# Patient Record
Sex: MALE | Race: BLACK OR AFRICAN AMERICAN | NOT HISPANIC OR LATINO | Employment: OTHER | URBAN - METROPOLITAN AREA
[De-identification: names, ages, dates, MRNs, and addresses within clinical notes are randomized per-mention and may not be internally consistent; named-entity substitution may affect disease eponyms.]

---

## 2019-09-21 ENCOUNTER — HOSPITAL ENCOUNTER (OUTPATIENT)
Dept: MEDSURG UNIT | Facility: HOSPITAL | Age: 49
End: 2019-09-23
Attending: INTERNAL MEDICINE | Admitting: INTERNAL MEDICINE

## 2019-09-21 LAB
ABS NEUT (OLG): 6.58 X10(3)/MCL (ref 2.1–9.2)
ALBUMIN SERPL-MCNC: 3.4 GM/DL (ref 3.4–5)
ALBUMIN/GLOB SERPL: 1.1 {RATIO}
ALP SERPL-CCNC: 51 UNIT/L (ref 50–136)
ALT SERPL-CCNC: 20 UNIT/L (ref 12–78)
AMPHET UR QL SCN: NEGATIVE
AST SERPL-CCNC: 12 UNIT/L (ref 15–37)
BARBITURATE SCN PRESENT UR: NEGATIVE
BASOPHILS # BLD AUTO: 0 X10(3)/MCL (ref 0–0.2)
BASOPHILS NFR BLD AUTO: 0 %
BENZODIAZ UR QL SCN: NEGATIVE
BILIRUB SERPL-MCNC: 1 MG/DL (ref 0.2–1)
BILIRUBIN DIRECT+TOT PNL SERPL-MCNC: 0.1 MG/DL (ref 0–0.2)
BILIRUBIN DIRECT+TOT PNL SERPL-MCNC: 0.9 MG/DL (ref 0–0.8)
BNP BLD-MCNC: 17 PG/ML (ref 0–100)
BUN SERPL-MCNC: 35 MG/DL (ref 7–18)
CALCIUM SERPL-MCNC: 8.7 MG/DL (ref 8.5–10.1)
CANNABINOIDS UR QL SCN: NEGATIVE
CHLORIDE SERPL-SCNC: 101 MMOL/L (ref 98–107)
CO2 SERPL-SCNC: 27 MMOL/L (ref 21–32)
COCAINE UR QL SCN: POSITIVE
CREAT SERPL-MCNC: 2.49 MG/DL (ref 0.7–1.3)
EOSINOPHIL # BLD AUTO: 0 X10(3)/MCL (ref 0–0.9)
EOSINOPHIL NFR BLD AUTO: 0 %
ERYTHROCYTE [DISTWIDTH] IN BLOOD BY AUTOMATED COUNT: 15.5 % (ref 11.5–17)
GLOBULIN SER-MCNC: 3.1 GM/DL (ref 2.4–3.5)
GLUCOSE SERPL-MCNC: 94 MG/DL (ref 74–106)
HCO3 UR-SCNC: 24.7 MMOL/L (ref 22–26)
HCT VFR BLD AUTO: 44.8 % (ref 42–52)
HGB BLD-MCNC: 14.2 GM/DL (ref 14–18)
LYMPHOCYTES # BLD AUTO: 3.1 X10(3)/MCL (ref 0.6–4.6)
LYMPHOCYTES NFR BLD AUTO: 28 %
MCH RBC QN AUTO: 29.5 PG (ref 27–31)
MCHC RBC AUTO-ENTMCNC: 31.7 GM/DL (ref 33–36)
MCV RBC AUTO: 92.9 FL (ref 80–94)
MONOCYTES # BLD AUTO: 1.2 X10(3)/MCL (ref 0.1–1.3)
MONOCYTES NFR BLD AUTO: 10 %
NEUTROPHILS # BLD AUTO: 6.58 X10(3)/MCL (ref 2.1–9.2)
NEUTROPHILS NFR BLD AUTO: 60 %
O2 HGB ARTERIAL: 91.3 % (ref 94–97)
OPIATES UR QL SCN: POSITIVE
PCO2 BLDA: 49 MMHG (ref 35–45)
PCP UR QL: NEGATIVE
PH SMN: 7.31 [PH] (ref 7.35–7.45)
PH UR STRIP.AUTO: 6 [PH] (ref 5–7.5)
PLATELET # BLD AUTO: 315 X10(3)/MCL (ref 130–400)
PMV BLD AUTO: 9.4 FL (ref 9.4–12.4)
PO2 BLDA: 73 MMHG (ref 80–100)
POC ALLENS TEST: ABNORMAL
POC BE: -2.1 (ref -2–3)
POC CAO2: 19.1 ML/DL (ref 17.6–24.3)
POC CO HGB: 1.8 %
POC CO2: 26.2 MMOL/L (ref 22–27)
POC IONIZED CALCIUM: 1.15 MMOL/L (ref 1.12–1.23)
POC MET HGB: 1.5 % (ref 0.4–1.5)
POC SAMPLESOURCE: ABNORMAL
POC SATURATED O2: 92.9 % (ref 96–97)
POC SITE: ABNORMAL
POC THB: 14.9 GM/DL (ref 13.5–18)
POC TREATMENT: ABNORMAL
POC TROPONIN: 0.01 NG/ML (ref 0–0.08)
POTASSIUM BLD-SCNC: 3.9 MMOL/L (ref 3.6–5)
POTASSIUM SERPL-SCNC: 3.5 MMOL/L (ref 3.5–5.1)
PROT SERPL-MCNC: 6.5 GM/DL (ref 6.4–8.2)
RBC # BLD AUTO: 4.82 X10(6)/MCL (ref 4.7–6.1)
SETTING 1 ABG: ABNORMAL
SETTING 2 ABG: ABNORMAL
SETTING 3 ABG: ABNORMAL
SETTING 4 ABG: ABNORMAL
SODIUM BLD-SCNC: 136 MMOL/L (ref 137–145)
SODIUM SERPL-SCNC: 138 MMOL/L (ref 136–145)
SP GR FLD REFRACTOMETRY: 1.01 (ref 1–1.03)
WBC # SPEC AUTO: 11 X10(3)/MCL (ref 4.5–11.5)

## 2019-09-22 LAB
ABS NEUT (OLG): 11.15 X10(3)/MCL (ref 2.1–9.2)
BASOPHILS # BLD AUTO: 0 X10(3)/MCL (ref 0–0.2)
BASOPHILS NFR BLD AUTO: 0 %
BUN SERPL-MCNC: 29 MG/DL (ref 7–18)
CALCIUM SERPL-MCNC: 8.4 MG/DL (ref 8.5–10.1)
CHLORIDE SERPL-SCNC: 102 MMOL/L (ref 98–107)
CO2 SERPL-SCNC: 31 MMOL/L (ref 21–32)
CREAT SERPL-MCNC: 1.22 MG/DL (ref 0.7–1.3)
CREAT/UREA NIT SERPL: 23.8
EOSINOPHIL # BLD AUTO: 0 X10(3)/MCL (ref 0–0.9)
EOSINOPHIL NFR BLD AUTO: 0 %
ERYTHROCYTE [DISTWIDTH] IN BLOOD BY AUTOMATED COUNT: 15.2 % (ref 11.5–17)
GLUCOSE SERPL-MCNC: 102 MG/DL (ref 74–106)
HCT VFR BLD AUTO: 40.7 % (ref 42–52)
HGB BLD-MCNC: 12.7 GM/DL (ref 14–18)
LYMPHOCYTES # BLD AUTO: 1.2 X10(3)/MCL (ref 0.6–4.6)
LYMPHOCYTES NFR BLD AUTO: 9 %
MCH RBC QN AUTO: 29.3 PG (ref 27–31)
MCHC RBC AUTO-ENTMCNC: 31.2 GM/DL (ref 33–36)
MCV RBC AUTO: 93.8 FL (ref 80–94)
MONOCYTES # BLD AUTO: 1 X10(3)/MCL (ref 0.1–1.3)
MONOCYTES NFR BLD AUTO: 7 %
NEUTROPHILS # BLD AUTO: 11.15 X10(3)/MCL (ref 2.1–9.2)
NEUTROPHILS NFR BLD AUTO: 82 %
PLATELET # BLD AUTO: 258 X10(3)/MCL (ref 130–400)
PMV BLD AUTO: 9.4 FL (ref 9.4–12.4)
POTASSIUM SERPL-SCNC: 4 MMOL/L (ref 3.5–5.1)
RBC # BLD AUTO: 4.34 X10(6)/MCL (ref 4.7–6.1)
SODIUM SERPL-SCNC: 140 MMOL/L (ref 136–145)
WBC # SPEC AUTO: 13.5 X10(3)/MCL (ref 4.5–11.5)

## 2019-09-23 ENCOUNTER — HOSPITAL ENCOUNTER (OUTPATIENT)
Dept: INTENSIVE CARE | Facility: HOSPITAL | Age: 49
End: 2019-09-25
Attending: INTERNAL MEDICINE | Admitting: INTERNAL MEDICINE

## 2019-09-23 LAB
ABS NEUT (OLG): 6.7 X10(3)/MCL (ref 2.1–9.2)
ALBUMIN SERPL-MCNC: 3.5 GM/DL (ref 3.4–5)
ALBUMIN/GLOB SERPL: 0.9 RATIO (ref 1.1–2)
ALP SERPL-CCNC: 64 UNIT/L (ref 45–117)
ALT SERPL-CCNC: 30 UNIT/L (ref 12–78)
AST SERPL-CCNC: 21 UNIT/L (ref 15–37)
BASOPHILS # BLD AUTO: 0 X10(3)/MCL (ref 0–0.2)
BASOPHILS NFR BLD AUTO: 0 %
BILIRUB SERPL-MCNC: 0.3 MG/DL (ref 0.2–1)
BILIRUBIN DIRECT+TOT PNL SERPL-MCNC: 0.1 MG/DL (ref 0–0.2)
BILIRUBIN DIRECT+TOT PNL SERPL-MCNC: 0.2 MG/DL
BUN SERPL-MCNC: 24 MG/DL (ref 7–18)
BUN SERPL-MCNC: 24 MG/DL (ref 7–18)
CALCIUM SERPL-MCNC: 8.5 MG/DL (ref 8.5–10.1)
CALCIUM SERPL-MCNC: 8.8 MG/DL (ref 8.5–10.1)
CHLORIDE SERPL-SCNC: 103 MMOL/L (ref 98–107)
CHLORIDE SERPL-SCNC: 103 MMOL/L (ref 98–107)
CK MB SERPL-MCNC: <1 NG/ML (ref 1–3.6)
CK SERPL-CCNC: 68 UNIT/L (ref 39–308)
CO2 SERPL-SCNC: 31 MMOL/L (ref 21–32)
CO2 SERPL-SCNC: 32 MMOL/L (ref 21–32)
CREAT SERPL-MCNC: 0.99 MG/DL (ref 0.7–1.3)
CREAT SERPL-MCNC: 1.1 MG/DL (ref 0.6–1.3)
CREAT/UREA NIT SERPL: 24.2
ERYTHROCYTE [DISTWIDTH] IN BLOOD BY AUTOMATED COUNT: 15.4 % (ref 11.5–17)
ERYTHROCYTE [DISTWIDTH] IN BLOOD BY AUTOMATED COUNT: 15.6 % (ref 11.5–14.5)
GLOBULIN SER-MCNC: 4 GM/ML (ref 2.3–3.5)
GLUCOSE SERPL-MCNC: 113 MG/DL (ref 74–106)
GLUCOSE SERPL-MCNC: 81 MG/DL (ref 74–106)
HCO3 UR-SCNC: 32 MMOL/L (ref 22–26)
HCT VFR BLD AUTO: 39.4 % (ref 42–52)
HCT VFR BLD AUTO: 46 % (ref 40–51)
HGB BLD-MCNC: 12.4 GM/DL (ref 14–18)
HGB BLD-MCNC: 14.6 GM/DL (ref 13.5–17.5)
IMM GRANULOCYTES # BLD AUTO: 0.1 10*3/UL
IMM GRANULOCYTES NFR BLD AUTO: 1 %
LYMPHOCYTES # BLD AUTO: 1.2 X10(3)/MCL (ref 0.6–4.6)
LYMPHOCYTES NFR BLD AUTO: 14 %
MCH RBC QN AUTO: 29.6 PG (ref 27–31)
MCH RBC QN AUTO: 29.9 PG (ref 26–34)
MCHC RBC AUTO-ENTMCNC: 31.5 GM/DL (ref 33–36)
MCHC RBC AUTO-ENTMCNC: 31.7 GM/DL (ref 31–37)
MCV RBC AUTO: 94 FL (ref 80–94)
MCV RBC AUTO: 94.1 FL (ref 80–100)
MONOCYTES # BLD AUTO: 0.6 X10(3)/MCL (ref 0.1–1.3)
MONOCYTES NFR BLD AUTO: 6 %
NEUTROPHILS # BLD AUTO: 6.7 X10(3)/MCL (ref 2.1–9.2)
NEUTROPHILS NFR BLD AUTO: 78 %
O2 HGB ARTERIAL: 92.6 % (ref 94–100)
PCO2 BLDA: 58 MMHG (ref 35–45)
PH SMN: 7.35 [PH] (ref 7.35–7.45)
PLATELET # BLD AUTO: 235 X10(3)/MCL (ref 130–400)
PLATELET # BLD AUTO: 336 X10(3)/MCL (ref 130–400)
PMV BLD AUTO: 9.3 FL (ref 7.4–10.4)
PMV BLD AUTO: 9.4 FL (ref 9.4–12.4)
PO2 BLDA: 68 MMHG (ref 80–100)
POC ALLENS TEST: POSITIVE
POC BE: 4.6 (ref -2–2)
POC CO HGB: 1.4 %
POC CO2: 33.8 MMOL/L (ref 22–26)
POC MET HGB: 0.6 % (ref 0.4–1.5)
POC SAMPLESOURCE: ABNORMAL
POC SATURATED O2: 94.5 %
POC SITE: ABNORMAL
POC THB: 14.7 GM/DL (ref 12–16)
POC TREATMENT: ABNORMAL
POTASSIUM SERPL-SCNC: 3.9 MMOL/L (ref 3.5–5.1)
POTASSIUM SERPL-SCNC: 4.5 MMOL/L (ref 3.5–5.1)
PROT SERPL-MCNC: 7.5 GM/DL (ref 6.4–8.2)
RBC # BLD AUTO: 4.19 X10(6)/MCL (ref 4.7–6.1)
RBC # BLD AUTO: 4.89 X10(6)/MCL (ref 4.5–5.9)
SODIUM SERPL-SCNC: 139 MMOL/L (ref 136–145)
SODIUM SERPL-SCNC: 140 MMOL/L (ref 136–145)
TROPONIN I SERPL-MCNC: <0.015 NG/ML (ref 0–0.05)
WBC # SPEC AUTO: 8.6 X10(3)/MCL (ref 4.5–11)
WBC # SPEC AUTO: 9.3 X10(3)/MCL (ref 4.5–11.5)

## 2019-09-24 LAB
ABS NEUT (OLG): 9.12 X10(3)/MCL (ref 2.1–9.2)
ALBUMIN SERPL-MCNC: 2.9 GM/DL (ref 3.4–5)
ALBUMIN/GLOB SERPL: 0.8 RATIO (ref 1.1–2)
ALP SERPL-CCNC: 51 UNIT/L (ref 45–117)
ALT SERPL-CCNC: 19 UNIT/L (ref 12–78)
AST SERPL-CCNC: 7 UNIT/L (ref 15–37)
BASOPHILS # BLD AUTO: 0 X10(3)/MCL (ref 0–0.2)
BASOPHILS NFR BLD AUTO: 0 %
BILIRUB SERPL-MCNC: 0.2 MG/DL (ref 0.2–1)
BILIRUBIN DIRECT+TOT PNL SERPL-MCNC: <0.1 MG/DL (ref 0–0.2)
BILIRUBIN DIRECT+TOT PNL SERPL-MCNC: ABNORMAL MG/DL
BUN SERPL-MCNC: 23 MG/DL (ref 7–18)
CALCIUM SERPL-MCNC: 8.7 MG/DL (ref 8.5–10.1)
CHLORIDE SERPL-SCNC: 105 MMOL/L (ref 98–107)
CO2 SERPL-SCNC: 29 MMOL/L (ref 21–32)
CREAT SERPL-MCNC: 1 MG/DL (ref 0.6–1.3)
ERYTHROCYTE [DISTWIDTH] IN BLOOD BY AUTOMATED COUNT: 15.4 % (ref 11.5–14.5)
GLOBULIN SER-MCNC: 3.7 GM/ML (ref 2.3–3.5)
GLUCOSE SERPL-MCNC: 132 MG/DL (ref 74–106)
HCT VFR BLD AUTO: 40.7 % (ref 40–51)
HGB BLD-MCNC: 13.1 GM/DL (ref 13.5–17.5)
IMM GRANULOCYTES # BLD AUTO: 0.09 10*3/UL
IMM GRANULOCYTES NFR BLD AUTO: 1 %
LYMPHOCYTES # BLD AUTO: 0.6 X10(3)/MCL (ref 0.6–4.6)
LYMPHOCYTES NFR BLD AUTO: 6 %
MCH RBC QN AUTO: 29.8 PG (ref 26–34)
MCHC RBC AUTO-ENTMCNC: 32.2 GM/DL (ref 31–37)
MCV RBC AUTO: 92.5 FL (ref 80–100)
MONOCYTES # BLD AUTO: 0.4 X10(3)/MCL (ref 0.1–1.3)
MONOCYTES NFR BLD AUTO: 3 %
NEUTROPHILS # BLD AUTO: 9.12 X10(3)/MCL (ref 2.1–9.2)
NEUTROPHILS NFR BLD AUTO: 89 %
PLATELET # BLD AUTO: 297 X10(3)/MCL (ref 130–400)
PMV BLD AUTO: 9.7 FL (ref 7.4–10.4)
POTASSIUM SERPL-SCNC: 4.8 MMOL/L (ref 3.5–5.1)
PROT SERPL-MCNC: 6.6 GM/DL (ref 6.4–8.2)
RBC # BLD AUTO: 4.4 X10(6)/MCL (ref 4.5–5.9)
SODIUM SERPL-SCNC: 139 MMOL/L (ref 136–145)
T4 FREE SERPL-MCNC: 0.78 NG/DL (ref 0.76–1.46)
TSH SERPL-ACNC: 0.14 MIU/L (ref 0.36–3.74)
WBC # SPEC AUTO: 10.2 X10(3)/MCL (ref 4.5–11)

## 2019-09-25 LAB
ABS NEUT (OLG): 8.87 X10(3)/MCL (ref 2.1–9.2)
ALBUMIN SERPL-MCNC: 2.6 GM/DL (ref 3.4–5)
ALBUMIN/GLOB SERPL: 0.6 RATIO (ref 1.1–2)
ALP SERPL-CCNC: 55 UNIT/L (ref 45–117)
ALT SERPL-CCNC: 24 UNIT/L (ref 12–78)
AST SERPL-CCNC: 36 UNIT/L (ref 15–37)
BASOPHILS # BLD AUTO: 0 X10(3)/MCL (ref 0–0.2)
BASOPHILS NFR BLD AUTO: 0 %
BILIRUB SERPL-MCNC: 0.8 MG/DL (ref 0.2–1)
BUN SERPL-MCNC: 23 MG/DL (ref 7–18)
BUN SERPL-MCNC: 24 MG/DL (ref 7–18)
CALCIUM SERPL-MCNC: 8.6 MG/DL (ref 8.5–10.1)
CALCIUM SERPL-MCNC: 8.8 MG/DL (ref 8.5–10.1)
CHLORIDE SERPL-SCNC: 104 MMOL/L (ref 98–107)
CHLORIDE SERPL-SCNC: 105 MMOL/L (ref 98–107)
CO2 SERPL-SCNC: 28 MMOL/L (ref 21–32)
CO2 SERPL-SCNC: 32 MMOL/L (ref 21–32)
CREAT SERPL-MCNC: 1 MG/DL (ref 0.6–1.3)
CREAT SERPL-MCNC: 1.2 MG/DL (ref 0.6–1.3)
CREAT/UREA NIT SERPL: 19
EOSINOPHIL # BLD AUTO: 0 X10(3)/MCL (ref 0–0.9)
EOSINOPHIL NFR BLD AUTO: 0 %
ERYTHROCYTE [DISTWIDTH] IN BLOOD BY AUTOMATED COUNT: 15.4 % (ref 11.5–14.5)
GLOBULIN SER-MCNC: 4 GM/ML (ref 2.3–3.5)
GLUCOSE SERPL-MCNC: 92 MG/DL (ref 74–106)
GLUCOSE SERPL-MCNC: 98 MG/DL (ref 74–106)
HCT VFR BLD AUTO: 43.1 % (ref 40–51)
HGB BLD-MCNC: 13.7 GM/DL (ref 13.5–17.5)
IMM GRANULOCYTES # BLD AUTO: 0.16 10*3/UL
IMM GRANULOCYTES NFR BLD AUTO: 1 %
LYMPHOCYTES # BLD AUTO: 2 X10(3)/MCL (ref 0.6–4.6)
LYMPHOCYTES NFR BLD AUTO: 17 %
MCH RBC QN AUTO: 30.1 PG (ref 26–34)
MCHC RBC AUTO-ENTMCNC: 31.8 GM/DL (ref 31–37)
MCV RBC AUTO: 94.7 FL (ref 80–100)
MONOCYTES # BLD AUTO: 1.1 X10(3)/MCL (ref 0.1–1.3)
MONOCYTES NFR BLD AUTO: 9 %
NEUTROPHILS # BLD AUTO: 8.87 X10(3)/MCL (ref 2.1–9.2)
NEUTROPHILS NFR BLD AUTO: 72 %
PLATELET # BLD AUTO: 270 X10(3)/MCL (ref 130–400)
PMV BLD AUTO: 10.2 FL (ref 7.4–10.4)
POTASSIUM SERPL-SCNC: 3.7 MMOL/L (ref 3.5–5.1)
POTASSIUM SERPL-SCNC: 5.5 MMOL/L (ref 3.5–5.1)
PROT SERPL-MCNC: 6.6 GM/DL (ref 6.4–8.2)
RBC # BLD AUTO: 4.55 X10(6)/MCL (ref 4.5–5.9)
SODIUM SERPL-SCNC: 138 MMOL/L (ref 136–145)
SODIUM SERPL-SCNC: 140 MMOL/L (ref 136–145)
WBC # SPEC AUTO: 12.2 X10(3)/MCL (ref 4.5–11)

## 2022-01-01 ENCOUNTER — PATIENT OUTREACH (OUTPATIENT)
Dept: ADMINISTRATIVE | Facility: CLINIC | Age: 52
End: 2022-01-01
Payer: MEDICAID

## 2022-01-01 ENCOUNTER — DOCUMENTATION ONLY (OUTPATIENT)
Dept: PHARMACY | Facility: HOSPITAL | Age: 52
End: 2022-01-01
Payer: MEDICAID

## 2022-01-01 ENCOUNTER — HOSPITAL ENCOUNTER (OUTPATIENT)
Facility: HOSPITAL | Age: 52
Discharge: HOME | End: 2022-11-02
Attending: EMERGENCY MEDICINE | Admitting: INTERNAL MEDICINE
Payer: MEDICAID

## 2022-01-01 ENCOUNTER — HOSPITAL ENCOUNTER (INPATIENT)
Facility: HOSPITAL | Age: 52
LOS: 2 days | Discharge: HOME | DRG: 305 | End: 2022-08-20
Attending: EMERGENCY MEDICINE | Admitting: STUDENT IN AN ORGANIZED HEALTH CARE EDUCATION/TRAINING PROGRAM
Payer: MEDICAID

## 2022-01-01 ENCOUNTER — HISTORICAL (OUTPATIENT)
Dept: ADMINISTRATIVE | Facility: HOSPITAL | Age: 52
End: 2022-01-01
Payer: MEDICAID

## 2022-01-01 ENCOUNTER — HOSPITAL ENCOUNTER (INPATIENT)
Facility: HOSPITAL | Age: 52
LOS: 3 days | Discharge: HOME | DRG: 291 | End: 2022-10-23
Attending: EMERGENCY MEDICINE | Admitting: INTERNAL MEDICINE
Payer: MEDICAID

## 2022-01-01 ENCOUNTER — HOSPITAL ENCOUNTER (INPATIENT)
Facility: HOSPITAL | Age: 52
LOS: 5 days | Discharge: HOME | DRG: 308 | End: 2022-05-25
Attending: STUDENT IN AN ORGANIZED HEALTH CARE EDUCATION/TRAINING PROGRAM | Admitting: STUDENT IN AN ORGANIZED HEALTH CARE EDUCATION/TRAINING PROGRAM
Payer: MEDICAID

## 2022-01-01 ENCOUNTER — HOSPITAL ENCOUNTER (INPATIENT)
Facility: HOSPITAL | Age: 52
LOS: 3 days | Discharge: HOME | DRG: 291 | End: 2022-06-10
Attending: STUDENT IN AN ORGANIZED HEALTH CARE EDUCATION/TRAINING PROGRAM | Admitting: HOSPITALIST
Payer: MEDICAID

## 2022-01-01 ENCOUNTER — HOSPITAL ENCOUNTER (INPATIENT)
Facility: HOSPITAL | Age: 52
LOS: 2 days | DRG: 208 | End: 2022-12-31
Attending: FAMILY MEDICINE | Admitting: INTERNAL MEDICINE
Payer: MEDICAID

## 2022-01-01 ENCOUNTER — HOSPITAL ENCOUNTER (INPATIENT)
Facility: HOSPITAL | Age: 52
LOS: 3 days | Discharge: HOME | DRG: 190 | End: 2022-07-27
Attending: EMERGENCY MEDICINE | Admitting: INTERNAL MEDICINE
Payer: MEDICAID

## 2022-01-01 ENCOUNTER — HOSPITAL ENCOUNTER (INPATIENT)
Facility: HOSPITAL | Age: 52
LOS: 3 days | Discharge: HOME | DRG: 308 | End: 2022-09-25
Attending: EMERGENCY MEDICINE | Admitting: INTERNAL MEDICINE
Payer: MEDICAID

## 2022-01-01 VITALS
WEIGHT: 315 LBS | RESPIRATION RATE: 18 BRPM | DIASTOLIC BLOOD PRESSURE: 81 MMHG | TEMPERATURE: 98 F | HEART RATE: 120 BPM | OXYGEN SATURATION: 95 % | BODY MASS INDEX: 47.63 KG/M2 | SYSTOLIC BLOOD PRESSURE: 140 MMHG

## 2022-01-01 VITALS
TEMPERATURE: 98 F | RESPIRATION RATE: 22 BRPM | HEART RATE: 96 BPM | WEIGHT: 315 LBS | BODY MASS INDEX: 42.66 KG/M2 | SYSTOLIC BLOOD PRESSURE: 150 MMHG | OXYGEN SATURATION: 96 % | HEIGHT: 72 IN | DIASTOLIC BLOOD PRESSURE: 87 MMHG

## 2022-01-01 VITALS
DIASTOLIC BLOOD PRESSURE: 72 MMHG | WEIGHT: 315 LBS | BODY MASS INDEX: 42.66 KG/M2 | OXYGEN SATURATION: 93 % | TEMPERATURE: 98 F | RESPIRATION RATE: 18 BRPM | HEIGHT: 72 IN | HEART RATE: 108 BPM | SYSTOLIC BLOOD PRESSURE: 110 MMHG

## 2022-01-01 VITALS
HEART RATE: 98 BPM | HEIGHT: 72 IN | BODY MASS INDEX: 42.66 KG/M2 | WEIGHT: 315 LBS | DIASTOLIC BLOOD PRESSURE: 81 MMHG | TEMPERATURE: 97 F | OXYGEN SATURATION: 99 % | SYSTOLIC BLOOD PRESSURE: 110 MMHG | RESPIRATION RATE: 18 BRPM

## 2022-01-01 VITALS
DIASTOLIC BLOOD PRESSURE: 68 MMHG | WEIGHT: 315 LBS | SYSTOLIC BLOOD PRESSURE: 116 MMHG | BODY MASS INDEX: 41.75 KG/M2 | HEIGHT: 73 IN

## 2022-01-01 VITALS
BODY MASS INDEX: 42.66 KG/M2 | WEIGHT: 315 LBS | SYSTOLIC BLOOD PRESSURE: 136 MMHG | OXYGEN SATURATION: 99 % | TEMPERATURE: 98 F | HEIGHT: 72 IN | HEART RATE: 72 BPM | DIASTOLIC BLOOD PRESSURE: 83 MMHG | RESPIRATION RATE: 18 BRPM

## 2022-01-01 VITALS
RESPIRATION RATE: 18 BRPM | HEIGHT: 72 IN | DIASTOLIC BLOOD PRESSURE: 78 MMHG | SYSTOLIC BLOOD PRESSURE: 114 MMHG | TEMPERATURE: 98 F | HEART RATE: 94 BPM | WEIGHT: 315 LBS | OXYGEN SATURATION: 94 % | BODY MASS INDEX: 42.66 KG/M2

## 2022-01-01 VITALS
WEIGHT: 315 LBS | HEIGHT: 72 IN | DIASTOLIC BLOOD PRESSURE: 47 MMHG | OXYGEN SATURATION: 95 % | BODY MASS INDEX: 42.66 KG/M2 | SYSTOLIC BLOOD PRESSURE: 73 MMHG | RESPIRATION RATE: 26 BRPM | TEMPERATURE: 96 F

## 2022-01-01 VITALS
RESPIRATION RATE: 16 BRPM | TEMPERATURE: 98 F | HEART RATE: 82 BPM | OXYGEN SATURATION: 96 % | HEIGHT: 73 IN | BODY MASS INDEX: 41.75 KG/M2 | SYSTOLIC BLOOD PRESSURE: 132 MMHG | WEIGHT: 315 LBS | DIASTOLIC BLOOD PRESSURE: 95 MMHG

## 2022-01-01 DIAGNOSIS — R06.02 SOB (SHORTNESS OF BREATH): ICD-10-CM

## 2022-01-01 DIAGNOSIS — I50.9 ACUTE ON CHRONIC CONGESTIVE HEART FAILURE, UNSPECIFIED HEART FAILURE TYPE: ICD-10-CM

## 2022-01-01 DIAGNOSIS — I48.92 ATRIAL FLUTTER WITH RAPID VENTRICULAR RESPONSE: ICD-10-CM

## 2022-01-01 DIAGNOSIS — J44.1 COPD EXACERBATION: Primary | ICD-10-CM

## 2022-01-01 DIAGNOSIS — I50.9 CHF (CONGESTIVE HEART FAILURE): ICD-10-CM

## 2022-01-01 DIAGNOSIS — R06.02 SOB (SHORTNESS OF BREATH): Primary | ICD-10-CM

## 2022-01-01 DIAGNOSIS — R07.9 CHEST PAIN: ICD-10-CM

## 2022-01-01 DIAGNOSIS — I50.9 CHF EXACERBATION: ICD-10-CM

## 2022-01-01 DIAGNOSIS — N17.9 AKI (ACUTE KIDNEY INJURY): ICD-10-CM

## 2022-01-01 DIAGNOSIS — I49.9 ARRHYTHMIA: ICD-10-CM

## 2022-01-01 DIAGNOSIS — I48.91 ATRIAL FIBRILLATION WITH RVR: ICD-10-CM

## 2022-01-01 DIAGNOSIS — J44.9 CHRONIC OBSTRUCTIVE PULMONARY DISEASE, UNSPECIFIED COPD TYPE: Primary | ICD-10-CM

## 2022-01-01 DIAGNOSIS — R06.02 SHORTNESS OF BREATH: ICD-10-CM

## 2022-01-01 DIAGNOSIS — I50.43 ACUTE ON CHRONIC COMBINED SYSTOLIC AND DIASTOLIC CONGESTIVE HEART FAILURE: ICD-10-CM

## 2022-01-01 DIAGNOSIS — I46.9 CARDIAC ARREST: Primary | ICD-10-CM

## 2022-01-01 DIAGNOSIS — I48.91 AFIB: ICD-10-CM

## 2022-01-01 DIAGNOSIS — I50.43 CHF (CONGESTIVE HEART FAILURE), NYHA CLASS I, ACUTE ON CHRONIC, COMBINED: Primary | ICD-10-CM

## 2022-01-01 DIAGNOSIS — J44.1 COPD EXACERBATION: ICD-10-CM

## 2022-01-01 DIAGNOSIS — G47.33 OSA (OBSTRUCTIVE SLEEP APNEA): Primary | ICD-10-CM

## 2022-01-01 DIAGNOSIS — I48.91 ATRIAL FIBRILLATION STATUS POST CARDIOVERSION: ICD-10-CM

## 2022-01-01 DIAGNOSIS — F14.10 COCAINE ABUSE: ICD-10-CM

## 2022-01-01 DIAGNOSIS — J96.01 ACUTE RESPIRATORY FAILURE WITH HYPOXIA: ICD-10-CM

## 2022-01-01 DIAGNOSIS — J96.01 ACUTE RESPIRATORY FAILURE WITH HYPOXIA AND HYPERCAPNIA: Primary | ICD-10-CM

## 2022-01-01 DIAGNOSIS — J96.22 ACUTE ON CHRONIC RESPIRATORY FAILURE WITH HYPOXIA AND HYPERCAPNIA: ICD-10-CM

## 2022-01-01 DIAGNOSIS — R06.03 ACUTE RESPIRATORY DISTRESS: Primary | ICD-10-CM

## 2022-01-01 DIAGNOSIS — I50.9 CONGESTIVE HEART FAILURE, UNSPECIFIED HF CHRONICITY, UNSPECIFIED HEART FAILURE TYPE: ICD-10-CM

## 2022-01-01 DIAGNOSIS — J96.02 ACUTE RESPIRATORY FAILURE WITH HYPOXIA AND HYPERCARBIA: ICD-10-CM

## 2022-01-01 DIAGNOSIS — J96.01 ACUTE RESPIRATORY FAILURE WITH HYPOXIA AND HYPERCARBIA: ICD-10-CM

## 2022-01-01 DIAGNOSIS — G93.1: ICD-10-CM

## 2022-01-01 DIAGNOSIS — I50.43 CHF (CONGESTIVE HEART FAILURE), NYHA CLASS I, ACUTE ON CHRONIC, COMBINED: ICD-10-CM

## 2022-01-01 DIAGNOSIS — J44.1 ACUTE EXACERBATION OF CHRONIC OBSTRUCTIVE PULMONARY DISEASE (COPD): ICD-10-CM

## 2022-01-01 DIAGNOSIS — I48.92 ATRIAL FLUTTER: ICD-10-CM

## 2022-01-01 DIAGNOSIS — R09.02 HYPOXIA: ICD-10-CM

## 2022-01-01 DIAGNOSIS — R73.9 HYPERGLYCEMIA: ICD-10-CM

## 2022-01-01 DIAGNOSIS — I50.30 (HFPEF) HEART FAILURE WITH PRESERVED EJECTION FRACTION: ICD-10-CM

## 2022-01-01 DIAGNOSIS — I48.91 A-FIB: ICD-10-CM

## 2022-01-01 DIAGNOSIS — E11.9 TYPE 2 DIABETES MELLITUS WITHOUT COMPLICATION, WITHOUT LONG-TERM CURRENT USE OF INSULIN: ICD-10-CM

## 2022-01-01 DIAGNOSIS — R07.9 CHEST PAIN, UNSPECIFIED TYPE: ICD-10-CM

## 2022-01-01 DIAGNOSIS — I48.91 ATRIAL FIBRILLATION AND FLUTTER: ICD-10-CM

## 2022-01-01 DIAGNOSIS — J96.21 ACUTE ON CHRONIC RESPIRATORY FAILURE WITH HYPOXIA AND HYPERCAPNIA: ICD-10-CM

## 2022-01-01 DIAGNOSIS — I48.92 ATRIAL FIBRILLATION AND FLUTTER: ICD-10-CM

## 2022-01-01 DIAGNOSIS — R06.00 DYSPNEA: ICD-10-CM

## 2022-01-01 DIAGNOSIS — J96.02 ACUTE RESPIRATORY FAILURE WITH HYPOXIA AND HYPERCAPNIA: Primary | ICD-10-CM

## 2022-01-01 DIAGNOSIS — I16.0 HYPERTENSIVE URGENCY: Primary | ICD-10-CM

## 2022-01-01 DIAGNOSIS — I48.92 ATRIAL FLUTTER, UNSPECIFIED TYPE: ICD-10-CM

## 2022-01-01 DIAGNOSIS — I10 PRIMARY HYPERTENSION: ICD-10-CM

## 2022-01-01 DIAGNOSIS — I48.91 ATRIAL FIBRILLATION: ICD-10-CM

## 2022-01-01 DIAGNOSIS — J96.22 ACUTE ON CHRONIC RESPIRATORY FAILURE WITH HYPERCAPNIA: ICD-10-CM

## 2022-01-01 DIAGNOSIS — I16.1 HYPERTENSIVE EMERGENCY: ICD-10-CM

## 2022-01-01 LAB
ALBUMIN SERPL-MCNC: 3 G/DL (ref 3.5–5)
ALBUMIN SERPL-MCNC: 3.2 GM/DL (ref 3.5–5)
ALBUMIN SERPL-MCNC: 3.3 GM/DL (ref 3.5–5)
ALBUMIN SERPL-MCNC: 3.4 GM/DL (ref 3.5–5)
ALBUMIN SERPL-MCNC: 3.5 GM/DL (ref 3.5–5)
ALBUMIN SERPL-MCNC: 3.5 GM/DL (ref 3.5–5)
ALBUMIN SERPL-MCNC: 3.6 GM/DL (ref 3.5–5)
ALBUMIN SERPL-MCNC: 3.7 G/DL (ref 3.5–5)
ALBUMIN SERPL-MCNC: 3.7 GM/DL (ref 3.5–5)
ALBUMIN SERPL-MCNC: 3.7 GM/DL (ref 3.5–5)
ALBUMIN SERPL-MCNC: 3.8 GM/DL (ref 3.5–5)
ALBUMIN SERPL-MCNC: 3.9 GM/DL (ref 3.5–5)
ALBUMIN/GLOB SERPL: 0.9 RATIO (ref 1.1–2)
ALBUMIN/GLOB SERPL: 1 RATIO (ref 1.1–2)
ALBUMIN/GLOB SERPL: 1.1 RATIO (ref 1.1–2)
ALBUMIN/GLOB SERPL: 1.2 RATIO (ref 1.1–2)
ALP SERPL-CCNC: 112 UNIT/L (ref 40–150)
ALP SERPL-CCNC: 50 UNIT/L (ref 40–150)
ALP SERPL-CCNC: 52 UNIT/L (ref 40–150)
ALP SERPL-CCNC: 54 UNIT/L (ref 40–150)
ALP SERPL-CCNC: 55 UNIT/L (ref 40–150)
ALP SERPL-CCNC: 55 UNIT/L (ref 40–150)
ALP SERPL-CCNC: 60 UNIT/L (ref 40–150)
ALP SERPL-CCNC: 62 UNIT/L (ref 40–150)
ALP SERPL-CCNC: 62 UNIT/L (ref 40–150)
ALP SERPL-CCNC: 64 UNIT/L (ref 40–150)
ALP SERPL-CCNC: 64 UNIT/L (ref 40–150)
ALP SERPL-CCNC: 65 UNIT/L (ref 40–150)
ALP SERPL-CCNC: 66 UNIT/L (ref 40–150)
ALP SERPL-CCNC: 67 UNIT/L (ref 40–150)
ALP SERPL-CCNC: 67 UNIT/L (ref 40–150)
ALP SERPL-CCNC: 68 UNIT/L (ref 40–150)
ALP SERPL-CCNC: 70 UNIT/L (ref 40–150)
ALP SERPL-CCNC: 71 UNIT/L (ref 40–150)
ALP SERPL-CCNC: 88 UNIT/L (ref 40–150)
ALT SERPL-CCNC: 10 UNIT/L (ref 0–55)
ALT SERPL-CCNC: 11 UNIT/L (ref 0–55)
ALT SERPL-CCNC: 11 UNIT/L (ref 0–55)
ALT SERPL-CCNC: 13 UNIT/L (ref 0–55)
ALT SERPL-CCNC: 17 UNIT/L (ref 0–55)
ALT SERPL-CCNC: 5 UNIT/L (ref 0–55)
ALT SERPL-CCNC: 6 UNIT/L (ref 0–55)
ALT SERPL-CCNC: 7 UNIT/L (ref 0–55)
ALT SERPL-CCNC: 8 UNIT/L (ref 0–55)
ALT SERPL-CCNC: 9 UNIT/L (ref 0–55)
AMPHET UR QL SCN: NEGATIVE
ANION GAP SERPL CALC-SCNC: 10 MEQ/L
ANION GAP SERPL CALC-SCNC: 11 MEQ/L
ANION GAP SERPL CALC-SCNC: 12 MEQ/L
ANION GAP SERPL CALC-SCNC: 12 MMOL/L (ref 8–16)
ANION GAP SERPL CALC-SCNC: 16 MEQ/L
ANION GAP SERPL CALC-SCNC: 20 MEQ/L
ANION GAP SERPL CALC-SCNC: 21 MEQ/L
ANION GAP SERPL CALC-SCNC: 21 MEQ/L
ANION GAP SERPL CALC-SCNC: 23 MEQ/L
ANION GAP SERPL CALC-SCNC: 8 MEQ/L
ANION GAP SERPL CALC-SCNC: 9 MEQ/L
APTT PPP: 20.6 SECONDS (ref 23.2–33.7)
APTT PPP: 28.3 SECONDS (ref 23.2–33.7)
APTT PPP: 33 SECONDS
APTT PPP: 33.9 SECONDS
APTT PPP: 35.6 SECONDS
APTT PPP: >200 SECONDS
AST SERPL-CCNC: 10 UNIT/L (ref 5–34)
AST SERPL-CCNC: 11 UNIT/L (ref 5–34)
AST SERPL-CCNC: 12 UNIT/L (ref 5–34)
AST SERPL-CCNC: 13 UNIT/L (ref 5–34)
AST SERPL-CCNC: 13 UNIT/L (ref 5–34)
AST SERPL-CCNC: 14 UNIT/L (ref 5–34)
AST SERPL-CCNC: 16 UNIT/L (ref 5–34)
AST SERPL-CCNC: 17 UNIT/L (ref 5–34)
AST SERPL-CCNC: 33 UNIT/L (ref 5–34)
AST SERPL-CCNC: 7 UNIT/L (ref 5–34)
AST SERPL-CCNC: 8 UNIT/L (ref 5–34)
AST SERPL-CCNC: 9 UNIT/L (ref 5–34)
AST SERPL-CCNC: 97 UNIT/L (ref 5–34)
AV INDEX (PROSTH): 0.56
AV INDEX (PROSTH): 0.9
AV MEAN GRADIENT: 3 MMHG
AV MEAN GRADIENT: 3 MMHG
AV PEAK GRADIENT: 5 MMHG
AV PEAK GRADIENT: 6 MMHG
AV VALVE AREA: 1.95 CM2
AV VALVE AREA: 3.11 CM2
AV VELOCITY RATIO: 0.66
AV VELOCITY RATIO: 0.87
B-OH-BUTYR SERPL-MCNC: 0.2 MMOL/L
BARBITURATE SCN PRESENT UR: NEGATIVE
BASE EXCESS ARTERIAL: -0.3 MMOL/L (ref -2–2)
BASE EXCESS ARTERIAL: NORMAL
BASOPHILS # BLD AUTO: 0.01 X10(3)/MCL (ref 0–0.2)
BASOPHILS # BLD AUTO: 0.02 X10(3)/MCL (ref 0–0.2)
BASOPHILS # BLD AUTO: 0.03 X10(3)/MCL (ref 0–0.2)
BASOPHILS # BLD AUTO: 0.04 X10(3)/MCL (ref 0–0.2)
BASOPHILS # BLD AUTO: 0.05 X10(3)/MCL (ref 0–0.2)
BASOPHILS # BLD AUTO: 0.06 X10(3)/MCL (ref 0–0.2)
BASOPHILS NFR BLD AUTO: 0.1 %
BASOPHILS NFR BLD AUTO: 0.2 %
BASOPHILS NFR BLD AUTO: 0.3 %
BASOPHILS NFR BLD AUTO: 0.3 %
BASOPHILS NFR BLD AUTO: 0.4 %
BASOPHILS NFR BLD AUTO: 0.5 %
BASOPHILS NFR BLD AUTO: 0.5 %
BASOPHILS NFR BLD AUTO: 0.6 %
BASOPHILS NFR BLD AUTO: 0.6 %
BASOPHILS NFR BLD AUTO: 0.7 %
BASOPHILS NFR BLD AUTO: 0.7 %
BASOPHILS NFR BLD AUTO: 0.8 %
BENZODIAZ UR QL SCN: NEGATIVE
BILIRUBIN DIRECT+TOT PNL SERPL-MCNC: 0.2 MG/DL
BILIRUBIN DIRECT+TOT PNL SERPL-MCNC: 0.3 MG/DL
BILIRUBIN DIRECT+TOT PNL SERPL-MCNC: 0.4 MG/DL
BILIRUBIN DIRECT+TOT PNL SERPL-MCNC: 0.5 MG/DL
BILIRUBIN DIRECT+TOT PNL SERPL-MCNC: 0.6 MG/DL
BILIRUBIN DIRECT+TOT PNL SERPL-MCNC: 0.7 MG/DL
BILIRUBIN DIRECT+TOT PNL SERPL-MCNC: 0.8 MG/DL
BNP BLD-MCNC: 168.2 PG/ML
BNP BLD-MCNC: 192.3 PG/ML
BNP BLD-MCNC: 28 PG/ML
BNP BLD-MCNC: 324.8 PG/ML
BNP BLD-MCNC: 34.1 PG/ML
BNP BLD-MCNC: 410.1 PG/ML
BNP BLD-MCNC: 510.3 PG/ML
BNP BLD-MCNC: 87.6 PG/ML
BSA FOR ECHO PROCEDURE: 2.82 M2
BSA FOR ECHO PROCEDURE: 2.89 M2
BSA FOR ECHO PROCEDURE: 2.93 M2
BUN SERPL-MCNC: 12 MG/DL (ref 8.4–25.7)
BUN SERPL-MCNC: 12.7 MG/DL (ref 8.4–25.7)
BUN SERPL-MCNC: 12.8 MG/DL (ref 8.4–25.7)
BUN SERPL-MCNC: 12.8 MG/DL (ref 8.4–25.7)
BUN SERPL-MCNC: 13.3 MG/DL (ref 8.4–25.7)
BUN SERPL-MCNC: 14 MG/DL (ref 8.4–25.7)
BUN SERPL-MCNC: 14.3 MG/DL (ref 8.4–25.7)
BUN SERPL-MCNC: 14.3 MG/DL (ref 8.4–25.7)
BUN SERPL-MCNC: 14.5 MG/DL (ref 8.4–25.7)
BUN SERPL-MCNC: 14.5 MG/DL (ref 8.4–25.7)
BUN SERPL-MCNC: 14.6 MG/DL (ref 8.4–25.7)
BUN SERPL-MCNC: 14.7 MG/DL (ref 8.4–25.7)
BUN SERPL-MCNC: 14.9 MG/DL (ref 8.4–25.7)
BUN SERPL-MCNC: 15.6 MG/DL (ref 8.4–25.7)
BUN SERPL-MCNC: 15.6 MG/DL (ref 8.4–25.7)
BUN SERPL-MCNC: 17 MG/DL (ref 6–30)
BUN SERPL-MCNC: 17.5 MG/DL (ref 8.4–25.7)
BUN SERPL-MCNC: 18 MG/DL (ref 8.4–25.7)
BUN SERPL-MCNC: 19.1 MG/DL (ref 8.4–25.7)
BUN SERPL-MCNC: 19.2 MG/DL (ref 8.4–25.7)
BUN SERPL-MCNC: 19.2 MG/DL (ref 8.4–25.7)
BUN SERPL-MCNC: 19.4 MG/DL (ref 8.4–25.7)
BUN SERPL-MCNC: 19.7 MG/DL (ref 8.4–25.7)
BUN SERPL-MCNC: 19.9 MG/DL (ref 8.4–25.7)
BUN SERPL-MCNC: 20.3 MG/DL (ref 8.4–25.7)
BUN SERPL-MCNC: 20.6 MG/DL (ref 8.4–25.7)
BUN SERPL-MCNC: 21.4 MG/DL (ref 8.4–25.7)
BUN SERPL-MCNC: 21.8 MG/DL (ref 8.4–25.7)
BUN SERPL-MCNC: 22.3 MG/DL (ref 8.4–25.7)
BUN SERPL-MCNC: 23.1 MG/DL (ref 8.4–25.7)
BUN SERPL-MCNC: 27 MG/DL (ref 8.4–25.7)
BUN SERPL-MCNC: 28.6 MG/DL (ref 8.4–25.7)
BUN SERPL-MCNC: 30.3 MG/DL (ref 8.4–25.7)
BUN SERPL-MCNC: 9.2 MG/DL (ref 8.4–25.7)
CALCIUM SERPL-MCNC: 10 MG/DL (ref 8.4–10.2)
CALCIUM SERPL-MCNC: 6.6 MG/DL (ref 8.4–10.2)
CALCIUM SERPL-MCNC: 7.2 MG/DL (ref 8.4–10.2)
CALCIUM SERPL-MCNC: 7.8 MG/DL (ref 8.4–10.2)
CALCIUM SERPL-MCNC: 8.2 MG/DL (ref 8.4–10.2)
CALCIUM SERPL-MCNC: 8.5 MG/DL (ref 8.4–10.2)
CALCIUM SERPL-MCNC: 8.5 MG/DL (ref 8.4–10.2)
CALCIUM SERPL-MCNC: 8.7 MG/DL (ref 8.4–10.2)
CALCIUM SERPL-MCNC: 9.1 MG/DL (ref 8.4–10.2)
CALCIUM SERPL-MCNC: 9.2 MG/DL (ref 8.4–10.2)
CALCIUM SERPL-MCNC: 9.2 MG/DL (ref 8.4–10.2)
CALCIUM SERPL-MCNC: 9.3 MG/DL (ref 8.4–10.2)
CALCIUM SERPL-MCNC: 9.4 MG/DL (ref 8.4–10.2)
CALCIUM SERPL-MCNC: 9.5 MG/DL (ref 8.4–10.2)
CALCIUM SERPL-MCNC: 9.7 MG/DL (ref 8.4–10.2)
CALCIUM SERPL-MCNC: 9.8 MG/DL (ref 8.4–10.2)
CALCIUM SERPL-MCNC: 9.8 MG/DL (ref 8.4–10.2)
CALCIUM SERPL-MCNC: 9.9 MG/DL (ref 8.4–10.2)
CANNABINOIDS UR QL SCN: NEGATIVE
CHLORIDE SERPL-SCNC: 100 MMOL/L (ref 98–107)
CHLORIDE SERPL-SCNC: 101 MMOL/L (ref 98–107)
CHLORIDE SERPL-SCNC: 102 MMOL/L (ref 98–107)
CHLORIDE SERPL-SCNC: 103 MMOL/L (ref 95–110)
CHLORIDE SERPL-SCNC: 103 MMOL/L (ref 98–107)
CHLORIDE SERPL-SCNC: 104 MMOL/L (ref 98–107)
CHLORIDE SERPL-SCNC: 105 MMOL/L (ref 98–107)
CHLORIDE SERPL-SCNC: 106 MMOL/L (ref 98–107)
CHLORIDE SERPL-SCNC: 107 MMOL/L (ref 98–107)
CHLORIDE SERPL-SCNC: 92 MMOL/L (ref 98–107)
CHLORIDE SERPL-SCNC: 96 MMOL/L (ref 98–107)
CHLORIDE SERPL-SCNC: 96 MMOL/L (ref 98–107)
CHLORIDE SERPL-SCNC: 97 MMOL/L (ref 98–107)
CHLORIDE SERPL-SCNC: 97 MMOL/L (ref 98–107)
CHLORIDE SERPL-SCNC: 98 MMOL/L (ref 98–107)
CHLORIDE SERPL-SCNC: 99 MMOL/L (ref 98–107)
CHOLEST SERPL-MCNC: 196 MG/DL
CHOLEST/HDLC SERPL: 4 {RATIO} (ref 0–5)
CK MB SERPL-MCNC: 2.4 NG/ML
CK SERPL-CCNC: 200 U/L (ref 30–200)
CK SERPL-CCNC: 406 U/L (ref 30–200)
CO2 SERPL-SCNC: 11 MMOL/L (ref 22–29)
CO2 SERPL-SCNC: 13 MMOL/L (ref 22–29)
CO2 SERPL-SCNC: 13 MMOL/L (ref 22–29)
CO2 SERPL-SCNC: 14 MMOL/L (ref 22–29)
CO2 SERPL-SCNC: 19 MMOL/L (ref 22–29)
CO2 SERPL-SCNC: 19 MMOL/L (ref 22–29)
CO2 SERPL-SCNC: 25 MMOL/L (ref 22–29)
CO2 SERPL-SCNC: 26 MMOL/L (ref 22–29)
CO2 SERPL-SCNC: 27 MMOL/L (ref 22–29)
CO2 SERPL-SCNC: 28 MMOL/L (ref 22–29)
CO2 SERPL-SCNC: 29 MMOL/L (ref 22–29)
CO2 SERPL-SCNC: 30 MMOL/L (ref 22–29)
CO2 SERPL-SCNC: 31 MMOL/L (ref 22–29)
CO2 SERPL-SCNC: 32 MMOL/L (ref 22–29)
CO2 SERPL-SCNC: 33 MMOL/L (ref 22–29)
COCAINE UR QL SCN: POSITIVE
CORRECTED TEMPERATURE (PCO2): 53 MMHG (ref 20–50)
CORRECTED TEMPERATURE (PCO2): 54 MMHG (ref 20–50)
CORRECTED TEMPERATURE (PCO2): 58 MMHG (ref 20–50)
CORRECTED TEMPERATURE (PCO2): 60 MMHG (ref 19–50)
CORRECTED TEMPERATURE (PCO2): 62 MMHG (ref 19–50)
CORRECTED TEMPERATURE (PCO2): 62 MMHG (ref 20–50)
CORRECTED TEMPERATURE (PCO2): 64 MMHG (ref 19–50)
CORRECTED TEMPERATURE (PCO2): 67 MMHG (ref 19–50)
CORRECTED TEMPERATURE (PCO2): 70 MMHG (ref 20–50)
CORRECTED TEMPERATURE (PCO2): 77 MMHG (ref 20–50)
CORRECTED TEMPERATURE (PCO2): 79 MMHG (ref 20–50)
CORRECTED TEMPERATURE (PCO2): 92 MMHG (ref 20–50)
CORRECTED TEMPERATURE (PH): 7.01 (ref 7.3–7.6)
CORRECTED TEMPERATURE (PH): 7.08 (ref 7.3–7.6)
CORRECTED TEMPERATURE (PH): 7.11 (ref 7.3–7.6)
CORRECTED TEMPERATURE (PH): 7.12 (ref 7.3–7.6)
CORRECTED TEMPERATURE (PH): 7.17 (ref 7.3–7.6)
CORRECTED TEMPERATURE (PH): 7.25 (ref 7.29–7.61)
CORRECTED TEMPERATURE (PH): 7.25 (ref 7.3–7.6)
CORRECTED TEMPERATURE (PH): 7.26 (ref 7.29–7.61)
CORRECTED TEMPERATURE (PH): 7.27 (ref 7.29–7.61)
CORRECTED TEMPERATURE (PH): 7.28 (ref 7.29–7.61)
CORRECTED TEMPERATURE (PH): 7.37 (ref 7.35–7.45)
CORRECTED TEMPERATURE (PO2): 148 MMHG (ref 75–100)
CORRECTED TEMPERATURE (PO2): 282 MMHG (ref 75–100)
CORRECTED TEMPERATURE (PO2): 296 MMHG (ref 75–100)
CORRECTED TEMPERATURE (PO2): 388 MMHG (ref 75–100)
CORRECTED TEMPERATURE (PO2): 63 MMHG (ref 75–100)
CORRECTED TEMPERATURE (PO2): 64 MMHG (ref 75–100)
CORRECTED TEMPERATURE (PO2): 68 MMHG (ref 75–100)
CORRECTED TEMPERATURE (PO2): 72 MMHG (ref 80–100)
CORRECTED TEMPERATURE (PO2): 84 MMHG (ref 80–100)
CORRECTED TEMPERATURE (PO2): 85 MMHG (ref 80–100)
CORRECTED TEMPERATURE (PO2): 87 MMHG (ref 80–100)
CORRECTED TEMPERATURE (PO2): 91 MMHG (ref 75–100)
CREAT SERPL-MCNC: 0.83 MG/DL (ref 0.73–1.18)
CREAT SERPL-MCNC: 0.9 MG/DL (ref 0.5–1.4)
CREAT SERPL-MCNC: 0.91 MG/DL (ref 0.73–1.18)
CREAT SERPL-MCNC: 0.95 MG/DL (ref 0.73–1.18)
CREAT SERPL-MCNC: 0.95 MG/DL (ref 0.73–1.18)
CREAT SERPL-MCNC: 0.98 MG/DL (ref 0.73–1.18)
CREAT SERPL-MCNC: 0.99 MG/DL (ref 0.73–1.18)
CREAT SERPL-MCNC: 1.01 MG/DL (ref 0.73–1.18)
CREAT SERPL-MCNC: 1.01 MG/DL (ref 0.73–1.18)
CREAT SERPL-MCNC: 1.02 MG/DL (ref 0.73–1.18)
CREAT SERPL-MCNC: 1.02 MG/DL (ref 0.73–1.18)
CREAT SERPL-MCNC: 1.04 MG/DL (ref 0.73–1.18)
CREAT SERPL-MCNC: 1.05 MG/DL (ref 0.73–1.18)
CREAT SERPL-MCNC: 1.05 MG/DL (ref 0.73–1.18)
CREAT SERPL-MCNC: 1.06 MG/DL (ref 0.73–1.18)
CREAT SERPL-MCNC: 1.08 MG/DL (ref 0.73–1.18)
CREAT SERPL-MCNC: 1.08 MG/DL (ref 0.73–1.18)
CREAT SERPL-MCNC: 1.1 MG/DL (ref 0.73–1.18)
CREAT SERPL-MCNC: 1.16 MG/DL (ref 0.73–1.18)
CREAT SERPL-MCNC: 1.17 MG/DL (ref 0.73–1.18)
CREAT SERPL-MCNC: 1.18 MG/DL (ref 0.73–1.18)
CREAT SERPL-MCNC: 1.18 MG/DL (ref 0.73–1.18)
CREAT SERPL-MCNC: 1.23 MG/DL (ref 0.73–1.18)
CREAT SERPL-MCNC: 1.26 MG/DL (ref 0.73–1.18)
CREAT SERPL-MCNC: 1.29 MG/DL (ref 0.73–1.18)
CREAT SERPL-MCNC: 1.29 MG/DL (ref 0.73–1.18)
CREAT SERPL-MCNC: 1.34 MG/DL (ref 0.73–1.18)
CREAT SERPL-MCNC: 1.39 MG/DL (ref 0.73–1.18)
CREAT SERPL-MCNC: 1.87 MG/DL (ref 0.73–1.18)
CREAT SERPL-MCNC: 2.72 MG/DL (ref 0.73–1.18)
CREAT SERPL-MCNC: 3.57 MG/DL (ref 0.73–1.18)
CREAT SERPL-MCNC: 4.35 MG/DL (ref 0.73–1.18)
CREAT SERPL-MCNC: 4.59 MG/DL (ref 0.73–1.18)
CREAT SERPL-MCNC: 4.84 MG/DL (ref 0.73–1.18)
CREAT/UREA NIT SERPL: 11
CREAT/UREA NIT SERPL: 14
CREAT/UREA NIT SERPL: 18
CREAT/UREA NIT SERPL: 20
CREAT/UREA NIT SERPL: 21
CREAT/UREA NIT SERPL: 28
CREAT/UREA NIT SERPL: 4
CREAT/UREA NIT SERPL: 5
CREAT/UREA NIT SERPL: 5
CREAT/UREA NIT SERPL: 6
CV ECHO LV RWT: 0.42 CM
CV ECHO LV RWT: 0.49 CM
DIGOXIN SERPL-MCNC: <0.19 NG/ML (ref 0.8–2)
DOP CALC AO PEAK VEL: 1.1 M/S
DOP CALC AO PEAK VEL: 1.23 M/S
DOP CALC AO VTI: 17.7 CM
DOP CALC AO VTI: 19 CM
DOP CALC LVOT AREA: 3.5 CM2
DOP CALC LVOT AREA: 3.5 CM2
DOP CALC LVOT DIAMETER: 2.1 CM
DOP CALC LVOT DIAMETER: 2.1 CM
DOP CALC LVOT PEAK VEL: 0.73 M/S
DOP CALC LVOT PEAK VEL: 1.07 M/S
DOP CALC LVOT STROKE VOLUME: 37.04 CM3
DOP CALC LVOT STROKE VOLUME: 55.04 CM3
DOP CALC MV VTI: 18.1 CM
DOP CALC MV VTI: 25.8 CM
DOP CALC MV VTI: 6.7 CM
DOP CALCLVOT PEAK VEL VTI: 10.7 CM
DOP CALCLVOT PEAK VEL VTI: 15.9 CM
E WAVE DECELERATION TIME: 106.2 MSEC
E WAVE DECELERATION TIME: 248 MSEC
E/A RATIO: 1.05
E/A RATIO: 1.13
E/E' RATIO: 8.92 M/S
ECHO LV POSTERIOR WALL: 1.01 CM (ref 0.6–1.1)
ECHO LV POSTERIOR WALL: 1.23 CM (ref 0.6–1.1)
EJECTION FRACTION: 43 %
EJECTION FRACTION: 45 %
EJECTION FRACTION: 55 %
EOSINOPHIL # BLD AUTO: 0 X10(3)/MCL (ref 0–0.9)
EOSINOPHIL # BLD AUTO: 0.01 X10(3)/MCL (ref 0–0.9)
EOSINOPHIL # BLD AUTO: 0.01 X10(3)/MCL (ref 0–0.9)
EOSINOPHIL # BLD AUTO: 0.02 X10(3)/MCL (ref 0–0.9)
EOSINOPHIL # BLD AUTO: 0.04 X10(3)/MCL (ref 0–0.9)
EOSINOPHIL # BLD AUTO: 0.08 X10(3)/MCL (ref 0–0.9)
EOSINOPHIL # BLD AUTO: 0.1 X10(3)/MCL (ref 0–0.9)
EOSINOPHIL # BLD AUTO: 0.12 X10(3)/MCL (ref 0–0.9)
EOSINOPHIL # BLD AUTO: 0.13 X10(3)/MCL (ref 0–0.9)
EOSINOPHIL # BLD AUTO: 0.14 X10(3)/MCL (ref 0–0.9)
EOSINOPHIL # BLD AUTO: 0.15 X10(3)/MCL (ref 0–0.9)
EOSINOPHIL # BLD AUTO: 0.17 X10(3)/MCL (ref 0–0.9)
EOSINOPHIL # BLD AUTO: 0.2 X10(3)/MCL (ref 0–0.9)
EOSINOPHIL # BLD AUTO: 0.21 X10(3)/MCL (ref 0–0.9)
EOSINOPHIL # BLD AUTO: 0.26 X10(3)/MCL (ref 0–0.9)
EOSINOPHIL # BLD AUTO: 0.32 X10(3)/MCL (ref 0–0.9)
EOSINOPHIL # BLD AUTO: 0.43 X10(3)/MCL (ref 0–0.9)
EOSINOPHIL NFR BLD AUTO: 0 %
EOSINOPHIL NFR BLD AUTO: 0.1 %
EOSINOPHIL NFR BLD AUTO: 0.1 %
EOSINOPHIL NFR BLD AUTO: 0.2 %
EOSINOPHIL NFR BLD AUTO: 0.4 %
EOSINOPHIL NFR BLD AUTO: 0.9 %
EOSINOPHIL NFR BLD AUTO: 1.2 %
EOSINOPHIL NFR BLD AUTO: 1.7 %
EOSINOPHIL NFR BLD AUTO: 1.7 %
EOSINOPHIL NFR BLD AUTO: 1.9 %
EOSINOPHIL NFR BLD AUTO: 1.9 %
EOSINOPHIL NFR BLD AUTO: 2.4 %
EOSINOPHIL NFR BLD AUTO: 3.6 %
EOSINOPHIL NFR BLD AUTO: 3.6 %
EOSINOPHIL NFR BLD AUTO: 3.8 %
EOSINOPHIL NFR BLD AUTO: 5 %
EOSINOPHIL NFR BLD AUTO: 7.7 %
ERYTHROCYTE [DISTWIDTH] IN BLOOD BY AUTOMATED COUNT: 14.2 % (ref 11.6–14.4)
ERYTHROCYTE [DISTWIDTH] IN BLOOD BY AUTOMATED COUNT: 14.3 % (ref 11.5–17)
ERYTHROCYTE [DISTWIDTH] IN BLOOD BY AUTOMATED COUNT: 14.4 % (ref 11.5–17)
ERYTHROCYTE [DISTWIDTH] IN BLOOD BY AUTOMATED COUNT: 14.4 % (ref 11.6–14.4)
ERYTHROCYTE [DISTWIDTH] IN BLOOD BY AUTOMATED COUNT: 14.9 % (ref 11.5–17)
ERYTHROCYTE [DISTWIDTH] IN BLOOD BY AUTOMATED COUNT: 15 % (ref 11.5–17)
ERYTHROCYTE [DISTWIDTH] IN BLOOD BY AUTOMATED COUNT: 15 % (ref 11.5–17)
ERYTHROCYTE [DISTWIDTH] IN BLOOD BY AUTOMATED COUNT: 15.1 % (ref 11.5–17)
ERYTHROCYTE [DISTWIDTH] IN BLOOD BY AUTOMATED COUNT: 15.3 % (ref 11.5–17)
ERYTHROCYTE [DISTWIDTH] IN BLOOD BY AUTOMATED COUNT: 15.3 % (ref 11.5–17)
ERYTHROCYTE [DISTWIDTH] IN BLOOD BY AUTOMATED COUNT: 15.4 % (ref 11.5–17)
ERYTHROCYTE [DISTWIDTH] IN BLOOD BY AUTOMATED COUNT: 15.5 % (ref 11.5–17)
ERYTHROCYTE [DISTWIDTH] IN BLOOD BY AUTOMATED COUNT: 15.6 % (ref 11.5–17)
ERYTHROCYTE [DISTWIDTH] IN BLOOD BY AUTOMATED COUNT: 15.6 % (ref 11.5–17)
ERYTHROCYTE [DISTWIDTH] IN BLOOD BY AUTOMATED COUNT: 15.7 % (ref 11.5–17)
ERYTHROCYTE [DISTWIDTH] IN BLOOD BY AUTOMATED COUNT: 15.7 % (ref 11.5–17)
ERYTHROCYTE [DISTWIDTH] IN BLOOD BY AUTOMATED COUNT: 15.9 % (ref 11.5–17)
ERYTHROCYTE [DISTWIDTH] IN BLOOD BY AUTOMATED COUNT: 16.1 % (ref 11.5–17)
ERYTHROCYTE [DISTWIDTH] IN BLOOD BY AUTOMATED COUNT: 16.1 % (ref 11.5–17)
ERYTHROCYTE [DISTWIDTH] IN BLOOD BY AUTOMATED COUNT: 16.3 % (ref 11.5–17)
ERYTHROCYTE [DISTWIDTH] IN BLOOD BY AUTOMATED COUNT: 16.4 % (ref 11.5–17)
EST. AVERAGE GLUCOSE BLD GHB EST-MCNC: 134.1 MG/DL
ETHANOL SERPL-MCNC: 160 MG/DL
FENTANYL UR QL SCN: NEGATIVE
FLUAV AG UPPER RESP QL IA.RAPID: NOT DETECTED
FLUBV AG UPPER RESP QL IA.RAPID: NOT DETECTED
FRACTIONAL SHORTENING: 16 % (ref 28–44)
FRACTIONAL SHORTENING: 9 % (ref 28–44)
GFR SERPLBLD CREATININE-BSD FMLA CKD-EPI: 14 MLS/MIN/1.73/M2
GFR SERPLBLD CREATININE-BSD FMLA CKD-EPI: 15 MLS/MIN/1.73/M2
GFR SERPLBLD CREATININE-BSD FMLA CKD-EPI: 16 MLS/MIN/1.73/M2
GFR SERPLBLD CREATININE-BSD FMLA CKD-EPI: 20 MLS/MIN/1.73/M2
GFR SERPLBLD CREATININE-BSD FMLA CKD-EPI: 28 MLS/MIN/1.73/M2
GFR SERPLBLD CREATININE-BSD FMLA CKD-EPI: 43 MLS/MIN/1.73/M2
GFR SERPLBLD CREATININE-BSD FMLA CKD-EPI: >60 MLS/MIN/1.73/M2
GLOBULIN SER-MCNC: 2.6 GM/DL (ref 2.4–3.5)
GLOBULIN SER-MCNC: 2.8 GM/DL (ref 2.4–3.5)
GLOBULIN SER-MCNC: 2.9 GM/DL (ref 2.4–3.5)
GLOBULIN SER-MCNC: 2.9 GM/DL (ref 2.4–3.5)
GLOBULIN SER-MCNC: 3 GM/DL (ref 2.4–3.5)
GLOBULIN SER-MCNC: 3.2 GM/DL (ref 2.4–3.5)
GLOBULIN SER-MCNC: 3.2 GM/DL (ref 2.4–3.5)
GLOBULIN SER-MCNC: 3.3 GM/DL (ref 2.4–3.5)
GLOBULIN SER-MCNC: 3.4 GM/DL (ref 2.4–3.5)
GLOBULIN SER-MCNC: 3.4 GM/DL (ref 2.4–3.5)
GLOBULIN SER-MCNC: 3.5 GM/DL (ref 2.4–3.5)
GLOBULIN SER-MCNC: 3.7 GM/DL (ref 2.4–3.5)
GLOBULIN SER-MCNC: 3.7 GM/DL (ref 2.4–3.5)
GLOBULIN SER-MCNC: 3.9 GM/DL (ref 2.4–3.5)
GLOBULIN SER-MCNC: 4 GM/DL (ref 2.4–3.5)
GLUCOSE SERPL-MCNC: 101 MG/DL (ref 74–100)
GLUCOSE SERPL-MCNC: 102 MG/DL (ref 74–100)
GLUCOSE SERPL-MCNC: 106 MG/DL (ref 74–100)
GLUCOSE SERPL-MCNC: 107 MG/DL (ref 70–110)
GLUCOSE SERPL-MCNC: 107 MG/DL (ref 70–110)
GLUCOSE SERPL-MCNC: 107 MG/DL (ref 74–100)
GLUCOSE SERPL-MCNC: 107 MG/DL (ref 74–100)
GLUCOSE SERPL-MCNC: 108 MG/DL (ref 70–110)
GLUCOSE SERPL-MCNC: 109 MG/DL (ref 74–100)
GLUCOSE SERPL-MCNC: 113 MG/DL (ref 74–100)
GLUCOSE SERPL-MCNC: 118 MG/DL (ref 74–100)
GLUCOSE SERPL-MCNC: 122 MG/DL (ref 70–110)
GLUCOSE SERPL-MCNC: 127 MG/DL (ref 74–100)
GLUCOSE SERPL-MCNC: 133 MG/DL (ref 74–100)
GLUCOSE SERPL-MCNC: 137 MG/DL (ref 74–100)
GLUCOSE SERPL-MCNC: 143 MG/DL (ref 74–100)
GLUCOSE SERPL-MCNC: 151 MG/DL (ref 74–100)
GLUCOSE SERPL-MCNC: 151 MG/DL (ref 74–100)
GLUCOSE SERPL-MCNC: 153 MG/DL (ref 70–110)
GLUCOSE SERPL-MCNC: 164 MG/DL (ref 74–100)
GLUCOSE SERPL-MCNC: 167 MG/DL (ref 70–110)
GLUCOSE SERPL-MCNC: 193 MG/DL (ref 70–110)
GLUCOSE SERPL-MCNC: 193 MG/DL (ref 74–100)
GLUCOSE SERPL-MCNC: 198 MG/DL (ref 74–100)
GLUCOSE SERPL-MCNC: 208 MG/DL (ref 74–100)
GLUCOSE SERPL-MCNC: 284 MG/DL (ref 74–100)
GLUCOSE SERPL-MCNC: 298 MG/DL (ref 74–100)
GLUCOSE SERPL-MCNC: 356 MG/DL (ref 74–100)
GLUCOSE SERPL-MCNC: 377 MG/DL (ref 74–100)
GLUCOSE SERPL-MCNC: 396 MG/DL (ref 74–100)
GLUCOSE SERPL-MCNC: 408 MG/DL (ref 74–100)
GLUCOSE SERPL-MCNC: 530 MG/DL (ref 74–100)
GLUCOSE SERPL-MCNC: 82 MG/DL (ref 74–100)
GLUCOSE SERPL-MCNC: 90 MG/DL (ref 74–100)
GLUCOSE SERPL-MCNC: 92 MG/DL (ref 74–100)
GLUCOSE SERPL-MCNC: 93 MG/DL (ref 74–100)
GLUCOSE SERPL-MCNC: 94 MG/DL (ref 74–100)
GLUCOSE SERPL-MCNC: 96 MG/DL (ref 74–100)
GLUCOSE SERPL-MCNC: 97 MG/DL (ref 74–100)
GLUCOSE SERPL-MCNC: 97 MG/DL (ref 74–100)
HBA1C MFR BLD: 6.3 %
HCO3 ARTERIAL: 28.2 MMOL/L (ref 18–23)
HCO3 ARTERIAL: NORMAL
HCO3 UR-SCNC: 14.6 MMOL/L
HCO3 UR-SCNC: 17.2 MMOL/L (ref 22–26)
HCO3 UR-SCNC: 18.4 MMOL/L (ref 22–26)
HCO3 UR-SCNC: 27.8 MMOL/L
HCO3 UR-SCNC: 28.1 MMOL/L
HCO3 UR-SCNC: 28.2 MMOL/L
HCO3 UR-SCNC: 28.8 MMOL/L (ref 22–26)
HCO3 UR-SCNC: 29.9 MMOL/L (ref 22–26)
HCO3 UR-SCNC: 30.6 MMOL/L (ref 22–26)
HCO3 UR-SCNC: 30.7 MMOL/L (ref 22–26)
HCO3 UR-SCNC: 30.8 MMOL/L
HCT VFR BLD AUTO: 40 % (ref 42–52)
HCT VFR BLD AUTO: 42.9 % (ref 42–52)
HCT VFR BLD AUTO: 43.3 % (ref 42–52)
HCT VFR BLD AUTO: 43.3 % (ref 42–52)
HCT VFR BLD AUTO: 44 % (ref 42–52)
HCT VFR BLD AUTO: 44.1 % (ref 42–52)
HCT VFR BLD AUTO: 44.7 % (ref 42–52)
HCT VFR BLD AUTO: 44.8 % (ref 42–52)
HCT VFR BLD AUTO: 45.5 % (ref 42–52)
HCT VFR BLD AUTO: 45.7 % (ref 42–52)
HCT VFR BLD AUTO: 45.9 % (ref 42–52)
HCT VFR BLD AUTO: 46.7 % (ref 42–52)
HCT VFR BLD AUTO: 46.9 % (ref 42–52)
HCT VFR BLD AUTO: 47.5 % (ref 42–52)
HCT VFR BLD AUTO: 48.4 % (ref 42–52)
HCT VFR BLD AUTO: 49 % (ref 42–52)
HCT VFR BLD AUTO: 49.4 % (ref 42–52)
HCT VFR BLD AUTO: 49.5 % (ref 42–52)
HCT VFR BLD AUTO: 49.7 % (ref 42–52)
HCT VFR BLD AUTO: 49.9 % (ref 42–52)
HCT VFR BLD AUTO: 50.3 % (ref 42–52)
HCT VFR BLD AUTO: 51 % (ref 42–52)
HCT VFR BLD AUTO: 53.9 % (ref 42–52)
HCT VFR BLD CALC: 50 %PCV (ref 36–54)
HDLC SERPL-MCNC: 51 MG/DL (ref 35–60)
HGB BLD-MCNC: 13 GM/DL (ref 14–18)
HGB BLD-MCNC: 13.3 GM/DL (ref 14–18)
HGB BLD-MCNC: 13.6 GM/DL (ref 14–18)
HGB BLD-MCNC: 13.9 GM/DL (ref 14–18)
HGB BLD-MCNC: 14 GM/DL (ref 14–18)
HGB BLD-MCNC: 14.2 GM/DL (ref 14–18)
HGB BLD-MCNC: 14.3 GM/DL (ref 14–18)
HGB BLD-MCNC: 14.3 GM/DL (ref 14–18)
HGB BLD-MCNC: 14.4 GM/DL (ref 14–18)
HGB BLD-MCNC: 14.4 GM/DL (ref 14–18)
HGB BLD-MCNC: 14.7 GM/DL (ref 14–18)
HGB BLD-MCNC: 14.8 G/DL (ref 12–16)
HGB BLD-MCNC: 14.8 G/DL (ref 12–18)
HGB BLD-MCNC: 14.8 GM/DL (ref 14–18)
HGB BLD-MCNC: 14.9 GM/DL (ref 14–18)
HGB BLD-MCNC: 15 GM/DL (ref 14–18)
HGB BLD-MCNC: 15.1 GM/DL (ref 14–18)
HGB BLD-MCNC: 15.2 G/DL (ref 12–16)
HGB BLD-MCNC: 15.2 G/DL (ref 12–18)
HGB BLD-MCNC: 15.2 GM/DL (ref 14–18)
HGB BLD-MCNC: 15.3 G/DL (ref 12–18)
HGB BLD-MCNC: 15.4 GM/DL (ref 14–18)
HGB BLD-MCNC: 15.4 GM/DL (ref 14–18)
HGB BLD-MCNC: 15.6 G/DL (ref 12–18)
HGB BLD-MCNC: 15.9 G/DL (ref 12–16)
HGB BLD-MCNC: 15.9 GM/DL (ref 14–18)
HGB BLD-MCNC: 16.1 GM/DL (ref 14–18)
HGB BLD-MCNC: 16.2 G/DL (ref 12–18)
HGB BLD-MCNC: 16.3 G/DL (ref 12–18)
HGB BLD-MCNC: 16.7 G/DL (ref 12–18)
HGB BLD-MCNC: 16.8 G/DL (ref 12–18)
HGB BLD-MCNC: 16.9 G/DL (ref 12–16)
HGB BLD-MCNC: 17 G/DL
HGB BLD-MCNC: 17 GM/DL (ref 14–18)
HGB BLD-MCNC: ABNORMAL G/DL
HGB BLD-MCNC: NORMAL G/DL
HR MV ECHO: 126 BPM
IMM GRANULOCYTES # BLD AUTO: 0.02 X10(3)/MCL (ref 0–0.02)
IMM GRANULOCYTES # BLD AUTO: 0.02 X10(3)/MCL (ref 0–0.04)
IMM GRANULOCYTES # BLD AUTO: 0.02 X10(3)/MCL (ref 0–0.04)
IMM GRANULOCYTES # BLD AUTO: 0.03 X10(3)/MCL (ref 0–0.02)
IMM GRANULOCYTES # BLD AUTO: 0.03 X10(3)/MCL (ref 0–0.02)
IMM GRANULOCYTES # BLD AUTO: 0.03 X10(3)/MCL (ref 0–0.04)
IMM GRANULOCYTES # BLD AUTO: 0.04 X10(3)/MCL (ref 0–0.04)
IMM GRANULOCYTES # BLD AUTO: 0.05 X10(3)/MCL (ref 0–0.02)
IMM GRANULOCYTES # BLD AUTO: 0.05 X10(3)/MCL (ref 0–0.02)
IMM GRANULOCYTES # BLD AUTO: 0.06 X10(3)/MCL (ref 0–0.02)
IMM GRANULOCYTES # BLD AUTO: 0.06 X10(3)/MCL (ref 0–0.04)
IMM GRANULOCYTES # BLD AUTO: 0.07 X10(3)/MCL (ref 0–0.04)
IMM GRANULOCYTES # BLD AUTO: 0.08 X10(3)/MCL (ref 0–0.04)
IMM GRANULOCYTES # BLD AUTO: 0.14 X10(3)/MCL (ref 0–0.02)
IMM GRANULOCYTES # BLD AUTO: 0.28 X10(3)/MCL (ref 0–0.04)
IMM GRANULOCYTES # BLD AUTO: 0.56 X10(3)/MCL (ref 0–0.04)
IMM GRANULOCYTES NFR BLD AUTO: 0.3 %
IMM GRANULOCYTES NFR BLD AUTO: 0.3 %
IMM GRANULOCYTES NFR BLD AUTO: 0.3 % (ref 0–0.43)
IMM GRANULOCYTES NFR BLD AUTO: 0.4 %
IMM GRANULOCYTES NFR BLD AUTO: 0.4 % (ref 0–0.43)
IMM GRANULOCYTES NFR BLD AUTO: 0.5 %
IMM GRANULOCYTES NFR BLD AUTO: 0.5 % (ref 0–0.43)
IMM GRANULOCYTES NFR BLD AUTO: 0.6 %
IMM GRANULOCYTES NFR BLD AUTO: 0.6 % (ref 0–0.43)
IMM GRANULOCYTES NFR BLD AUTO: 0.7 % (ref 0–0.43)
IMM GRANULOCYTES NFR BLD AUTO: 0.7 % (ref 0–0.43)
IMM GRANULOCYTES NFR BLD AUTO: 0.8 %
IMM GRANULOCYTES NFR BLD AUTO: 1.7 %
IMM GRANULOCYTES NFR BLD AUTO: 1.8 % (ref 0–0.43)
IMM GRANULOCYTES NFR BLD AUTO: 6.7 %
INR BLD: 1.02 (ref 0–1.3)
INR BLD: 1.08 (ref 0–1.3)
INR BLD: 1.17 (ref 0–1.3)
INTERVENTRICULAR SEPTUM: 1.2 CM (ref 0.6–1.1)
INTERVENTRICULAR SEPTUM: 1.39 CM (ref 0.6–1.1)
LACTATE SERPL-SCNC: 1.8 MMOL/L (ref 0.5–2.2)
LACTATE SERPL-SCNC: 10.4 MMOL/L (ref 0.5–2.2)
LACTATE SERPL-SCNC: 5.4 MMOL/L (ref 0.5–2.2)
LACTATE SERPL-SCNC: 8.7 MMOL/L (ref 0.5–2.2)
LDLC SERPL CALC-MCNC: 118 MG/DL (ref 50–140)
LEFT ATRIUM SIZE: 3.1 CM
LEFT ATRIUM SIZE: 3.6 CM
LEFT ATRIUM SIZE: 3.7 CM
LEFT ATRIUM VOLUME INDEX MOD: 19.4 ML/M2
LEFT ATRIUM VOLUME INDEX MOD: 20 ML/M2
LEFT ATRIUM VOLUME MOD: 53.7 CM3
LEFT ATRIUM VOLUME MOD: 53.9 CM3
LEFT INTERNAL DIMENSION IN SYSTOLE: 4.05 CM (ref 2.1–4)
LEFT INTERNAL DIMENSION IN SYSTOLE: 4.55 CM (ref 2.1–4)
LEFT VENTRICLE DIASTOLIC VOLUME INDEX: 19.93 ML/M2
LEFT VENTRICLE DIASTOLIC VOLUME INDEX: 39.57 ML/M2
LEFT VENTRICLE DIASTOLIC VOLUME INDEX: 63.57 ML/M2
LEFT VENTRICLE DIASTOLIC VOLUME: 110 ML
LEFT VENTRICLE DIASTOLIC VOLUME: 171 ML
LEFT VENTRICLE DIASTOLIC VOLUME: 54.61 ML
LEFT VENTRICLE MASS INDEX: 80 G/M2
LEFT VENTRICLE MASS INDEX: 88 G/M2
LEFT VENTRICLE SYSTOLIC VOLUME INDEX: 25.9 ML/M2
LEFT VENTRICLE SYSTOLIC VOLUME INDEX: 35.3 ML/M2
LEFT VENTRICLE SYSTOLIC VOLUME INDEX: 9 ML/M2
LEFT VENTRICLE SYSTOLIC VOLUME: 24.61 ML
LEFT VENTRICLE SYSTOLIC VOLUME: 72.1 ML
LEFT VENTRICLE SYSTOLIC VOLUME: 94.9 ML
LEFT VENTRICULAR INTERNAL DIMENSION IN DIASTOLE: 4.85 CM (ref 3.5–6)
LEFT VENTRICULAR INTERNAL DIMENSION IN DIASTOLE: 5 CM (ref 3.5–6)
LEFT VENTRICULAR MASS: 222.74 G
LEFT VENTRICULAR MASS: 237.86 G
LV LATERAL E/E' RATIO: 9.67 M/S
LV SEPTAL E/E' RATIO: 8.29 M/S
LVOT MG: 1 MMHG
LVOT MG: 2 MMHG
LVOT MV: 0.47 CM/S
LVOT MV: 0.64 CM/S
LYMPHOCYTES # BLD AUTO: 0.75 X10(3)/MCL (ref 0.6–4.6)
LYMPHOCYTES # BLD AUTO: 0.75 X10(3)/MCL (ref 0.6–4.6)
LYMPHOCYTES # BLD AUTO: 0.79 X10(3)/MCL (ref 0.6–4.6)
LYMPHOCYTES # BLD AUTO: 0.84 X10(3)/MCL (ref 0.6–4.6)
LYMPHOCYTES # BLD AUTO: 0.86 X10(3)/MCL (ref 0.6–4.6)
LYMPHOCYTES # BLD AUTO: 0.89 X10(3)/MCL (ref 0.6–4.6)
LYMPHOCYTES # BLD AUTO: 0.93 X10(3)/MCL (ref 0.6–4.6)
LYMPHOCYTES # BLD AUTO: 0.99 X10(3)/MCL (ref 0.6–4.6)
LYMPHOCYTES # BLD AUTO: 1.43 X10(3)/MCL (ref 0.6–4.6)
LYMPHOCYTES # BLD AUTO: 1.65 X10(3)/MCL (ref 0.6–4.6)
LYMPHOCYTES # BLD AUTO: 1.67 X10(3)/MCL (ref 0.6–4.6)
LYMPHOCYTES # BLD AUTO: 1.75 X10(3)/MCL (ref 0.6–4.6)
LYMPHOCYTES # BLD AUTO: 1.83 X10(3)/MCL (ref 0.6–4.6)
LYMPHOCYTES # BLD AUTO: 1.85 X10(3)/MCL (ref 0.6–4.6)
LYMPHOCYTES # BLD AUTO: 1.87 X10(3)/MCL (ref 0.6–4.6)
LYMPHOCYTES # BLD AUTO: 1.99 X10(3)/MCL (ref 0.6–4.6)
LYMPHOCYTES # BLD AUTO: 2.04 X10(3)/MCL (ref 0.6–4.6)
LYMPHOCYTES # BLD AUTO: 2.1 X10(3)/MCL (ref 0.6–4.6)
LYMPHOCYTES # BLD AUTO: 2.16 X10(3)/MCL (ref 0.6–4.6)
LYMPHOCYTES # BLD AUTO: 2.26 X10(3)/MCL (ref 0.6–4.6)
LYMPHOCYTES # BLD AUTO: 2.27 X10(3)/MCL (ref 0.6–4.6)
LYMPHOCYTES # BLD AUTO: 2.32 X10(3)/MCL (ref 0.6–4.6)
LYMPHOCYTES # BLD AUTO: 3.7 X10(3)/MCL (ref 0.6–4.6)
LYMPHOCYTES NFR BLD AUTO: 12.9 %
LYMPHOCYTES NFR BLD AUTO: 13.3 %
LYMPHOCYTES NFR BLD AUTO: 15 %
LYMPHOCYTES NFR BLD AUTO: 20 %
LYMPHOCYTES NFR BLD AUTO: 22.5 %
LYMPHOCYTES NFR BLD AUTO: 22.5 %
LYMPHOCYTES NFR BLD AUTO: 22.6 %
LYMPHOCYTES NFR BLD AUTO: 22.8 %
LYMPHOCYTES NFR BLD AUTO: 24.7 %
LYMPHOCYTES NFR BLD AUTO: 25 %
LYMPHOCYTES NFR BLD AUTO: 25.5 %
LYMPHOCYTES NFR BLD AUTO: 29.9 %
LYMPHOCYTES NFR BLD AUTO: 30 %
LYMPHOCYTES NFR BLD AUTO: 33.1 %
LYMPHOCYTES NFR BLD AUTO: 33.6 %
LYMPHOCYTES NFR BLD AUTO: 4.3 %
LYMPHOCYTES NFR BLD AUTO: 41 %
LYMPHOCYTES NFR BLD AUTO: 44 %
LYMPHOCYTES NFR BLD AUTO: 6 %
LYMPHOCYTES NFR BLD AUTO: 7 %
LYMPHOCYTES NFR BLD AUTO: 7 %
LYMPHOCYTES NFR BLD AUTO: 7.7 %
LYMPHOCYTES NFR BLD AUTO: 9.3 %
MAGNESIUM SERPL-MCNC: 1.1 MG/DL (ref 1.6–2.6)
MAGNESIUM SERPL-MCNC: 1.1 MG/DL (ref 1.6–2.6)
MAGNESIUM SERPL-MCNC: 1.4 MG/DL (ref 1.6–2.6)
MAGNESIUM SERPL-MCNC: 1.6 MG/DL (ref 1.6–2.6)
MAGNESIUM SERPL-MCNC: 1.7 MG/DL (ref 1.6–2.6)
MAGNESIUM SERPL-MCNC: 1.8 MG/DL (ref 1.6–2.6)
MAGNESIUM SERPL-MCNC: 1.9 MG/DL (ref 1.6–2.6)
MAGNESIUM SERPL-MCNC: 1.9 MG/DL (ref 1.6–2.6)
MAGNESIUM SERPL-MCNC: 2 MG/DL (ref 1.6–2.6)
MAGNESIUM SERPL-MCNC: 2 MG/DL (ref 1.6–2.6)
MAGNESIUM SERPL-MCNC: 2.1 MG/DL (ref 1.6–2.6)
MAGNESIUM SERPL-MCNC: 2.2 MG/DL (ref 1.6–2.6)
MAGNESIUM SERPL-MCNC: 2.2 MG/DL (ref 1.6–2.6)
MCH RBC QN AUTO: 29.4 PG (ref 27–31)
MCH RBC QN AUTO: 29.5 PG (ref 27–31)
MCH RBC QN AUTO: 29.6 PG (ref 27–31)
MCH RBC QN AUTO: 29.7 PG (ref 27–31)
MCH RBC QN AUTO: 29.8 PG (ref 27–31)
MCH RBC QN AUTO: 29.8 PG (ref 27–31)
MCH RBC QN AUTO: 29.9 PG (ref 27–31)
MCH RBC QN AUTO: 30 PG (ref 27–31)
MCH RBC QN AUTO: 30 PG (ref 27–31)
MCH RBC QN AUTO: 30.1 PG (ref 27–31)
MCH RBC QN AUTO: 30.4 PG (ref 27–31)
MCH RBC QN AUTO: 30.6 PG (ref 27–31)
MCH RBC QN AUTO: 30.6 PG (ref 27–31)
MCH RBC QN AUTO: 30.8 PG (ref 27–31)
MCH RBC QN AUTO: 30.8 PG (ref 27–31)
MCH RBC QN AUTO: 31 PG
MCH RBC QN AUTO: 31.1 PG (ref 27–31)
MCH RBC QN AUTO: 31.2 PG (ref 27–31)
MCH RBC QN AUTO: 31.3 PG (ref 27–31)
MCH RBC QN AUTO: 31.3 PG (ref 27–31)
MCH RBC QN AUTO: 31.6 PG
MCHC RBC AUTO-ENTMCNC: 30.2 MG/DL (ref 33–36)
MCHC RBC AUTO-ENTMCNC: 30.3 MG/DL (ref 33–36)
MCHC RBC AUTO-ENTMCNC: 30.5 MG/DL (ref 33–36)
MCHC RBC AUTO-ENTMCNC: 30.6 MG/DL (ref 33–36)
MCHC RBC AUTO-ENTMCNC: 30.8 MG/DL (ref 33–36)
MCHC RBC AUTO-ENTMCNC: 30.9 MG/DL (ref 33–36)
MCHC RBC AUTO-ENTMCNC: 31.1 MG/DL (ref 33–36)
MCHC RBC AUTO-ENTMCNC: 31.2 MG/DL (ref 33–36)
MCHC RBC AUTO-ENTMCNC: 31.3 MG/DL (ref 33–36)
MCHC RBC AUTO-ENTMCNC: 31.4 MG/DL (ref 33–36)
MCHC RBC AUTO-ENTMCNC: 31.5 MG/DL (ref 33–36)
MCHC RBC AUTO-ENTMCNC: 31.7 MG/DL (ref 33–36)
MCHC RBC AUTO-ENTMCNC: 31.7 MG/DL (ref 33–36)
MCHC RBC AUTO-ENTMCNC: 31.8 MG/DL (ref 33–36)
MCHC RBC AUTO-ENTMCNC: 31.8 MG/DL (ref 33–36)
MCHC RBC AUTO-ENTMCNC: 32.3 MG/DL (ref 33–36)
MCHC RBC AUTO-ENTMCNC: 32.4 MG/DL (ref 33–36)
MCHC RBC AUTO-ENTMCNC: 32.5 MG/DL (ref 33–36)
MCV RBC AUTO: 100.7 FL (ref 80–94)
MCV RBC AUTO: 102.3 FL (ref 80–94)
MCV RBC AUTO: 92 FL (ref 80–94)
MCV RBC AUTO: 93.8 FL (ref 80–94)
MCV RBC AUTO: 94.9 FL (ref 80–94)
MCV RBC AUTO: 95.2 FL (ref 80–94)
MCV RBC AUTO: 96.2 FL (ref 80–94)
MCV RBC AUTO: 96.3 FL (ref 80–94)
MCV RBC AUTO: 96.4 FL (ref 80–94)
MCV RBC AUTO: 96.5 FL (ref 80–94)
MCV RBC AUTO: 96.7 FL (ref 80–94)
MCV RBC AUTO: 96.9 FL (ref 80–94)
MCV RBC AUTO: 97 FL (ref 80–94)
MCV RBC AUTO: 97 FL (ref 80–94)
MCV RBC AUTO: 97.2 FL (ref 80–94)
MCV RBC AUTO: 97.2 FL (ref 80–94)
MCV RBC AUTO: 97.3 FL (ref 80–94)
MCV RBC AUTO: 97.7 FL (ref 80–94)
MCV RBC AUTO: 98 FL (ref 80–94)
MCV RBC AUTO: 98.4 FL (ref 80–94)
MCV RBC AUTO: 99.1 FL (ref 80–94)
MCV RBC AUTO: 99.3 FL (ref 80–94)
MCV RBC AUTO: 99.3 FL (ref 80–94)
MDMA UR QL SCN: NEGATIVE
MONOCYTES # BLD AUTO: 0.11 X10(3)/MCL (ref 0.1–1.3)
MONOCYTES # BLD AUTO: 0.13 X10(3)/MCL (ref 0.1–1.3)
MONOCYTES # BLD AUTO: 0.17 X10(3)/MCL (ref 0.1–1.3)
MONOCYTES # BLD AUTO: 0.25 X10(3)/MCL (ref 0.1–1.3)
MONOCYTES # BLD AUTO: 0.34 X10(3)/MCL (ref 0.1–1.3)
MONOCYTES # BLD AUTO: 0.37 X10(3)/MCL (ref 0.1–1.3)
MONOCYTES # BLD AUTO: 0.39 X10(3)/MCL (ref 0.1–1.3)
MONOCYTES # BLD AUTO: 0.49 X10(3)/MCL (ref 0.1–1.3)
MONOCYTES # BLD AUTO: 0.49 X10(3)/MCL (ref 0.1–1.3)
MONOCYTES # BLD AUTO: 0.51 X10(3)/MCL (ref 0.1–1.3)
MONOCYTES # BLD AUTO: 0.54 X10(3)/MCL (ref 0.1–1.3)
MONOCYTES # BLD AUTO: 0.54 X10(3)/MCL (ref 0.1–1.3)
MONOCYTES # BLD AUTO: 0.59 X10(3)/MCL (ref 0.1–1.3)
MONOCYTES # BLD AUTO: 0.61 X10(3)/MCL (ref 0.1–1.3)
MONOCYTES # BLD AUTO: 0.62 X10(3)/MCL (ref 0.1–1.3)
MONOCYTES # BLD AUTO: 0.62 X10(3)/MCL (ref 0.1–1.3)
MONOCYTES # BLD AUTO: 0.65 X10(3)/MCL (ref 0.1–1.3)
MONOCYTES # BLD AUTO: 0.65 X10(3)/MCL (ref 0.1–1.3)
MONOCYTES # BLD AUTO: 0.66 X10(3)/MCL (ref 0.1–1.3)
MONOCYTES # BLD AUTO: 0.71 X10(3)/MCL (ref 0.1–1.3)
MONOCYTES # BLD AUTO: 0.72 X10(3)/MCL (ref 0.1–1.3)
MONOCYTES # BLD AUTO: 0.76 X10(3)/MCL (ref 0.1–1.3)
MONOCYTES # BLD AUTO: 0.84 X10(3)/MCL (ref 0.1–1.3)
MONOCYTES NFR BLD AUTO: 1.4 %
MONOCYTES NFR BLD AUTO: 1.6 %
MONOCYTES NFR BLD AUTO: 1.7 %
MONOCYTES NFR BLD AUTO: 10.1 %
MONOCYTES NFR BLD AUTO: 2.3 %
MONOCYTES NFR BLD AUTO: 4 %
MONOCYTES NFR BLD AUTO: 4.4 %
MONOCYTES NFR BLD AUTO: 5.4 %
MONOCYTES NFR BLD AUTO: 5.9 %
MONOCYTES NFR BLD AUTO: 6.1 %
MONOCYTES NFR BLD AUTO: 6.1 %
MONOCYTES NFR BLD AUTO: 6.3 %
MONOCYTES NFR BLD AUTO: 7.2 %
MONOCYTES NFR BLD AUTO: 7.3 %
MONOCYTES NFR BLD AUTO: 8.7 %
MONOCYTES NFR BLD AUTO: 8.8 %
MONOCYTES NFR BLD AUTO: 8.8 %
MONOCYTES NFR BLD AUTO: 9.2 %
MONOCYTES NFR BLD AUTO: 9.3 %
MONOCYTES NFR BLD AUTO: 9.3 %
MONOCYTES NFR BLD AUTO: 9.7 %
MV MEAN GRADIENT: 1 MMHG
MV MEAN GRADIENT: 2 MMHG
MV MEAN GRADIENT: 2 MMHG
MV PEAK A VEL: 0.44 M/S
MV PEAK A VEL: 0.64 M/S
MV PEAK E VEL: 0.46 M/S
MV PEAK E VEL: 0.72 M/S
MV PEAK E VEL: 1.16 M/S
MV PEAK GRADIENT: 1 MMHG
MV PEAK GRADIENT: 4 MMHG
MV PEAK GRADIENT: 5 MMHG
MV STENOSIS PRESSURE HALF TIME: 30.8 MS
MV STENOSIS PRESSURE HALF TIME: 45 MS
MV VALVE AREA BY CONTINUITY EQUATION: 2.05 CM2
MV VALVE AREA BY CONTINUITY EQUATION: 2.13 CM2
MV VALVE AREA P 1/2 METHOD: 4.89 CM2
MV VALVE AREA P 1/2 METHOD: 7.14 CM2
NEUTROPHILS # BLD AUTO: 11 X10(3)/MCL (ref 2.1–9.2)
NEUTROPHILS # BLD AUTO: 11.1 X10(3)/MCL (ref 2.1–9.2)
NEUTROPHILS # BLD AUTO: 11.3 X10(3)/MCL (ref 2.1–9.2)
NEUTROPHILS # BLD AUTO: 11.7 X10(3)/MCL (ref 2.1–9.2)
NEUTROPHILS # BLD AUTO: 13.03 X10(3)/MCL (ref 2.1–9.2)
NEUTROPHILS # BLD AUTO: 16.1 X10(3)/MCL (ref 2.1–9.2)
NEUTROPHILS # BLD AUTO: 2.7 X10(3)/MCL (ref 2.1–9.2)
NEUTROPHILS # BLD AUTO: 2.7 X10(3)/MCL (ref 2.1–9.2)
NEUTROPHILS # BLD AUTO: 3.57 X10(3)/MCL (ref 2.1–9.2)
NEUTROPHILS # BLD AUTO: 3.6 X10(3)/MCL (ref 2.1–9.2)
NEUTROPHILS # BLD AUTO: 3.8 X10(3)/MCL (ref 2.1–9.2)
NEUTROPHILS # BLD AUTO: 3.8 X10(3)/MCL (ref 2.1–9.2)
NEUTROPHILS # BLD AUTO: 3.9 X10(3)/MCL (ref 2.1–9.2)
NEUTROPHILS # BLD AUTO: 4 X10(3)/MCL (ref 2.1–9.2)
NEUTROPHILS # BLD AUTO: 4.2 X10(3)/MCL (ref 2.1–9.2)
NEUTROPHILS # BLD AUTO: 5 X10(3)/MCL (ref 2.1–9.2)
NEUTROPHILS # BLD AUTO: 5.2 X10(3)/MCL (ref 2.1–9.2)
NEUTROPHILS # BLD AUTO: 5.5 X10(3)/MCL (ref 2.1–9.2)
NEUTROPHILS # BLD AUTO: 5.5 X10(3)/MCL (ref 2.1–9.2)
NEUTROPHILS # BLD AUTO: 6.1 X10(3)/MCL (ref 2.1–9.2)
NEUTROPHILS # BLD AUTO: 6.4 X10(3)/MCL (ref 2.1–9.2)
NEUTROPHILS # BLD AUTO: 6.9 X10(3)/MCL (ref 2.1–9.2)
NEUTROPHILS # BLD AUTO: 7.1 X10(3)/MCL (ref 2.1–9.2)
NEUTROPHILS NFR BLD AUTO: 42.5 %
NEUTROPHILS NFR BLD AUTO: 48.1 %
NEUTROPHILS NFR BLD AUTO: 49 %
NEUTROPHILS NFR BLD AUTO: 55.5 %
NEUTROPHILS NFR BLD AUTO: 55.7 %
NEUTROPHILS NFR BLD AUTO: 57.6 %
NEUTROPHILS NFR BLD AUTO: 58.3 %
NEUTROPHILS NFR BLD AUTO: 61.6 %
NEUTROPHILS NFR BLD AUTO: 63 %
NEUTROPHILS NFR BLD AUTO: 67 %
NEUTROPHILS NFR BLD AUTO: 68.1 %
NEUTROPHILS NFR BLD AUTO: 69.1 %
NEUTROPHILS NFR BLD AUTO: 69.3 %
NEUTROPHILS NFR BLD AUTO: 69.7 %
NEUTROPHILS NFR BLD AUTO: 80.3 %
NEUTROPHILS NFR BLD AUTO: 80.6 %
NEUTROPHILS NFR BLD AUTO: 84.7 %
NEUTROPHILS NFR BLD AUTO: 85.7 %
NEUTROPHILS NFR BLD AUTO: 87.9 %
NEUTROPHILS NFR BLD AUTO: 88.3 %
NEUTROPHILS NFR BLD AUTO: 88.3 %
NEUTROPHILS NFR BLD AUTO: 91 %
NEUTROPHILS NFR BLD AUTO: 92.8 %
NRBC BLD AUTO-RTO: 0 %
NRBC BLD AUTO-RTO: 0.2 %
NRBC BLD AUTO-RTO: 0.2 %
NRBC BLD AUTO-RTO: 0.3 %
NRBC BLD AUTO-RTO: 0.4 %
NRBC BLD AUTO-RTO: 0.4 %
NRBC BLD AUTO-RTO: 0.5 %
NRBC BLD AUTO-RTO: 0.6 %
NRBC BLD AUTO-RTO: 0.6 %
NRBC BLD AUTO-RTO: 0.6 % (ref 0–1)
NRBC BLD AUTO-RTO: 0.7 %
NRBC BLD AUTO-RTO: 0.8 %
NRBC BLD AUTO-RTO: 0.8 % (ref 0–1)
NSE SERPL-MCNC: NORMAL UG/L
OPIATES UR QL SCN: NEGATIVE
OPIATES UR QL SCN: POSITIVE
PCO2 BLDA: 53 MMHG (ref 20–50)
PCO2 BLDA: 54 MMHG (ref 20–50)
PCO2 BLDA: 58 MMHG (ref 20–50)
PCO2 BLDA: 60 MMHG (ref 19–50)
PCO2 BLDA: 62 MMHG (ref 19–50)
PCO2 BLDA: 62 MMHG (ref 20–50)
PCO2 BLDA: 64 MMHG (ref 19–50)
PCO2 BLDA: 67 MMHG (ref 19–50)
PCO2 BLDA: 70 MMHG (ref 20–50)
PCO2 BLDA: 77 MMHG (ref 20–50)
PCO2 BLDA: 79 MMHG (ref 20–50)
PCO2 BLDA: 92 MMHG (ref 20–50)
PCO2 BLDA: ABNORMAL MM[HG]
PCO2 BLDA: ABNORMAL MM[HG]
PCO2 BLDA: NORMAL MM[HG]
PCO2 BLDA: NORMAL MM[HG]
PCP UR QL: NEGATIVE
PH SMN: 7.01 [PH] (ref 7.3–7.6)
PH SMN: 7.08 [PH] (ref 7.3–7.6)
PH SMN: 7.11 [PH] (ref 7.3–7.6)
PH SMN: 7.12 [PH] (ref 7.3–7.6)
PH SMN: 7.17 [PH] (ref 7.3–7.6)
PH SMN: 7.25 [PH] (ref 7.29–7.61)
PH SMN: 7.25 [PH] (ref 7.3–7.6)
PH SMN: 7.26 [PH] (ref 7.29–7.61)
PH SMN: 7.27 [PH] (ref 7.29–7.61)
PH SMN: 7.28 [PH] (ref 7.29–7.61)
PH SMN: 7.37 [PH] (ref 7.35–7.45)
PH SMN: <6.92 [PH]
PH SMN: ABNORMAL [PH]
PH SMN: NORMAL [PH]
PH UR: 5.5 [PH] (ref 3–11)
PH UR: 5.5 [PH] (ref 3–11)
PH UR: 6 [PH] (ref 3–11)
PH UR: 7.5 [PH] (ref 3–11)
PHOSPHATE SERPL-MCNC: 10.3 MG/DL (ref 2.3–4.7)
PHOSPHATE SERPL-MCNC: 2.6 MG/DL (ref 2.3–4.7)
PHOSPHATE SERPL-MCNC: 3.4 MG/DL (ref 2.3–4.7)
PHOSPHATE SERPL-MCNC: 3.7 MG/DL (ref 2.3–4.7)
PHOSPHATE SERPL-MCNC: 4.1 MG/DL (ref 2.3–4.7)
PHOSPHATE SERPL-MCNC: 4.2 MG/DL (ref 2.3–4.7)
PHOSPHATE SERPL-MCNC: 8.9 MG/DL (ref 2.3–4.7)
PHOSPHATE SERPL-MCNC: 9.2 MG/DL (ref 2.3–4.7)
PISA TR MAX VEL: 2.24 M/S
PISA TR MAX VEL: 2.6 M/S
PISA TR MAX VEL: 2.7 M/S
PLATELET # BLD AUTO: 244 X10(3)/MCL (ref 140–371)
PLATELET # BLD AUTO: 253 X10(3)/MCL (ref 130–400)
PLATELET # BLD AUTO: 267 X10(3)/MCL (ref 140–371)
PLATELET # BLD AUTO: 274 X10(3)/MCL (ref 130–400)
PLATELET # BLD AUTO: 275 X10(3)/MCL (ref 130–400)
PLATELET # BLD AUTO: 277 X10(3)/MCL (ref 130–400)
PLATELET # BLD AUTO: 277 X10(3)/MCL (ref 130–400)
PLATELET # BLD AUTO: 283 X10(3)/MCL (ref 130–400)
PLATELET # BLD AUTO: 285 X10(3)/MCL (ref 130–400)
PLATELET # BLD AUTO: 286 X10(3)/MCL (ref 130–400)
PLATELET # BLD AUTO: 286 X10(3)/MCL (ref 130–400)
PLATELET # BLD AUTO: 305 X10(3)/MCL (ref 130–400)
PLATELET # BLD AUTO: 307 X10(3)/MCL (ref 130–400)
PLATELET # BLD AUTO: 312 X10(3)/MCL (ref 130–400)
PLATELET # BLD AUTO: 314 X10(3)/MCL (ref 130–400)
PLATELET # BLD AUTO: 315 X10(3)/MCL (ref 130–400)
PLATELET # BLD AUTO: 324 X10(3)/MCL (ref 130–400)
PLATELET # BLD AUTO: 325 X10(3)/MCL (ref 130–400)
PLATELET # BLD AUTO: 328 X10(3)/MCL (ref 130–400)
PLATELET # BLD AUTO: 334 X10(3)/MCL (ref 130–400)
PLATELET # BLD AUTO: 336 X10(3)/MCL (ref 130–400)
PLATELET # BLD AUTO: 351 X10(3)/MCL (ref 130–400)
PLATELET # BLD AUTO: 360 X10(3)/MCL (ref 130–400)
PMV BLD AUTO: 10 FL (ref 7.4–10.4)
PMV BLD AUTO: 10 FL (ref 7.4–10.4)
PMV BLD AUTO: 10.1 FL (ref 7.4–10.4)
PMV BLD AUTO: 10.1 FL (ref 9.4–12.4)
PMV BLD AUTO: 10.2 FL (ref 7.4–10.4)
PMV BLD AUTO: 10.2 FL (ref 9.4–12.4)
PMV BLD AUTO: 10.6 FL (ref 9.4–12.4)
PMV BLD AUTO: 9.3 FL (ref 7.4–10.4)
PMV BLD AUTO: 9.4 FL (ref 9.4–12.4)
PMV BLD AUTO: 9.5 FL (ref 7.4–10.4)
PMV BLD AUTO: 9.6 FL (ref 7.4–10.4)
PMV BLD AUTO: 9.6 FL (ref 7.4–10.4)
PMV BLD AUTO: 9.6 FL (ref 9.4–12.4)
PMV BLD AUTO: 9.7 FL (ref 7.4–10.4)
PMV BLD AUTO: 9.7 FL (ref 7.4–10.4)
PMV BLD AUTO: 9.7 FL (ref 9.4–12.4)
PMV BLD AUTO: 9.8 FL (ref 7.4–10.4)
PMV BLD AUTO: 9.8 FL (ref 7.4–10.4)
PMV BLD AUTO: 9.8 FL (ref 9.4–12.4)
PMV BLD AUTO: 9.8 FL (ref 9.4–12.4)
PMV BLD AUTO: 9.9 FL (ref 7.4–10.4)
PMV BLD AUTO: 9.9 FL (ref 7.4–10.4)
PMV BLD AUTO: 9.9 FL (ref 9.4–12.4)
PO2 BLDA: 148 MMHG (ref 75–100)
PO2 BLDA: 282 MMHG (ref 75–100)
PO2 BLDA: 296 MMHG (ref 75–100)
PO2 BLDA: 388 MMHG (ref 75–100)
PO2 BLDA: 63 MMHG (ref 75–100)
PO2 BLDA: 64 MMHG (ref 75–100)
PO2 BLDA: 68 MMHG (ref 75–100)
PO2 BLDA: 72 MMHG (ref 80–100)
PO2 BLDA: 84 MMHG (ref 80–100)
PO2 BLDA: 85 MMHG (ref 80–100)
PO2 BLDA: 87 MMHG (ref 80–100)
PO2 BLDA: 91 MMHG (ref 75–100)
PO2 BLDA: ABNORMAL MM[HG]
PO2 BLDA: NORMAL MM[HG]
POC BASE DEFICIT: -0.3 MMOL/L (ref -2–2)
POC BASE DEFICIT: -0.9 MMOL/L (ref -2–2)
POC BASE DEFICIT: -1 MMOL/L (ref -2–2)
POC BASE DEFICIT: -12.7 MMOL/L (ref -2–2)
POC BASE DEFICIT: -12.7 MMOL/L (ref -2–2)
POC BASE DEFICIT: -17.3 MMOL/L (ref -2–2)
POC BASE DEFICIT: -2.1 MMOL/L (ref -2–2)
POC BASE DEFICIT: -2.5 MMOL/L (ref -2–2)
POC BASE DEFICIT: 1 MMOL/L (ref -2–2)
POC BASE DEFICIT: 1.8 MMOL/L (ref -2–2)
POC BASE DEFICIT: 3.9 MMOL/L (ref -2–2)
POC COHB: 1.5 % (ref 0.5–1.5)
POC COHB: 1.5 % (ref 0.5–1.5)
POC COHB: 1.7 % (ref 0.5–1.5)
POC COHB: 1.9 % (ref 0.5–1.5)
POC COHB: 2.2 %
POC COHB: 2.7 %
POC COHB: 2.7 %
POC COHB: 2.8 %
POC COHB: 2.8 % (ref 0.5–1.5)
POC COHB: 2.8 % (ref 0.5–1.5)
POC COHB: 2.9 % (ref 0.5–1.5)
POC COHB: 4.1 % (ref 0.5–1.5)
POC COHB: ABNORMAL
POC COHB: NORMAL
POC IONIZED CALCIUM: 1.19 MMOL/L (ref 1.12–1.23)
POC IONIZED CALCIUM: 1.23 MMOL/L (ref 1.12–1.23)
POC IONIZED CALCIUM: 1.25 MMOL/L (ref 1.12–1.23)
POC IONIZED CALCIUM: 1.26 MMOL/L (ref 1.06–1.42)
POC IONIZED CALCIUM: 1.27 MMOL/L (ref 1.12–1.23)
POC IONIZED CALCIUM: ABNORMAL
POC IONIZED CALCIUM: NORMAL
POC METHB: 0.7 % (ref 0–2)
POC METHB: 0.8 % (ref 0–1.5)
POC METHB: 0.9 % (ref 0–2)
POC METHB: 1 % (ref 0.4–1.5)
POC METHB: 1 % (ref 0–1.5)
POC METHB: 1 % (ref 0–1.5)
POC METHB: 1.1 % (ref 0.4–1.5)
POC METHB: 1.1 % (ref 0.4–1.5)
POC METHB: 1.1 % (ref 0–1.5)
POC METHB: 1.1 % (ref 0–1.5)
POC METHB: 1.3 % (ref 0.4–1.5)
POC METHB: 2.2 % (ref 0–1.5)
POC METHB: ABNORMAL
POC METHB: NORMAL
POC O2HB: 88.4 % (ref 94–100)
POC O2HB: 89.1 % (ref 94–100)
POC O2HB: 90.9 % (ref 94–97)
POC O2HB: 91.2 % (ref 94–100)
POC O2HB: 93 % (ref 94–97)
POC O2HB: 93.5 % (ref 94–100)
POC O2HB: 94 % (ref 94–97)
POC O2HB: 94.3 % (ref 94–97)
POC O2HB: 95.6 % (ref 94–100)
POC O2HB: 97.7 % (ref 94–100)
POC O2HB: 97.8 % (ref 94–100)
POC O2HB: 98.2 % (ref 94–100)
POC O2HB: ABNORMAL
POC O2HB: NORMAL
POC PERFORMED BY: ABNORMAL
POC SATURATED O2: 84.7 % (ref 90–100)
POC SATURATED O2: 89.9 % (ref 90–100)
POC SATURATED O2: 91.3 %
POC SATURATED O2: 91.4 % (ref 90–100)
POC SATURATED O2: 93.7 % (ref 90–100)
POC SATURATED O2: 94.7 %
POC SATURATED O2: 95 %
POC SATURATED O2: 95.1 %
POC SATURATED O2: 99.8 % (ref 90–100)
POC SATURATED O2: 99.8 % (ref 90–100)
POC SATURATED O2: 99.9 % (ref 90–100)
POC TCO2 (MEASURED): 31 MMOL/L (ref 23–29)
POC TEMPERATURE: 37 C
POC TEMPERATURE: 37 °C
POCT GLUCOSE: 100 MG/DL (ref 70–110)
POCT GLUCOSE: 100 MG/DL (ref 70–110)
POCT GLUCOSE: 101 MG/DL (ref 70–110)
POCT GLUCOSE: 102 MG/DL (ref 70–110)
POCT GLUCOSE: 103 MG/DL (ref 70–110)
POCT GLUCOSE: 107 MG/DL (ref 70–110)
POCT GLUCOSE: 107 MG/DL (ref 70–110)
POCT GLUCOSE: 108 MG/DL (ref 70–110)
POCT GLUCOSE: 109 MG/DL (ref 70–110)
POCT GLUCOSE: 109 MG/DL (ref 70–110)
POCT GLUCOSE: 110 MG/DL (ref 70–110)
POCT GLUCOSE: 110 MG/DL (ref 70–110)
POCT GLUCOSE: 114 MG/DL (ref 70–110)
POCT GLUCOSE: 115 MG/DL (ref 70–110)
POCT GLUCOSE: 115 MG/DL (ref 70–110)
POCT GLUCOSE: 122 MG/DL (ref 70–110)
POCT GLUCOSE: 122 MG/DL (ref 70–110)
POCT GLUCOSE: 124 MG/DL (ref 70–110)
POCT GLUCOSE: 129 MG/DL (ref 70–110)
POCT GLUCOSE: 129 MG/DL (ref 70–110)
POCT GLUCOSE: 130 MG/DL (ref 70–110)
POCT GLUCOSE: 134 MG/DL (ref 70–110)
POCT GLUCOSE: 134 MG/DL (ref 70–110)
POCT GLUCOSE: 135 MG/DL (ref 70–110)
POCT GLUCOSE: 136 MG/DL (ref 70–110)
POCT GLUCOSE: 138 MG/DL (ref 70–110)
POCT GLUCOSE: 148 MG/DL (ref 70–110)
POCT GLUCOSE: 151 MG/DL (ref 70–110)
POCT GLUCOSE: 157 MG/DL (ref 70–110)
POCT GLUCOSE: 158 MG/DL (ref 70–110)
POCT GLUCOSE: 167 MG/DL (ref 70–110)
POCT GLUCOSE: 167 MG/DL (ref 70–110)
POCT GLUCOSE: 174 MG/DL (ref 70–110)
POCT GLUCOSE: 175 MG/DL (ref 70–110)
POCT GLUCOSE: 186 MG/DL (ref 70–110)
POCT GLUCOSE: 188 MG/DL (ref 70–110)
POCT GLUCOSE: 189 MG/DL (ref 70–110)
POCT GLUCOSE: 192 MG/DL (ref 70–110)
POCT GLUCOSE: 193 MG/DL (ref 70–110)
POCT GLUCOSE: 193 MG/DL (ref 70–110)
POCT GLUCOSE: 252 MG/DL (ref 70–110)
POCT GLUCOSE: 311 MG/DL (ref 70–110)
POCT GLUCOSE: 335 MG/DL (ref 70–110)
POCT GLUCOSE: 86 MG/DL (ref 70–110)
POCT GLUCOSE: 89 MG/DL (ref 70–110)
POCT GLUCOSE: 92 MG/DL (ref 70–110)
POCT GLUCOSE: 93 MG/DL (ref 70–110)
POCT GLUCOSE: 94 MG/DL (ref 70–110)
POCT GLUCOSE: 94 MG/DL (ref 70–110)
POCT GLUCOSE: 96 MG/DL (ref 70–110)
POCT GLUCOSE: 96 MG/DL (ref 70–110)
POCT GLUCOSE: 98 MG/DL (ref 70–110)
POCT GLUCOSE: >500 MG/DL (ref 70–110)
POCT GLUCOSE: >500 MG/DL (ref 70–110)
POTASSIUM BLD-SCNC: 4 MMOL/L (ref 3.5–5)
POTASSIUM BLD-SCNC: 4.6 MMOL/L (ref 3.5–5)
POTASSIUM BLD-SCNC: 4.7 MMOL/L (ref 3.5–5)
POTASSIUM BLD-SCNC: 4.9 MMOL/L (ref 3.5–5)
POTASSIUM BLD-SCNC: 5.3 MMOL/L (ref 3.5–5.1)
POTASSIUM BLD-SCNC: ABNORMAL MMOL/L
POTASSIUM BLD-SCNC: NORMAL MMOL/L
POTASSIUM SERPL-SCNC: 3.3 MMOL/L (ref 3.5–5.1)
POTASSIUM SERPL-SCNC: 3.7 MMOL/L (ref 3.5–5.1)
POTASSIUM SERPL-SCNC: 3.9 MMOL/L (ref 3.5–5.1)
POTASSIUM SERPL-SCNC: 4 MMOL/L (ref 3.5–5.1)
POTASSIUM SERPL-SCNC: 4.2 MMOL/L (ref 3.5–5.1)
POTASSIUM SERPL-SCNC: 4.3 MMOL/L (ref 3.5–5.1)
POTASSIUM SERPL-SCNC: 4.4 MMOL/L (ref 3.5–5.1)
POTASSIUM SERPL-SCNC: 4.5 MMOL/L (ref 3.5–5.1)
POTASSIUM SERPL-SCNC: 4.6 MMOL/L (ref 3.5–5.1)
POTASSIUM SERPL-SCNC: 4.7 MMOL/L (ref 3.5–5.1)
POTASSIUM SERPL-SCNC: 4.7 MMOL/L (ref 3.5–5.1)
POTASSIUM SERPL-SCNC: 4.8 MMOL/L (ref 3.5–5.1)
POTASSIUM SERPL-SCNC: 4.9 MMOL/L (ref 3.5–5.1)
POTASSIUM SERPL-SCNC: 5 MMOL/L (ref 3.5–5.1)
POTASSIUM SERPL-SCNC: 5.1 MMOL/L (ref 3.5–5.1)
POTASSIUM SERPL-SCNC: 5.1 MMOL/L (ref 3.5–5.1)
POTASSIUM SERPL-SCNC: 5.2 MMOL/L (ref 3.5–5.1)
POTASSIUM SERPL-SCNC: 5.7 MMOL/L (ref 3.5–5.1)
POTASSIUM SERPL-SCNC: 5.9 MMOL/L (ref 3.5–5.1)
POTASSIUM SERPL-SCNC: 6 MMOL/L (ref 3.5–5.1)
POTASSIUM SERPL-SCNC: 6.1 MMOL/L (ref 3.5–5.1)
POTASSIUM SERPL-SCNC: 6.3 MMOL/L (ref 3.5–5.1)
PROT SERPL-MCNC: 5.8 GM/DL (ref 6.4–8.3)
PROT SERPL-MCNC: 6.1 GM/DL (ref 6.4–8.3)
PROT SERPL-MCNC: 6.2 GM/DL (ref 6.4–8.3)
PROT SERPL-MCNC: 6.3 GM/DL (ref 6.4–8.3)
PROT SERPL-MCNC: 6.3 GM/DL (ref 6.4–8.3)
PROT SERPL-MCNC: 6.6 GM/DL (ref 6.4–8.3)
PROT SERPL-MCNC: 6.7 GM/DL (ref 6.4–8.3)
PROT SERPL-MCNC: 6.8 GM/DL (ref 6.4–8.3)
PROT SERPL-MCNC: 7 GM/DL (ref 6.4–8.3)
PROT SERPL-MCNC: 7.1 GM/DL (ref 6.4–8.3)
PROT SERPL-MCNC: 7.3 GM/DL (ref 6.4–8.3)
PROT SERPL-MCNC: 7.6 GM/DL (ref 6.4–8.3)
PROT SERPL-MCNC: 7.7 GM/DL (ref 6.4–8.3)
PROTHROMBIN TIME: 13.3 SECONDS (ref 12.5–14.5)
PROTHROMBIN TIME: 13.9 SECONDS (ref 12.5–14.5)
PROTHROMBIN TIME: 14.6 SECONDS (ref 12.5–14.5)
PV PEAK VELOCITY: 1.17 CM/S
RA PRESSURE: 15 MMHG
RA PRESSURE: 15 MMHG
RA WIDTH: 5 CM
RBC # BLD AUTO: 4.15 X10(6)/MCL (ref 4.7–6.1)
RBC # BLD AUTO: 4.36 X10(6)/MCL (ref 4.7–6.1)
RBC # BLD AUTO: 4.38 X10(6)/MCL (ref 4.7–6.1)
RBC # BLD AUTO: 4.45 X10(6)/MCL (ref 4.7–6.1)
RBC # BLD AUTO: 4.51 X10(6)/MCL (ref 4.7–6.1)
RBC # BLD AUTO: 4.52 X10(6)/MCL (ref 4.7–6.1)
RBC # BLD AUTO: 4.57 X10(6)/MCL (ref 4.7–6.1)
RBC # BLD AUTO: 4.6 X10(6)/MCL (ref 4.7–6.1)
RBC # BLD AUTO: 4.61 X10(6)/MCL (ref 4.7–6.1)
RBC # BLD AUTO: 4.69 X10(6)/MCL (ref 4.7–6.1)
RBC # BLD AUTO: 4.78 X10(6)/MCL (ref 4.7–6.1)
RBC # BLD AUTO: 4.82 X10(6)/MCL (ref 4.7–6.1)
RBC # BLD AUTO: 4.88 X10(6)/MCL (ref 4.7–6.1)
RBC # BLD AUTO: 4.9 X10(6)/MCL (ref 4.7–6.1)
RBC # BLD AUTO: 4.94 X10(6)/MCL (ref 4.7–6.1)
RBC # BLD AUTO: 5 X10(6)/MCL (ref 4.7–6.1)
RBC # BLD AUTO: 5.05 X10(6)/MCL (ref 4.7–6.1)
RBC # BLD AUTO: 5.08 X10(6)/MCL (ref 4.7–6.1)
RBC # BLD AUTO: 5.12 X10(6)/MCL (ref 4.7–6.1)
RBC # BLD AUTO: 5.21 X10(6)/MCL (ref 4.7–6.1)
RBC # BLD AUTO: 5.3 X10(6)/MCL (ref 4.7–6.1)
RBC # BLD AUTO: 5.4 X10(6)/MCL (ref 4.7–6.1)
RBC # BLD AUTO: 5.48 X10(6)/MCL (ref 4.7–6.1)
RIGHT VENTRICULAR END-DIASTOLIC DIMENSION: 3.88 CM
RIGHT VENTRICULAR END-DIASTOLIC DIMENSION: 3.97 CM
SAMPLE: ABNORMAL
SARS-COV-2 RDRP RESP QL NAA+PROBE: NEGATIVE
SARS-COV-2 RNA RESP QL NAA+PROBE: NOT DETECTED
SATURATED O2 ARTERIAL, I-STAT: ABNORMAL
SATURATED O2 ARTERIAL, I-STAT: NORMAL
SODIUM BLD-SCNC: 134 MMOL/L (ref 137–145)
SODIUM BLD-SCNC: 135 MMOL/L (ref 137–145)
SODIUM BLD-SCNC: 135 MMOL/L (ref 137–145)
SODIUM BLD-SCNC: 136 MMOL/L (ref 137–145)
SODIUM BLD-SCNC: 139 MMOL/L (ref 136–145)
SODIUM BLD-SCNC: ABNORMAL MMOL/L
SODIUM BLD-SCNC: NORMAL MMOL/L
SODIUM SERPL-SCNC: 123 MMOL/L (ref 136–145)
SODIUM SERPL-SCNC: 130 MMOL/L (ref 136–145)
SODIUM SERPL-SCNC: 132 MMOL/L (ref 136–145)
SODIUM SERPL-SCNC: 137 MMOL/L (ref 136–145)
SODIUM SERPL-SCNC: 138 MMOL/L (ref 136–145)
SODIUM SERPL-SCNC: 139 MMOL/L (ref 136–145)
SODIUM SERPL-SCNC: 140 MMOL/L (ref 136–145)
SODIUM SERPL-SCNC: 141 MMOL/L (ref 136–145)
SODIUM SERPL-SCNC: 142 MMOL/L (ref 136–145)
SODIUM SERPL-SCNC: 143 MMOL/L (ref 136–145)
SPECIFIC GRAVITY, URINE AUTO (.000) (OHS): 1.01 (ref 1–1.03)
SPECIFIC GRAVITY, URINE AUTO (.000) (OHS): 1.02 (ref 1–1.03)
SPECIMEN SOURCE: ABNORMAL
T4 FREE SERPL-MCNC: 0.76 NG/DL (ref 0.7–1.48)
TDI LATERAL: 0.12 M/S
TDI SEPTAL: 0.14 M/S
TDI: 0.13 M/S
TR MAX PG: 20 MMHG
TR MAX PG: 27 MMHG
TR MAX PG: 29 MMHG
TRICUSPID ANNULAR PLANE SYSTOLIC EXCURSION: 1.31 CM
TRICUSPID ANNULAR PLANE SYSTOLIC EXCURSION: 1.75 CM
TRICUSPID ANNULAR PLANE SYSTOLIC EXCURSION: 2.03 CM
TRIGL SERPL-MCNC: 135 MG/DL (ref 34–140)
TROPONIN I SERPL-MCNC: 0.01 NG/ML (ref 0–0.04)
TROPONIN I SERPL-MCNC: 0.07 NG/ML (ref 0–0.04)
TROPONIN I SERPL-MCNC: 1.56 NG/ML (ref 0–0.04)
TROPONIN I SERPL-MCNC: 4.66 NG/ML (ref 0–0.04)
TROPONIN I SERPL-MCNC: 4.67 NG/ML (ref 0–0.04)
TROPONIN I SERPL-MCNC: 5 NG/ML (ref 0–0.04)
TROPONIN I SERPL-MCNC: <0.01 NG/ML (ref 0–0.04)
TSH SERPL-ACNC: 0.83 UIU/ML (ref 0.35–4.94)
TSH SERPL-ACNC: 1.45 UIU/ML (ref 0.35–4.94)
TSH SERPL-ACNC: 2.07 UIU/ML (ref 0.35–4.94)
TV REST PULMONARY ARTERY PRESSURE: 42 MMHG
TV REST PULMONARY ARTERY PRESSURE: 44 MMHG
VLDLC SERPL CALC-MCNC: 27 MG/DL
WBC # SPEC AUTO: 10 X10(3)/MCL (ref 4.5–11.5)
WBC # SPEC AUTO: 12.2 X10(3)/MCL (ref 4.5–11.5)
WBC # SPEC AUTO: 12.8 X10(3)/MCL (ref 4.5–11.5)
WBC # SPEC AUTO: 12.8 X10(3)/MCL (ref 4.5–11.5)
WBC # SPEC AUTO: 13.2 X10(3)/MCL (ref 4.5–11.5)
WBC # SPEC AUTO: 16.2 X10(3)/MCL (ref 4.5–11.5)
WBC # SPEC AUTO: 17.3 X10(3)/MCL (ref 4.5–11.5)
WBC # SPEC AUTO: 5.5 X10(3)/MCL (ref 4.5–11.5)
WBC # SPEC AUTO: 5.6 X10(3)/MCL (ref 4.5–11.5)
WBC # SPEC AUTO: 5.8 X10(3)/MCL (ref 4.5–11.5)
WBC # SPEC AUTO: 6.2 X10(3)/MCL (ref 4.5–11.5)
WBC # SPEC AUTO: 6.5 X10(3)/MCL (ref 4.5–11.5)
WBC # SPEC AUTO: 6.5 X10(3)/MCL (ref 4.5–11.5)
WBC # SPEC AUTO: 6.7 X10(3)/MCL (ref 4.5–11.5)
WBC # SPEC AUTO: 6.8 X10(3)/MCL (ref 4.5–11.5)
WBC # SPEC AUTO: 7 X10(3)/MCL (ref 4.5–11.5)
WBC # SPEC AUTO: 7 X10(3)/MCL (ref 4.5–11.5)
WBC # SPEC AUTO: 7.8 X10(3)/MCL (ref 4.5–11.5)
WBC # SPEC AUTO: 8 X10(3)/MCL (ref 4.5–11.5)
WBC # SPEC AUTO: 8.1 X10(3)/MCL (ref 4.5–11.5)
WBC # SPEC AUTO: 8.4 X10(3)/MCL (ref 4.5–11.5)
WBC # SPEC AUTO: 8.9 X10(3)/MCL (ref 4.5–11.5)
WBC # SPEC AUTO: 9.2 X10(3)/MCL (ref 4.5–11.5)

## 2022-01-01 PROCEDURE — 99285 EMERGENCY DEPT VISIT HI MDM: CPT | Mod: 25

## 2022-01-01 PROCEDURE — 36600 WITHDRAWAL OF ARTERIAL BLOOD: CPT

## 2022-01-01 PROCEDURE — 99900031 HC PATIENT EDUCATION (STAT)

## 2022-01-01 PROCEDURE — 94761 N-INVAS EAR/PLS OXIMETRY MLT: CPT

## 2022-01-01 PROCEDURE — 93005 ELECTROCARDIOGRAM TRACING: CPT

## 2022-01-01 PROCEDURE — 99291 CRITICAL CARE FIRST HOUR: CPT | Mod: 25

## 2022-01-01 PROCEDURE — 25000003 PHARM REV CODE 250: Performed by: STUDENT IN AN ORGANIZED HEALTH CARE EDUCATION/TRAINING PROGRAM

## 2022-01-01 PROCEDURE — 94640 AIRWAY INHALATION TREATMENT: CPT

## 2022-01-01 PROCEDURE — 84484 ASSAY OF TROPONIN QUANT: CPT | Performed by: EMERGENCY MEDICINE

## 2022-01-01 PROCEDURE — 25000003 PHARM REV CODE 250: Performed by: PHYSICIAN ASSISTANT

## 2022-01-01 PROCEDURE — 63600175 PHARM REV CODE 636 W HCPCS: Performed by: EMERGENCY MEDICINE

## 2022-01-01 PROCEDURE — 94002 VENT MGMT INPAT INIT DAY: CPT

## 2022-01-01 PROCEDURE — 85025 COMPLETE CBC W/AUTO DIFF WBC: CPT | Performed by: NURSE PRACTITIONER

## 2022-01-01 PROCEDURE — 25000242 PHARM REV CODE 250 ALT 637 W/ HCPCS: Performed by: EMERGENCY MEDICINE

## 2022-01-01 PROCEDURE — 80053 COMPREHEN METABOLIC PANEL: CPT | Performed by: EMERGENCY MEDICINE

## 2022-01-01 PROCEDURE — 63600175 PHARM REV CODE 636 W HCPCS: Performed by: STUDENT IN AN ORGANIZED HEALTH CARE EDUCATION/TRAINING PROGRAM

## 2022-01-01 PROCEDURE — 85025 COMPLETE CBC W/AUTO DIFF WBC: CPT | Performed by: INTERNAL MEDICINE

## 2022-01-01 PROCEDURE — 93010 EKG 12-LEAD: ICD-10-PCS | Mod: ,,, | Performed by: INTERNAL MEDICINE

## 2022-01-01 PROCEDURE — 36415 COLL VENOUS BLD VENIPUNCTURE: CPT | Performed by: NURSE PRACTITIONER

## 2022-01-01 PROCEDURE — 25000242 PHARM REV CODE 250 ALT 637 W/ HCPCS: Performed by: INTERNAL MEDICINE

## 2022-01-01 PROCEDURE — 27000190 HC CPAP FULL FACE MASK W/VALVE

## 2022-01-01 PROCEDURE — 96375 TX/PRO/DX INJ NEW DRUG ADDON: CPT

## 2022-01-01 PROCEDURE — 83735 ASSAY OF MAGNESIUM: CPT | Performed by: INTERNAL MEDICINE

## 2022-01-01 PROCEDURE — 85610 PROTHROMBIN TIME: CPT | Performed by: STUDENT IN AN ORGANIZED HEALTH CARE EDUCATION/TRAINING PROGRAM

## 2022-01-01 PROCEDURE — 27000221 HC OXYGEN, UP TO 24 HOURS

## 2022-01-01 PROCEDURE — 11000001 HC ACUTE MED/SURG PRIVATE ROOM

## 2022-01-01 PROCEDURE — 25000003 PHARM REV CODE 250: Performed by: NURSE PRACTITIONER

## 2022-01-01 PROCEDURE — 25000003 PHARM REV CODE 250: Performed by: FAMILY MEDICINE

## 2022-01-01 PROCEDURE — 37799 UNLISTED PX VASCULAR SURGERY: CPT

## 2022-01-01 PROCEDURE — 25000242 PHARM REV CODE 250 ALT 637 W/ HCPCS: Performed by: STUDENT IN AN ORGANIZED HEALTH CARE EDUCATION/TRAINING PROGRAM

## 2022-01-01 PROCEDURE — 83735 ASSAY OF MAGNESIUM: CPT | Performed by: STUDENT IN AN ORGANIZED HEALTH CARE EDUCATION/TRAINING PROGRAM

## 2022-01-01 PROCEDURE — 0241U COVID/FLU A&B PCR: CPT | Performed by: EMERGENCY MEDICINE

## 2022-01-01 PROCEDURE — 83605 ASSAY OF LACTIC ACID: CPT | Performed by: FAMILY MEDICINE

## 2022-01-01 PROCEDURE — 63600175 PHARM REV CODE 636 W HCPCS: Performed by: INTERNAL MEDICINE

## 2022-01-01 PROCEDURE — 99900035 HC TECH TIME PER 15 MIN (STAT)

## 2022-01-01 PROCEDURE — 80162 ASSAY OF DIGOXIN TOTAL: CPT | Performed by: EMERGENCY MEDICINE

## 2022-01-01 PROCEDURE — 83735 ASSAY OF MAGNESIUM: CPT | Performed by: FAMILY MEDICINE

## 2022-01-01 PROCEDURE — 80307 DRUG TEST PRSMV CHEM ANLYZR: CPT | Performed by: STUDENT IN AN ORGANIZED HEALTH CARE EDUCATION/TRAINING PROGRAM

## 2022-01-01 PROCEDURE — 25000003 PHARM REV CODE 250: Performed by: EMERGENCY MEDICINE

## 2022-01-01 PROCEDURE — 84439 ASSAY OF FREE THYROXINE: CPT | Performed by: STUDENT IN AN ORGANIZED HEALTH CARE EDUCATION/TRAINING PROGRAM

## 2022-01-01 PROCEDURE — 36415 COLL VENOUS BLD VENIPUNCTURE: CPT | Performed by: EMERGENCY MEDICINE

## 2022-01-01 PROCEDURE — 80048 BASIC METABOLIC PNL TOTAL CA: CPT | Performed by: INTERNAL MEDICINE

## 2022-01-01 PROCEDURE — 85025 COMPLETE CBC W/AUTO DIFF WBC: CPT | Performed by: EMERGENCY MEDICINE

## 2022-01-01 PROCEDURE — 80053 COMPREHEN METABOLIC PANEL: CPT | Performed by: STUDENT IN AN ORGANIZED HEALTH CARE EDUCATION/TRAINING PROGRAM

## 2022-01-01 PROCEDURE — 63600175 PHARM REV CODE 636 W HCPCS: Performed by: PHYSICIAN ASSISTANT

## 2022-01-01 PROCEDURE — 80048 BASIC METABOLIC PNL TOTAL CA: CPT | Performed by: NURSE PRACTITIONER

## 2022-01-01 PROCEDURE — 83036 HEMOGLOBIN GLYCOSYLATED A1C: CPT | Performed by: STUDENT IN AN ORGANIZED HEALTH CARE EDUCATION/TRAINING PROGRAM

## 2022-01-01 PROCEDURE — 21400001 HC TELEMETRY ROOM

## 2022-01-01 PROCEDURE — 94660 CPAP INITIATION&MGMT: CPT

## 2022-01-01 PROCEDURE — 36415 COLL VENOUS BLD VENIPUNCTURE: CPT | Performed by: PHYSICIAN ASSISTANT

## 2022-01-01 PROCEDURE — 80307 DRUG TEST PRSMV CHEM ANLYZR: CPT | Performed by: INTERNAL MEDICINE

## 2022-01-01 PROCEDURE — 85025 COMPLETE CBC W/AUTO DIFF WBC: CPT | Performed by: STUDENT IN AN ORGANIZED HEALTH CARE EDUCATION/TRAINING PROGRAM

## 2022-01-01 PROCEDURE — 63600175 PHARM REV CODE 636 W HCPCS: Performed by: NURSE PRACTITIONER

## 2022-01-01 PROCEDURE — 93010 ELECTROCARDIOGRAM REPORT: CPT | Mod: ,,, | Performed by: INTERNAL MEDICINE

## 2022-01-01 PROCEDURE — 82803 BLOOD GASES ANY COMBINATION: CPT

## 2022-01-01 PROCEDURE — 80048 BASIC METABOLIC PNL TOTAL CA: CPT | Performed by: STUDENT IN AN ORGANIZED HEALTH CARE EDUCATION/TRAINING PROGRAM

## 2022-01-01 PROCEDURE — 36415 COLL VENOUS BLD VENIPUNCTURE: CPT | Performed by: STUDENT IN AN ORGANIZED HEALTH CARE EDUCATION/TRAINING PROGRAM

## 2022-01-01 PROCEDURE — 87636 SARSCOV2 & INF A&B AMP PRB: CPT | Performed by: PHYSICIAN ASSISTANT

## 2022-01-01 PROCEDURE — 94799 UNLISTED PULMONARY SVC/PX: CPT

## 2022-01-01 PROCEDURE — 80053 COMPREHEN METABOLIC PANEL: CPT | Performed by: INTERNAL MEDICINE

## 2022-01-01 PROCEDURE — 25000003 PHARM REV CODE 250: Performed by: INTERNAL MEDICINE

## 2022-01-01 PROCEDURE — G0378 HOSPITAL OBSERVATION PER HR: HCPCS

## 2022-01-01 PROCEDURE — 84484 ASSAY OF TROPONIN QUANT: CPT | Performed by: STUDENT IN AN ORGANIZED HEALTH CARE EDUCATION/TRAINING PROGRAM

## 2022-01-01 PROCEDURE — 85730 THROMBOPLASTIN TIME PARTIAL: CPT | Performed by: STUDENT IN AN ORGANIZED HEALTH CARE EDUCATION/TRAINING PROGRAM

## 2022-01-01 PROCEDURE — 25000003 PHARM REV CODE 250

## 2022-01-01 PROCEDURE — 80053 COMPREHEN METABOLIC PANEL: CPT | Performed by: NURSE PRACTITIONER

## 2022-01-01 PROCEDURE — 84100 ASSAY OF PHOSPHORUS: CPT | Performed by: INTERNAL MEDICINE

## 2022-01-01 PROCEDURE — 82553 CREATINE MB FRACTION: CPT | Performed by: FAMILY MEDICINE

## 2022-01-01 PROCEDURE — 84100 ASSAY OF PHOSPHORUS: CPT | Performed by: STUDENT IN AN ORGANIZED HEALTH CARE EDUCATION/TRAINING PROGRAM

## 2022-01-01 PROCEDURE — 83880 ASSAY OF NATRIURETIC PEPTIDE: CPT | Performed by: STUDENT IN AN ORGANIZED HEALTH CARE EDUCATION/TRAINING PROGRAM

## 2022-01-01 PROCEDURE — 99900026 HC AIRWAY MAINTENANCE (STAT)

## 2022-01-01 PROCEDURE — 36415 COLL VENOUS BLD VENIPUNCTURE: CPT | Performed by: INTERNAL MEDICINE

## 2022-01-01 PROCEDURE — 96366 THER/PROPH/DIAG IV INF ADDON: CPT

## 2022-01-01 PROCEDURE — 83520 IMMUNOASSAY QUANT NOS NONAB: CPT | Performed by: STUDENT IN AN ORGANIZED HEALTH CARE EDUCATION/TRAINING PROGRAM

## 2022-01-01 PROCEDURE — 83735 ASSAY OF MAGNESIUM: CPT | Performed by: NURSE PRACTITIONER

## 2022-01-01 PROCEDURE — 99292 CRITICAL CARE ADDL 30 MIN: CPT

## 2022-01-01 PROCEDURE — 63600175 PHARM REV CODE 636 W HCPCS

## 2022-01-01 PROCEDURE — 96374 THER/PROPH/DIAG INJ IV PUSH: CPT

## 2022-01-01 PROCEDURE — 95822 EEG COMA OR SLEEP ONLY: CPT | Mod: 26,,, | Performed by: PSYCHIATRY & NEUROLOGY

## 2022-01-01 PROCEDURE — 51702 INSERT TEMP BLADDER CATH: CPT

## 2022-01-01 PROCEDURE — 85025 COMPLETE CBC W/AUTO DIFF WBC: CPT | Performed by: PHYSICIAN ASSISTANT

## 2022-01-01 PROCEDURE — 94003 VENT MGMT INPAT SUBQ DAY: CPT

## 2022-01-01 PROCEDURE — 31500 INSERT EMERGENCY AIRWAY: CPT

## 2022-01-01 PROCEDURE — 96360 HYDRATION IV INFUSION INIT: CPT | Mod: 59

## 2022-01-01 PROCEDURE — 84484 ASSAY OF TROPONIN QUANT: CPT | Performed by: INTERNAL MEDICINE

## 2022-01-01 PROCEDURE — 82947 ASSAY GLUCOSE BLOOD QUANT: CPT | Performed by: STUDENT IN AN ORGANIZED HEALTH CARE EDUCATION/TRAINING PROGRAM

## 2022-01-01 PROCEDURE — 95816 EEG AWAKE AND DROWSY: CPT

## 2022-01-01 PROCEDURE — 96368 THER/DIAG CONCURRENT INF: CPT

## 2022-01-01 PROCEDURE — 0240U COVID/FLU A&B PCR: CPT | Performed by: FAMILY MEDICINE

## 2022-01-01 PROCEDURE — 84075 ASSAY ALKALINE PHOSPHATASE: CPT | Performed by: NURSE PRACTITIONER

## 2022-01-01 PROCEDURE — 20000000 HC ICU ROOM

## 2022-01-01 PROCEDURE — 94640 AIRWAY INHALATION TREATMENT: CPT | Mod: XB

## 2022-01-01 PROCEDURE — 83880 ASSAY OF NATRIURETIC PEPTIDE: CPT | Performed by: EMERGENCY MEDICINE

## 2022-01-01 PROCEDURE — 96365 THER/PROPH/DIAG IV INF INIT: CPT

## 2022-01-01 PROCEDURE — 87635 SARS-COV-2 COVID-19 AMP PRB: CPT | Performed by: EMERGENCY MEDICINE

## 2022-01-01 PROCEDURE — 84443 ASSAY THYROID STIM HORMONE: CPT | Performed by: FAMILY MEDICINE

## 2022-01-01 PROCEDURE — 85730 THROMBOPLASTIN TIME PARTIAL: CPT | Performed by: FAMILY MEDICINE

## 2022-01-01 PROCEDURE — 25000242 PHARM REV CODE 250 ALT 637 W/ HCPCS

## 2022-01-01 PROCEDURE — 97530 THERAPEUTIC ACTIVITIES: CPT

## 2022-01-01 PROCEDURE — 84484 ASSAY OF TROPONIN QUANT: CPT | Performed by: PHYSICIAN ASSISTANT

## 2022-01-01 PROCEDURE — 83605 ASSAY OF LACTIC ACID: CPT | Performed by: INTERNAL MEDICINE

## 2022-01-01 PROCEDURE — 84484 ASSAY OF TROPONIN QUANT: CPT | Performed by: FAMILY MEDICINE

## 2022-01-01 PROCEDURE — 83735 ASSAY OF MAGNESIUM: CPT | Performed by: EMERGENCY MEDICINE

## 2022-01-01 PROCEDURE — 83880 ASSAY OF NATRIURETIC PEPTIDE: CPT | Performed by: FAMILY MEDICINE

## 2022-01-01 PROCEDURE — 82550 ASSAY OF CK (CPK): CPT | Performed by: STUDENT IN AN ORGANIZED HEALTH CARE EDUCATION/TRAINING PROGRAM

## 2022-01-01 PROCEDURE — 25000242 PHARM REV CODE 250 ALT 637 W/ HCPCS: Performed by: FAMILY MEDICINE

## 2022-01-01 PROCEDURE — 27200966 HC CLOSED SUCTION SYSTEM

## 2022-01-01 PROCEDURE — 63600175 PHARM REV CODE 636 W HCPCS: Performed by: FAMILY MEDICINE

## 2022-01-01 PROCEDURE — 95822 PR EEG,COMA/SLEEP RECORD ONLY: ICD-10-PCS | Mod: 26,,, | Performed by: PSYCHIATRY & NEUROLOGY

## 2022-01-01 PROCEDURE — 87040 BLOOD CULTURE FOR BACTERIA: CPT | Performed by: FAMILY MEDICINE

## 2022-01-01 PROCEDURE — 87181 SC STD AGAR DILUTION PER AGT: CPT | Performed by: INTERNAL MEDICINE

## 2022-01-01 PROCEDURE — 27100080 HC AIRWAY ADAPTER-END TIDAL CO2

## 2022-01-01 PROCEDURE — 27100171 HC OXYGEN HIGH FLOW UP TO 24 HOURS

## 2022-01-01 PROCEDURE — 25500020 PHARM REV CODE 255: Performed by: INTERNAL MEDICINE

## 2022-01-01 PROCEDURE — 82550 ASSAY OF CK (CPK): CPT | Performed by: FAMILY MEDICINE

## 2022-01-01 PROCEDURE — 36556 INSERT NON-TUNNEL CV CATH: CPT

## 2022-01-01 PROCEDURE — 80053 COMPREHEN METABOLIC PANEL: CPT | Performed by: PHYSICIAN ASSISTANT

## 2022-01-01 PROCEDURE — 87636 SARSCOV2 & INF A&B AMP PRB: CPT | Performed by: STUDENT IN AN ORGANIZED HEALTH CARE EDUCATION/TRAINING PROGRAM

## 2022-01-01 PROCEDURE — 80061 LIPID PANEL: CPT | Performed by: STUDENT IN AN ORGANIZED HEALTH CARE EDUCATION/TRAINING PROGRAM

## 2022-01-01 PROCEDURE — 87077 CULTURE AEROBIC IDENTIFY: CPT | Performed by: STUDENT IN AN ORGANIZED HEALTH CARE EDUCATION/TRAINING PROGRAM

## 2022-01-01 PROCEDURE — 80162 ASSAY OF DIGOXIN TOTAL: CPT | Performed by: INTERNAL MEDICINE

## 2022-01-01 PROCEDURE — 80053 COMPREHEN METABOLIC PANEL: CPT | Performed by: FAMILY MEDICINE

## 2022-01-01 PROCEDURE — 84443 ASSAY THYROID STIM HORMONE: CPT | Performed by: STUDENT IN AN ORGANIZED HEALTH CARE EDUCATION/TRAINING PROGRAM

## 2022-01-01 PROCEDURE — 84443 ASSAY THYROID STIM HORMONE: CPT | Performed by: PHYSICIAN ASSISTANT

## 2022-01-01 PROCEDURE — 25000242 PHARM REV CODE 250 ALT 637 W/ HCPCS: Performed by: NURSE PRACTITIONER

## 2022-01-01 PROCEDURE — 85025 COMPLETE CBC W/AUTO DIFF WBC: CPT | Performed by: FAMILY MEDICINE

## 2022-01-01 PROCEDURE — 85730 THROMBOPLASTIN TIME PARTIAL: CPT | Performed by: EMERGENCY MEDICINE

## 2022-01-01 PROCEDURE — 94760 N-INVAS EAR/PLS OXIMETRY 1: CPT

## 2022-01-01 PROCEDURE — 85610 PROTHROMBIN TIME: CPT | Performed by: FAMILY MEDICINE

## 2022-01-01 PROCEDURE — 85610 PROTHROMBIN TIME: CPT | Performed by: EMERGENCY MEDICINE

## 2022-01-01 PROCEDURE — C9113 INJ PANTOPRAZOLE SODIUM, VIA: HCPCS | Performed by: STUDENT IN AN ORGANIZED HEALTH CARE EDUCATION/TRAINING PROGRAM

## 2022-01-01 PROCEDURE — 93010 EKG 12-LEAD: ICD-10-PCS | Mod: 76,,, | Performed by: INTERNAL MEDICINE

## 2022-01-01 PROCEDURE — 80307 DRUG TEST PRSMV CHEM ANLYZR: CPT | Performed by: EMERGENCY MEDICINE

## 2022-01-01 PROCEDURE — 82962 GLUCOSE BLOOD TEST: CPT

## 2022-01-01 PROCEDURE — 80307 DRUG TEST PRSMV CHEM ANLYZR: CPT | Performed by: PHYSICIAN ASSISTANT

## 2022-01-01 PROCEDURE — 82010 KETONE BODYS QUAN: CPT | Performed by: INTERNAL MEDICINE

## 2022-01-01 PROCEDURE — 80162 ASSAY OF DIGOXIN TOTAL: CPT | Performed by: PHYSICIAN ASSISTANT

## 2022-01-01 PROCEDURE — 99284 EMERGENCY DEPT VISIT MOD MDM: CPT | Mod: 25

## 2022-01-01 PROCEDURE — 87636 SARSCOV2 & INF A&B AMP PRB: CPT | Performed by: EMERGENCY MEDICINE

## 2022-01-01 PROCEDURE — 82077 ASSAY SPEC XCP UR&BREATH IA: CPT | Performed by: EMERGENCY MEDICINE

## 2022-01-01 RX ORDER — GLUCAGON 1 MG
1 KIT INJECTION
Status: DISCONTINUED | OUTPATIENT
Start: 2022-01-01 | End: 2022-01-01 | Stop reason: HOSPADM

## 2022-01-01 RX ORDER — HYDRALAZINE HYDROCHLORIDE 20 MG/ML
20 INJECTION INTRAMUSCULAR; INTRAVENOUS
Status: DISCONTINUED | OUTPATIENT
Start: 2022-01-01 | End: 2022-01-01 | Stop reason: HOSPADM

## 2022-01-01 RX ORDER — MAGNESIUM SULFATE HEPTAHYDRATE 40 MG/ML
2 INJECTION, SOLUTION INTRAVENOUS
Status: COMPLETED | OUTPATIENT
Start: 2022-01-01 | End: 2022-01-01

## 2022-01-01 RX ORDER — ATORVASTATIN CALCIUM 80 MG/1
80 TABLET, FILM COATED ORAL DAILY
Status: ON HOLD | COMMUNITY
End: 2022-01-01 | Stop reason: SDUPTHER

## 2022-01-01 RX ORDER — FUROSEMIDE 10 MG/ML
40 INJECTION INTRAMUSCULAR; INTRAVENOUS
Status: COMPLETED | OUTPATIENT
Start: 2022-01-01 | End: 2022-01-01

## 2022-01-01 RX ORDER — GLUCAGON 1 MG
1 KIT INJECTION
Status: DISCONTINUED | OUTPATIENT
Start: 2022-01-01 | End: 2022-01-01

## 2022-01-01 RX ORDER — DIGOXIN 250 MCG
0.25 TABLET ORAL DAILY
Status: DISCONTINUED | OUTPATIENT
Start: 2022-01-01 | End: 2022-01-01 | Stop reason: HOSPADM

## 2022-01-01 RX ORDER — LEVOFLOXACIN 5 MG/ML
750 INJECTION, SOLUTION INTRAVENOUS
Status: DISCONTINUED | OUTPATIENT
Start: 2022-01-01 | End: 2022-01-01 | Stop reason: HOSPADM

## 2022-01-01 RX ORDER — MIDAZOLAM HYDROCHLORIDE 1 MG/ML
INJECTION INTRAMUSCULAR; INTRAVENOUS
Status: DISPENSED
Start: 2022-01-01 | End: 2022-01-01

## 2022-01-01 RX ORDER — IBUPROFEN 200 MG
24 TABLET ORAL
Status: DISCONTINUED | OUTPATIENT
Start: 2022-01-01 | End: 2022-01-01

## 2022-01-01 RX ORDER — SODIUM CHLORIDE 0.9 % (FLUSH) 0.9 %
10 SYRINGE (ML) INJECTION
Status: DISCONTINUED | OUTPATIENT
Start: 2022-01-01 | End: 2022-01-01 | Stop reason: HOSPADM

## 2022-01-01 RX ORDER — IBUPROFEN 800 MG/1
800 TABLET ORAL EVERY 8 HOURS
Status: ON HOLD | COMMUNITY
Start: 2022-01-01 | End: 2022-01-01 | Stop reason: HOSPADM

## 2022-01-01 RX ORDER — DIGOXIN 0.25 MG/ML
500 INJECTION INTRAMUSCULAR; INTRAVENOUS ONCE
Status: COMPLETED | OUTPATIENT
Start: 2022-01-01 | End: 2022-01-01

## 2022-01-01 RX ORDER — AMLODIPINE BESYLATE 5 MG/1
5 TABLET ORAL DAILY
Status: DISCONTINUED | OUTPATIENT
Start: 2022-01-01 | End: 2022-01-01 | Stop reason: HOSPADM

## 2022-01-01 RX ORDER — PANTOPRAZOLE SODIUM 40 MG/1
40 TABLET, DELAYED RELEASE ORAL DAILY
Status: DISCONTINUED | OUTPATIENT
Start: 2022-01-01 | End: 2022-01-01 | Stop reason: HOSPADM

## 2022-01-01 RX ORDER — ONDANSETRON 2 MG/ML
4 INJECTION INTRAMUSCULAR; INTRAVENOUS
Status: COMPLETED | OUTPATIENT
Start: 2022-01-01 | End: 2022-01-01

## 2022-01-01 RX ORDER — DILTIAZEM HYDROCHLORIDE 180 MG/1
360 CAPSULE, COATED, EXTENDED RELEASE ORAL DAILY
Status: DISCONTINUED | OUTPATIENT
Start: 2022-01-01 | End: 2022-01-01 | Stop reason: HOSPADM

## 2022-01-01 RX ORDER — ONDANSETRON 2 MG/ML
4 INJECTION INTRAMUSCULAR; INTRAVENOUS EVERY 8 HOURS PRN
Status: DISCONTINUED | OUTPATIENT
Start: 2022-01-01 | End: 2022-01-01 | Stop reason: HOSPADM

## 2022-01-01 RX ORDER — DILTIAZEM HYDROCHLORIDE 240 MG/1
240 CAPSULE, COATED, EXTENDED RELEASE ORAL DAILY
Status: DISCONTINUED | OUTPATIENT
Start: 2022-01-01 | End: 2022-01-01 | Stop reason: HOSPADM

## 2022-01-01 RX ORDER — PANTOPRAZOLE SODIUM 40 MG/1
40 TABLET, DELAYED RELEASE ORAL DAILY
Status: ON HOLD | COMMUNITY
End: 2022-01-01 | Stop reason: SDUPTHER

## 2022-01-01 RX ORDER — FUROSEMIDE 40 MG/1
40 TABLET ORAL DAILY
Qty: 30 TABLET | Refills: 0 | Status: ON HOLD | OUTPATIENT
Start: 2022-01-01 | End: 2022-01-01 | Stop reason: SDUPTHER

## 2022-01-01 RX ORDER — METHYLPREDNISOLONE SOD SUCC 125 MG
125 VIAL (EA) INJECTION
Status: COMPLETED | OUTPATIENT
Start: 2022-01-01 | End: 2022-01-01

## 2022-01-01 RX ORDER — DILTIAZEM HYDROCHLORIDE 240 MG/1
480 CAPSULE, COATED, EXTENDED RELEASE ORAL DAILY
Qty: 60 CAPSULE | Refills: 11 | Status: SHIPPED | OUTPATIENT
Start: 2022-01-01 | End: 2023-10-24

## 2022-01-01 RX ORDER — BENZONATATE 100 MG/1
100 CAPSULE ORAL 3 TIMES DAILY PRN
Status: DISCONTINUED | OUTPATIENT
Start: 2022-01-01 | End: 2022-01-01 | Stop reason: HOSPADM

## 2022-01-01 RX ORDER — FENTANYL CITRATE 50 UG/ML
50 INJECTION, SOLUTION INTRAMUSCULAR; INTRAVENOUS
Status: DISCONTINUED | OUTPATIENT
Start: 2022-01-01 | End: 2022-01-01 | Stop reason: HOSPADM

## 2022-01-01 RX ORDER — ENOXAPARIN SODIUM 100 MG/ML
40 INJECTION SUBCUTANEOUS EVERY 24 HOURS
Status: DISCONTINUED | OUTPATIENT
Start: 2022-01-01 | End: 2022-01-01

## 2022-01-01 RX ORDER — TADALAFIL 5 MG/1
10 TABLET ORAL DAILY PRN
COMMUNITY
End: 2022-01-01

## 2022-01-01 RX ORDER — ACETAMINOPHEN 325 MG/1
650 TABLET ORAL EVERY 4 HOURS PRN
Status: DISCONTINUED | OUTPATIENT
Start: 2022-01-01 | End: 2022-01-01 | Stop reason: HOSPADM

## 2022-01-01 RX ORDER — BUTALBITAL, ACETAMINOPHEN AND CAFFEINE 50; 325; 40 MG/1; MG/1; MG/1
1 TABLET ORAL EVERY 4 HOURS PRN
Status: DISCONTINUED | OUTPATIENT
Start: 2022-01-01 | End: 2022-01-01 | Stop reason: HOSPADM

## 2022-01-01 RX ORDER — LABETALOL HYDROCHLORIDE 5 MG/ML
20 INJECTION, SOLUTION INTRAVENOUS
Status: DISCONTINUED | OUTPATIENT
Start: 2022-01-01 | End: 2022-01-01

## 2022-01-01 RX ORDER — CARVEDILOL 3.12 MG/1
3.12 TABLET ORAL 2 TIMES DAILY
Status: DISCONTINUED | OUTPATIENT
Start: 2022-01-01 | End: 2022-01-01

## 2022-01-01 RX ORDER — LANOLIN ALCOHOL/MO/W.PET/CERES
400 CREAM (GRAM) TOPICAL 2 TIMES DAILY
Status: DISCONTINUED | OUTPATIENT
Start: 2022-01-01 | End: 2022-01-01 | Stop reason: HOSPADM

## 2022-01-01 RX ORDER — GABAPENTIN 300 MG/1
300 CAPSULE ORAL 3 TIMES DAILY
Status: DISCONTINUED | OUTPATIENT
Start: 2022-01-01 | End: 2022-01-01 | Stop reason: HOSPADM

## 2022-01-01 RX ORDER — LISINOPRIL 20 MG/1
20 TABLET ORAL DAILY
Status: DISCONTINUED | OUTPATIENT
Start: 2022-01-01 | End: 2022-01-01 | Stop reason: HOSPADM

## 2022-01-01 RX ORDER — IBUPROFEN 200 MG
24 TABLET ORAL
Status: DISCONTINUED | OUTPATIENT
Start: 2022-01-01 | End: 2022-01-01 | Stop reason: HOSPADM

## 2022-01-01 RX ORDER — DIGOXIN 0.25 MG/ML
250 INJECTION INTRAMUSCULAR; INTRAVENOUS ONCE
Status: COMPLETED | OUTPATIENT
Start: 2022-01-01 | End: 2022-01-01

## 2022-01-01 RX ORDER — DILTIAZEM HYDROCHLORIDE 5 MG/ML
10 INJECTION INTRAVENOUS
Status: COMPLETED | OUTPATIENT
Start: 2022-01-01 | End: 2022-01-01

## 2022-01-01 RX ORDER — LEVALBUTEROL INHALATION SOLUTION 0.63 MG/3ML
0.63 SOLUTION RESPIRATORY (INHALATION) EVERY 4 HOURS
Qty: 540 ML | Refills: 0 | Status: SHIPPED | OUTPATIENT
Start: 2022-01-01 | End: 2022-01-01

## 2022-01-01 RX ORDER — LISINOPRIL 10 MG/1
10 TABLET ORAL DAILY
Status: DISCONTINUED | OUTPATIENT
Start: 2022-01-01 | End: 2022-01-01

## 2022-01-01 RX ORDER — GUAIFENESIN 100 MG/5ML
200 SOLUTION ORAL EVERY 4 HOURS PRN
Status: DISCONTINUED | OUTPATIENT
Start: 2022-01-01 | End: 2022-01-01 | Stop reason: HOSPADM

## 2022-01-01 RX ORDER — INSULIN ASPART 100 [IU]/ML
1-10 INJECTION, SOLUTION INTRAVENOUS; SUBCUTANEOUS
Status: DISCONTINUED | OUTPATIENT
Start: 2022-01-01 | End: 2022-01-01 | Stop reason: HOSPADM

## 2022-01-01 RX ORDER — ALBUTEROL SULFATE 0.63 MG/3ML
0.63 SOLUTION RESPIRATORY (INHALATION) EVERY 6 HOURS PRN
Qty: 75 ML | Refills: 0 | Status: ON HOLD | OUTPATIENT
Start: 2022-01-01 | End: 2022-01-01 | Stop reason: HOSPADM

## 2022-01-01 RX ORDER — CALCIUM GLUCONATE 20 MG/ML
1 INJECTION, SOLUTION INTRAVENOUS
Status: COMPLETED | OUTPATIENT
Start: 2022-01-01 | End: 2022-01-01

## 2022-01-01 RX ORDER — OXYCODONE AND ACETAMINOPHEN 10; 325 MG/1; MG/1
1 TABLET ORAL EVERY 4 HOURS PRN
Status: DISCONTINUED | OUTPATIENT
Start: 2022-01-01 | End: 2022-01-01 | Stop reason: HOSPADM

## 2022-01-01 RX ORDER — IBUPROFEN 200 MG
16 TABLET ORAL
Status: DISCONTINUED | OUTPATIENT
Start: 2022-01-01 | End: 2022-01-01 | Stop reason: HOSPADM

## 2022-01-01 RX ORDER — HYDRALAZINE HYDROCHLORIDE 20 MG/ML
10 INJECTION INTRAMUSCULAR; INTRAVENOUS
Status: DISCONTINUED | OUTPATIENT
Start: 2022-01-01 | End: 2022-01-01 | Stop reason: HOSPADM

## 2022-01-01 RX ORDER — PANTOPRAZOLE SODIUM 40 MG/10ML
40 INJECTION, POWDER, LYOPHILIZED, FOR SOLUTION INTRAVENOUS EVERY 12 HOURS
Status: DISCONTINUED | OUTPATIENT
Start: 2022-01-01 | End: 2022-01-01 | Stop reason: HOSPADM

## 2022-01-01 RX ORDER — INSULIN ASPART 100 [IU]/ML
1-10 INJECTION, SOLUTION INTRAVENOUS; SUBCUTANEOUS EVERY 6 HOURS PRN
Status: DISCONTINUED | OUTPATIENT
Start: 2022-01-01 | End: 2022-01-01 | Stop reason: HOSPADM

## 2022-01-01 RX ORDER — SODIUM CHLORIDE 0.9 % (FLUSH) 0.9 %
10 SYRINGE (ML) INJECTION EVERY 12 HOURS PRN
Status: DISCONTINUED | OUTPATIENT
Start: 2022-01-01 | End: 2022-01-01 | Stop reason: HOSPADM

## 2022-01-01 RX ORDER — DIGOXIN 250 MCG
0.25 TABLET ORAL DAILY
Qty: 30 TABLET | Refills: 0 | Status: ON HOLD | OUTPATIENT
Start: 2022-01-01 | End: 2022-01-01 | Stop reason: SDUPTHER

## 2022-01-01 RX ORDER — TIOTROPIUM BROMIDE 18 UG/1
18 CAPSULE ORAL; RESPIRATORY (INHALATION) DAILY
Qty: 30 CAPSULE | Refills: 0 | Status: ON HOLD | OUTPATIENT
Start: 2022-01-01 | End: 2022-01-01 | Stop reason: SDUPTHER

## 2022-01-01 RX ORDER — MIDAZOLAM HYDROCHLORIDE 1 MG/ML
4 INJECTION INTRAMUSCULAR; INTRAVENOUS
Status: COMPLETED | OUTPATIENT
Start: 2022-01-01 | End: 2022-01-01

## 2022-01-01 RX ORDER — IPRATROPIUM BROMIDE AND ALBUTEROL SULFATE 2.5; .5 MG/3ML; MG/3ML
3 SOLUTION RESPIRATORY (INHALATION) EVERY 6 HOURS PRN
Status: DISCONTINUED | OUTPATIENT
Start: 2022-01-01 | End: 2022-01-01

## 2022-01-01 RX ORDER — LEVALBUTEROL INHALATION SOLUTION 0.63 MG/3ML
0.63 SOLUTION RESPIRATORY (INHALATION) EVERY 4 HOURS
Status: DISCONTINUED | OUTPATIENT
Start: 2022-01-01 | End: 2022-01-01 | Stop reason: HOSPADM

## 2022-01-01 RX ORDER — BUDESONIDE AND FORMOTEROL FUMARATE DIHYDRATE 160; 4.5 UG/1; UG/1
2 AEROSOL RESPIRATORY (INHALATION) EVERY 12 HOURS
Qty: 6 G | Refills: 0 | Status: SHIPPED | OUTPATIENT
Start: 2022-01-01

## 2022-01-01 RX ORDER — INSULIN ASPART 100 [IU]/ML
0-5 INJECTION, SOLUTION INTRAVENOUS; SUBCUTANEOUS
Status: DISCONTINUED | OUTPATIENT
Start: 2022-01-01 | End: 2022-01-01

## 2022-01-01 RX ORDER — ATORVASTATIN CALCIUM 40 MG/1
80 TABLET, FILM COATED ORAL DAILY
Status: DISCONTINUED | OUTPATIENT
Start: 2022-01-01 | End: 2022-01-01 | Stop reason: HOSPADM

## 2022-01-01 RX ORDER — OXYCODONE AND ACETAMINOPHEN 5; 325 MG/1; MG/1
1 TABLET ORAL EVERY 6 HOURS PRN
Status: DISCONTINUED | OUTPATIENT
Start: 2022-01-01 | End: 2022-01-01 | Stop reason: HOSPADM

## 2022-01-01 RX ORDER — LANOLIN ALCOHOL/MO/W.PET/CERES
400 CREAM (GRAM) TOPICAL ONCE
Status: DISCONTINUED | OUTPATIENT
Start: 2022-01-01 | End: 2022-01-01

## 2022-01-01 RX ORDER — MAGNESIUM SULFATE HEPTAHYDRATE 40 MG/ML
2 INJECTION, SOLUTION INTRAVENOUS EVERY 4 HOURS
Status: DISCONTINUED | OUTPATIENT
Start: 2022-01-01 | End: 2022-01-01 | Stop reason: DRUGHIGH

## 2022-01-01 RX ORDER — BUDESONIDE AND FORMOTEROL FUMARATE DIHYDRATE 160; 4.5 UG/1; UG/1
2 AEROSOL RESPIRATORY (INHALATION) EVERY 12 HOURS
Status: ON HOLD | COMMUNITY
End: 2022-01-01 | Stop reason: SDUPTHER

## 2022-01-01 RX ORDER — CARVEDILOL 3.12 MG/1
6.25 TABLET ORAL 2 TIMES DAILY
Status: DISCONTINUED | OUTPATIENT
Start: 2022-01-01 | End: 2022-01-01 | Stop reason: HOSPADM

## 2022-01-01 RX ORDER — INDOMETHACIN 25 MG/1
CAPSULE ORAL
Status: COMPLETED
Start: 2022-01-01 | End: 2022-01-01

## 2022-01-01 RX ORDER — EPINEPHRINE 1 MG/ML
0.4 INJECTION, SOLUTION, CONCENTRATE INTRAVENOUS
Status: COMPLETED | OUTPATIENT
Start: 2022-01-01 | End: 2022-01-01

## 2022-01-01 RX ORDER — TIOTROPIUM BROMIDE 18 UG/1
18 CAPSULE ORAL; RESPIRATORY (INHALATION) DAILY
Qty: 30 CAPSULE | Refills: 0 | Status: SHIPPED | OUTPATIENT
Start: 2022-01-01

## 2022-01-01 RX ORDER — CARVEDILOL 6.25 MG/1
6.25 TABLET ORAL 2 TIMES DAILY
Qty: 60 TABLET | Refills: 11 | Status: SHIPPED | OUTPATIENT
Start: 2022-01-01 | End: 2022-01-01 | Stop reason: SDUPTHER

## 2022-01-01 RX ORDER — PROCHLORPERAZINE EDISYLATE 5 MG/ML
10 INJECTION INTRAMUSCULAR; INTRAVENOUS ONCE
Status: COMPLETED | OUTPATIENT
Start: 2022-01-01 | End: 2022-01-01

## 2022-01-01 RX ORDER — ACETAMINOPHEN 325 MG/1
650 TABLET ORAL EVERY 6 HOURS PRN
Status: DISCONTINUED | OUTPATIENT
Start: 2022-01-01 | End: 2022-01-01 | Stop reason: HOSPADM

## 2022-01-01 RX ORDER — DILTIAZEM HYDROCHLORIDE 120 MG/1
120 CAPSULE, COATED, EXTENDED RELEASE ORAL ONCE
Status: COMPLETED | OUTPATIENT
Start: 2022-01-01 | End: 2022-01-01

## 2022-01-01 RX ORDER — CARVEDILOL 6.25 MG/1
6.25 TABLET ORAL 2 TIMES DAILY
Qty: 60 TABLET | Refills: 11 | Status: SHIPPED | OUTPATIENT
Start: 2022-01-01 | End: 2023-10-23

## 2022-01-01 RX ORDER — DILTIAZEM HYDROCHLORIDE 180 MG/1
360 CAPSULE, COATED, EXTENDED RELEASE ORAL DAILY
Status: DISCONTINUED | OUTPATIENT
Start: 2022-01-01 | End: 2022-01-01

## 2022-01-01 RX ORDER — LISINOPRIL 10 MG/1
20 TABLET ORAL
Status: COMPLETED | OUTPATIENT
Start: 2022-01-01 | End: 2022-01-01

## 2022-01-01 RX ORDER — IPRATROPIUM BROMIDE 0.5 MG/2.5ML
0.5 SOLUTION RESPIRATORY (INHALATION) EVERY 4 HOURS PRN
Status: DISCONTINUED | OUTPATIENT
Start: 2022-01-01 | End: 2022-01-01 | Stop reason: HOSPADM

## 2022-01-01 RX ORDER — HYDRALAZINE HYDROCHLORIDE 20 MG/ML
10 INJECTION INTRAMUSCULAR; INTRAVENOUS EVERY 6 HOURS PRN
Status: DISCONTINUED | OUTPATIENT
Start: 2022-01-01 | End: 2022-01-01 | Stop reason: HOSPADM

## 2022-01-01 RX ORDER — FUROSEMIDE 10 MG/ML
40 INJECTION INTRAMUSCULAR; INTRAVENOUS
Status: DISCONTINUED | OUTPATIENT
Start: 2022-01-01 | End: 2022-01-01 | Stop reason: HOSPADM

## 2022-01-01 RX ORDER — ACETAMINOPHEN 325 MG/1
650 TABLET ORAL EVERY 8 HOURS PRN
Status: DISCONTINUED | OUTPATIENT
Start: 2022-01-01 | End: 2022-01-01 | Stop reason: HOSPADM

## 2022-01-01 RX ORDER — ENOXAPARIN SODIUM 100 MG/ML
40 INJECTION SUBCUTANEOUS EVERY 12 HOURS
Status: DISCONTINUED | OUTPATIENT
Start: 2022-01-01 | End: 2022-01-01

## 2022-01-01 RX ORDER — MAGNESIUM SULFATE HEPTAHYDRATE 40 MG/ML
2 INJECTION, SOLUTION INTRAVENOUS ONCE
Status: DISCONTINUED | OUTPATIENT
Start: 2022-01-01 | End: 2022-01-01

## 2022-01-01 RX ORDER — AMIODARONE HYDROCHLORIDE 200 MG/1
400 TABLET ORAL 2 TIMES DAILY
Status: DISCONTINUED | OUTPATIENT
Start: 2022-01-01 | End: 2022-01-01 | Stop reason: HOSPADM

## 2022-01-01 RX ORDER — FUROSEMIDE 40 MG/1
40 TABLET ORAL 2 TIMES DAILY
Qty: 60 TABLET | Refills: 0 | Status: SHIPPED | OUTPATIENT
Start: 2022-01-01 | End: 2022-01-01

## 2022-01-01 RX ORDER — IPRATROPIUM BROMIDE AND ALBUTEROL SULFATE 2.5; .5 MG/3ML; MG/3ML
SOLUTION RESPIRATORY (INHALATION)
Status: COMPLETED
Start: 2022-01-01 | End: 2022-01-01

## 2022-01-01 RX ORDER — LISINOPRIL 20 MG/1
20 TABLET ORAL DAILY
Qty: 90 TABLET | Refills: 0 | Status: SHIPPED | OUTPATIENT
Start: 2022-01-01 | End: 2022-01-01 | Stop reason: SDUPTHER

## 2022-01-01 RX ORDER — METOPROLOL SUCCINATE 50 MG/1
100 TABLET, EXTENDED RELEASE ORAL 2 TIMES DAILY
Status: DISCONTINUED | OUTPATIENT
Start: 2022-01-01 | End: 2022-01-01

## 2022-01-01 RX ORDER — SPIRONOLACTONE 25 MG/1
25 TABLET ORAL DAILY
Status: DISCONTINUED | OUTPATIENT
Start: 2022-01-01 | End: 2022-01-01

## 2022-01-01 RX ORDER — METOPROLOL TARTRATE 1 MG/ML
5 INJECTION, SOLUTION INTRAVENOUS
Status: DISCONTINUED | OUTPATIENT
Start: 2022-01-01 | End: 2022-01-01 | Stop reason: HOSPADM

## 2022-01-01 RX ORDER — AMLODIPINE BESYLATE 10 MG/1
10 TABLET ORAL DAILY
Status: ON HOLD | COMMUNITY
End: 2022-01-01 | Stop reason: SDUPTHER

## 2022-01-01 RX ORDER — LISINOPRIL 20 MG/1
40 TABLET ORAL DAILY
Qty: 30 TABLET | Refills: 0 | Status: ON HOLD | OUTPATIENT
Start: 2022-01-01 | End: 2022-01-01 | Stop reason: HOSPADM

## 2022-01-01 RX ORDER — CLONIDINE HYDROCHLORIDE 0.1 MG/1
0.1 TABLET ORAL EVERY 6 HOURS PRN
Status: DISCONTINUED | OUTPATIENT
Start: 2022-01-01 | End: 2022-01-01

## 2022-01-01 RX ORDER — FUROSEMIDE 40 MG/1
40 TABLET ORAL 2 TIMES DAILY
Qty: 60 TABLET | Refills: 0 | Status: ON HOLD | OUTPATIENT
Start: 2022-01-01 | End: 2022-01-01 | Stop reason: SDUPTHER

## 2022-01-01 RX ORDER — IPRATROPIUM BROMIDE AND ALBUTEROL SULFATE 2.5; .5 MG/3ML; MG/3ML
3 SOLUTION RESPIRATORY (INHALATION) EVERY 6 HOURS PRN
Status: DISCONTINUED | OUTPATIENT
Start: 2022-01-01 | End: 2022-01-01 | Stop reason: HOSPADM

## 2022-01-01 RX ORDER — METOPROLOL SUCCINATE 50 MG/1
100 TABLET, EXTENDED RELEASE ORAL 2 TIMES DAILY
Status: DISCONTINUED | OUTPATIENT
Start: 2022-01-01 | End: 2022-01-01 | Stop reason: HOSPADM

## 2022-01-01 RX ORDER — METHYLPREDNISOLONE SOD SUCC 125 MG
60 VIAL (EA) INJECTION EVERY 8 HOURS
Status: DISCONTINUED | OUTPATIENT
Start: 2022-01-01 | End: 2022-01-01

## 2022-01-01 RX ORDER — INDOMETHACIN 25 MG/1
50 CAPSULE ORAL ONCE
Status: COMPLETED | OUTPATIENT
Start: 2022-01-01 | End: 2022-01-01

## 2022-01-01 RX ORDER — ASPIRIN 325 MG
TABLET ORAL
Status: DISPENSED
Start: 2022-01-01 | End: 2022-01-01

## 2022-01-01 RX ORDER — FUROSEMIDE 10 MG/ML
80 INJECTION INTRAMUSCULAR; INTRAVENOUS 2 TIMES DAILY
Status: DISCONTINUED | OUTPATIENT
Start: 2022-01-01 | End: 2022-01-01 | Stop reason: HOSPADM

## 2022-01-01 RX ORDER — PROMETHAZINE HYDROCHLORIDE AND CODEINE PHOSPHATE 6.25; 1 MG/5ML; MG/5ML
5 SOLUTION ORAL EVERY 4 HOURS PRN
Qty: 118 ML | Refills: 0 | Status: ON HOLD | OUTPATIENT
Start: 2022-01-01 | End: 2022-01-01 | Stop reason: HOSPADM

## 2022-01-01 RX ORDER — METOPROLOL TARTRATE 50 MG/1
100 TABLET ORAL 3 TIMES DAILY
Status: DISCONTINUED | OUTPATIENT
Start: 2022-01-01 | End: 2022-01-01

## 2022-01-01 RX ORDER — SPIRONOLACTONE 25 MG/1
25 TABLET ORAL DAILY
Qty: 30 TABLET | Refills: 0 | Status: ON HOLD | OUTPATIENT
Start: 2022-01-01 | End: 2022-01-01 | Stop reason: HOSPADM

## 2022-01-01 RX ORDER — TRAMADOL HYDROCHLORIDE 50 MG/1
50 TABLET ORAL EVERY 6 HOURS PRN
Status: DISCONTINUED | OUTPATIENT
Start: 2022-01-01 | End: 2022-01-01

## 2022-01-01 RX ORDER — DILTIAZEM HYDROCHLORIDE 240 MG/1
480 CAPSULE, COATED, EXTENDED RELEASE ORAL DAILY
Status: DISCONTINUED | OUTPATIENT
Start: 2022-01-01 | End: 2022-01-01 | Stop reason: HOSPADM

## 2022-01-01 RX ORDER — FAMOTIDINE 10 MG/ML
20 INJECTION INTRAVENOUS EVERY 12 HOURS
Status: DISCONTINUED | OUTPATIENT
Start: 2022-01-01 | End: 2022-01-01

## 2022-01-01 RX ORDER — LISINOPRIL 20 MG/1
20 TABLET ORAL DAILY
Status: ON HOLD | COMMUNITY
End: 2022-01-01 | Stop reason: SDUPTHER

## 2022-01-01 RX ORDER — PANTOPRAZOLE SODIUM 40 MG/1
40 TABLET, DELAYED RELEASE ORAL DAILY
Qty: 30 TABLET | Refills: 0 | Status: SHIPPED | OUTPATIENT
Start: 2022-01-01 | End: 2022-01-01 | Stop reason: SDUPTHER

## 2022-01-01 RX ORDER — ACETAMINOPHEN 500 MG
1000 TABLET ORAL
Status: COMPLETED | OUTPATIENT
Start: 2022-01-01 | End: 2022-01-01

## 2022-01-01 RX ORDER — TIOTROPIUM BROMIDE 18 UG/1
18 CAPSULE ORAL; RESPIRATORY (INHALATION) DAILY
Status: ON HOLD | COMMUNITY
End: 2022-01-01 | Stop reason: SDUPTHER

## 2022-01-01 RX ORDER — AMLODIPINE BESYLATE 10 MG/1
5 TABLET ORAL DAILY
Qty: 30 TABLET | Refills: 0
Start: 2022-01-01 | End: 2022-01-01

## 2022-01-01 RX ORDER — FLUTICASONE FUROATE AND VILANTEROL 100; 25 UG/1; UG/1
1 POWDER RESPIRATORY (INHALATION) DAILY
Status: DISCONTINUED | OUTPATIENT
Start: 2022-01-01 | End: 2022-01-01 | Stop reason: HOSPADM

## 2022-01-01 RX ORDER — GABAPENTIN 300 MG/1
300 CAPSULE ORAL 3 TIMES DAILY
Status: ON HOLD | COMMUNITY
End: 2022-01-01 | Stop reason: SDUPTHER

## 2022-01-01 RX ORDER — LABETALOL HYDROCHLORIDE 5 MG/ML
10 INJECTION, SOLUTION INTRAVENOUS
Status: COMPLETED | OUTPATIENT
Start: 2022-01-01 | End: 2022-01-01

## 2022-01-01 RX ORDER — HEPARIN SODIUM,PORCINE/D5W 25000/250
0-40 INTRAVENOUS SOLUTION INTRAVENOUS CONTINUOUS
Status: DISCONTINUED | OUTPATIENT
Start: 2022-01-01 | End: 2022-01-01

## 2022-01-01 RX ORDER — IPRATROPIUM BROMIDE AND ALBUTEROL SULFATE 2.5; .5 MG/3ML; MG/3ML
3 SOLUTION RESPIRATORY (INHALATION) EVERY 4 HOURS PRN
Status: DISCONTINUED | OUTPATIENT
Start: 2022-01-01 | End: 2022-01-01 | Stop reason: HOSPADM

## 2022-01-01 RX ORDER — MIDAZOLAM HYDROCHLORIDE 1 MG/ML
INJECTION INTRAMUSCULAR; INTRAVENOUS
Status: COMPLETED
Start: 2022-01-01 | End: 2022-01-01

## 2022-01-01 RX ORDER — CARVEDILOL 12.5 MG/1
25 TABLET ORAL 2 TIMES DAILY
Status: DISCONTINUED | OUTPATIENT
Start: 2022-01-01 | End: 2022-01-01

## 2022-01-01 RX ORDER — ALBUTEROL SULFATE 0.83 MG/ML
2.5 SOLUTION RESPIRATORY (INHALATION)
Status: COMPLETED | OUTPATIENT
Start: 2022-01-01 | End: 2022-01-01

## 2022-01-01 RX ORDER — IPRATROPIUM BROMIDE AND ALBUTEROL SULFATE 2.5; .5 MG/3ML; MG/3ML
3 SOLUTION RESPIRATORY (INHALATION)
Status: COMPLETED | OUTPATIENT
Start: 2022-01-01 | End: 2022-01-01

## 2022-01-01 RX ORDER — PANTOPRAZOLE SODIUM 40 MG/1
40 TABLET, DELAYED RELEASE ORAL DAILY
Qty: 30 TABLET | Refills: 0 | Status: SHIPPED | OUTPATIENT
Start: 2022-01-01 | End: 2022-01-01

## 2022-01-01 RX ORDER — HYDRALAZINE HYDROCHLORIDE 20 MG/ML
20 INJECTION INTRAMUSCULAR; INTRAVENOUS
Status: DISCONTINUED | OUTPATIENT
Start: 2022-01-01 | End: 2022-01-01

## 2022-01-01 RX ORDER — DIGOXIN 250 MCG
0.25 TABLET ORAL DAILY
Qty: 30 TABLET | Refills: 0 | Status: ON HOLD | OUTPATIENT
Start: 2022-01-01 | End: 2022-01-01 | Stop reason: HOSPADM

## 2022-01-01 RX ORDER — METHYLPREDNISOLONE 4 MG/1
TABLET ORAL
Qty: 21 EACH | Refills: 0 | Status: SHIPPED | OUTPATIENT
Start: 2022-01-01 | End: 2022-01-01

## 2022-01-01 RX ORDER — METOPROLOL SUCCINATE 50 MG/1
150 TABLET, EXTENDED RELEASE ORAL 2 TIMES DAILY
Status: DISCONTINUED | OUTPATIENT
Start: 2022-01-01 | End: 2022-01-01

## 2022-01-01 RX ORDER — NAPROXEN 500 MG/1
500 TABLET ORAL 2 TIMES DAILY
Status: ON HOLD | COMMUNITY
End: 2022-01-01

## 2022-01-01 RX ORDER — ATORVASTATIN CALCIUM 80 MG/1
80 TABLET, FILM COATED ORAL DAILY
Qty: 30 TABLET | Refills: 0 | Status: SHIPPED | OUTPATIENT
Start: 2022-01-01 | End: 2022-01-01 | Stop reason: SDUPTHER

## 2022-01-01 RX ORDER — HYDROCODONE BITARTRATE AND ACETAMINOPHEN 5; 325 MG/1; MG/1
1 TABLET ORAL EVERY 6 HOURS PRN
Status: DISCONTINUED | OUTPATIENT
Start: 2022-01-01 | End: 2022-01-01 | Stop reason: HOSPADM

## 2022-01-01 RX ORDER — OXYCODONE AND ACETAMINOPHEN 10; 325 MG/1; MG/1
1 TABLET ORAL EVERY 4 HOURS PRN
Status: ON HOLD | COMMUNITY
End: 2022-01-01

## 2022-01-01 RX ORDER — TRAMADOL HYDROCHLORIDE 50 MG/1
50 TABLET ORAL
Status: COMPLETED | OUTPATIENT
Start: 2022-01-01 | End: 2022-01-01

## 2022-01-01 RX ORDER — HYDRALAZINE HYDROCHLORIDE 20 MG/ML
5 INJECTION INTRAMUSCULAR; INTRAVENOUS EVERY 6 HOURS PRN
Status: DISCONTINUED | OUTPATIENT
Start: 2022-01-01 | End: 2022-01-01 | Stop reason: HOSPADM

## 2022-01-01 RX ORDER — ALBUTEROL SULFATE 0.63 MG/3ML
0.63 SOLUTION RESPIRATORY (INHALATION) EVERY 6 HOURS PRN
Status: ON HOLD | COMMUNITY
End: 2022-01-01 | Stop reason: SDUPTHER

## 2022-01-01 RX ORDER — AMIODARONE HYDROCHLORIDE 200 MG/1
200 TABLET ORAL 2 TIMES DAILY
Status: DISCONTINUED | OUTPATIENT
Start: 2022-01-01 | End: 2022-01-01 | Stop reason: HOSPADM

## 2022-01-01 RX ORDER — LISINOPRIL 20 MG/1
20 TABLET ORAL DAILY
Qty: 90 TABLET | Refills: 0 | Status: ON HOLD | OUTPATIENT
Start: 2022-01-01 | End: 2022-01-01 | Stop reason: SDUPTHER

## 2022-01-01 RX ORDER — SODIUM BICARBONATE 1 MEQ/ML
50 SYRINGE (ML) INTRAVENOUS ONCE
Status: COMPLETED | OUTPATIENT
Start: 2022-01-01 | End: 2022-01-01

## 2022-01-01 RX ORDER — TALC
6 POWDER (GRAM) TOPICAL NIGHTLY PRN
Status: DISCONTINUED | OUTPATIENT
Start: 2022-01-01 | End: 2022-01-01 | Stop reason: HOSPADM

## 2022-01-01 RX ORDER — CARVEDILOL 3.12 MG/1
6.25 TABLET ORAL ONCE
Status: COMPLETED | OUTPATIENT
Start: 2022-01-01 | End: 2022-01-01

## 2022-01-01 RX ORDER — GABAPENTIN 300 MG/1
300 CAPSULE ORAL 3 TIMES DAILY
Status: DISCONTINUED | OUTPATIENT
Start: 2022-01-01 | End: 2022-01-01

## 2022-01-01 RX ORDER — METOPROLOL SUCCINATE 100 MG/1
100 TABLET, EXTENDED RELEASE ORAL 2 TIMES DAILY
Qty: 60 TABLET | Refills: 0 | Status: ON HOLD | OUTPATIENT
Start: 2022-01-01 | End: 2022-01-01 | Stop reason: SDUPTHER

## 2022-01-01 RX ORDER — LEVALBUTEROL INHALATION SOLUTION 1.25 MG/3ML
1.25 SOLUTION RESPIRATORY (INHALATION) EVERY 6 HOURS PRN
Qty: 100 ML | Refills: 0 | Status: ON HOLD | OUTPATIENT
Start: 2022-01-01 | End: 2022-01-01 | Stop reason: HOSPADM

## 2022-01-01 RX ORDER — EPINEPHRINE 0.1 MG/ML
INJECTION INTRAVENOUS
Status: COMPLETED
Start: 2022-01-01 | End: 2022-01-01

## 2022-01-01 RX ORDER — PREDNISONE 10 MG/1
TABLET ORAL
Qty: 30 TABLET | Refills: 0 | Status: SHIPPED | OUTPATIENT
Start: 2022-01-01 | End: 2022-01-01

## 2022-01-01 RX ORDER — DILTIAZEM HYDROCHLORIDE 360 MG/1
360 CAPSULE, EXTENDED RELEASE ORAL DAILY
Qty: 30 CAPSULE | Refills: 0 | Status: ON HOLD | OUTPATIENT
Start: 2022-01-01 | End: 2022-01-01 | Stop reason: HOSPADM

## 2022-01-01 RX ORDER — SPIRONOLACTONE 25 MG/1
25 TABLET ORAL DAILY
Status: DISCONTINUED | OUTPATIENT
Start: 2022-01-01 | End: 2022-01-01 | Stop reason: HOSPADM

## 2022-01-01 RX ORDER — NALOXONE HCL 0.4 MG/ML
0.4 VIAL (ML) INJECTION
Status: COMPLETED | OUTPATIENT
Start: 2022-01-01 | End: 2022-01-01

## 2022-01-01 RX ORDER — NAPROXEN SODIUM 220 MG/1
81 TABLET, FILM COATED ORAL DAILY
Status: DISCONTINUED | OUTPATIENT
Start: 2022-01-01 | End: 2022-01-01 | Stop reason: HOSPADM

## 2022-01-01 RX ORDER — AMIODARONE HYDROCHLORIDE 200 MG/1
200 TABLET ORAL DAILY
Status: DISCONTINUED | OUTPATIENT
Start: 2022-01-01 | End: 2022-01-01

## 2022-01-01 RX ORDER — LISINOPRIL 10 MG/1
10 TABLET ORAL DAILY
Qty: 90 TABLET | Refills: 3 | Status: ON HOLD | OUTPATIENT
Start: 2022-01-01 | End: 2022-01-01 | Stop reason: HOSPADM

## 2022-01-01 RX ORDER — METHYLPREDNISOLONE SOD SUCC 125 MG
125 VIAL (EA) INJECTION ONCE
Status: COMPLETED | OUTPATIENT
Start: 2022-01-01 | End: 2022-01-01

## 2022-01-01 RX ORDER — BUTALBITAL, ACETAMINOPHEN AND CAFFEINE 50; 325; 40 MG/1; MG/1; MG/1
1 TABLET ORAL EVERY 6 HOURS PRN
Status: DISCONTINUED | OUTPATIENT
Start: 2022-01-01 | End: 2022-01-01 | Stop reason: HOSPADM

## 2022-01-01 RX ORDER — METOPROLOL TARTRATE 1 MG/ML
5 INJECTION, SOLUTION INTRAVENOUS
Status: COMPLETED | OUTPATIENT
Start: 2022-01-01 | End: 2022-01-01

## 2022-01-01 RX ORDER — IPRATROPIUM BROMIDE AND ALBUTEROL SULFATE 2.5; .5 MG/3ML; MG/3ML
3 SOLUTION RESPIRATORY (INHALATION) EVERY 4 HOURS
Status: DISCONTINUED | OUTPATIENT
Start: 2022-01-01 | End: 2022-01-01

## 2022-01-01 RX ORDER — ASPIRIN 325 MG
325 TABLET, DELAYED RELEASE (ENTERIC COATED) ORAL
Status: COMPLETED | OUTPATIENT
Start: 2022-01-01 | End: 2022-01-01

## 2022-01-01 RX ORDER — DILTIAZEM HYDROCHLORIDE 240 MG/1
240 CAPSULE, COATED, EXTENDED RELEASE ORAL
Status: COMPLETED | OUTPATIENT
Start: 2022-01-01 | End: 2022-01-01

## 2022-01-01 RX ORDER — SODIUM CHLORIDE 9 MG/ML
INJECTION, SOLUTION INTRAVENOUS CONTINUOUS
Status: DISCONTINUED | OUTPATIENT
Start: 2022-01-01 | End: 2022-01-01

## 2022-01-01 RX ORDER — MAGNESIUM SULFATE HEPTAHYDRATE 40 MG/ML
2 INJECTION, SOLUTION INTRAVENOUS ONCE
Status: COMPLETED | OUTPATIENT
Start: 2022-01-01 | End: 2022-01-01

## 2022-01-01 RX ORDER — METFORMIN HYDROCHLORIDE 500 MG/1
500 TABLET, EXTENDED RELEASE ORAL DAILY
Qty: 30 TABLET | Refills: 0 | Status: SHIPPED | OUTPATIENT
Start: 2022-01-01 | End: 2022-01-01

## 2022-01-01 RX ORDER — BUTALBITAL, ACETAMINOPHEN AND CAFFEINE 50; 325; 40 MG/1; MG/1; MG/1
1 TABLET ORAL EVERY 4 HOURS PRN
Qty: 30 TABLET | Refills: 0 | Status: SHIPPED | OUTPATIENT
Start: 2022-01-01 | End: 2022-01-01

## 2022-01-01 RX ORDER — ENALAPRILAT 1.25 MG/ML
2.5 INJECTION INTRAVENOUS
Status: DISCONTINUED | OUTPATIENT
Start: 2022-01-01 | End: 2022-01-01 | Stop reason: HOSPADM

## 2022-01-01 RX ORDER — IBUPROFEN 800 MG/1
800 TABLET ORAL EVERY 6 HOURS PRN
Status: ON HOLD | COMMUNITY
End: 2022-01-01 | Stop reason: HOSPADM

## 2022-01-01 RX ORDER — FUROSEMIDE 40 MG/1
40 TABLET ORAL DAILY
Status: DISCONTINUED | OUTPATIENT
Start: 2022-01-01 | End: 2022-01-01 | Stop reason: HOSPADM

## 2022-01-01 RX ORDER — PANTOPRAZOLE SODIUM 40 MG/10ML
40 INJECTION, POWDER, LYOPHILIZED, FOR SOLUTION INTRAVENOUS ONCE
Status: COMPLETED | OUTPATIENT
Start: 2022-01-01 | End: 2022-01-01

## 2022-01-01 RX ORDER — BENZONATATE 100 MG/1
200 CAPSULE ORAL 3 TIMES DAILY PRN
Status: DISCONTINUED | OUTPATIENT
Start: 2022-01-01 | End: 2022-01-01 | Stop reason: HOSPADM

## 2022-01-01 RX ORDER — ONDANSETRON 2 MG/ML
4 INJECTION INTRAMUSCULAR; INTRAVENOUS EVERY 6 HOURS PRN
Status: DISCONTINUED | OUTPATIENT
Start: 2022-01-01 | End: 2022-01-01 | Stop reason: HOSPADM

## 2022-01-01 RX ORDER — LISINOPRIL 10 MG/1
20 TABLET ORAL DAILY
Status: DISCONTINUED | OUTPATIENT
Start: 2022-01-01 | End: 2022-01-01 | Stop reason: HOSPADM

## 2022-01-01 RX ORDER — LISINOPRIL 10 MG/1
40 TABLET ORAL DAILY
Status: DISCONTINUED | OUTPATIENT
Start: 2022-01-01 | End: 2022-01-01

## 2022-01-01 RX ORDER — METOPROLOL SUCCINATE 100 MG/1
100 TABLET, EXTENDED RELEASE ORAL 2 TIMES DAILY
Qty: 60 TABLET | Refills: 0 | Status: ON HOLD | OUTPATIENT
Start: 2022-01-01 | End: 2022-01-01 | Stop reason: HOSPADM

## 2022-01-01 RX ORDER — DIGOXIN 250 MCG
0.25 TABLET ORAL DAILY
Status: DISCONTINUED | OUTPATIENT
Start: 2022-01-01 | End: 2022-01-01

## 2022-01-01 RX ORDER — ALBUTEROL SULFATE 0.63 MG/3ML
0.63 SOLUTION RESPIRATORY (INHALATION) EVERY 6 HOURS PRN
Status: DISCONTINUED | OUTPATIENT
Start: 2022-01-01 | End: 2022-01-01

## 2022-01-01 RX ORDER — DILTIAZEM HYDROCHLORIDE 240 MG/1
240 CAPSULE, COATED, EXTENDED RELEASE ORAL DAILY
Qty: 30 CAPSULE | Refills: 11 | Status: ON HOLD | OUTPATIENT
Start: 2022-01-01 | End: 2022-01-01 | Stop reason: HOSPADM

## 2022-01-01 RX ORDER — PROMETHAZINE HYDROCHLORIDE AND DEXTROMETHORPHAN HYDROBROMIDE 6.25; 15 MG/5ML; MG/5ML
5 SYRUP ORAL EVERY 8 HOURS PRN
Qty: 150 ML | Refills: 0 | Status: SHIPPED | OUTPATIENT
Start: 2022-01-01 | End: 2022-01-01

## 2022-01-01 RX ORDER — BUDESONIDE AND FORMOTEROL FUMARATE DIHYDRATE 160; 4.5 UG/1; UG/1
2 AEROSOL RESPIRATORY (INHALATION) EVERY 12 HOURS
Qty: 6 G | Refills: 0 | Status: ON HOLD | OUTPATIENT
Start: 2022-01-01 | End: 2022-01-01 | Stop reason: SDUPTHER

## 2022-01-01 RX ORDER — MAGNESIUM SULFATE HEPTAHYDRATE 40 MG/ML
2 INJECTION, SOLUTION INTRAVENOUS
Status: ACTIVE | OUTPATIENT
Start: 2022-01-01 | End: 2022-01-01

## 2022-01-01 RX ORDER — METOPROLOL TARTRATE 25 MG/1
25 TABLET, FILM COATED ORAL ONCE
Status: COMPLETED | OUTPATIENT
Start: 2022-01-01 | End: 2022-01-01

## 2022-01-01 RX ORDER — ASPIRIN 325 MG
325 TABLET ORAL
Status: COMPLETED | OUTPATIENT
Start: 2022-01-01 | End: 2022-01-01

## 2022-01-01 RX ORDER — ENOXAPARIN SODIUM 150 MG/ML
1 INJECTION SUBCUTANEOUS
Status: DISCONTINUED | OUTPATIENT
Start: 2022-01-01 | End: 2022-01-01

## 2022-01-01 RX ORDER — CARVEDILOL 3.12 MG/1
3.12 TABLET ORAL 2 TIMES DAILY
Qty: 60 TABLET | Refills: 0 | Status: ON HOLD | OUTPATIENT
Start: 2022-01-01 | End: 2022-01-01 | Stop reason: HOSPADM

## 2022-01-01 RX ORDER — BUTALBITAL, ACETAMINOPHEN AND CAFFEINE 50; 325; 40 MG/1; MG/1; MG/1
1 TABLET ORAL EVERY 4 HOURS PRN
Status: ON HOLD | COMMUNITY
End: 2022-01-01 | Stop reason: SDUPTHER

## 2022-01-01 RX ORDER — DEXMEDETOMIDINE HYDROCHLORIDE 4 UG/ML
INJECTION, SOLUTION INTRAVENOUS
Status: COMPLETED
Start: 2022-01-01 | End: 2022-01-01

## 2022-01-01 RX ORDER — LEVALBUTEROL INHALATION SOLUTION 0.63 MG/3ML
0.63 SOLUTION RESPIRATORY (INHALATION) EVERY 4 HOURS
Status: DISCONTINUED | OUTPATIENT
Start: 2022-01-01 | End: 2022-01-01

## 2022-01-01 RX ORDER — KETOROLAC TROMETHAMINE 30 MG/ML
30 INJECTION, SOLUTION INTRAMUSCULAR; INTRAVENOUS ONCE
Status: COMPLETED | OUTPATIENT
Start: 2022-01-01 | End: 2022-01-01

## 2022-01-01 RX ORDER — IPRATROPIUM BROMIDE AND ALBUTEROL SULFATE 2.5; .5 MG/3ML; MG/3ML
9 SOLUTION RESPIRATORY (INHALATION) ONCE
Status: DISCONTINUED | OUTPATIENT
Start: 2022-01-01 | End: 2022-01-01

## 2022-01-01 RX ORDER — PREDNISONE 20 MG/1
20 TABLET ORAL DAILY
Status: ON HOLD | COMMUNITY
End: 2022-01-01 | Stop reason: HOSPADM

## 2022-01-01 RX ORDER — CLONIDINE HYDROCHLORIDE 0.2 MG/1
0.2 TABLET ORAL 3 TIMES DAILY PRN
Status: DISCONTINUED | OUTPATIENT
Start: 2022-01-01 | End: 2022-01-01 | Stop reason: HOSPADM

## 2022-01-01 RX ORDER — FLUTICASONE FUROATE AND VILANTEROL 100; 25 UG/1; UG/1
1 POWDER RESPIRATORY (INHALATION) DAILY
Status: DISCONTINUED | OUTPATIENT
Start: 2022-01-01 | End: 2022-01-01

## 2022-01-01 RX ORDER — DIGOXIN 0.25 MG/ML
250 INJECTION INTRAMUSCULAR; INTRAVENOUS EVERY 6 HOURS
Status: COMPLETED | OUTPATIENT
Start: 2022-01-01 | End: 2022-01-01

## 2022-01-01 RX ORDER — POTASSIUM CHLORIDE 20 MEQ/1
40 TABLET, EXTENDED RELEASE ORAL ONCE
Status: COMPLETED | OUTPATIENT
Start: 2022-01-01 | End: 2022-01-01

## 2022-01-01 RX ORDER — AMLODIPINE BESYLATE 5 MG/1
5 TABLET ORAL DAILY
Status: DISCONTINUED | OUTPATIENT
Start: 2022-01-01 | End: 2022-01-01

## 2022-01-01 RX ORDER — LEVETIRACETAM 500 MG/5ML
1000 INJECTION, SOLUTION, CONCENTRATE INTRAVENOUS
Status: COMPLETED | OUTPATIENT
Start: 2022-01-01 | End: 2022-01-01

## 2022-01-01 RX ORDER — METHYLPREDNISOLONE SOD SUCC 125 MG
VIAL (EA) INJECTION
Status: COMPLETED
Start: 2022-01-01 | End: 2022-01-01

## 2022-01-01 RX ORDER — FUROSEMIDE 10 MG/ML
60 INJECTION INTRAMUSCULAR; INTRAVENOUS
Status: COMPLETED | OUTPATIENT
Start: 2022-01-01 | End: 2022-01-01

## 2022-01-01 RX ORDER — CARVEDILOL 3.12 MG/1
3.12 TABLET ORAL 2 TIMES DAILY
Status: DISCONTINUED | OUTPATIENT
Start: 2022-01-01 | End: 2022-01-01 | Stop reason: HOSPADM

## 2022-01-01 RX ORDER — PANTOPRAZOLE SODIUM 40 MG/10ML
40 INJECTION, POWDER, LYOPHILIZED, FOR SOLUTION INTRAVENOUS EVERY 24 HOURS
Status: DISCONTINUED | OUTPATIENT
Start: 2022-01-01 | End: 2022-01-01

## 2022-01-01 RX ORDER — LISINOPRIL 20 MG/1
40 TABLET ORAL DAILY
Status: DISCONTINUED | OUTPATIENT
Start: 2022-01-01 | End: 2022-01-01

## 2022-01-01 RX ORDER — FUROSEMIDE 40 MG/1
40 TABLET ORAL DAILY
Qty: 30 TABLET | Refills: 0 | Status: ON HOLD | OUTPATIENT
Start: 2022-01-01 | End: 2022-01-01

## 2022-01-01 RX ORDER — NALOXONE HCL 0.4 MG/ML
VIAL (ML) INJECTION
Status: COMPLETED
Start: 2022-01-01 | End: 2022-01-01

## 2022-01-01 RX ORDER — FUROSEMIDE 10 MG/ML
40 INJECTION INTRAMUSCULAR; INTRAVENOUS DAILY
Status: DISCONTINUED | OUTPATIENT
Start: 2022-01-01 | End: 2022-01-01 | Stop reason: HOSPADM

## 2022-01-01 RX ORDER — FUROSEMIDE 10 MG/ML
40 INJECTION INTRAMUSCULAR; INTRAVENOUS ONCE
Status: COMPLETED | OUTPATIENT
Start: 2022-01-01 | End: 2022-01-01

## 2022-01-01 RX ORDER — METOPROLOL TARTRATE 50 MG/1
50 TABLET ORAL 2 TIMES DAILY
Status: DISCONTINUED | OUTPATIENT
Start: 2022-01-01 | End: 2022-01-01

## 2022-01-01 RX ORDER — ACETAMINOPHEN 325 MG/1
650 TABLET ORAL EVERY 8 HOURS PRN
Status: DISCONTINUED | OUTPATIENT
Start: 2022-01-01 | End: 2022-01-01

## 2022-01-01 RX ORDER — DILTIAZEM HYDROCHLORIDE 5 MG/ML
INJECTION INTRAVENOUS
Status: DISPENSED
Start: 2022-01-01 | End: 2022-01-01

## 2022-01-01 RX ORDER — GABAPENTIN 300 MG/1
300 CAPSULE ORAL 3 TIMES DAILY
Qty: 90 CAPSULE | Refills: 0 | Status: SHIPPED | OUTPATIENT
Start: 2022-01-01 | End: 2022-01-01 | Stop reason: SDUPTHER

## 2022-01-01 RX ORDER — FUROSEMIDE 10 MG/ML
40 INJECTION INTRAMUSCULAR; INTRAVENOUS 2 TIMES DAILY
Status: DISCONTINUED | OUTPATIENT
Start: 2022-01-01 | End: 2022-01-01

## 2022-01-01 RX ORDER — NALOXONE HCL 0.4 MG/ML
0.02 VIAL (ML) INJECTION
Status: DISCONTINUED | OUTPATIENT
Start: 2022-01-01 | End: 2022-01-01 | Stop reason: HOSPADM

## 2022-01-01 RX ORDER — POTASSIUM CHLORIDE 20 MEQ/1
20 TABLET, EXTENDED RELEASE ORAL ONCE
Status: COMPLETED | OUTPATIENT
Start: 2022-01-01 | End: 2022-01-01

## 2022-01-01 RX ORDER — CLONIDINE HYDROCHLORIDE 0.2 MG/1
0.2 TABLET ORAL EVERY 6 HOURS PRN
Status: DISCONTINUED | OUTPATIENT
Start: 2022-01-01 | End: 2022-01-01 | Stop reason: HOSPADM

## 2022-01-01 RX ORDER — DEXMEDETOMIDINE HYDROCHLORIDE 4 UG/ML
0-1.4 INJECTION, SOLUTION INTRAVENOUS CONTINUOUS
Status: DISCONTINUED | OUTPATIENT
Start: 2022-01-01 | End: 2022-01-01 | Stop reason: HOSPADM

## 2022-01-01 RX ORDER — IPRATROPIUM BROMIDE AND ALBUTEROL SULFATE 2.5; .5 MG/3ML; MG/3ML
3 SOLUTION RESPIRATORY (INHALATION)
Status: DISCONTINUED | OUTPATIENT
Start: 2022-01-01 | End: 2022-01-01

## 2022-01-01 RX ORDER — METOPROLOL TARTRATE 1 MG/ML
5 INJECTION, SOLUTION INTRAVENOUS ONCE
Status: COMPLETED | OUTPATIENT
Start: 2022-01-01 | End: 2022-01-01

## 2022-01-01 RX ORDER — ATORVASTATIN CALCIUM 80 MG/1
80 TABLET, FILM COATED ORAL DAILY
Qty: 30 TABLET | Refills: 0 | Status: SHIPPED | OUTPATIENT
Start: 2022-01-01 | End: 2022-01-01

## 2022-01-01 RX ORDER — TRAMADOL HYDROCHLORIDE 50 MG/1
50 TABLET ORAL 2 TIMES DAILY
Status: ON HOLD | COMMUNITY
End: 2022-01-01 | Stop reason: HOSPADM

## 2022-01-01 RX ORDER — LISINOPRIL 10 MG/1
10 TABLET ORAL DAILY
Status: DISCONTINUED | OUTPATIENT
Start: 2022-01-01 | End: 2022-01-01 | Stop reason: HOSPADM

## 2022-01-01 RX ORDER — FUROSEMIDE 40 MG/1
40 TABLET ORAL 2 TIMES DAILY
Qty: 60 TABLET | Refills: 0 | Status: SHIPPED | OUTPATIENT
Start: 2022-01-01 | End: 2022-01-01 | Stop reason: SDUPTHER

## 2022-01-01 RX ORDER — IPRATROPIUM BROMIDE AND ALBUTEROL SULFATE 2.5; .5 MG/3ML; MG/3ML
9 SOLUTION RESPIRATORY (INHALATION)
Status: COMPLETED | OUTPATIENT
Start: 2022-01-01 | End: 2022-01-01

## 2022-01-01 RX ORDER — LISINOPRIL 20 MG/1
20 TABLET ORAL DAILY
Qty: 90 TABLET | Refills: 0 | Status: SHIPPED | OUTPATIENT
Start: 2022-01-01 | End: 2022-01-01

## 2022-01-01 RX ORDER — MORPHINE SULFATE 4 MG/ML
4 INJECTION, SOLUTION INTRAMUSCULAR; INTRAVENOUS
Status: COMPLETED | OUTPATIENT
Start: 2022-01-01 | End: 2022-01-01

## 2022-01-01 RX ORDER — ALBUTEROL SULFATE 0.63 MG/3ML
0.63 SOLUTION RESPIRATORY (INHALATION) EVERY 6 HOURS PRN
Qty: 75 ML | Refills: 0 | Status: ON HOLD | OUTPATIENT
Start: 2022-01-01 | End: 2022-01-01 | Stop reason: SDUPTHER

## 2022-01-01 RX ORDER — SIMETHICONE 80 MG
1 TABLET,CHEWABLE ORAL 4 TIMES DAILY PRN
Status: DISCONTINUED | OUTPATIENT
Start: 2022-01-01 | End: 2022-01-01 | Stop reason: HOSPADM

## 2022-01-01 RX ORDER — METFORMIN HYDROCHLORIDE 500 MG/1
500 TABLET, EXTENDED RELEASE ORAL DAILY
Status: ON HOLD | COMMUNITY
End: 2022-01-01 | Stop reason: SDUPTHER

## 2022-01-01 RX ORDER — MIDAZOLAM HYDROCHLORIDE 5 MG/ML
4 INJECTION INTRAMUSCULAR; INTRAVENOUS
Status: DISCONTINUED | OUTPATIENT
Start: 2022-01-01 | End: 2022-01-01

## 2022-01-01 RX ORDER — LEVALBUTEROL 1.25 MG/.5ML
1.25 SOLUTION, CONCENTRATE RESPIRATORY (INHALATION) EVERY 4 HOURS PRN
Status: DISCONTINUED | OUTPATIENT
Start: 2022-01-01 | End: 2022-01-01 | Stop reason: HOSPADM

## 2022-01-01 RX ORDER — LEVETIRACETAM 5 MG/ML
500 INJECTION INTRAVASCULAR EVERY 12 HOURS
Status: DISCONTINUED | OUTPATIENT
Start: 2022-01-01 | End: 2022-01-01 | Stop reason: HOSPADM

## 2022-01-01 RX ORDER — LEVALBUTEROL INHALATION SOLUTION 1.25 MG/3ML
1.25 SOLUTION RESPIRATORY (INHALATION) EVERY 6 HOURS PRN
Status: DISCONTINUED | OUTPATIENT
Start: 2022-01-01 | End: 2022-01-01 | Stop reason: HOSPADM

## 2022-01-01 RX ORDER — AMIODARONE HYDROCHLORIDE 200 MG/1
TABLET ORAL
Qty: 40 TABLET | Refills: 0 | Status: ON HOLD | OUTPATIENT
Start: 2022-01-01 | End: 2022-01-01 | Stop reason: HOSPADM

## 2022-01-01 RX ORDER — MUPIROCIN 20 MG/G
OINTMENT TOPICAL 2 TIMES DAILY
Status: DISCONTINUED | OUTPATIENT
Start: 2022-01-01 | End: 2022-01-01 | Stop reason: HOSPADM

## 2022-01-01 RX ORDER — FUROSEMIDE 40 MG/1
40 TABLET ORAL 2 TIMES DAILY
Status: DISCONTINUED | OUTPATIENT
Start: 2022-01-01 | End: 2022-01-01 | Stop reason: HOSPADM

## 2022-01-01 RX ORDER — OXYCODONE AND ACETAMINOPHEN 5; 325 MG/1; MG/1
1 TABLET ORAL EVERY 12 HOURS PRN
Status: DISCONTINUED | OUTPATIENT
Start: 2022-01-01 | End: 2022-01-01

## 2022-01-01 RX ORDER — METFORMIN HYDROCHLORIDE 500 MG/1
500 TABLET ORAL 2 TIMES DAILY WITH MEALS
Status: DISCONTINUED | OUTPATIENT
Start: 2022-01-01 | End: 2022-01-01

## 2022-01-01 RX ORDER — METOPROLOL TARTRATE 25 MG/1
25 TABLET, FILM COATED ORAL 2 TIMES DAILY
Status: DISCONTINUED | OUTPATIENT
Start: 2022-01-01 | End: 2022-01-01

## 2022-01-01 RX ORDER — PROMETHAZINE HYDROCHLORIDE AND CODEINE PHOSPHATE 6.25; 1 MG/5ML; MG/5ML
5 SOLUTION ORAL EVERY 4 HOURS PRN
Status: ON HOLD | COMMUNITY
End: 2022-01-01 | Stop reason: SDUPTHER

## 2022-01-01 RX ORDER — AMIODARONE HYDROCHLORIDE 200 MG/1
200 TABLET ORAL DAILY
Status: DISCONTINUED | OUTPATIENT
Start: 2022-01-01 | End: 2022-01-01 | Stop reason: HOSPADM

## 2022-01-01 RX ORDER — OXYCODONE AND ACETAMINOPHEN 5; 325 MG/1; MG/1
1 TABLET ORAL EVERY 4 HOURS PRN
Status: DISCONTINUED | OUTPATIENT
Start: 2022-01-01 | End: 2022-01-01 | Stop reason: HOSPADM

## 2022-01-01 RX ORDER — METOPROLOL TARTRATE 50 MG/1
100 TABLET ORAL ONCE
Status: COMPLETED | OUTPATIENT
Start: 2022-01-01 | End: 2022-01-01

## 2022-01-01 RX ORDER — MIDAZOLAM HYDROCHLORIDE 1 MG/ML
2 INJECTION INTRAMUSCULAR; INTRAVENOUS
Status: COMPLETED | OUTPATIENT
Start: 2022-01-01 | End: 2022-01-01

## 2022-01-01 RX ORDER — ATORVASTATIN CALCIUM 80 MG/1
80 TABLET, FILM COATED ORAL DAILY
Qty: 30 TABLET | Refills: 0 | Status: ON HOLD | OUTPATIENT
Start: 2022-01-01 | End: 2022-01-01 | Stop reason: SDUPTHER

## 2022-01-01 RX ORDER — METHYLPREDNISOLONE SOD SUCC 125 MG
60 VIAL (EA) INJECTION
Status: DISCONTINUED | OUTPATIENT
Start: 2022-01-01 | End: 2022-01-01

## 2022-01-01 RX ORDER — INSULIN ASPART 100 [IU]/ML
0-5 INJECTION, SOLUTION INTRAVENOUS; SUBCUTANEOUS
Status: DISCONTINUED | OUTPATIENT
Start: 2022-01-01 | End: 2022-01-01 | Stop reason: HOSPADM

## 2022-01-01 RX ORDER — MIDAZOLAM HYDROCHLORIDE 1 MG/ML
4 INJECTION INTRAMUSCULAR; INTRAVENOUS
Status: DISCONTINUED | OUTPATIENT
Start: 2022-01-01 | End: 2022-01-01 | Stop reason: HOSPADM

## 2022-01-01 RX ORDER — IBUPROFEN 200 MG
16 TABLET ORAL
Status: DISCONTINUED | OUTPATIENT
Start: 2022-01-01 | End: 2022-01-01

## 2022-01-01 RX ORDER — GABAPENTIN 300 MG/1
300 CAPSULE ORAL 3 TIMES DAILY
Qty: 90 CAPSULE | Refills: 0 | Status: SHIPPED | OUTPATIENT
Start: 2022-01-01 | End: 2022-01-01

## 2022-01-01 RX ORDER — MAGNESIUM SULFATE HEPTAHYDRATE 40 MG/ML
INJECTION, SOLUTION INTRAVENOUS
Status: COMPLETED
Start: 2022-01-01 | End: 2022-01-01

## 2022-01-01 RX ORDER — METOPROLOL TARTRATE 50 MG/1
100 TABLET ORAL 2 TIMES DAILY
Status: DISCONTINUED | OUTPATIENT
Start: 2022-01-01 | End: 2022-01-01

## 2022-01-01 RX ORDER — METFORMIN HYDROCHLORIDE 500 MG/1
500 TABLET ORAL DAILY
Status: DISCONTINUED | OUTPATIENT
Start: 2022-01-01 | End: 2022-01-01 | Stop reason: HOSPADM

## 2022-01-01 RX ORDER — ENOXAPARIN SODIUM 150 MG/ML
150 INJECTION SUBCUTANEOUS
Status: DISCONTINUED | OUTPATIENT
Start: 2022-01-01 | End: 2022-01-01

## 2022-01-01 RX ORDER — DIGOXIN 250 MCG
0.25 TABLET ORAL
Status: COMPLETED | OUTPATIENT
Start: 2022-01-01 | End: 2022-01-01

## 2022-01-01 RX ORDER — OXYCODONE AND ACETAMINOPHEN 5; 325 MG/1; MG/1
1 TABLET ORAL EVERY 6 HOURS PRN
Qty: 15 TABLET | Refills: 0 | Status: SHIPPED | OUTPATIENT
Start: 2022-01-01 | End: 2022-01-01

## 2022-01-01 RX ORDER — TERBUTALINE SULFATE 1 MG/ML
0.25 INJECTION SUBCUTANEOUS ONCE
Status: DISCONTINUED | OUTPATIENT
Start: 2022-01-01 | End: 2022-01-01

## 2022-01-01 RX ORDER — FUROSEMIDE 40 MG/1
40 TABLET ORAL DAILY
Qty: 30 TABLET | Refills: 0 | Status: ON HOLD | OUTPATIENT
Start: 2022-01-01 | End: 2022-01-01 | Stop reason: HOSPADM

## 2022-01-01 RX ORDER — DILTIAZEM HYDROCHLORIDE 240 MG/1
240 CAPSULE, COATED, EXTENDED RELEASE ORAL DAILY
Status: DISCONTINUED | OUTPATIENT
Start: 2022-01-01 | End: 2022-01-01

## 2022-01-01 RX ORDER — ASPIRIN 325 MG
325 TABLET ORAL ONCE
Status: COMPLETED | OUTPATIENT
Start: 2022-01-01 | End: 2022-01-01

## 2022-01-01 RX ORDER — PREDNISONE 20 MG/1
40 TABLET ORAL DAILY
Status: DISCONTINUED | OUTPATIENT
Start: 2022-01-01 | End: 2022-01-01 | Stop reason: HOSPADM

## 2022-01-01 RX ADMIN — PANTOPRAZOLE SODIUM 40 MG: 40 TABLET, DELAYED RELEASE ORAL at 08:10

## 2022-01-01 RX ADMIN — IPRATROPIUM BROMIDE 0.5 MG: 0.5 SOLUTION RESPIRATORY (INHALATION) at 11:05

## 2022-01-01 RX ADMIN — PANTOPRAZOLE SODIUM 40 MG: 40 TABLET, DELAYED RELEASE ORAL at 09:07

## 2022-01-01 RX ADMIN — GABAPENTIN 300 MG: 300 CAPSULE ORAL at 02:10

## 2022-01-01 RX ADMIN — GABAPENTIN 300 MG: 300 CAPSULE ORAL at 08:10

## 2022-01-01 RX ADMIN — ACETAMINOPHEN 650 MG: 325 TABLET ORAL at 12:06

## 2022-01-01 RX ADMIN — IPRATROPIUM BROMIDE 0.5 MG: 0.5 SOLUTION RESPIRATORY (INHALATION) at 08:05

## 2022-01-01 RX ADMIN — METHYLPREDNISOLONE SODIUM SUCCINATE 125 MG: 125 INJECTION, POWDER, FOR SOLUTION INTRAMUSCULAR; INTRAVENOUS at 10:06

## 2022-01-01 RX ADMIN — METHYLPREDNISOLONE SODIUM SUCCINATE 60 MG: 40 INJECTION, POWDER, FOR SOLUTION INTRAMUSCULAR; INTRAVENOUS at 06:09

## 2022-01-01 RX ADMIN — ATORVASTATIN CALCIUM 80 MG: 40 TABLET, FILM COATED ORAL at 09:10

## 2022-01-01 RX ADMIN — CARVEDILOL 3.12 MG: 3.12 TABLET, FILM COATED ORAL at 08:10

## 2022-01-01 RX ADMIN — ATORVASTATIN CALCIUM 80 MG: 40 TABLET, FILM COATED ORAL at 08:06

## 2022-01-01 RX ADMIN — METHYLPREDNISOLONE SODIUM SUCCINATE 40 MG: 40 INJECTION, POWDER, FOR SOLUTION INTRAMUSCULAR; INTRAVENOUS at 09:09

## 2022-01-01 RX ADMIN — DIGOXIN 250 MCG: 0.25 INJECTION INTRAMUSCULAR; INTRAVENOUS at 09:06

## 2022-01-01 RX ADMIN — LISINOPRIL 40 MG: 10 TABLET ORAL at 02:06

## 2022-01-01 RX ADMIN — BENZONATATE 100 MG: 100 CAPSULE ORAL at 01:09

## 2022-01-01 RX ADMIN — DILTIAZEM HYDROCHLORIDE 240 MG: 240 CAPSULE, COATED, EXTENDED RELEASE ORAL at 02:11

## 2022-01-01 RX ADMIN — AMLODIPINE BESYLATE 5 MG: 5 TABLET ORAL at 09:06

## 2022-01-01 RX ADMIN — METHYLPREDNISOLONE SODIUM SUCCINATE 125 MG: 125 INJECTION, POWDER, FOR SOLUTION INTRAMUSCULAR; INTRAVENOUS at 10:07

## 2022-01-01 RX ADMIN — IPRATROPIUM BROMIDE AND ALBUTEROL SULFATE 3 ML: 2.5; .5 SOLUTION RESPIRATORY (INHALATION) at 11:06

## 2022-01-01 RX ADMIN — CARVEDILOL 6.25 MG: 3.12 TABLET, FILM COATED ORAL at 09:10

## 2022-01-01 RX ADMIN — DIGOXIN 0.25 MG: 250 TABLET ORAL at 08:06

## 2022-01-01 RX ADMIN — METOPROLOL TARTRATE 50 MG: 50 TABLET, FILM COATED ORAL at 09:05

## 2022-01-01 RX ADMIN — HYDROCORTISONE SODIUM SUCCINATE 50 MG: 100 INJECTION, POWDER, FOR SOLUTION INTRAMUSCULAR; INTRAVENOUS at 12:12

## 2022-01-01 RX ADMIN — EPINEPHRINE 1.1 MCG/KG/MIN: 1 INJECTION, SOLUTION, CONCENTRATE INTRAVENOUS at 09:12

## 2022-01-01 RX ADMIN — PHENYLEPHRINE HYDROCHLORIDE 4.5 MCG/KG/MIN: 10 INJECTION INTRAVENOUS at 12:12

## 2022-01-01 RX ADMIN — DILTIAZEM HYDROCHLORIDE 5 MG/HR: 5 INJECTION INTRAVENOUS at 01:08

## 2022-01-01 RX ADMIN — METFORMIN HYDROCHLORIDE 500 MG: 500 TABLET, FILM COATED ORAL at 04:05

## 2022-01-01 RX ADMIN — AMLODIPINE BESYLATE 5 MG: 5 TABLET ORAL at 08:05

## 2022-01-01 RX ADMIN — DILTIAZEM HYDROCHLORIDE 240 MG: 240 CAPSULE, COATED, EXTENDED RELEASE ORAL at 08:09

## 2022-01-01 RX ADMIN — SPIRONOLACTONE 25 MG: 25 TABLET ORAL at 08:05

## 2022-01-01 RX ADMIN — BENZONATATE 200 MG: 100 CAPSULE ORAL at 11:07

## 2022-01-01 RX ADMIN — ATORVASTATIN CALCIUM 80 MG: 40 TABLET, FILM COATED ORAL at 08:05

## 2022-01-01 RX ADMIN — RIVAROXABAN 20 MG: 10 TABLET, FILM COATED ORAL at 04:10

## 2022-01-01 RX ADMIN — SPIRONOLACTONE 25 MG: 25 TABLET, FILM COATED ORAL at 09:06

## 2022-01-01 RX ADMIN — DEXMEDETOMIDINE HYDROCHLORIDE 1.4 MCG/KG/HR: 400 INJECTION, SOLUTION INTRAVENOUS at 05:12

## 2022-01-01 RX ADMIN — Medication 400 MG: at 07:05

## 2022-01-01 RX ADMIN — MAGNESIUM SULFATE HEPTAHYDRATE 2 G: 40 INJECTION, SOLUTION INTRAVENOUS at 09:10

## 2022-01-01 RX ADMIN — ONDANSETRON 4 MG: 2 INJECTION INTRAMUSCULAR; INTRAVENOUS at 02:08

## 2022-01-01 RX ADMIN — INSULIN ASPART 2 UNITS: 100 INJECTION, SOLUTION INTRAVENOUS; SUBCUTANEOUS at 09:09

## 2022-01-01 RX ADMIN — MAGNESIUM SULFATE 2 G: 2 INJECTION INTRAVENOUS at 08:06

## 2022-01-01 RX ADMIN — LEVALBUTEROL HYDROCHLORIDE 0.63 MG: 0.63 SOLUTION RESPIRATORY (INHALATION) at 07:10

## 2022-01-01 RX ADMIN — PHENYLEPHRINE HYDROCHLORIDE 5 MCG/KG/MIN: 10 INJECTION INTRAVENOUS at 01:12

## 2022-01-01 RX ADMIN — FUROSEMIDE 80 MG: 10 INJECTION, SOLUTION INTRAMUSCULAR; INTRAVENOUS at 08:09

## 2022-01-01 RX ADMIN — EPINEPHRINE 1.1 MCG/KG/MIN: 1 INJECTION, SOLUTION, CONCENTRATE INTRAVENOUS at 10:12

## 2022-01-01 RX ADMIN — EPINEPHRINE 0.05 MCG/KG/MIN: 1 INJECTION, SOLUTION, CONCENTRATE INTRAVENOUS at 10:12

## 2022-01-01 RX ADMIN — OXYCODONE AND ACETAMINOPHEN 1 TABLET: 10; 325 TABLET ORAL at 05:07

## 2022-01-01 RX ADMIN — AMIODARONE HYDROCHLORIDE 200 MG: 200 TABLET ORAL at 09:06

## 2022-01-01 RX ADMIN — DIGOXIN 500 MCG: 0.25 INJECTION INTRAMUSCULAR; INTRAVENOUS at 12:10

## 2022-01-01 RX ADMIN — AMIODARONE HYDROCHLORIDE 400 MG: 200 TABLET ORAL at 04:05

## 2022-01-01 RX ADMIN — IPRATROPIUM BROMIDE AND ALBUTEROL SULFATE 9 ML: .5; 3 SOLUTION RESPIRATORY (INHALATION) at 10:12

## 2022-01-01 RX ADMIN — LEVALBUTEROL HYDROCHLORIDE 0.63 MG: 0.63 SOLUTION RESPIRATORY (INHALATION) at 07:09

## 2022-01-01 RX ADMIN — OXYCODONE AND ACETAMINOPHEN 1 TABLET: 10; 325 TABLET ORAL at 01:09

## 2022-01-01 RX ADMIN — FAMOTIDINE 20 MG: 10 INJECTION INTRAVENOUS at 08:06

## 2022-01-01 RX ADMIN — METOPROLOL TARTRATE 25 MG: 25 TABLET, FILM COATED ORAL at 06:05

## 2022-01-01 RX ADMIN — FLUTICASONE FUROATE AND VILANTEROL TRIFENATATE 1 PUFF: 100; 25 POWDER RESPIRATORY (INHALATION) at 07:06

## 2022-01-01 RX ADMIN — RIVAROXABAN 20 MG: 10 TABLET, FILM COATED ORAL at 04:06

## 2022-01-01 RX ADMIN — EPINEPHRINE 1 MCG/KG/MIN: 1 INJECTION INTRAMUSCULAR; INTRAVENOUS; SUBCUTANEOUS at 07:12

## 2022-01-01 RX ADMIN — METOPROLOL TARTRATE 50 MG: 50 TABLET, FILM COATED ORAL at 08:05

## 2022-01-01 RX ADMIN — LEVETIRACETAM 1000 MG: 100 INJECTION, SOLUTION, CONCENTRATE INTRAVENOUS at 01:12

## 2022-01-01 RX ADMIN — HYDROCODONE BITARTRATE AND ACETAMINOPHEN 1 TABLET: 5; 325 TABLET ORAL at 09:08

## 2022-01-01 RX ADMIN — DEXMEDETOMIDINE HYDROCHLORIDE 1.4 MCG/KG/HR: 400 INJECTION, SOLUTION INTRAVENOUS at 03:12

## 2022-01-01 RX ADMIN — OXYCODONE AND ACETAMINOPHEN 1 TABLET: 10; 325 TABLET ORAL at 04:09

## 2022-01-01 RX ADMIN — PROCHLORPERAZINE EDISYLATE 10 MG: 5 INJECTION INTRAMUSCULAR; INTRAVENOUS at 03:11

## 2022-01-01 RX ADMIN — METFORMIN HYDROCHLORIDE 500 MG: 500 TABLET, FILM COATED ORAL at 09:07

## 2022-01-01 RX ADMIN — PHENYLEPHRINE HYDROCHLORIDE 1 MCG/KG/MIN: 10 INJECTION INTRAVENOUS at 07:12

## 2022-01-01 RX ADMIN — AMIODARONE HYDROCHLORIDE 400 MG: 200 TABLET ORAL at 08:05

## 2022-01-01 RX ADMIN — EPINEPHRINE 1.8 MCG/KG/MIN: 1 INJECTION INTRAMUSCULAR; INTRAVENOUS; SUBCUTANEOUS at 12:12

## 2022-01-01 RX ADMIN — FUROSEMIDE 40 MG: 10 INJECTION, SOLUTION INTRAMUSCULAR; INTRAVENOUS at 04:07

## 2022-01-01 RX ADMIN — RIVAROXABAN 20 MG: 10 TABLET, FILM COATED ORAL at 09:11

## 2022-01-01 RX ADMIN — OXYCODONE AND ACETAMINOPHEN 1 TABLET: 10; 325 TABLET ORAL at 12:09

## 2022-01-01 RX ADMIN — DILTIAZEM HYDROCHLORIDE 120 MG: 120 CAPSULE, COATED, EXTENDED RELEASE ORAL at 11:10

## 2022-01-01 RX ADMIN — METOPROLOL TARTRATE 100 MG: 50 TABLET, FILM COATED ORAL at 03:05

## 2022-01-01 RX ADMIN — IPRATROPIUM BROMIDE AND ALBUTEROL SULFATE 3 ML: 2.5; .5 SOLUTION RESPIRATORY (INHALATION) at 06:06

## 2022-01-01 RX ADMIN — METHYLPREDNISOLONE SODIUM SUCCINATE 80 MG: 40 INJECTION, POWDER, FOR SOLUTION INTRAMUSCULAR; INTRAVENOUS at 05:07

## 2022-01-01 RX ADMIN — METOPROLOL TARTRATE 5 MG: 5 INJECTION, SOLUTION INTRAVENOUS at 08:05

## 2022-01-01 RX ADMIN — LEVALBUTEROL HYDROCHLORIDE 0.63 MG: 0.63 SOLUTION RESPIRATORY (INHALATION) at 12:09

## 2022-01-01 RX ADMIN — MORPHINE SULFATE 4 MG: 4 INJECTION INTRAVENOUS at 02:08

## 2022-01-01 RX ADMIN — CARVEDILOL 6.25 MG: 3.12 TABLET, FILM COATED ORAL at 08:10

## 2022-01-01 RX ADMIN — MAGNESIUM SULFATE 2 G: 2 INJECTION INTRAVENOUS at 10:06

## 2022-01-01 RX ADMIN — LEVALBUTEROL HYDROCHLORIDE 0.63 MG: 0.63 SOLUTION RESPIRATORY (INHALATION) at 10:11

## 2022-01-01 RX ADMIN — HYDROCORTISONE SODIUM SUCCINATE 100 MG: 100 INJECTION, POWDER, FOR SOLUTION INTRAMUSCULAR; INTRAVENOUS at 09:12

## 2022-01-01 RX ADMIN — FUROSEMIDE 40 MG: 10 INJECTION, SOLUTION INTRAVENOUS at 12:10

## 2022-01-01 RX ADMIN — SODIUM BICARBONATE 50 MEQ: 84 INJECTION, SOLUTION INTRAVENOUS at 07:12

## 2022-01-01 RX ADMIN — ENOXAPARIN SODIUM 40 MG: 40 INJECTION SUBCUTANEOUS at 08:06

## 2022-01-01 RX ADMIN — CARVEDILOL 3.12 MG: 3.12 TABLET, FILM COATED ORAL at 08:09

## 2022-01-01 RX ADMIN — DEXMEDETOMIDINE HYDROCHLORIDE 1.4 MCG/KG/HR: 400 INJECTION, SOLUTION INTRAVENOUS at 09:12

## 2022-01-01 RX ADMIN — LEVALBUTEROL HYDROCHLORIDE 0.63 MG: 0.63 SOLUTION RESPIRATORY (INHALATION) at 08:10

## 2022-01-01 RX ADMIN — FUROSEMIDE 40 MG: 40 TABLET ORAL at 08:11

## 2022-01-01 RX ADMIN — SODIUM BICARBONATE: 84 INJECTION, SOLUTION INTRAVENOUS at 02:12

## 2022-01-01 RX ADMIN — FLUTICASONE FUROATE AND VILANTEROL TRIFENATATE 1 PUFF: 100; 25 POWDER RESPIRATORY (INHALATION) at 08:05

## 2022-01-01 RX ADMIN — OXYCODONE AND ACETAMINOPHEN 1 TABLET: 10; 325 TABLET ORAL at 02:07

## 2022-01-01 RX ADMIN — AMIODARONE HYDROCHLORIDE 1 MG/MIN: 1.8 INJECTION, SOLUTION INTRAVENOUS at 01:05

## 2022-01-01 RX ADMIN — MIDAZOLAM HYDROCHLORIDE 2 MG: 1 INJECTION, SOLUTION INTRAMUSCULAR; INTRAVENOUS at 12:12

## 2022-01-01 RX ADMIN — INSULIN ASPART 4 UNITS: 100 INJECTION, SOLUTION INTRAVENOUS; SUBCUTANEOUS at 12:10

## 2022-01-01 RX ADMIN — ENOXAPARIN SODIUM 40 MG: 40 INJECTION SUBCUTANEOUS at 09:06

## 2022-01-01 RX ADMIN — METHYLPREDNISOLONE SODIUM SUCCINATE 80 MG: 40 INJECTION, POWDER, FOR SOLUTION INTRAMUSCULAR; INTRAVENOUS at 04:07

## 2022-01-01 RX ADMIN — TIOTROPIUM BROMIDE INHALATION SPRAY 2 PUFF: 3.12 SPRAY, METERED RESPIRATORY (INHALATION) at 08:10

## 2022-01-01 RX ADMIN — LEVALBUTEROL HYDROCHLORIDE 0.63 MG: 0.63 SOLUTION RESPIRATORY (INHALATION) at 04:11

## 2022-01-01 RX ADMIN — IPRATROPIUM BROMIDE AND ALBUTEROL SULFATE 3 ML: 2.5; .5 SOLUTION RESPIRATORY (INHALATION) at 04:06

## 2022-01-01 RX ADMIN — Medication 0.4 MG: at 11:06

## 2022-01-01 RX ADMIN — NOREPINEPHRINE BITARTRATE 2.6 MCG/KG/MIN: 1 INJECTION, SOLUTION, CONCENTRATE INTRAVENOUS at 03:12

## 2022-01-01 RX ADMIN — BENZONATATE 200 MG: 100 CAPSULE ORAL at 12:05

## 2022-01-01 RX ADMIN — TIOTROPIUM BROMIDE INHALATION SPRAY 2 PUFF: 3.12 SPRAY, METERED RESPIRATORY (INHALATION) at 09:10

## 2022-01-01 RX ADMIN — BENZONATATE 100 MG: 100 CAPSULE ORAL at 09:06

## 2022-01-01 RX ADMIN — ENOXAPARIN SODIUM 150 MG: 150 INJECTION SUBCUTANEOUS at 10:05

## 2022-01-01 RX ADMIN — METHYLPREDNISOLONE SODIUM SUCCINATE 80 MG: 40 INJECTION, POWDER, FOR SOLUTION INTRAMUSCULAR; INTRAVENOUS at 12:05

## 2022-01-01 RX ADMIN — PHENYLEPHRINE HYDROCHLORIDE 3 MCG/KG/MIN: 10 INJECTION INTRAVENOUS at 10:12

## 2022-01-01 RX ADMIN — HYDROCODONE BITARTRATE AND ACETAMINOPHEN 1 TABLET: 5; 325 TABLET ORAL at 10:08

## 2022-01-01 RX ADMIN — ATORVASTATIN CALCIUM 80 MG: 40 TABLET, FILM COATED ORAL at 08:10

## 2022-01-01 RX ADMIN — LEVALBUTEROL HYDROCHLORIDE 1.25 MG: 1.25 SOLUTION, CONCENTRATE RESPIRATORY (INHALATION) at 08:05

## 2022-01-01 RX ADMIN — LEVALBUTEROL HYDROCHLORIDE 0.63 MG: 0.63 SOLUTION RESPIRATORY (INHALATION) at 04:09

## 2022-01-01 RX ADMIN — ENOXAPARIN SODIUM 150 MG: 150 INJECTION SUBCUTANEOUS at 11:05

## 2022-01-01 RX ADMIN — PANTOPRAZOLE 40 MG: 40 TABLET, DELAYED RELEASE ORAL at 08:06

## 2022-01-01 RX ADMIN — AMLODIPINE BESYLATE 5 MG: 5 TABLET ORAL at 09:05

## 2022-01-01 RX ADMIN — METOPROLOL TARTRATE 5 MG: 5 INJECTION, SOLUTION INTRAVENOUS at 07:05

## 2022-01-01 RX ADMIN — LEVALBUTEROL HYDROCHLORIDE 0.63 MG: 0.63 SOLUTION RESPIRATORY (INHALATION) at 08:11

## 2022-01-01 RX ADMIN — ACETAMINOPHEN 650 MG: 325 TABLET ORAL at 08:05

## 2022-01-01 RX ADMIN — METOPROLOL SUCCINATE 100 MG: 50 TABLET, FILM COATED, EXTENDED RELEASE ORAL at 08:05

## 2022-01-01 RX ADMIN — MUPIROCIN: 20 OINTMENT TOPICAL at 09:12

## 2022-01-01 RX ADMIN — FUROSEMIDE 40 MG: 10 INJECTION INTRAMUSCULAR; INTRAVENOUS at 08:06

## 2022-01-01 RX ADMIN — GABAPENTIN 300 MG: 300 CAPSULE ORAL at 09:10

## 2022-01-01 RX ADMIN — IPRATROPIUM BROMIDE AND ALBUTEROL SULFATE 3 ML: .5; 2.5 SOLUTION RESPIRATORY (INHALATION) at 01:05

## 2022-01-01 RX ADMIN — CARVEDILOL 6.25 MG: 3.12 TABLET, FILM COATED ORAL at 02:11

## 2022-01-01 RX ADMIN — ENOXAPARIN SODIUM 150 MG: 150 INJECTION SUBCUTANEOUS at 12:05

## 2022-01-01 RX ADMIN — MIDAZOLAM HYDROCHLORIDE 4 MG: 1 INJECTION INTRAMUSCULAR; INTRAVENOUS at 11:12

## 2022-01-01 RX ADMIN — RIVAROXABAN 20 MG: 10 TABLET, FILM COATED ORAL at 05:07

## 2022-01-01 RX ADMIN — NOREPINEPHRINE BITARTRATE 2.6 MCG/KG/MIN: 1 INJECTION, SOLUTION, CONCENTRATE INTRAVENOUS at 02:12

## 2022-01-01 RX ADMIN — LABETALOL HYDROCHLORIDE 20 MG: 5 INJECTION INTRAVENOUS at 10:07

## 2022-01-01 RX ADMIN — AMIODARONE HYDROCHLORIDE 0.5 MG/MIN: 1.8 INJECTION, SOLUTION INTRAVENOUS at 06:05

## 2022-01-01 RX ADMIN — OXYCODONE HYDROCHLORIDE AND ACETAMINOPHEN 1 TABLET: 5; 325 TABLET ORAL at 08:10

## 2022-01-01 RX ADMIN — RIVAROXABAN 20 MG: 10 TABLET, FILM COATED ORAL at 10:06

## 2022-01-01 RX ADMIN — LISINOPRIL 20 MG: 10 TABLET ORAL at 02:10

## 2022-01-01 RX ADMIN — LISINOPRIL 20 MG: 20 TABLET ORAL at 08:10

## 2022-01-01 RX ADMIN — METHYLPREDNISOLONE SODIUM SUCCINATE 40 MG: 40 INJECTION, POWDER, FOR SOLUTION INTRAMUSCULAR; INTRAVENOUS at 08:05

## 2022-01-01 RX ADMIN — LEVALBUTEROL HYDROCHLORIDE 1.25 MG: 1.25 SOLUTION, CONCENTRATE RESPIRATORY (INHALATION) at 04:05

## 2022-01-01 RX ADMIN — OXYCODONE HYDROCHLORIDE AND ACETAMINOPHEN 1 TABLET: 5; 325 TABLET ORAL at 03:10

## 2022-01-01 RX ADMIN — METOPROLOL TARTRATE 100 MG: 50 TABLET, FILM COATED ORAL at 08:05

## 2022-01-01 RX ADMIN — ATORVASTATIN CALCIUM 80 MG: 40 TABLET, FILM COATED ORAL at 09:07

## 2022-01-01 RX ADMIN — PANTOPRAZOLE SODIUM 40 MG: 40 INJECTION, POWDER, FOR SOLUTION INTRAVENOUS at 04:12

## 2022-01-01 RX ADMIN — RIVAROXABAN 20 MG: 10 TABLET, FILM COATED ORAL at 03:08

## 2022-01-01 RX ADMIN — LISINOPRIL 10 MG: 10 TABLET ORAL at 08:07

## 2022-01-01 RX ADMIN — SODIUM CHLORIDE 1000 ML: 9 INJECTION, SOLUTION INTRAVENOUS at 10:12

## 2022-01-01 RX ADMIN — GABAPENTIN 300 MG: 300 CAPSULE ORAL at 04:05

## 2022-01-01 RX ADMIN — IPRATROPIUM BROMIDE 0.5 MG: 0.5 SOLUTION RESPIRATORY (INHALATION) at 01:05

## 2022-01-01 RX ADMIN — OXYCODONE AND ACETAMINOPHEN 1 TABLET: 10; 325 TABLET ORAL at 10:07

## 2022-01-01 RX ADMIN — METOPROLOL TARTRATE 100 MG: 50 TABLET, FILM COATED ORAL at 09:05

## 2022-01-01 RX ADMIN — FLUTICASONE FUROATE AND VILANTEROL TRIFENATATE 1 PUFF: 100; 25 POWDER RESPIRATORY (INHALATION) at 08:10

## 2022-01-01 RX ADMIN — EPINEPHRINE 1.5 MCG/KG/MIN: 1 INJECTION INTRAMUSCULAR; INTRAVENOUS; SUBCUTANEOUS at 09:12

## 2022-01-01 RX ADMIN — LEVALBUTEROL HYDROCHLORIDE 1.25 MG: 1.25 SOLUTION RESPIRATORY (INHALATION) at 01:08

## 2022-01-01 RX ADMIN — METOPROLOL SUCCINATE 100 MG: 50 TABLET, EXTENDED RELEASE ORAL at 08:06

## 2022-01-01 RX ADMIN — PHENYLEPHRINE HYDROCHLORIDE 4 MCG/KG/MIN: 10 INJECTION INTRAVENOUS at 11:12

## 2022-01-01 RX ADMIN — INSULIN HUMAN 0.1 UNITS/KG/HR: 1 INJECTION, SOLUTION INTRAVENOUS at 12:12

## 2022-01-01 RX ADMIN — FUROSEMIDE 40 MG: 10 INJECTION, SOLUTION INTRAMUSCULAR; INTRAVENOUS at 05:07

## 2022-01-01 RX ADMIN — PANTOPRAZOLE SODIUM 40 MG: 40 TABLET, DELAYED RELEASE ORAL at 12:10

## 2022-01-01 RX ADMIN — METOPROLOL TARTRATE 100 MG: 50 TABLET, FILM COATED ORAL at 11:07

## 2022-01-01 RX ADMIN — ACETAMINOPHEN 650 MG: 325 TABLET ORAL at 10:06

## 2022-01-01 RX ADMIN — SODIUM BICARBONATE: 84 INJECTION, SOLUTION INTRAVENOUS at 10:12

## 2022-01-01 RX ADMIN — METHYLPREDNISOLONE SODIUM SUCCINATE 125 MG: 125 INJECTION, POWDER, FOR SOLUTION INTRAMUSCULAR; INTRAVENOUS at 02:10

## 2022-01-01 RX ADMIN — OXYCODONE AND ACETAMINOPHEN 1 TABLET: 10; 325 TABLET ORAL at 05:09

## 2022-01-01 RX ADMIN — METHYLPREDNISOLONE SODIUM SUCCINATE 80 MG: 40 INJECTION, POWDER, FOR SOLUTION INTRAMUSCULAR; INTRAVENOUS at 09:10

## 2022-01-01 RX ADMIN — NOREPINEPHRINE BITARTRATE 3 MCG/KG/MIN: 1 INJECTION, SOLUTION, CONCENTRATE INTRAVENOUS at 10:12

## 2022-01-01 RX ADMIN — METHYLPREDNISOLONE SODIUM SUCCINATE 80 MG: 40 INJECTION, POWDER, FOR SOLUTION INTRAMUSCULAR; INTRAVENOUS at 06:07

## 2022-01-01 RX ADMIN — MAGNESIUM SULFATE HEPTAHYDRATE 2 G: 40 INJECTION, SOLUTION INTRAVENOUS at 11:10

## 2022-01-01 RX ADMIN — FUROSEMIDE 40 MG: 10 INJECTION, SOLUTION INTRAMUSCULAR; INTRAVENOUS at 11:09

## 2022-01-01 RX ADMIN — GABAPENTIN 300 MG: 300 CAPSULE ORAL at 08:05

## 2022-01-01 RX ADMIN — AMLODIPINE BESYLATE 5 MG: 5 TABLET ORAL at 06:05

## 2022-01-01 RX ADMIN — METOPROLOL TARTRATE 5 MG: 5 INJECTION, SOLUTION INTRAVENOUS at 05:05

## 2022-01-01 RX ADMIN — LEVALBUTEROL HYDROCHLORIDE 0.63 MG: 0.63 SOLUTION RESPIRATORY (INHALATION) at 03:10

## 2022-01-01 RX ADMIN — LEVALBUTEROL HYDROCHLORIDE 1.25 MG: 1.25 SOLUTION, CONCENTRATE RESPIRATORY (INHALATION) at 01:05

## 2022-01-01 RX ADMIN — INSULIN ASPART 2 UNITS: 100 INJECTION, SOLUTION INTRAVENOUS; SUBCUTANEOUS at 12:09

## 2022-01-01 RX ADMIN — DILTIAZEM HYDROCHLORIDE 240 MG: 240 CAPSULE, COATED, EXTENDED RELEASE ORAL at 09:08

## 2022-01-01 RX ADMIN — LEVALBUTEROL HYDROCHLORIDE 0.63 MG: 0.63 SOLUTION RESPIRATORY (INHALATION) at 04:10

## 2022-01-01 RX ADMIN — METHYLPREDNISOLONE SODIUM SUCCINATE 125 MG: 125 INJECTION, POWDER, FOR SOLUTION INTRAMUSCULAR; INTRAVENOUS at 10:12

## 2022-01-01 RX ADMIN — FUROSEMIDE 40 MG: 10 INJECTION, SOLUTION INTRAMUSCULAR; INTRAVENOUS at 02:10

## 2022-01-01 RX ADMIN — FUROSEMIDE 40 MG: 10 INJECTION, SOLUTION INTRAVENOUS at 11:05

## 2022-01-01 RX ADMIN — GUAIFENESIN 200 MG: 200 SOLUTION ORAL at 04:05

## 2022-01-01 RX ADMIN — DILTIAZEM HYDROCHLORIDE 15 MG/HR: 5 INJECTION INTRAVENOUS at 01:09

## 2022-01-01 RX ADMIN — IPRATROPIUM BROMIDE AND ALBUTEROL SULFATE 6 ML: 2.5; .5 SOLUTION RESPIRATORY (INHALATION) at 11:06

## 2022-01-01 RX ADMIN — TRAMADOL HYDROCHLORIDE 50 MG: 50 TABLET, COATED ORAL at 09:07

## 2022-01-01 RX ADMIN — LISINOPRIL 20 MG: 20 TABLET ORAL at 08:11

## 2022-01-01 RX ADMIN — GABAPENTIN 300 MG: 300 CAPSULE ORAL at 04:07

## 2022-01-01 RX ADMIN — NITROGLYCERIN 1 INCH: 20 OINTMENT TOPICAL at 01:11

## 2022-01-01 RX ADMIN — EPINEPHRINE 0.2 MCG/KG/MIN: 1 INJECTION INTRAMUSCULAR; INTRAVENOUS; SUBCUTANEOUS at 03:12

## 2022-01-01 RX ADMIN — LABETALOL HYDROCHLORIDE 10 MG: 5 INJECTION INTRAVENOUS at 08:05

## 2022-01-01 RX ADMIN — DILTIAZEM HYDROCHLORIDE 480 MG: 240 CAPSULE, COATED, EXTENDED RELEASE ORAL at 09:10

## 2022-01-01 RX ADMIN — ACETAMINOPHEN 650 MG: 325 TABLET ORAL at 04:05

## 2022-01-01 RX ADMIN — METOPROLOL TARTRATE 25 MG: 25 TABLET, FILM COATED ORAL at 09:05

## 2022-01-01 RX ADMIN — METHYLPREDNISOLONE SODIUM SUCCINATE 60 MG: 125 INJECTION, POWDER, FOR SOLUTION INTRAMUSCULAR; INTRAVENOUS at 05:12

## 2022-01-01 RX ADMIN — IPRATROPIUM BROMIDE 0.5 MG: 0.5 SOLUTION RESPIRATORY (INHALATION) at 12:05

## 2022-01-01 RX ADMIN — DEXTROSE MONOHYDRATE 250 ML: 100 INJECTION, SOLUTION INTRAVENOUS at 09:12

## 2022-01-01 RX ADMIN — OXYCODONE AND ACETAMINOPHEN 1 TABLET: 10; 325 TABLET ORAL at 11:07

## 2022-01-01 RX ADMIN — IPRATROPIUM BROMIDE AND ALBUTEROL SULFATE 3 ML: 2.5; .5 SOLUTION RESPIRATORY (INHALATION) at 09:06

## 2022-01-01 RX ADMIN — ATORVASTATIN CALCIUM 80 MG: 40 TABLET, FILM COATED ORAL at 02:06

## 2022-01-01 RX ADMIN — RIVAROXABAN 20 MG: 10 TABLET, FILM COATED ORAL at 05:09

## 2022-01-01 RX ADMIN — CARVEDILOL 3.12 MG: 3.12 TABLET, FILM COATED ORAL at 12:09

## 2022-01-01 RX ADMIN — CARVEDILOL 3.12 MG: 3.12 TABLET, FILM COATED ORAL at 09:09

## 2022-01-01 RX ADMIN — ASPIRIN 325 MG ORAL TABLET 325 MG: 325 PILL ORAL at 04:12

## 2022-01-01 RX ADMIN — SODIUM ZIRCONIUM CYCLOSILICATE 10 G: 10 POWDER, FOR SUSPENSION ORAL at 05:12

## 2022-01-01 RX ADMIN — DIGOXIN 0.25 MG: 250 TABLET ORAL at 09:08

## 2022-01-01 RX ADMIN — LISINOPRIL 10 MG: 10 TABLET ORAL at 09:07

## 2022-01-01 RX ADMIN — DEXTROSE MONOHYDRATE 250 ML: 100 INJECTION, SOLUTION INTRAVENOUS at 05:12

## 2022-01-01 RX ADMIN — GABAPENTIN 300 MG: 300 CAPSULE ORAL at 09:05

## 2022-01-01 RX ADMIN — HYDRALAZINE HYDROCHLORIDE 10 MG: 20 INJECTION INTRAMUSCULAR; INTRAVENOUS at 07:10

## 2022-01-01 RX ADMIN — SODIUM CHLORIDE: 9 INJECTION, SOLUTION INTRAVENOUS at 04:12

## 2022-01-01 RX ADMIN — PHENYLEPHRINE HYDROCHLORIDE 3.5 MCG/KG/MIN: 10 INJECTION INTRAVENOUS at 11:12

## 2022-01-01 RX ADMIN — DILTIAZEM HYDROCHLORIDE 240 MG: 240 CAPSULE, COATED, EXTENDED RELEASE ORAL at 06:09

## 2022-01-01 RX ADMIN — NOREPINEPHRINE BITARTRATE 3 MCG/KG/MIN: 1 INJECTION, SOLUTION, CONCENTRATE INTRAVENOUS at 09:12

## 2022-01-01 RX ADMIN — DEXMEDETOMIDINE HYDROCHLORIDE 1.4 MCG/KG/HR: 400 INJECTION, SOLUTION INTRAVENOUS at 07:12

## 2022-01-01 RX ADMIN — DEXTROSE MONOHYDRATE 250 ML: 100 INJECTION, SOLUTION INTRAVENOUS at 06:12

## 2022-01-01 RX ADMIN — BENZONATATE 200 MG: 100 CAPSULE ORAL at 03:05

## 2022-01-01 RX ADMIN — METHYLPREDNISOLONE SODIUM SUCCINATE 80 MG: 40 INJECTION, POWDER, FOR SOLUTION INTRAMUSCULAR; INTRAVENOUS at 11:07

## 2022-01-01 RX ADMIN — DILTIAZEM HYDROCHLORIDE 5 MG/HR: 5 INJECTION INTRAVENOUS at 05:09

## 2022-01-01 RX ADMIN — LEVALBUTEROL HYDROCHLORIDE 0.63 MG: 0.63 SOLUTION RESPIRATORY (INHALATION) at 12:10

## 2022-01-01 RX ADMIN — IPRATROPIUM BROMIDE AND ALBUTEROL SULFATE 3 ML: 2.5; .5 SOLUTION RESPIRATORY (INHALATION) at 11:07

## 2022-01-01 RX ADMIN — LEVALBUTEROL HYDROCHLORIDE 0.63 MG: 0.63 SOLUTION RESPIRATORY (INHALATION) at 11:10

## 2022-01-01 RX ADMIN — Medication 400 MG: at 08:05

## 2022-01-01 RX ADMIN — INSULIN ASPART 2 UNITS: 100 INJECTION, SOLUTION INTRAVENOUS; SUBCUTANEOUS at 05:05

## 2022-01-01 RX ADMIN — OXYCODONE AND ACETAMINOPHEN 1 TABLET: 10; 325 TABLET ORAL at 08:09

## 2022-01-01 RX ADMIN — SODIUM BICARBONATE: 84 INJECTION, SOLUTION INTRAVENOUS at 08:12

## 2022-01-01 RX ADMIN — FLUTICASONE FUROATE AND VILANTEROL TRIFENATATE 1 PUFF: 100; 25 POWDER RESPIRATORY (INHALATION) at 08:06

## 2022-01-01 RX ADMIN — DILTIAZEM HYDROCHLORIDE 360 MG: 180 CAPSULE, COATED, EXTENDED RELEASE ORAL at 08:10

## 2022-01-01 RX ADMIN — DILTIAZEM HYDROCHLORIDE 10 MG: 5 INJECTION INTRAVENOUS at 04:09

## 2022-01-01 RX ADMIN — NOREPINEPHRINE BITARTRATE 2.84 MCG/KG/MIN: 1 INJECTION, SOLUTION, CONCENTRATE INTRAVENOUS at 07:12

## 2022-01-01 RX ADMIN — DEXMEDETOMIDINE HYDROCHLORIDE 1.4 MCG/KG/HR: 400 INJECTION, SOLUTION INTRAVENOUS at 11:12

## 2022-01-01 RX ADMIN — DIGOXIN 0.25 MG: 250 TABLET ORAL at 08:09

## 2022-01-01 RX ADMIN — LABETALOL HYDROCHLORIDE 10 MG: 5 INJECTION, SOLUTION INTRAVENOUS at 03:05

## 2022-01-01 RX ADMIN — PREDNISONE 40 MG: 20 TABLET ORAL at 08:05

## 2022-01-01 RX ADMIN — OXYCODONE HYDROCHLORIDE AND ACETAMINOPHEN 1 TABLET: 5; 325 TABLET ORAL at 11:07

## 2022-01-01 RX ADMIN — GABAPENTIN 300 MG: 300 CAPSULE ORAL at 09:06

## 2022-01-01 RX ADMIN — ATORVASTATIN CALCIUM 80 MG: 40 TABLET, FILM COATED ORAL at 08:11

## 2022-01-01 RX ADMIN — EPINEPHRINE 0.44 MCG/KG/MIN: 1 INJECTION, SOLUTION, CONCENTRATE INTRAVENOUS at 05:12

## 2022-01-01 RX ADMIN — TRAMADOL HYDROCHLORIDE 50 MG: 50 TABLET, COATED ORAL at 07:09

## 2022-01-01 RX ADMIN — ACETAMINOPHEN 1000 MG: 500 TABLET ORAL at 01:11

## 2022-01-01 RX ADMIN — LEVOFLOXACIN 750 MG: 5 INJECTION, SOLUTION INTRAVENOUS at 12:07

## 2022-01-01 RX ADMIN — GABAPENTIN 300 MG: 300 CAPSULE ORAL at 09:07

## 2022-01-01 RX ADMIN — AMLODIPINE BESYLATE 5 MG: 5 TABLET ORAL at 08:06

## 2022-01-01 RX ADMIN — SPIRONOLACTONE 25 MG: 25 TABLET, FILM COATED ORAL at 08:06

## 2022-01-01 RX ADMIN — MIDAZOLAM 4 MG: 1 INJECTION INTRAMUSCULAR; INTRAVENOUS at 06:12

## 2022-01-01 RX ADMIN — DILTIAZEM HYDROCHLORIDE 240 MG: 240 CAPSULE, COATED, EXTENDED RELEASE ORAL at 08:08

## 2022-01-01 RX ADMIN — LEVALBUTEROL HYDROCHLORIDE 0.63 MG: 0.63 SOLUTION RESPIRATORY (INHALATION) at 03:09

## 2022-01-01 RX ADMIN — LISINOPRIL 20 MG: 10 TABLET ORAL at 08:05

## 2022-01-01 RX ADMIN — FLUTICASONE FUROATE AND VILANTEROL TRIFENATATE 1 PUFF: 100; 25 POWDER RESPIRATORY (INHALATION) at 09:07

## 2022-01-01 RX ADMIN — EPINEPHRINE 0.2 MG: 0.1 INJECTION INTRACARDIAC; INTRAVENOUS at 10:12

## 2022-01-01 RX ADMIN — PANTOPRAZOLE SODIUM 40 MG: 40 INJECTION, POWDER, FOR SOLUTION INTRAVENOUS at 08:12

## 2022-01-01 RX ADMIN — SODIUM BICARBONATE 50 MEQ: 84 INJECTION, SOLUTION INTRAVENOUS at 05:12

## 2022-01-01 RX ADMIN — IPRATROPIUM BROMIDE AND ALBUTEROL SULFATE 3 ML: 2.5; .5 SOLUTION RESPIRATORY (INHALATION) at 02:10

## 2022-01-01 RX ADMIN — HYDRALAZINE HYDROCHLORIDE 10 MG: 20 INJECTION INTRAMUSCULAR; INTRAVENOUS at 12:05

## 2022-01-01 RX ADMIN — IPRATROPIUM BROMIDE AND ALBUTEROL SULFATE 3 ML: 2.5; .5 SOLUTION RESPIRATORY (INHALATION) at 07:06

## 2022-01-01 RX ADMIN — HYDROCODONE BITARTRATE AND ACETAMINOPHEN 1 TABLET: 5; 325 TABLET ORAL at 04:08

## 2022-01-01 RX ADMIN — DIGOXIN 0.25 MG: 250 TABLET ORAL at 02:08

## 2022-01-01 RX ADMIN — LISINOPRIL 20 MG: 20 TABLET ORAL at 12:10

## 2022-01-01 RX ADMIN — NOREPINEPHRINE BITARTRATE 2.84 MCG/KG/MIN: 1 INJECTION, SOLUTION, CONCENTRATE INTRAVENOUS at 06:12

## 2022-01-01 RX ADMIN — METOPROLOL SUCCINATE 100 MG: 50 TABLET, EXTENDED RELEASE ORAL at 09:06

## 2022-01-01 RX ADMIN — TRAMADOL HYDROCHLORIDE 50 MG: 50 TABLET, COATED ORAL at 11:07

## 2022-01-01 RX ADMIN — IPRATROPIUM BROMIDE 0.5 MG: 0.5 SOLUTION RESPIRATORY (INHALATION) at 09:05

## 2022-01-01 RX ADMIN — LEVETIRACETAM INJECTION 500 MG: 5 INJECTION INTRAVENOUS at 09:12

## 2022-01-01 RX ADMIN — IPRATROPIUM BROMIDE 0.5 MG: 0.5 SOLUTION RESPIRATORY (INHALATION) at 04:05

## 2022-01-01 RX ADMIN — LISINOPRIL 20 MG: 20 TABLET ORAL at 09:08

## 2022-01-01 RX ADMIN — LEVALBUTEROL HYDROCHLORIDE 1.25 MG: 1.25 SOLUTION RESPIRATORY (INHALATION) at 10:08

## 2022-01-01 RX ADMIN — LEVALBUTEROL HYDROCHLORIDE 0.63 MG: 0.63 SOLUTION RESPIRATORY (INHALATION) at 01:09

## 2022-01-01 RX ADMIN — DIGOXIN 0.25 MG: 250 TABLET ORAL at 09:09

## 2022-01-01 RX ADMIN — DEXMEDETOMIDINE HYDROCHLORIDE 1.4 MCG/KG/HR: 400 INJECTION, SOLUTION INTRAVENOUS at 12:12

## 2022-01-01 RX ADMIN — METOPROLOL TARTRATE 25 MG: 25 TABLET, FILM COATED ORAL at 11:05

## 2022-01-01 RX ADMIN — INSULIN ASPART 10 UNITS: 100 INJECTION, SOLUTION INTRAVENOUS; SUBCUTANEOUS at 12:12

## 2022-01-01 RX ADMIN — POTASSIUM CHLORIDE 20 MEQ: 1500 TABLET, EXTENDED RELEASE ORAL at 10:10

## 2022-01-01 RX ADMIN — LEVETIRACETAM INJECTION 500 MG: 5 INJECTION INTRAVENOUS at 08:12

## 2022-01-01 RX ADMIN — BENZONATATE 200 MG: 100 CAPSULE ORAL at 04:05

## 2022-01-01 RX ADMIN — DILTIAZEM HYDROCHLORIDE 480 MG: 240 CAPSULE, COATED, EXTENDED RELEASE ORAL at 08:10

## 2022-01-01 RX ADMIN — MAGNESIUM SULFATE HEPTAHYDRATE 2 G: 40 INJECTION, SOLUTION INTRAVENOUS at 10:07

## 2022-01-01 RX ADMIN — MAGNESIUM SULFATE HEPTAHYDRATE 2 G: 40 INJECTION, SOLUTION INTRAVENOUS at 06:10

## 2022-01-01 RX ADMIN — NALOXONE HYDROCHLORIDE 0.4 MG: 0.4 INJECTION, SOLUTION INTRAMUSCULAR; INTRAVENOUS; SUBCUTANEOUS at 11:06

## 2022-01-01 RX ADMIN — METHYLPREDNISOLONE SODIUM SUCCINATE 60 MG: 40 INJECTION, POWDER, FOR SOLUTION INTRAMUSCULAR; INTRAVENOUS at 05:09

## 2022-01-01 RX ADMIN — NOREPINEPHRINE BITARTRATE 3 MCG/KG/MIN: 1 INJECTION, SOLUTION, CONCENTRATE INTRAVENOUS at 08:12

## 2022-01-01 RX ADMIN — HYDROCORTISONE SODIUM SUCCINATE 50 MG: 100 INJECTION, POWDER, FOR SOLUTION INTRAMUSCULAR; INTRAVENOUS at 04:12

## 2022-01-01 RX ADMIN — LISINOPRIL 20 MG: 20 TABLET ORAL at 09:10

## 2022-01-01 RX ADMIN — METOPROLOL TARTRATE 100 MG: 50 TABLET, FILM COATED ORAL at 02:05

## 2022-01-01 RX ADMIN — MIDAZOLAM 4 MG: 1 INJECTION INTRAMUSCULAR; INTRAVENOUS at 11:12

## 2022-01-01 RX ADMIN — VASOPRESSIN 0.04 UNITS/MIN: 20 INJECTION INTRAVENOUS at 07:12

## 2022-01-01 RX ADMIN — FUROSEMIDE 80 MG: 10 INJECTION, SOLUTION INTRAMUSCULAR; INTRAVENOUS at 09:09

## 2022-01-01 RX ADMIN — ATORVASTATIN CALCIUM 80 MG: 40 TABLET, FILM COATED ORAL at 12:10

## 2022-01-01 RX ADMIN — OXYCODONE AND ACETAMINOPHEN 1 TABLET: 10; 325 TABLET ORAL at 07:07

## 2022-01-01 RX ADMIN — LISINOPRIL 10 MG: 10 TABLET ORAL at 02:08

## 2022-01-01 RX ADMIN — EPINEPHRINE 0.17 MCG/KG/MIN: 1 INJECTION, SOLUTION, CONCENTRATE INTRAVENOUS at 12:12

## 2022-01-01 RX ADMIN — DIGOXIN 500 MCG: 0.25 INJECTION INTRAMUSCULAR; INTRAVENOUS at 07:06

## 2022-01-01 RX ADMIN — SODIUM BICARBONATE 50 MEQ: 84 INJECTION, SOLUTION INTRAVENOUS at 06:12

## 2022-01-01 RX ADMIN — KETOROLAC TROMETHAMINE 30 MG: 30 INJECTION, SOLUTION INTRAMUSCULAR at 09:08

## 2022-01-01 RX ADMIN — OXYCODONE HYDROCHLORIDE AND ACETAMINOPHEN 1 TABLET: 5; 325 TABLET ORAL at 01:10

## 2022-01-01 RX ADMIN — LEVALBUTEROL HYDROCHLORIDE 1.25 MG: 1.25 SOLUTION, CONCENTRATE RESPIRATORY (INHALATION) at 11:05

## 2022-01-01 RX ADMIN — METOPROLOL SUCCINATE 100 MG: 50 TABLET, FILM COATED, EXTENDED RELEASE ORAL at 09:06

## 2022-01-01 RX ADMIN — HYDROCODONE BITARTRATE AND ACETAMINOPHEN 1 TABLET: 5; 325 TABLET ORAL at 05:11

## 2022-01-01 RX ADMIN — UMECLIDINIUM 62.5 MCG: 62.5 AEROSOL, POWDER ORAL at 09:07

## 2022-01-01 RX ADMIN — LEVALBUTEROL HYDROCHLORIDE 0.63 MG: 0.63 SOLUTION RESPIRATORY (INHALATION) at 08:09

## 2022-01-01 RX ADMIN — OXYCODONE HYDROCHLORIDE AND ACETAMINOPHEN 1 TABLET: 5; 325 TABLET ORAL at 02:10

## 2022-01-01 RX ADMIN — OXYCODONE HYDROCHLORIDE AND ACETAMINOPHEN 1 TABLET: 5; 325 TABLET ORAL at 09:10

## 2022-01-01 RX ADMIN — NOREPINEPHRINE BITARTRATE 2.8 MCG/KG/MIN: 1 INJECTION, SOLUTION, CONCENTRATE INTRAVENOUS at 05:12

## 2022-01-01 RX ADMIN — CARVEDILOL 3.12 MG: 3.12 TABLET, FILM COATED ORAL at 12:10

## 2022-01-01 RX ADMIN — RIVAROXABAN 20 MG: 10 TABLET, FILM COATED ORAL at 04:07

## 2022-01-01 RX ADMIN — METFORMIN HYDROCHLORIDE 500 MG: 500 TABLET, FILM COATED ORAL at 08:07

## 2022-01-01 RX ADMIN — FUROSEMIDE 40 MG: 40 TABLET ORAL at 09:08

## 2022-01-01 RX ADMIN — DEXMEDETOMIDINE HYDROCHLORIDE 0.4 MCG/KG/HR: 400 INJECTION, SOLUTION INTRAVENOUS at 10:12

## 2022-01-01 RX ADMIN — IPRATROPIUM BROMIDE AND ALBUTEROL SULFATE 3 ML: .5; 3 SOLUTION RESPIRATORY (INHALATION) at 03:12

## 2022-01-01 RX ADMIN — LEVALBUTEROL HYDROCHLORIDE 0.63 MG: 0.63 SOLUTION RESPIRATORY (INHALATION) at 01:10

## 2022-01-01 RX ADMIN — FUROSEMIDE 40 MG: 10 INJECTION INTRAMUSCULAR; INTRAVENOUS at 03:11

## 2022-01-01 RX ADMIN — METHYLPREDNISOLONE SODIUM SUCCINATE 125 MG: 125 INJECTION, POWDER, FOR SOLUTION INTRAMUSCULAR; INTRAVENOUS at 04:09

## 2022-01-01 RX ADMIN — IPRATROPIUM BROMIDE AND ALBUTEROL SULFATE 3 ML: 2.5; .5 SOLUTION RESPIRATORY (INHALATION) at 09:07

## 2022-01-01 RX ADMIN — LEVALBUTEROL HYDROCHLORIDE 0.63 MG: 0.63 SOLUTION RESPIRATORY (INHALATION) at 05:10

## 2022-01-01 RX ADMIN — ALBUTEROL SULFATE 2.5 MG: 2.5 SOLUTION RESPIRATORY (INHALATION) at 03:10

## 2022-01-01 RX ADMIN — LISINOPRIL 20 MG: 20 TABLET ORAL at 08:09

## 2022-01-01 RX ADMIN — RIVAROXABAN 20 MG: 10 TABLET, FILM COATED ORAL at 04:09

## 2022-01-01 RX ADMIN — FUROSEMIDE 80 MG: 10 INJECTION, SOLUTION INTRAMUSCULAR; INTRAVENOUS at 10:09

## 2022-01-01 RX ADMIN — POTASSIUM CHLORIDE 40 MEQ: 1500 TABLET, EXTENDED RELEASE ORAL at 11:06

## 2022-01-01 RX ADMIN — IPRATROPIUM BROMIDE AND ALBUTEROL SULFATE 3 ML: 2.5; .5 SOLUTION RESPIRATORY (INHALATION) at 10:07

## 2022-01-01 RX ADMIN — GABAPENTIN 300 MG: 300 CAPSULE ORAL at 02:05

## 2022-01-01 RX ADMIN — PHENYLEPHRINE HYDROCHLORIDE 2 MCG/KG/MIN: 10 INJECTION INTRAVENOUS at 09:12

## 2022-01-01 RX ADMIN — UMECLIDINIUM 62.5 MCG: 62.5 AEROSOL, POWDER ORAL at 11:05

## 2022-01-01 RX ADMIN — LISINOPRIL 20 MG: 10 TABLET ORAL at 11:05

## 2022-01-01 RX ADMIN — MAGNESIUM SULFATE HEPTAHYDRATE 2 G: 40 INJECTION, SOLUTION INTRAVENOUS at 02:10

## 2022-01-01 RX ADMIN — METFORMIN HYDROCHLORIDE 500 MG: 500 TABLET, FILM COATED ORAL at 11:05

## 2022-01-01 RX ADMIN — METHYLPREDNISOLONE SODIUM SUCCINATE 60 MG: 40 INJECTION, POWDER, FOR SOLUTION INTRAMUSCULAR; INTRAVENOUS at 11:09

## 2022-01-01 RX ADMIN — HYDROCODONE BITARTRATE AND ACETAMINOPHEN 1 TABLET: 5; 325 TABLET ORAL at 05:08

## 2022-01-01 RX ADMIN — LEVOFLOXACIN 750 MG: 5 INJECTION, SOLUTION INTRAVENOUS at 01:07

## 2022-01-01 RX ADMIN — METOPROLOL TARTRATE 5 MG: 5 INJECTION, SOLUTION INTRAVENOUS at 01:05

## 2022-01-01 RX ADMIN — IPRATROPIUM BROMIDE AND ALBUTEROL SULFATE 3 ML: 2.5; .5 SOLUTION RESPIRATORY (INHALATION) at 04:09

## 2022-01-01 RX ADMIN — LISINOPRIL 40 MG: 10 TABLET ORAL at 08:06

## 2022-01-01 RX ADMIN — SODIUM BICARBONATE 50 MEQ: 84 INJECTION INTRAVENOUS at 05:12

## 2022-01-01 RX ADMIN — CALCIUM GLUCONATE 1 G: 20 INJECTION, SOLUTION INTRAVENOUS at 05:12

## 2022-01-01 RX ADMIN — METOPROLOL SUCCINATE 100 MG: 50 TABLET, FILM COATED, EXTENDED RELEASE ORAL at 08:06

## 2022-01-01 RX ADMIN — IPRATROPIUM BROMIDE AND ALBUTEROL SULFATE 3 ML: 2.5; .5 SOLUTION RESPIRATORY (INHALATION) at 10:06

## 2022-01-01 RX ADMIN — FLUTICASONE FUROATE AND VILANTEROL TRIFENATATE 1 PUFF: 100; 25 POWDER RESPIRATORY (INHALATION) at 12:10

## 2022-01-01 RX ADMIN — NOREPINEPHRINE BITARTRATE 1.5 MCG/KG/MIN: 1 INJECTION, SOLUTION, CONCENTRATE INTRAVENOUS at 05:12

## 2022-01-01 RX ADMIN — GABAPENTIN 300 MG: 300 CAPSULE ORAL at 03:05

## 2022-01-01 RX ADMIN — IPRATROPIUM BROMIDE AND ALBUTEROL SULFATE 3 ML: .5; 3 SOLUTION RESPIRATORY (INHALATION) at 01:11

## 2022-01-01 RX ADMIN — DILTIAZEM HYDROCHLORIDE 360 MG: 240 CAPSULE, COATED, EXTENDED RELEASE ORAL at 02:10

## 2022-01-01 RX ADMIN — DIGOXIN 0.25 MG: 250 TABLET ORAL at 08:08

## 2022-01-01 RX ADMIN — NOREPINEPHRINE BITARTRATE 1.76 MCG/KG/MIN: 1 INJECTION, SOLUTION, CONCENTRATE INTRAVENOUS at 08:12

## 2022-01-01 RX ADMIN — NOREPINEPHRINE BITARTRATE 2.3 MCG/KG/MIN: 1 INJECTION, SOLUTION, CONCENTRATE INTRAVENOUS at 09:12

## 2022-01-01 RX ADMIN — NOREPINEPHRINE BITARTRATE 2.6 MCG/KG/MIN: 1 INJECTION, SOLUTION, CONCENTRATE INTRAVENOUS at 12:12

## 2022-01-01 RX ADMIN — LISINOPRIL 10 MG: 10 TABLET ORAL at 08:08

## 2022-01-01 RX ADMIN — OXYCODONE AND ACETAMINOPHEN 1 TABLET: 10; 325 TABLET ORAL at 02:09

## 2022-01-01 RX ADMIN — PANTOPRAZOLE SODIUM 40 MG: 40 TABLET, DELAYED RELEASE ORAL at 09:10

## 2022-01-01 RX ADMIN — AMIODARONE HYDROCHLORIDE 150 MG: 1.5 INJECTION, SOLUTION INTRAVENOUS at 12:05

## 2022-01-01 RX ADMIN — INSULIN ASPART 2 UNITS: 100 INJECTION, SOLUTION INTRAVENOUS; SUBCUTANEOUS at 06:09

## 2022-01-01 RX ADMIN — NITROGLYCERIN 1 INCH: 20 OINTMENT TOPICAL at 01:08

## 2022-01-01 RX ADMIN — LISINOPRIL 20 MG: 20 TABLET ORAL at 09:09

## 2022-01-01 RX ADMIN — HEPARIN SODIUM 18 UNITS/KG/HR: 10000 INJECTION, SOLUTION INTRAVENOUS at 09:12

## 2022-01-01 RX ADMIN — DILTIAZEM HYDROCHLORIDE 360 MG: 180 CAPSULE, COATED, EXTENDED RELEASE ORAL at 08:09

## 2022-01-01 RX ADMIN — NOREPINEPHRINE BITARTRATE 0.1 MCG/KG/MIN: 1 INJECTION, SOLUTION, CONCENTRATE INTRAVENOUS at 03:12

## 2022-01-01 RX ADMIN — HUMAN INSULIN 10 UNITS: 100 INJECTION, SOLUTION SUBCUTANEOUS at 05:12

## 2022-01-01 RX ADMIN — NOREPINEPHRINE BITARTRATE 2.6 MCG/KG/MIN: 1 INJECTION, SOLUTION, CONCENTRATE INTRAVENOUS at 11:12

## 2022-01-01 RX ADMIN — INSULIN ASPART 10 UNITS: 100 INJECTION, SOLUTION INTRAVENOUS; SUBCUTANEOUS at 09:12

## 2022-01-01 RX ADMIN — AMIODARONE HYDROCHLORIDE 0.5 MG/MIN: 1.8 INJECTION, SOLUTION INTRAVENOUS at 07:05

## 2022-01-01 RX ADMIN — FLUTICASONE FUROATE AND VILANTEROL TRIFENATATE 1 PUFF: 100; 25 POWDER RESPIRATORY (INHALATION) at 09:10

## 2022-01-01 RX ADMIN — AMLODIPINE BESYLATE 5 MG: 5 TABLET ORAL at 02:06

## 2022-01-01 RX ADMIN — DILTIAZEM HYDROCHLORIDE 360 MG: 180 CAPSULE, COATED, EXTENDED RELEASE ORAL at 09:09

## 2022-01-01 RX ADMIN — FUROSEMIDE 60 MG: 10 INJECTION, SOLUTION INTRAMUSCULAR; INTRAVENOUS at 02:06

## 2022-01-01 RX ADMIN — PHENYLEPHRINE HYDROCHLORIDE 0.5 MCG/KG/MIN: 10 INJECTION INTRAVENOUS at 05:12

## 2022-01-01 RX ADMIN — HYDROCODONE BITARTRATE AND ACETAMINOPHEN 1 TABLET: 5; 325 TABLET ORAL at 12:08

## 2022-01-01 RX ADMIN — PHENYLEPHRINE HYDROCHLORIDE 0.5 MCG/KG/MIN: 10 INJECTION INTRAVENOUS at 02:12

## 2022-01-01 RX ADMIN — GUAIFENESIN 200 MG: 200 SOLUTION ORAL at 12:05

## 2022-01-01 RX ADMIN — RIVAROXABAN 20 MG: 10 TABLET, FILM COATED ORAL at 09:08

## 2022-01-01 RX ADMIN — FUROSEMIDE 40 MG: 10 INJECTION, SOLUTION INTRAVENOUS at 11:10

## 2022-01-01 RX ADMIN — EPINEPHRINE 1 MCG/KG/MIN: 1 INJECTION, SOLUTION, CONCENTRATE INTRAVENOUS at 12:12

## 2022-01-01 RX ADMIN — Medication 125 MG: at 10:07

## 2022-01-01 RX ADMIN — METHYLPREDNISOLONE SODIUM SUCCINATE 125 MG: 125 INJECTION, POWDER, FOR SOLUTION INTRAMUSCULAR; INTRAVENOUS at 01:05

## 2022-01-01 RX ADMIN — ATORVASTATIN CALCIUM 80 MG: 40 TABLET, FILM COATED ORAL at 08:07

## 2022-01-01 RX ADMIN — HUMAN ALBUMIN MICROSPHERES AND PERFLUTREN 0.66 MG: 10; .22 INJECTION, SOLUTION INTRAVENOUS at 09:12

## 2022-01-01 RX ADMIN — INSULIN ASPART 1 UNITS: 100 INJECTION, SOLUTION INTRAVENOUS; SUBCUTANEOUS at 08:09

## 2022-01-01 RX ADMIN — DIGOXIN 0.25 MG: 250 TABLET ORAL at 06:09

## 2022-01-01 RX ADMIN — METHYLPREDNISOLONE SODIUM SUCCINATE 80 MG: 40 INJECTION, POWDER, FOR SOLUTION INTRAMUSCULAR; INTRAVENOUS at 12:07

## 2022-01-01 RX ADMIN — CALCIUM GLUCONATE 1 G: 20 INJECTION, SOLUTION INTRAVENOUS at 06:12

## 2022-01-01 RX ADMIN — DIGOXIN 250 MCG: 0.25 INJECTION INTRAMUSCULAR; INTRAVENOUS at 08:10

## 2022-01-01 RX ADMIN — ASPIRIN 325 MG ORAL TABLET 325 MG: 325 PILL ORAL at 11:06

## 2022-01-01 RX ADMIN — DIGOXIN 250 MCG: 0.25 INJECTION INTRAMUSCULAR; INTRAVENOUS at 04:10

## 2022-01-01 RX ADMIN — ATORVASTATIN CALCIUM 80 MG: 40 TABLET, FILM COATED ORAL at 09:06

## 2022-01-01 RX ADMIN — FLUTICASONE FUROATE AND VILANTEROL TRIFENATATE 1 PUFF: 100; 25 POWDER RESPIRATORY (INHALATION) at 09:05

## 2022-01-01 RX ADMIN — DEXMEDETOMIDINE HYDROCHLORIDE 0.4 MCG/KG/HR: 4 INJECTION, SOLUTION INTRAVENOUS at 10:12

## 2022-01-01 RX ADMIN — EPINEPHRINE 0.4 MG: 1 INJECTION, SOLUTION, CONCENTRATE INTRAVENOUS at 10:12

## 2022-01-01 RX ADMIN — SODIUM ZIRCONIUM CYCLOSILICATE 10 G: 10 POWDER, FOR SUSPENSION ORAL at 08:12

## 2022-01-01 RX ADMIN — METHYLPREDNISOLONE SODIUM SUCCINATE 60 MG: 40 INJECTION, POWDER, FOR SOLUTION INTRAMUSCULAR; INTRAVENOUS at 09:09

## 2022-01-01 RX ADMIN — SPIRONOLACTONE 25 MG: 25 TABLET ORAL at 11:05

## 2022-01-01 RX ADMIN — FUROSEMIDE 40 MG: 10 INJECTION INTRAMUSCULAR; INTRAVENOUS at 05:06

## 2022-01-01 RX ADMIN — METHYLPREDNISOLONE SODIUM SUCCINATE 125 MG: 125 INJECTION, POWDER, FOR SOLUTION INTRAMUSCULAR; INTRAVENOUS at 06:05

## 2022-01-01 RX ADMIN — EPINEPHRINE 0.7 MCG/KG/MIN: 1 INJECTION, SOLUTION, CONCENTRATE INTRAVENOUS at 11:12

## 2022-01-01 RX ADMIN — HEPARIN SODIUM 18 UNITS/KG/HR: 10000 INJECTION, SOLUTION INTRAVENOUS at 04:12

## 2022-01-01 RX ADMIN — PANTOPRAZOLE SODIUM 40 MG: 40 TABLET, DELAYED RELEASE ORAL at 08:11

## 2022-01-01 RX ADMIN — LISINOPRIL 40 MG: 10 TABLET ORAL at 09:06

## 2022-01-01 RX ADMIN — EPINEPHRINE 0.75 MCG/KG/MIN: 1 INJECTION INTRAMUSCULAR; INTRAVENOUS; SUBCUTANEOUS at 05:12

## 2022-01-01 RX ADMIN — DIGOXIN 250 MCG: 0.25 INJECTION INTRAMUSCULAR; INTRAVENOUS at 02:06

## 2022-01-01 RX ADMIN — SPIRONOLACTONE 25 MG: 25 TABLET, FILM COATED ORAL at 02:06

## 2022-01-01 RX ADMIN — ATORVASTATIN CALCIUM 80 MG: 40 TABLET, FILM COATED ORAL at 11:05

## 2022-01-01 RX ADMIN — METFORMIN HYDROCHLORIDE 500 MG: 500 TABLET, FILM COATED ORAL at 08:05

## 2022-01-01 RX ADMIN — HUMAN INSULIN 10 UNITS: 100 INJECTION, SOLUTION SUBCUTANEOUS at 09:12

## 2022-01-01 RX ADMIN — LISINOPRIL 20 MG: 10 TABLET ORAL at 02:11

## 2022-01-01 RX ADMIN — HYDROCODONE BITARTRATE AND ACETAMINOPHEN 1 TABLET: 5; 325 TABLET ORAL at 11:11

## 2022-01-01 RX ADMIN — DILTIAZEM HYDROCHLORIDE 10 MG: 5 INJECTION INTRAVENOUS at 01:08

## 2022-01-01 RX ADMIN — INSULIN ASPART 10 UNITS: 100 INJECTION, SOLUTION INTRAVENOUS; SUBCUTANEOUS at 05:12

## 2022-01-01 RX ADMIN — NOREPINEPHRINE BITARTRATE 3 MCG/KG/MIN: 1 INJECTION, SOLUTION, CONCENTRATE INTRAVENOUS at 01:12

## 2022-01-01 RX ADMIN — FUROSEMIDE 80 MG: 10 INJECTION, SOLUTION INTRAMUSCULAR; INTRAVENOUS at 01:09

## 2022-01-01 RX ADMIN — EPINEPHRINE 1.8 MCG/KG/MIN: 1 INJECTION INTRAMUSCULAR; INTRAVENOUS; SUBCUTANEOUS at 11:12

## 2022-01-01 RX ADMIN — DILTIAZEM HYDROCHLORIDE 480 MG: 240 CAPSULE, COATED, EXTENDED RELEASE ORAL at 08:11

## 2022-01-01 RX ADMIN — METHYLPREDNISOLONE SODIUM SUCCINATE 80 MG: 40 INJECTION, POWDER, FOR SOLUTION INTRAMUSCULAR; INTRAVENOUS at 01:07

## 2022-01-01 RX ADMIN — ASPIRIN 325 MG: 325 TABLET, COATED ORAL at 01:08

## 2022-01-01 RX ADMIN — OXYCODONE AND ACETAMINOPHEN 1 TABLET: 10; 325 TABLET ORAL at 09:09

## 2022-01-01 RX ADMIN — FUROSEMIDE 40 MG: 10 INJECTION, SOLUTION INTRAMUSCULAR; INTRAVENOUS at 08:09

## 2022-01-01 RX ADMIN — SODIUM CHLORIDE 1000 ML: 9 INJECTION, SOLUTION INTRAVENOUS at 04:12

## 2022-01-01 RX ADMIN — HYDRALAZINE HYDROCHLORIDE 10 MG: 20 INJECTION INTRAMUSCULAR; INTRAVENOUS at 08:05

## 2022-01-01 RX ADMIN — EPINEPHRINE 0.8 MCG/KG/MIN: 1 INJECTION, SOLUTION, CONCENTRATE INTRAVENOUS at 11:12

## 2022-04-29 NOTE — ED PROVIDER NOTES
Patient:   Vladimir Bernal             MRN: [id number]            FIN: [id number]               Age:   49 years     Sex:  Male     :  1970   Associated Diagnoses:   COPD exacerbation; On home O2; Hypoxemia; Hypercapnia; Chest pain   Author:   Epi Peng MD      Basic Information   Time seen: Date & time 2019 16:58:00.   History source: Patient.   Arrival mode: Private vehicle.   History limitation: None.   Additional information: Chief Complaint from Nursing Triage Note : Chief Complaint   2019 16:51 CDT      Chief Complaint           c/o shortness of breath, discharge from [facility] today with CHF, oxygen saturation 82%, oxygen applied NC 92%  .   Provider/Visit info:   Time Seen:  Epi Peng MD / 2019 16:57  .   History of Present Illness   The patient presents with difficulty breathing.  The onset was chronic.  The course/duration of symptoms is worsening.  Degree at onset severe.  Degree at present severe.  The Exacerbating factors is exertion.  There are Relieving factorss including oxygen and beta-agonist.  Risk factors consist of congestive heart failure, chronic obstructive pulmonary disease and obesity.  Prior episodes: rare.  Therapy today: none.  Associated symptoms: chest pain and cough.  Additional history: recent discharge from [facility] for COPD exacerbation, obesity hypoventilation symdrome.  patient reports that he is on home o2, 2L, recently moved to [place], does not have oxygen here, states he will be here in [place] at least 7 months. .        Review of Systems   Constitutional symptoms:  No fever, no chills, no sweats.    Skin symptoms:  No rash,    Eye symptoms:  No recent vision problems,    ENMT symptoms:  No sore throat,    Respiratory symptoms:  Negative except as documented in HPI.   Cardiovascular symptoms:  Negative except as documented in HPI.   Gastrointestinal symptoms:  No abdominal pain, no nausea, no vomiting.    Genitourinary symptoms:  No  dysuria,    Musculoskeletal symptoms:  No back pain, no Muscle pain.    Neurologic symptoms:  No headache, no dizziness.              Additional review of systems information: All other systems reviewed and otherwise negative.      Health Status   Allergies:    Allergic Reactions (Selected)  No Known Medication Allergies,    Allergies (1) Active Reaction  No Known Medication Allergies None Documented  .   Medications:  (Selected)   Inpatient Medications  Ordered  Augmentin 875 mg-125 mg oral tablet: 875 mg, form: Tab, Oral, q12hr, first dose 09/21/19 10:00:00 CDT  Dextrose 10% in Water intravenous solution: 125 mL, IV, PRN blood glucose, start date 09/21/19 5:17:00 CDT  Dextrose 10% in Water intravenous solution: 125 mL, IV, PRN blood glucose, start date 09/21/19 5:17:00 CDT  Dextrose 10% in Water intravenous solution: 125 mL, IV, PRN blood glucose, start date 09/21/19 5:17:00 CDT  Dextrose 10% in Water intravenous solution: 250 mL, IV, PRN blood glucose, start date 09/21/19 5:17:00 CDT  DuoNeb: 3 mL, form: Soln-Inh, NEB, QID Resp, first dose 09/21/19 5:13:00 CDT  Lovenox 40 mg/0.4 mL subcutaneous solution: 40 mg, form: Injection, Subcutaneous, Daily, first dose 09/21/19 5:12:00 CDT, STAT  Norco 5 mg-325 mg oral tablet: 1 tab(s), form: Tab, Oral, q6hr PRN for pain, first dose 09/21/19 15:09:00 CDT, Waste Code CLARISA  Normal Saline (0.9% NS) IV 1,000 mL: 1,000 mL, 1,000 mL, IV, 100 mL/hr, start date 09/22/19 11:35:00 CDT, 2.48, m2  Pantoprazole 40 mg ORAL EC-Tablet: 40 mg, form: Tab-EC, Oral, Daily, first dose 09/22/19 6:00:00 CDT  Zestril 10 mg oral tablet: 40 mg, form: Tab, Oral, Daily, first dose 09/22/19 9:00:00 CDT  Zofran: 4 mg, form: Injection, IV Push, q4hr PRN for nausea, first dose 09/21/19 5:13:00 CDT, STAT  acetaminophen: 1,000 mg, form: Tab, Oral, q6hr PRN for pain, mild, first dose 09/21/19 5:13:00 CDT, STAT  acetaminophen: 650 mg, form: Liquid, Oral, q6hr PRN for fever, first dose 09/21/19 5:13:00 CDT,  STAT,  > 38.1 degrees Celsius (100.6 degrees Fahrenheit)  albuterol: 3 mL, 2.5 mg =, form: Soln-Inh, NEB, q4hr PRN for wheezing, first dose 09/21/19 5:13:00 CDT  glucagon: 1 mg, form: Injection, IM, q10min PRN for blood glucose, first dose 09/21/19 5:13:00 CDT, STAT, Conscious Patient with NO IV access available and BG < 45 mg/dl.  glucagon: 1 mg, form: Injection, IM, q10min PRN for blood glucose, first dose 09/21/19 5:13:00 CDT, STAT, Unconscious patient: Patient with NO IV access available and BG < 70 mg/dl.  hydrochlorothiazide 25 mg oral tablet: 25 mg, form: Tab, Oral, Daily, first dose 09/22/19 9:00:00 CDT  insulin lispro: 2-14 units, form: Soln, Subcutaneous, As Directed PRN for blood glucose, first dose 09/21/19 5:13:00 CDT  prednisONE 20 mg oral tablet: 40 mg, form: Tab, Oral, Daily, first dose 09/22/19 9:00:00 CDT  Prescriptions  Prescribed  Augmentin 875 mg-125 mg oral tablet: = 1 tab(s), Oral, q12hr, X 7 day(s), # 14 tab(s), 0 Refill(s)  lisinopril 10 mg oral tablet: 40 mg = 4 tab(s), Oral, Daily, # 120 tab(s), 0 Refill(s)  Documented Medications  Documented  Pantoprazole 40 mg ORAL EC-Tablet: 40 mg = 1 tab(s), Oral, Daily.      Past Medical/ Family/ Social History   Medical history:    No active or resolved past medical history items have been selected or recorded..   Surgical history:    denies..   Family history:    No family history items have been selected or recorded..   Social history:    Social & Psychosocial Habits    Alcohol  09/15/2019  Use: Current    Substance Use  09/23/2019  Use: Never    Tobacco  09/23/2019  Use: Never (less than 100 in l    Patient Wants Consult For Cessation Counseling N/A    Abuse/Neglect  09/15/2019  SHX Any signs of abuse or neglect No    09/23/2019  SHX Any signs of abuse or neglect No  .   Problem list:    Active Problems (1)  Morbid obesity   .      Physical Examination               Vital Signs      Vital Signs (last 24 hrs)_____  Last Charted___________  Temp  Oral     36.6 DegC  (SEP 23 16:51)  Heart Rate Peripheral   H 117bpm  (SEP 23 16:51)  Resp Rate         H 26br/min  (SEP 23 16:51)  SBP      H 148mmHg  (SEP 23 16:51)  DBP      H 93mmHg  (SEP 23 16:51)  SpO2      L 82%  (SEP 23 16:51)  Weight      157.25 kg  (SEP 23 16:51)  Height      180 cm  (SEP 23 16:51)  BMI      48.53  (SEP 23 16:51)  .   General:  Alert, no acute distress.    Skin:  Warm, dry.    Head:  Normocephalic.   Neck:  Supple, trachea midline.    Eye:  Pupils are equal, round and reactive to light, extraocular movements are intact.    Ears, nose, mouth and throat:  Oral mucosa moist.   Cardiovascular:  Regular rate and rhythm, No murmur, Normal peripheral perfusion, No edema.    Respiratory:  Lungs are clear to auscultation, respirations are non-labored, breath sounds are equal, Symmetrical chest wall expansion.    Gastrointestinal:  Soft, Nontender, Non distended, Normal bowel sounds, No organomegaly.    Back:  Nontender, Normal range of motion.    Musculoskeletal:  No swelling, no deformity.    Neurological:  Alert and oriented to person, place, time, and situation, normal motor observed, normal speech observed.    Psychiatric:  Cooperative.      Medical Decision Making   Documents reviewed:  Emergency department nurses' notes.   Electrocardiogram:  Time 9/23/2019 16:54:00, rate 111, No ST-T changes, no ectopy, EP Interp, The Rhythm is sinus tachycardia.  , QRS interval Right bundle branch block.    Results review:  Lab results : Lab View   9/23/2019 17:18 CDT      Sample ABG                arterial                             Treatment                 room air                             Site                      Radial Lt                             pH Art                    7.35                             pCO2 Art                  58.0 mmHg  CRIT                             pO2 Art                   68.0 mmHg  LOW                             HCO3 Art                  32.0 mmol/L  HI                              CO2 Totl Art              33.8 mmol/L  HI                             O2 Sat Art                94.5 %                             D base                    4.6  HI                             THB ABG                   14.7 gm/dL                             CO Hgb                    1.4 %  NA                             Met Hgb Art               0.6 %                             O2 Hgb                    92.6 %  LOW                             Allens                    Positive    9/23/2019 17:08 CDT      Sodium Lvl                139 mmol/L                             Potassium Lvl             4.5 mmol/L                             Chloride                  103 mmol/L                             CO2                       32 mmol/L                             Calcium Lvl               8.8 mg/dL                             Glucose Lvl               113 mg/dL  HI                             BUN                       24 mg/dL  HI                             Creatinine                1.10 mg/dL                             eGFR-AA                   92 mL/min                             eGFR-ORLY                  76 mL/min  LOW                             Bili Total                0.3 mg/dL                             Bili Direct               0.1 mg/dL                             Bili Indirect             0.2 mg/dL                             AST                       21 unit/L                             ALT                       30 unit/L                             Alk Phos                  64 unit/L                             Total Protein             7.5 gm/dL                             Albumin Lvl               3.5 gm/dL                             Globulin                  4.00 gm/mL  HI                             A/G Ratio                 0.9 ratio  LOW                             Total CK                  68 unit/L                             CK MB                     <1.0 ng/mL                              Troponin-I                <0.015 ng/mL                             WBC                       8.6 x10(3)/mcL                             RBC                       4.89 x10(6)/mcL                             Hgb                       14.6 gm/dL                             Hct                       46.0 %                             Platelet                  336 x10(3)/mcL                             MCV                       94.1 fL                             MCH                       29.9 pg                             MCHC                      31.7 gm/dL                             RDW                       15.6 %  HI                             MPV                       9.3 fL                             Abs Neut                  6.70 x10(3)/mcL                             Neutro Auto               78 %  NA                             Lymph Auto                14 %  NA                             Mono Auto                 6 %  NA                             Basophil Auto             0 %  NA                             Abs Neutro                6.70 x10(3)/mcL                             Abs Lymph                 1.2 x10(3)/mcL                             Abs Mono                  0.6 x10(3)/mcL                             Abs Baso                  0.0 x10(3)/mcL                             IG%                       1 %  NA                             IG#                       0.1000  NA    9/23/2019 5:52 CDT       Sodium Lvl                140 mmol/L                             Potassium Lvl             3.9 mmol/L                             Chloride                  103 mmol/L                             CO2                       31.0 mmol/L                             Calcium Lvl               8.5 mg/dL                             Glucose Lvl               81 mg/dL                             BUN                       24.0 mg/dL  HI                             Creatinine                0.99 mg/dL                              BUN/Creat Ratio           24.2  NA                             eGFR-AA                   >60 mL/min/1.73 m2  NA                             eGFR-ORLY                  >60 mL/min/1.73 m2  NA                             WBC                       9.3 x10(3)/mcL                             RBC                       4.19 x10(6)/mcL  LOW                             Hgb                       12.4 gm/dL  LOW                             Hct                       39.4 %  LOW                             Platelet                  235 x10(3)/mcL                             MCV                       94.0 fL                             MCH                       29.6 pg                             MCHC                      31.5 gm/dL  LOW                             RDW                       15.4 %                             MPV                       9.4 fL    9/22/2019 5:30 CDT       Sodium Lvl                140 mmol/L                             Potassium Lvl             4.0 mmol/L                             Chloride                  102 mmol/L                             CO2                       31.0 mmol/L                             Calcium Lvl               8.4 mg/dL  LOW                             Glucose Lvl               102 mg/dL                             BUN                       29.0 mg/dL  HI                             Creatinine                1.22 mg/dL                             BUN/Creat Ratio           23.8  NA                             eGFR-AA                   >60 mL/min/1.73 m2  NA                             eGFR-ORLY                  >60 mL/min/1.73 m2  NA                             WBC                       13.5 x10(3)/mcL  HI                             RBC                       4.34 x10(6)/mcL  LOW                             Hgb                       12.7 gm/dL  LOW                             Hct                       40.7 %  LOW                             Platelet                   258 x10(3)/mcL                             MCV                       93.8 fL                             MCH                       29.3 pg                             MCHC                      31.2 gm/dL  LOW                             RDW                       15.2 %                             MPV                       9.4 fL                             Abs Neut                  11.15 x10(3)/mcL  HI                             Neutro Auto               82 %  NA                             Lymph Auto                9 %  NA                             Mono Auto                 7 %  NA                             Eos Auto                  0 %  NA                             Abs Eos                   0.0 x10(3)/mcL                             Basophil Auto             0 %  NA                             Abs Neutro                11.15 x10(3)/mcL  HI                             Abs Lymph                 1.2 x10(3)/mcL                             Abs Mono                  1.0 x10(3)/mcL                             Abs Baso                  0.0 x10(3)/mcL  .   Radiology results:  Rad Results (ST)  < 12 hrs   Accession: [id number]  Order: XR Chest 2 Views  Report Dt/Tm: 09/23/2019 18:30  Report:   XR Chest 2 Views     REASON FOR STUDY: Shortness of Breath     TECHNIQUE: Frontal and lateral radiographs of the chest     COMPARISON: 9/21/2019     FINDINGS: Cardiac silhouette and mediastinal contours are within  normal limits. The lungs are well-inflated. No consolidation  identified. There is no pleural effusion or discernible pneumothorax.          IMPRESSION:      No acute cardiac pulmonary process.          .      Reexamination/ Reevaluation   Vital signs   results included from flowsheet : Vital Signs   9/23/2019 18:30 CDT      Peripheral Pulse Rate     91 bpm                             Heart Rate Monitored      91 bpm                             Respiratory Rate          12 br/min                              SpO2                      93 %  LOW                             Saturation Probe Site     Hand, left                             Oxygen Therapy            Nasal cannula                             Oxygen Flow Rate          3 L/min                             Systolic Blood Pressure   116 mmHg                             Diastolic Blood Pressure  74 mmHg                             Mean Arterial Pressure, Cuff              88 mmHg    9/23/2019 18:00 CDT      Peripheral Pulse Rate     80 bpm                             Heart Rate Monitored      81 bpm                             Respiratory Rate          18 br/min                             SpO2                      95 %                             Oxygen Therapy            Nasal cannula                             Oxygen Therapy            Room air                             Oxygen Flow Rate          3 L/min    9/23/2019 17:49 CDT      Peripheral Pulse Rate     86 bpm                             Heart Rate Monitored      86 bpm                             Respiratory Rate          20 br/min                             SpO2                      96 %                             Oxygen Therapy            Nasal cannula                             Oxygen Flow Rate          3 L/min                             Systolic Blood Pressure   137 mmHg                             Diastolic Blood Pressure  85 mmHg                             Mean Arterial Pressure, Cuff              102 mmHg    9/23/2019 17:34 CDT      Peripheral Pulse Rate     86 bpm                             Heart Rate Monitored      86 bpm                             Respiratory Rate          25 br/min  HI                             SpO2                      93 %  LOW    9/23/2019 17:16 CDT      Peripheral Pulse Rate     90 bpm                             Respiratory Rate          25 br/min  HI                             SpO2                      93 %  LOW                             Oxygen Therapy             Room air, All-Purpose nebulizer    9/23/2019 17:03 CDT      Peripheral Pulse Rate     100 bpm                             Heart Rate Monitored      100 bpm                             Respiratory Rate          25 br/min  HI                             SpO2                      95 %    9/23/2019 17:02 CDT      Peripheral Pulse Rate     100 bpm                             Respiratory Rate          27 br/min  HI                             SpO2                      93 %  LOW                             Oxygen Therapy            Nasal cannula                             Oxygen Flow Rate          3 L/min                             Systolic Blood Pressure   139 mmHg                             Diastolic Blood Pressure  105 mmHg  HI                             Mean Arterial Pressure, Cuff              116 mmHg    9/23/2019 17:00 CDT      Heart Rate Monitored      100 bpm                             Oxygen Therapy            Nasal cannula                             Oxygen Flow Rate          3 L/min    9/23/2019 16:58 CDT      SpO2                      92 %  LOW    9/23/2019 16:51 CDT      Temperature Oral          36.6 DegC                             Temperature Oral (calculated)             97.88 DegF                             Peripheral Pulse Rate     117 bpm  HI                             Respiratory Rate          26 br/min  HI                             SpO2                      82 %  LOW                             Oxygen Therapy            Room air                             Systolic Blood Pressure   148 mmHg  HI                             Diastolic Blood Pressure  93 mmHg  HI    9/23/2019 8:19 CDT       Oxygen Therapy            Room air    9/23/2019 8:12 CDT       Peripheral Pulse Rate     58 bpm  LOW    9/23/2019 8:03 CDT       Peripheral Pulse Rate     60 bpm                             Respiratory Rate          18 br/min                             SpO2                      98 %                              Oxygen Therapy            Room air    9/23/2019 8:00 CDT       Heart Rate Monitored      66 bpm    9/23/2019 7:56 CDT       Temperature Oral          36.5 DegC                             Temperature Oral (calculated)             97.70 DegF                             Peripheral Pulse Rate     57 bpm  LOW                             SpO2                      98 %                             Systolic Blood Pressure   149 mmHg  HI                             Diastolic Blood Pressure  95 mmHg  HI                             Mean Arterial Pressure, Cuff              113 mmHg    9/23/2019 4:55 CDT       Peripheral Pulse Rate     67 bpm                             Systolic Blood Pressure   157 mmHg  HI                             Diastolic Blood Pressure  93 mmHg  HI                             Mean Arterial Pressure, Cuff              114 mmHg    9/23/2019 4:53 CDT       Temperature Oral          36.4 DegC                             Temperature Oral (calculated)             97.52 DegF                             Peripheral Pulse Rate     64 bpm                             SpO2                      99 %                             Systolic Blood Pressure   162 mmHg  HI                             Diastolic Blood Pressure  101 mmHg  HI                             Mean Arterial Pressure, Cuff              122 mmHg    9/23/2019 4:00 CDT       Heart Rate Monitored      65 bpm    9/23/2019 0:08 CDT       Temperature Oral          36.5 DegC                             Temperature Oral (calculated)             97.70 DegF                             Peripheral Pulse Rate     67 bpm                             SpO2                      99 %                             Systolic Blood Pressure   159 mmHg  HI                             Diastolic Blood Pressure  92 mmHg  HI                             Mean Arterial Pressure, Cuff              114 mmHg    9/23/2019 0:00 CDT       Heart Rate Monitored      74 bpm    9/22/2019  21:10 CDT      Respiratory Rate          72 br/min  HI    9/22/2019 21:03 CDT      Respiratory Rate          69 br/min  HI                             SpO2                      99 %                             Oxygen Therapy            Nasal cannula                             Oxygen Flow Rate          2 L/min    9/22/2019 20:00 CDT      Heart Rate Monitored      85 bpm                             Oxygen Therapy            Nasal cannula                             Oxygen Flow Rate          2 L/min    9/22/2019 17:09 CDT      Heart Rate Monitored      66 bpm                             Respiratory Rate          20 br/min    9/22/2019 17:01 CDT      Respiratory Rate          20 br/min                             Oxygen Therapy            Nasal cannula                             Oxygen Flow Rate          2 L/min    9/22/2019 16:00 CDT      Heart Rate Monitored      65 bpm    9/22/2019 15:37 CDT      Temperature Oral          36.4 DegC                             Temperature Oral (calculated)             97.52 DegF                             Peripheral Pulse Rate     71 bpm                             Respiratory Rate          20 br/min                             SpO2                      96 %                             Systolic Blood Pressure   135 mmHg                             Diastolic Blood Pressure  76 mmHg                             Mean Arterial Pressure, Cuff              96 mmHg    9/22/2019 11:34 CDT      Heart Rate Monitored      72 bpm                             Respiratory Rate          18 br/min                             SpO2                      98 %                             Oxygen Therapy            Nasal cannula                             Oxygen Flow Rate          2 L/min    9/22/2019 11:00 CDT      Temperature Oral          37.0 DegC                             Temperature Oral (calculated)             98.60 DegF                             Systolic Blood Pressure   124 mmHg                              Diastolic Blood Pressure  80 mmHg    9/22/2019 8:00 CDT       Heart Rate Monitored      83 bpm                             Oxygen Therapy            Nasal cannula                             Oxygen Flow Rate          2 L/min    9/22/2019 7:31 CDT       Temperature Oral          36.4 DegC                             Temperature Oral (calculated)             97.52 DegF                             Peripheral Pulse Rate     61 bpm                             SpO2                      97 %                             Systolic Blood Pressure   137 mmHg                             Diastolic Blood Pressure  83 mmHg                             Mean Arterial Pressure, Cuff              101 mmHg    9/22/2019 7:22 CDT       Heart Rate Monitored      70 bpm                             Respiratory Rate          18 br/min    9/22/2019 7:15 CDT       Heart Rate Monitored      68 bpm                             Respiratory Rate          18 br/min                             SpO2                      98 %                             Oxygen Therapy            Nasal cannula                             Oxygen Flow Rate          2 L/min    9/22/2019 5:00 CDT       Oxygen Therapy            Nasal cannula                             Oxygen Flow Rate          2 L/min    9/22/2019 4:56 CDT       Temperature Oral          36.5 DegC                             Temperature Oral (calculated)             97.70 DegF                             Peripheral Pulse Rate     64 bpm                             SpO2                      98 %                             Systolic Blood Pressure   127 mmHg                             Diastolic Blood Pressure  80 mmHg                             Mean Arterial Pressure, Cuff              96 mmHg    9/22/2019 4:00 CDT       Heart Rate Monitored      64 bpm    9/22/2019 0:17 CDT       FIO2                      35 %    9/22/2019 0:00 CDT       Heart Rate Monitored      75 bpm                              Oxygen Therapy            BiPAP        Impression and Plan   Diagnosis   COPD exacerbation (ZBK66-ZK J44.1)   On home O2 (GAW29-MA Z99.81)   Hypoxemia (DBJ68-PQ R09.02)   Hypercapnia (EPK77-VK R06.89)   Chest pain (QWK52-XC R07.9)      Calls-Consults   -  IM, consult.    Plan   Condition: Guarded.    Disposition: Admit time  9/23/2019 18:54:00, Admit to Inpatient Telemetry Unit.

## 2022-04-29 NOTE — DISCHARGE SUMMARY
Patient:   Vladimir Bernal             MRN: [id number]            FIN: [id number]               Age:   49 years     Sex:  Male     :  1970   Associated Diagnoses:   None   Author:   Houston Borrego MD      Admit Date: 2019  Discharge Date: 2019      PHYSICIANS  Attending Physician - Sergo Lee MD  Admitting Physician - Sergo Lee MD  Primary Care Physician - No PCP, No      DISCHARGE DIAGNOSIS  VY/IVVD  Chronic hypoxic and hypercapnic respiratory failure  COPD  Obesity hypoventilation syndrome  KRISHNA  Morbid Obesity  Noncompliance with medical therapy      PROCEDURES  No procedures recorded for this visit.      HOSPITAL COURSE  49 year old AA male with a history of HTN, DM2, COPD. He was discharged from [facility] on 19 and presented to the ED early this morning with a complaint of SOB and CP.  He reports that his SOB began when he got out of the car and walked to the house, he is unsure how far it was. His SOB was associated with left sided anterior chest wall pain, that is non-radiating and is made worse with inspiration and palpation. He is here from [place] visiting family locally.  Both his PCP and cardiologist are both in [place].  He is on chronic oxygen therapy and CPAP therapy at night, and denies having either of them here with him. At the time of exam he is sleeping, easily arousable. Reports he is very tired and will only answer some questions. He was admitted to hospital medicine for further management.  He was given gentle hydration and creatnine improved.  On the day of discharge, he was actually saturating adequately on room air while resting in bed.  He is encouraged to obtain his respiratory apparatus from  home.  We attempted to arrange with for home oxygen, but patient unwilling to pay for it.    STATUS  Improved    DISPOSITION  Discharge to home    DIET  Cardiac    ACTIVITY  As tolerated    IMMUNIZATIONS  No immunizations recorded for this  visit.      [facility]      DISCHARGE MEDICATION RECONCILIATION  Prescribed  lisinopril (lisinopril 10 mg oral tablet) 40 mg, Oral, Daily  methylprednisolone (Medrol 4 mg oral tablet) 1 packet(s), Oral, Once  Continue  amoxicillin-clavulanate (Augmentin 875 mg-125 mg oral tablet) 1 tab(s), Oral, q12hr  pantoprazole (Pantoprazole 40 mg ORAL EC-Tablet) 40 mg, Oral, Daily  Discontinue  hydrochlorothiazide-lisinopril (hydrochlorothiazide-lisinopril 12.5 mg-20 mg oral tablet) 2 tab(s), Oral, Daily  ibuprofen (ibuprofen 800 mg oral tablet) 800 mg, Oral, q8hr  meloxicam (meloxicam 15 mg oral tablet) 15 mg, Oral, Daily  metformin (metformin 500 mg oral tablet) 500 mg, Oral, BID  prednisone (prednisone 20 mg oral tablet) 40 mg, Oral, Daily      PHYSICAL EXAM  AFVSS    GENERAL: awake and in no acute distress  LUNGS: CTA anteriorly  CVS: Normal rate  ABD: Soft, non-tender  EXTREMITIES: peripheral pulses 2+  NEURO: AAOx3  PSYCHIATRIC: Cooperative        Time spent on discharge 35 minutes

## 2022-04-29 NOTE — ED PROVIDER NOTES
Patient:   Vladimir Bernal             MRN: [id number]            FIN: [id number]               Age:   49 years     Sex:  Male     :  1970   Associated Diagnoses:   Acute on chronic respiratory failure; Acute chest pain; Transient hypotension; Obesity hypoventilation syndrome; Obstructive sleep apnea   Author:   Anu Valdez MD      Basic Information   Time seen: Date & time 2019 04:48:00.   History source: Patient.   Arrival mode: Ambulance.   History limitation: None.   Additional information: Chief Complaint from Nursing Triage Note : Chief Complaint   2019 4:21 CDT       Chief Complaint           c/o SOB & CP. supposed to be on home O2, but it's in [place]. multiple visits in the last few weeks for the same. has not gotten O2 tank from [facility]. staying in [place] for 6 months. EMS gave ASA 324mg, 1 spray nitro. still c L chest pain  .      History of Present Illness   The patient presents with   50 y/o AAM with a history of COPD presents to the ED via EMS for complaints of chest tightness and shortness of breath. Pt was discharged 19 with plans to get BiPAP from [place] and an oxygen concentrator. Pt has not gotten either of these and has come back for the same complaints as he was admitted for previously.    .  The onset was chronic.  The course/duration of symptoms is constant.  Degree at onset moderate.  Degree at present moderate.  The Exacerbating factors is none.  The Relieving factors is oxygen.  Risk factors consist of chronic obstructive pulmonary disease.  Prior episodes: frequent.  Therapy today: emergency medical services.  Associated symptoms: Chest tightness.  Additional history: none.        Review of Systems   Constitutional symptoms:  No fever, no chills.    Skin symptoms:  No jaundice, no rash   Eye symptoms:  Vision unchangedNo blurred vision,    Respiratory symptoms:  Shortness of breath, Chest tightnessNo orthopnea, no cough, no sputum  production.    Cardiovascular symptoms:  No chest pain, no palpitations, no diaphoresis, no peripheral edema.    Gastrointestinal symptoms:  No abdominal pain, no nausea, no vomiting, no diarrhea, no constipation.    Genitourinary symptoms:  No dysuria, no hematuria   Neurologic symptoms:  No headache, no dizziness.    Hematologic/Lymphatic symptoms:  Bleeding tendency negative,    Allergy/immunologic symptoms:  No impaired immunity,       Health Status   Allergies: No known allergies.   Medications:  (Selected)   Prescriptions  Prescribed  Augmentin 875 mg-125 mg oral tablet: = 1 tab(s), Oral, q12hr, X 7 day(s), # 14 tab(s), 0 Refill(s)  prednisONE 20 mg oral tablet: 40 mg = 2 tab(s), Oral, Daily, X 5 day(s), # 10 tab(s), 0 Refill(s)  Documented Medications  Documented  Pantoprazole 40 mg ORAL EC-Tablet: 40 mg = 1 tab(s), Oral, Daily  hydrochlorothiazide-lisinopril 12.5 mg-20 mg oral tablet: 2 tab(s), Oral, Daily  ibuprofen 800 mg oral tablet: 800 mg = 1 tab(s), Oral, q8hr  meloxicam 15 mg oral tablet: 15 mg = 1 tab(s), Oral, Daily  metformin 500 mg oral tablet: 500 mg = 1 tab(s), Oral, BID.      Past Medical/ Family/ Social History   Medical history:    No active or resolved past medical history items have been selected or recorded., COPD.   Surgical history:    No active procedure history items have been selected or recorded..   Family history:    No family history items have been selected or recorded..   Social history: Alcohol use: Occasionally, Tobacco use: Denies, Drug use: Denies.      Physical Examination               Vital Signs   Vital Signs   9/21/2019 4:21 CDT       Temperature Oral          36.8 DegC                             Temperature Oral (calculated)             98.24 DegF                             Peripheral Pulse Rate     90 bpm                             Respiratory Rate          23 br/min                             SpO2                      95 %                             Oxygen  Therapy            Nasal cannula                             Oxygen Flow Rate          3 L/min                             Systolic Blood Pressure   74 mmHg  LOW                             Diastolic Blood Pressure  46 mmHg  LOW    9/20/2019 10:29 CDT      Temperature Oral          36.6 DegC                             Temperature Oral (calculated)             97.88 DegF                             Peripheral Pulse Rate     61 bpm                             Heart Rate Monitored      82 bpm                             Respiratory Rate          16 br/min                             SpO2                      95 %                             Oxygen Therapy            Room air                             Systolic Blood Pressure   135 mmHg                             Diastolic Blood Pressure  81 mmHg                             Mean Arterial Pressure, Cuff              99 mmHg                             Blood Pressure Location   Left arm    9/20/2019 8:00 CDT       Oxygen Therapy            Room air    9/20/2019 7:59 CDT       24 HR Intake Totals       0 mL                             24 HR Output Totals       0 mL                             24 HR I&O Balance         0 mL    9/20/2019 7:24 CDT       Peripheral Pulse Rate     61 bpm    9/20/2019 7:19 CDT       Peripheral Pulse Rate     70 bpm                             Respiratory Rate          16 br/min                             SpO2                      95 %                             Oxygen Therapy            Room air    9/20/2019 7:00 CDT       Temperature Oral          36.6 DegC                             Temperature Oral (calculated)             97.88 DegF                             Systolic Blood Pressure   135 mmHg                             Diastolic Blood Pressure  81 mmHg                             Mean Arterial Pressure, Cuff              99 mmHg    9/20/2019 6:00 CDT       Heart Rate Monitored      82 bpm                             Respiratory Rate           20 br/min                             SpO2                      96 %                             Oxygen Therapy            Room air    9/20/2019 4:00 CDT       Temperature Oral          36.4 DegC                             Temperature Oral (calculated)             97.52 DegF                             Peripheral Pulse Rate     64 bpm                             Heart Rate Monitored      66 bpm                             Respiratory Rate          16 br/min                             SpO2                      95 %                             Oxygen Therapy            Room air                             Systolic Blood Pressure   177 mmHg  HI                             Diastolic Blood Pressure  65 mmHg                             Mean Arterial Pressure, Cuff              102 mmHg    9/20/2019 3:19 CDT       Peripheral Pulse Rate     72 bpm                             Respiratory Rate          18 br/min                             SpO2                      97 %    9/20/2019 3:10 CDT       Peripheral Pulse Rate     70 bpm                             Respiratory Rate          16 br/min                             SpO2                      94 %                             Oxygen Therapy            Room air    9/20/2019 2:00 CDT       Peripheral Pulse Rate     74 bpm                             Respiratory Rate          20 br/min                             SpO2                      98 %                             Oxygen Therapy            Room air    9/20/2019 0:07 CDT       Peripheral Pulse Rate     71 bpm                             Respiratory Rate          20 br/min                             SpO2                      100 %    9/20/2019 0:00 CDT       Oxygen Therapy            Room air  .   Measurements   9/21/2019 4:21 CDT       Weight Dosing             132 kg                             Weight Measured and Calculated in Lbs     291.01 lb                             Weight Estimated          132 kg                              Height/Length Dosing      181 cm                             Height/Length Estimated   181 cm                             Body Mass Index Estimated 40.29 kg/m2    9/20/2019 5:00 CDT       Weight                    Not Done  (Not Done) .   Basic Oxygen Information   9/21/2019 4:21 CDT       SpO2                      95 %                             Oxygen Therapy            Nasal cannula                             Oxygen Flow Rate          3 L/min    9/20/2019 10:29 CDT      SpO2                      95 %                             Oxygen Therapy            Room air    9/20/2019 8:00 CDT       Oxygen Therapy            Room air    9/20/2019 7:19 CDT       SpO2                      95 %                             Oxygen Therapy            Room air    9/20/2019 6:00 CDT       SpO2                      96 %                             Oxygen Therapy            Room air    9/20/2019 4:00 CDT       SpO2                      95 %                             Oxygen Therapy            Room air    9/20/2019 3:19 CDT       SpO2                      97 %    9/20/2019 3:10 CDT       SpO2                      94 %                             Oxygen Therapy            Room air    9/20/2019 2:00 CDT       SpO2                      98 %                             Oxygen Therapy            Room air    9/20/2019 0:07 CDT       SpO2                      100 %    9/20/2019 0:00 CDT       Oxygen Therapy            Room air  .   General:  Alert, moderate distress, obese   Skin:  Warm, dry   Head:  Normocephalic, atraumatic   Neck:  Supple, trachea midline   Eye:  Normal conjunctiva   Ears, nose, mouth and throat:  Oral mucosa moist.   Cardiovascular:  Regular rate and rhythm, No murmur, Normal peripheral perfusion, No edema   Respiratory:  Respirations: Labored, Breath sounds: Wheezes present (inspiratory wheezes, expiratory wheezes), all fields.    Gastrointestinal:  Soft, Nontender, Non distended, Normal  "bowel sounds   Neurological:  Alert and oriented to person, place, time, and situation.   Psychiatric:  Cooperative      Medical Decision Making   Rationale:  Patient just discharged from inpatient after COPD exacerbation - supposed to be on home O2 and the discharge plan wsa for a friend or family member to bring the home O2 to him; however, this did not happen and he returns w/ severe dyspnea, hypoxia. Presented hypotensive, in moderate distress but improving w/ supplemental O2, fluids, and neb treatments. CXR w/o focal findings. States no one available to bring his home O2/neb machines tonight. Will readmit for respiratory treatments/support until more sustainable discharge planning can be achieved. .   Documents reviewed:  Emergency department nurses' notes.   Orders  Launch Order Profile (Selected)   Inpatient Orders  Ordered  30 Day Readmission: 09/21/19 4:27:01 CDT, Stop date 09/21/19 4:27:01 CDT, "This patient has had an inpatient, observation, outpatient bedded or emergency visit within the last 30 days."  Aerosol Treatment: 09/21/19 4:52:00 CDT, 09/21/19 4:52:00 CDT, [id number]  DuoNeb: 3 mL, form: Soln-Inh, NEB, Once, first dose 09/21/19 4:52:00 CDT, stop date 09/21/19 4:52:00 CDT, STAT  Fall Risk Protocol: 09/21/19 4:48:10 CDT, Constant Order  O2 Therapy: 09/21/19 4:27:01 CDT  Ordered (Exam Completed)  CXR 1 View: Stat, 09/21/19 4:30:00 CDT, Chest Pain, None, Stretcher, Rad Type, Not Scheduled, 09/21/19 4:30:00 CDT  Ordered (In-Lab)  CMP: Stat collect, 09/21/19 4:30:00 CDT, Blood, Once, Stop date 09/21/19 4:31:00 CDT, Lab Collect, Print Label By Order Location, 09/21/19 4:30:00 CDT  Canceled  Aerosol Treatment: 09/21/19 4:52:00 CDT, 09/21/19 4:52:00 CDT, [id number]  Completed  Automated Diff: Stat collect, 09/21/19 4:35:00 CDT, Blood, Collected, Once, Stop date 09/21/19 4:35:00 CDT, Lab Collect, Print Label By Order Location, 09/21/19 4:30:00 CDT  CBC w/ Auto Diff: Stat collect, 09/21/19 4:30:00 CDT, " Blood, Once, Stop date 09/21/19 4:31:00 CDT, Lab Collect, Print Label By Order Location, 09/21/19 4:30:00 CDT  [id number]: 1,000 mL, 1,000 mL, IV, start date 09/21/19 4:30:00 CDT, stop date 09/21/19 4:30:00 CDT, STAT  POC BNP iSTAT request: Blood, Stat collect, 09/21/19 4:30:00 CDT by Anu Valdez MD, Stop date 09/21/19 4:31:00 CDT, Lab Collect, Print Label By Order Location  POC BNP iSTAT: Blood, Stat collect, Collected, 09/21/19 4:40:14 CDT  POC Istat ER Troponin: Blood, Stat collect, Collected, 09/21/19 4:40:50 CDT  POC iSTAT ER Troponin request: Blood, Stat collect, 09/21/19 4:30:00 CDT by Anu Valdez MD, Stop date 09/21/19 4:31:00 CDT, Lab Collect, Print Label By Order Location  SOLU-Medrol: 125 mg, form: Injection, IV Push, Once, first dose 09/21/19 4:53:00 CDT, stop date 09/21/19 4:53:00 CDT, STAT.   Electrocardiogram:  Time 9/21/2019 04:22:00, rate 93, normal sinus rhythm, QRS interval Right bundle branch block, Previous EKG available No changes, compared with 9/15/2019 02:54:00, Interpretation by Emergency Physician No significant interval changes.    Results review:  Lab results : Lab View   9/21/2019 4:40 CDT       POC BNP iSTAT             17 pg/mL                             POC Troponin              0.01 ng/mL    9/21/2019 4:35 CDT       WBC                       11.0 x10(3)/mcL                             RBC                       4.82 x10(6)/mcL                             Hgb                       14.2 gm/dL                             Hct                       44.8 %                             Platelet                  315 x10(3)/mcL                             MCV                       92.9 fL                             MCH                       29.5 pg                             MCHC                      31.7 gm/dL  LOW                             RDW                       15.5 %                             MPV                       9.4 fL                             Abs Neut                   6.58 x10(3)/mcL                             Neutro Auto               60 %  NA                             Lymph Auto                28 %  NA                             Mono Auto                 10 %  NA                             Eos Auto                  0 %  NA                             Abs Eos                   0.0 x10(3)/mcL                             Basophil Auto             0 %  NA                             Abs Neutro                6.58 x10(3)/mcL                             Abs Lymph                 3.1 x10(3)/mcL                             Abs Mono                  1.2 x10(3)/mcL                             Abs Baso                  0.0 x10(3)/mcL  , Interpretation Labs unremarkable.    Chest X-Ray:  No acute disease process, interpretation by Emergency Physician.       Reexamination/ Reevaluation   Vital signs   Basic Oxygen Information   9/21/2019 4:21 CDT       SpO2                      95 %                             Oxygen Therapy            Nasal cannula                             Oxygen Flow Rate          3 L/min    9/20/2019 10:29 CDT      SpO2                      95 %                             Oxygen Therapy            Room air    9/20/2019 8:00 CDT       Oxygen Therapy            Room air    9/20/2019 7:19 CDT       SpO2                      95 %                             Oxygen Therapy            Room air    9/20/2019 6:00 CDT       SpO2                      96 %                             Oxygen Therapy            Room air    9/20/2019 4:00 CDT       SpO2                      95 %                             Oxygen Therapy            Room air    9/20/2019 3:19 CDT       SpO2                      97 %    9/20/2019 3:10 CDT       SpO2                      94 %                             Oxygen Therapy            Room air    9/20/2019 2:00 CDT       SpO2                      98 %                             Oxygen Therapy            Room air    9/20/2019 0:07 CDT       SpO2                       100 %    9/20/2019 0:00 CDT       Oxygen Therapy            Room air        Procedure   Critical care note   Total time: 35 minutes spent engaged in work directly related to patient care and/ or available for direct patient care.   Critical condition(s) addressed for impending deterioration include: airway, respiratory, cardiovascular.   Associated risk factors: hypotension, hypoxia.   Management: bedside assessment, supervision of care, Interpretation (chest x-ray, electrocardiogram), Interventions fluids, supplemental O2, nebs, Alternate history emergency medical services.   Performed by: self.      Impression and Plan   Diagnosis   Acute on chronic respiratory failure (SKA26-AT J96.20)   Acute chest pain (UBB87-TT R07.9)   Transient hypotension (SET29-PL I95.9)   Obesity hypoventilation syndrome (OAT57-XB E66.2)   Obstructive sleep apnea (JDS30-RS G47.33)   Plan   Condition: Improved, Stable.    Disposition: Admit time  9/21/2019 05:12:00, Place in Observation Telemetry Unit, Jesus DAS, Sergo DURAN, case discussed with Kaylee Gibson NP  .    Counseled: Patient, Regarding diagnosis, Regarding diagnostic results, Regarding treatment plan, Patient indicated understanding of instructions.    Notes: IJeaneth, acted solely as a scribe for and in the presence of Dr. Valdez who performed the service. , I, Anu Valdez, have independently performed the history, physical, medical decision making and procedures as documented above and agree with the scribe's documentation. .

## 2022-05-03 NOTE — HISTORICAL [facility] CERNER
This is a historical note converted from Ayla. Formatting and pictures may have been removed.  Please reference Ayla for original formatting and attached multimedia. Chief Complaint  SOB and chest pains  History of Present Illness  Mr. Bernal is a 49 year old AA male with a PMHx of HTN, DM2, COPD. He was discharged from [facility] on 9/20/19 and presented to the ED early this morning with a complaint of SOB and CP.?  He reports that his SOB began when he got out of the car and walked to the house, he is unsure how far it was. His SOB was associated with left sided?anterior chest wall pain, that is non-radiating and is made worse with inspiration and palpation. He is here from [place] visiting family locally. ?Both his PCP and cardiologist are both in [place].??He is on chronic oxygen therapy and CPAP therapy at night, and denies having either of them here with him. At the time of exam he is sleeping, easily arousable. Reports he is very tired and will only answer some questions. He has been admitted to hospital medicine for further management.  ?  Review of Systems  Except as documented, all other systems reviewed and negative.?  Physical Exam  Vitals & Measurements  T:?36.6? ?C (Oral)? TMIN:?36.6? ?C (Oral)? TMAX:?36.8? ?C (Oral)? HR:?75(Peripheral)? RR:?19? BP:?95/64? SpO2:?93%? WT:?132?kg?  ?  General:?drowsy;?in no acute distress  Eye: PERRLA, EOMI, clear conjunctiva, eyelids normal  HENT:?normocephalic; atraumatic; hearing grossly intact  Neck: full range of motion,  Respiratory:?diminished to auscultation bilaterally; non-labored respirations; symmetrical chest expansion; on 3L via NC  Cardiovascular:?regular rate and rhythm; no edema noted  Gastrointestinal:?soft, non-tender, non-distended with normal bowel sounds; obese  Musculoskeletal:?moves all extremities; normal ROM, tenderness?with palpation?to left anterior chest wall.  Integumentary: no rashes or skin lesions present  Neurologic: Alert and  oriented; motor/sensory function intact  ?   Labs Last 24 Hours?  ?Chemistry? Hematology/Coagulation?   Sodium Lvl: 138 mmol/L (09/21/19 04:35:00) WBC: 11 x10(3)/mcL (09/21/19 04:35:00)   Potassium Lvl: 3.5 mmol/L (09/21/19 04:35:00) RBC: 4.82 x10(6)/mcL (09/21/19 04:35:00)   Chloride: 101 mmol/L (09/21/19 04:35:00) Hgb: 14.2 gm/dL (09/21/19 04:35:00)   CO2: 27 mmol/L (09/21/19 04:35:00) Hct: 44.8 % (09/21/19 04:35:00)   Calcium Lvl: 8.7 mg/dL (09/21/19 04:35:00) Platelet: 315 x10(3)/mcL (09/21/19 04:35:00)   Glucose Lvl: 94 mg/dL (09/21/19 04:35:00) MCV: 92.9 fL (09/21/19 04:35:00)   BUN:?35 mg/dL?High (09/21/19 04:35:00) MCH: 29.5 pg (09/21/19 04:35:00)   Creatinine:?2.49 mg/dL?High (09/21/19 04:35:00) MCHC:?31.7 gm/dL?Low (09/21/19 04:35:00)   eGFR-AA: 36 mL/min/1.73 m2 (09/21/19 04:35:00) RDW: 15.5 % (09/21/19 04:35:00)   eGFR-ORLY: 30 mL/min/1.73 m2 (09/21/19 04:35:00) MPV: 9.4 fL (09/21/19 04:35:00)   Bili Total: 1 mg/dL (09/21/19 04:35:00) Abs Neut: 6.58 x10(3)/mcL (09/21/19 04:35:00)   Bili Direct: 0.1 mg/dL (09/21/19 04:35:00) Neutro Auto: 60 % (09/21/19 04:35:00)   Bili Indirect:?0.9 mg/dL?High (09/21/19 04:35:00) Lymph Auto: 28 % (09/21/19 04:35:00)   AST:?12 unit/L?Low (09/21/19 04:35:00) Mono Auto: 10 % (09/21/19 04:35:00)   ALT: 20 unit/L (09/21/19 04:35:00) Eos Auto: 0 % (09/21/19 04:35:00)   Alk Phos: 51 unit/L (09/21/19 04:35:00) Abs Eos: 0 x10(3)/mcL (09/21/19 04:35:00)   Total Protein: 6.5 gm/dL (09/21/19 04:35:00) Basophil Auto: 0 % (09/21/19 04:35:00)   Albumin Lvl: 3.4 gm/dL (09/21/19 04:35:00) Abs Neutro: 6.58 x10(3)/mcL (09/21/19 04:35:00)   Globulin: 3.1 gm/dL (09/21/19 04:35:00) Abs Lymph: 3.1 x10(3)/mcL (09/21/19 04:35:00)   A/G Ratio: 1.1 (09/21/19 04:35:00) Abs Mono: 1.2 x10(3)/mcL (09/21/19 04:35:00)   POC BNP iSTAT: 17 pg/mL (09/21/19 04:40:00) Abs Baso: 0 x10(3)/mcL (09/21/19 04:35:00)   POC Troponin: 0.01 ng/mL (09/21/19 04:40:00) ?   ?  ?  Accession:?[id number]  Order:?XR Chest 1  View  Report Dt/Tm:?09/21/2019 08:06  Report:?  ?  CLINICAL: Chest pain.  ?  COMPARISON: September 15, 2019.  ?? ? ? ? ? ? ? ? ??  FINDINGS: Cardiopericardial silhouette is within normal limits. ?Lungs  are without dense focal or segmental consolidation, congestive  process, pleural effusions or pneumothorax. ?  ?  IMPRESSION:  ?  NO ACUTE CARDIOPULMONARY PROCESS IDENTIFIED.?  ?  ?  Assessment/Plan  Acute on chronic respiratory failure - likely hypercarbic  COPD exacerbation  Obesity hypoventilation syndrome  Hx of KRISHNA  Atypical chest pain most likely musculoskeletal in origin  SIRS  VY  Hypotension with hx of HTN  Morbid Obesity  Noncompliance with medical therapy  ?   Plan:  ABG now. CXR pending. Resume home meds as deemed necessary. Supplemental O2 as necessary to keep oxygen saturation > 90%. ADA diet. CBGs ac/hs with insulin sliding scale. Check UDS. Continue Augmentin 875 mg-125 mg oral tablet?1 tab, Oral, q12hr and IV Solu-Medrol 60mg q 6 hrs. NS at 150 ml/hr.  ?  Source of history: Self. Medical record.?  Present at bedside: None.?  History limitation: Limited due to pt cooperation  Provider information: PCP in [place], none locally  ?   Code status: Full  DVT prophylaxis: Lovenox 40mg subq daily  Admission time?75 minutes.?  ?  ?  I, Arlyn Ku NP reviewed and discussed this case with   ?   Addendum by attending physician Donavan Segura MD:  ?  ?Patient was seen and examined with Ms. Arlyn NP,. ?I had face-to-face time with this patient.  ?I Reviewed HPI, allergies, or medications, past medical and surgical history, family and social history. ?Reviewed vitals, labs, imaging and assessment and plan and I agree with history, physical and medical decision making as detailed above. ?In addition:?Mr. Bernal is a 49 years old male?with known history of chronic hypercapnic respiratory failure from COPD,?KRISHNA and obesity hypoventilation syndrome?is well-known to me from recent admission is  here for again?chest pain. ?Patient?is very noncompliant, morbidly obese,?who left is a CPAP and oxygen concentrator admissions back in [place].? His main?issue was?pain medications.? He said he was started on Norco lying back for his back pain after?accident?but he was using only tramadol and says it was not enough so he wanted [place] on discharge.? Last admission also chest pain?will work-up and rule out any ACS.? Here work-up is so far negative for any acute coronary syndrome.? Patient is?currently comfortable.? Not on any oxygen, saturating well.  ?   Exam:  Morbidly obese, not in distress, alert and oriented x3.  HEENT-no pallor, no icterus, PERRLA, EOMI  Lungs-good air entry bilaterally, clear without any wheezing.  Cardiac-regular heart sounds, no murmurs.  Morbidly obese abdomen, nontender, bowel sounds are present.  ?  ?Assessment and plan:  #Acute renal failure-probably prerenal. ?Continue with IV fluids. ?Monitor the?creatinine, if does not improve?with renal ultrasound.  #Chest pain-likely noncardiac, patient has chronic intermittent chest pain but?mostly suspect?narcotic seeking behavior. ?Rule out ACS.  #Chronic shortness of breath, chronic hypercapnic respiratory failure due to KRISHNA, OHS?and chronic COPD-patient is very noncompliant.? Will restart him on CPAP with IPAP?12, EPAP?5. ?Patient left his oxygen concentrator and?CPAP back in [place]. ?Patient says no but is?there to bring the admissions here.? Patient?does not use oxygen?and saturating well.? But he likely needs oxygen on discharge, which is a problem with the discharge. ?Involve case management?to look into this.? Augmentin and prednisone are being continue from?last admission, can stop after 5 days.  #Noncompliance with?medications and?CPAP and oxygen.  #Morbid obesity.  #Narcotic seeking behavior:?For back pain patient is on tramadol at home, wants Norco here.  ?   ?See the rest of the assessment and plan as above.  ?  ?I have  personally reviewed the HPI, patients medication list, allergies and home medications, reviewed the labs and test results that are presently available, All diagnosis and differential diagnosis have been reviewed and assessment and plan has been documented; I have reviewed the consulting providers response and recommendations. Discussed with the nursing staff.  ?  ?Education and Follow-up: Counseled the Patient, Family regarding diagnosis, treatment and?medications.  ?  ?Donavan Lopez MD.   Problem List/Past Medical History  Ongoing  Morbid obesity  ?? Essential HTN  ?? COPD  ?? DM-2  Medications  Inpatient  acetaminophen, 650 mg= 20.3 mL, Oral, q6hr, PRN  acetaminophen, 1000 mg= 2 tab(s), Oral, q6hr, PRN  albuterol, 2.5 mg= 3 mL, NEB, q4hr, PRN  Dextrose 10% in Water intravenous solution, 125 mL, IV, As Directed, PRN  Dextrose 10% in Water intravenous solution, 125 mL, IV, As Directed, PRN  Dextrose 10% in Water intravenous solution, 125 mL, IV, Once, PRN  Dextrose 10% in Water intravenous solution, 250 mL, IV, As Directed, PRN  DuoNeb, 3 mL, NEB, QID Resp  glucagon, 1 mg= 1 EA, IM, q10min, PRN  glucagon, 1 mg= 1 EA, IM, q10min, PRN  insulin lispro, 2-14 units, Subcutaneous, As Directed, PRN  Lovenox 40 mg/0.4 mL subcutaneous solution, 40 mg= 0.4 mL, Subcutaneous, Daily  Normal Saline (0.9% NS) IV 1,000 mL, 1000 mL, IV  SOLU-Medrol, 60 mg= 1.5 mL, IV Push, [id number]  Zofran, 4 mg= 2 mL, IV Push, q4hr, PRN  Home  Augmentin 875 mg-125 mg oral tablet, 1 tab(s), Oral, q12hr,? ?Unable to obtain  hydrochlorothiazide-lisinopril 12.5 mg-20 mg oral tablet, 2 tab(s), Oral, Daily,? ?Unable to obtain  ibuprofen 800 mg oral tablet, 800 mg= 1 tab(s), Oral, q8hr,? ?Unable to obtain  meloxicam 15 mg oral tablet, 15 mg= 1 tab(s), Oral, Daily,? ?Unable to obtain  metformin 500 mg oral tablet, 500 mg= 1 tab(s), Oral, BID,? ?Unable to obtain  Pantoprazole 40 mg ORAL EC-Tablet, 40 mg= 1 tab(s), Oral, Daily,? ?Unable to  obtain  prednisONE 20 mg oral tablet, 40 mg= 2 tab(s), Oral, Daily,? ?Unable to obtain  Allergies  No Known Medication Allergies  Social History  Abuse/Neglect  No, 09/21/2019  Alcohol  Current, 09/15/2019  Tobacco  Never (less than 100 in lifetime), No, 09/21/2019  Family History  unable to obtain due to pt cooperation

## 2022-05-20 NOTE — H&P
[facility] Hospital Medicine History & Physical Examination       Patient Name: Vladimir Bernal  MRN: [id number]  Patient Class: IP- Inpatient   Admission Date: 05/20/2022   Admitting Physician: Dr. Uday Rich  Length of Stay: 0  Attending Physician: Dr. Uday Rich  Primary Care Provider: Non-Staff  Face-to-Face encounter date: 05/20/2022  Code Status: Full  Chief Complaint: Shortness of Breath (Hx asthma - sob not relieved w/ albuterol at home, saw pcp yest and was scheduled for ekg due to tachycardia)      Source of Information: Medical Records      HISTORY OF PRESENT ILLNESS:   Vladimir Bernal is a 52 y.o. male with a PMHx of HTN, DM 2, CHF, COPD/Asthma, Chronic hypoxemic respiratory failure on 2L home O2  who presented to [facility] on 5/20/2022 with a primary complaint of worsening SOB with associated cough x2 days.    Initial ED VS include /129, , RR 28, SpO2 93% on RA, temp 98.6. Labs notable for creatinine 1.23, troponin normal. BNP normal. CXR noted prominent interstital markings. Flu and COVID negative. EKG revealed atrial flutter with variable A-V block with premature ventricular or  aberrantly conducted complexes. He was started on full dose Lovenox. HR improved following Metoprolol administration.  Cardiology services were consulted. He has been placed on CPAP. He was admitted to hospital medicine services for further work-up and management of care.    PAST MEDICAL HISTORY:   HTN, DM 2, CHF, COPD/Asthma, Chronic hypoxemic respiratory failure on 2L home O, Blindness L Eye, Obesity    PAST SURGICAL HISTORY:   History reviewed. No pertinent surgical history.    ALLERGIES:   Iodine and Shellfish containing products    FAMILY HISTORY:   Reviewed and negative    SOCIAL he HISTORY:   Denied alcohol, tobacco or illicit drug use    HOME MEDICATIONS:     Prior to Admission medications    Not on File       REVIEW OF SYSTEMS:   Except as documented, all other systems reviewed and  negative     PHYSICAL EXAM:     VITAL SIGNS: 24 HRS MIN & MAX LAST   Temp  Min: 98.4 °F (36.9 °C)  Max: 98.6 °F (37 °C) 98.4 °F (36.9 °C)   BP  Min: 140/108  Max: 205/102 (!) 158/108   Pulse  Min: 82  Max: 135  90   Resp  Min: 16  Max: 28 (!) 22   SpO2  Min: 90 %  Max: 95 % 95 %       General appearance: Well-developed male in no apparent distress.  HENT: Atraumatic head. Moist mucous membranes of oral cavity.  Eyes: Blind L Eye   Neck: Supple.   Lungs: Expiratory wheezing to auscultation bilaterally.   Heart: Regular rate and rhythm. S1 and S2 present. No pedal edema.  Abdomen: Soft, non-distended, non-tender. Obese  Extremities: No cyanosis, clubbing, or edema.  Skin: No Rash.   Neuro: Motor and sensory exams grossly intact.   Psych/mental status:  Lethargic     LABS AND IMAGING:     Recent Labs   Lab 05/20/22  0103   WBC 5.6   RBC 5.05   HGB 15.0   HCT 49.0   MCV 97.0*   MCH 29.7   MCHC 30.6*   RDW 16.3      MPV 9.6       Recent Labs   Lab 05/20/22  0103 05/20/22  1209     --    K 4.3  --    CO2 28  --    BUN 15.6  --    CREATININE 1.23*  --    CALCIUM 9.4  --    PH  --  7.25*   ALBUMIN 3.7  --    ALKPHOS 71  --    ALT 8  --    AST 16  --    BILITOT 0.4  --        Microbiology Results (last 7 days)     ** No results found for the last 168 hours. **           X-Ray Chest 1 View  Narrative: EXAMINATION:  XR CHEST 1 VIEW    CLINICAL HISTORY:  shortness of breath;    TECHNIQUE:  Single view of the chest    COMPARISON:  10/30/2019    FINDINGS:  Prominent interstitial markings with no focal opacification.    The cardiomediastinal silhouette is within normal limits.    No acute osseous abnormality.  Impression: No acute cardiopulmonary process. Prominent interstitial markings with no focal opacification.    Electronically signed by: Oziel Vale  Date:    05/20/2022  Time:    06:37        ASSESSMENT & PLAN:   Acute on Chronic Hypoxemic and Hypercapnic Respiratory Failure  Hypertensive Urgency  New Onset  Atrial Flutter with RVR  COPD   KRISHNA  DM 2  Hx of HTN, CHF, Polysubstance abuse, Blind L Eye, Obesity    Plan:  CPAP was ordered  NPO while in CPAP  ABG with pH 7.25, PCO2 64, PO2 72.BiPAP ordered with ABG 2 hrs after placed on BiPAP  NPO while on BiPAP  PRN antihypertensives  Cardiology consultation, appreciate recommendations  CBGs with ISS  Resume appropriate home medications  Check UDS  Cardiac Monitoring  CBGs with ISS  Labs in AM    VTE Prophylaxis: will be placed on Lovenox for DVT prophylaxis and will be advised to be as mobile as possible and sit in a chair as tolerated    __________________________________________________________________________  INPATIENT LIST OF MEDICATIONS     Current Facility-Administered Medications:     acetaminophen tablet 650 mg, 650 mg, Oral, Q8H PRN, ALEXIS Burgos-BC    acetaminophen tablet 650 mg, 650 mg, Oral, Q4H PRN, ALEXIS Burgos-BC    enoxaparin injection 150 mg, 150 mg, Subcutaneous, Q12H, Jayant Murillo MD, 150 mg at 05/20/22 1144    hydrALAZINE injection 10 mg, 10 mg, Intravenous, Q6H PRN, ALEXIS Burgos-BC, 10 mg at 05/20/22 1206    ondansetron injection 4 mg, 4 mg, Intravenous, Q8H PRN, MARY KAY BurgosP-BC  No current outpatient medications on file.      Scheduled Meds:   enoxaparin  150 mg Subcutaneous Q12H     Continuous Infusions:  PRN Meds:.acetaminophen, acetaminophen, hydrALAZINE, ondansetron      Discharge Planning and Disposition: TBD    I, Arlyn Jim, OZIEL have reviewed and discussed the case with Dr. Uday Rich.    Please see the following addendum for further assessment and plan from the attending MD.    AVELINO Burgos  05/20/2022    ________________________________________________________________________________    MD Addendum:  I, Dr. Rich, assumed care of this patient today at 11:00am  For the patient encounter, I performed the substantive portion of the visit, I reviewed the NP/PA documentation,  treatment plan, and medical decision making.  I had face to face time with this patient     52 years old male history of hypertension diabetes history of COPD/asthma presented to hospital AFib RVR and hypertensive urgency.   During the exam patient was mild-to-moderate lethargy history is limited  He is on home oxygen 2 liters.   He was having shortness of breath for lost case stating that his asthma was getting worse.   According to previous records patient might have AFib in the history however, no ac was seen. Again medical records are limited.       Exam  Oriented x3 with lethargic and following commands.   Irregular rhythm  Clear b/l,   No edema   No neurological deficits      Plan:   - will admit patient to the hospitalist service template  His rate is better controlled now will continue with Lovenox 1 bernarda per kg b.i.d..  Start metoprolol 25 mg BID   Echo and cardiology consult.  ABG showed pH 7.24 with a CO2 64, PO2 72 on 2 Ls HCO3 28, suggesting acute respiratory acidosis.  He stated that he was sleeping with CPAP at home.   Will place patient on BiPAP.  medrec to be done.  Will give 125 mg solumedrol once, continue inhalers solumedrol 80 BID and wean as tolerated.   Lipid panel and A1C,   ISS     Critical care diagnoses AFib RVR, acute hypercarbic respiratory failure requiring BiPAP  Critical care time was given 45 minutes.       05/20/2022

## 2022-05-20 NOTE — CONSULTS
Inpatient consult to Cardiology  Consult performed by: JOEY Cazares  Consult ordered by: Uday Rich MD        [facility] - Emergency Dept  Cardiology  Consult Note    Patient Name: Vladimir Bernal  MRN: [id number]  Admission Date: 5/20/2022  Hospital Length of Stay: 0 days  Code Status: Full Code   Attending Provider: Uday Rich MD   Consulting Provider: JOEY Cazares  Primary Care Physician: Primary Doctor No  Principal Problem:<principal problem not specified>    Patient information was obtained from spouse/SO and ER records.     Subjective:     Chief Complaint: Reason for Consult: PAF/Flutter     HPI: Mr. Bernal is a 53 y/o male who is unknown to [facility]. The patient presented to [facility] on 5.20.22 with c/o SOB and Cough x 2 Days. The patient reported that about 2 days prior he developed SOB that progressively worsened and was associated with a non-productive cough. He denied fever and chills. In the ER his initial workup revealed a SBP in the 180s (185/129), RA O2 Saturation of 93%, Crea 1.23, Normal BNP, CXR with Prominent Interstitial Markings, Flu/COVID-19 Negative and an EKG with PAF/Flutter with RVR. He was given IV Metoprolol and Placed on BiPAP. His HR did improve with the BB Administration. [facility] was consulted for Abnormal EKG: PAF/Flutter with RVR.     PMH: HTN, DM II, COPD/Asthma, HF, Chronic Hypoxemic Respiratory Failure/Home O2, L Eye Blindness, Obesity  PSH: Denies Past Surgical History  Family History: Reviewed and Unremarkable for Heart Disease  Social History: Denies Illicit Drug, ETOH and Tobacco Use    Previous Cardiac Diagnostics: None for Review    Review of patient's allergies indicates:   Allergen Reactions    Iodine Shortness Of Breath and Swelling     IT FLARES UP ASTHMA SYMPTOMSFLAR      Shellfish containing products      Review of Systems:  Review of Systems   Unable to perform ROS: Other (BiPAP)   Constitutional: Positive for fatigue. Negative for fever.   HENT:  Negative for congestion.    Respiratory: Positive for cough and shortness of breath. Negative for apnea.    All other systems reviewed and are negative.    Objective:     Vital Signs (Most Recent):  Temp: 98.4 °F (36.9 °C) (05/20/22 1154)  Pulse: 90 (05/20/22 1154)  Resp: (!) 22 (05/20/22 1154)  BP: (!) 158/108 (05/20/22 1206)  SpO2: 95 % (05/20/22 1154) Vital Signs (24h Range):  Temp:  [98.4 °F (36.9 °C)-98.6 °F (37 °C)] 98.4 °F (36.9 °C)  Pulse:  [] 90  Resp:  [16-28] 22  SpO2:  [90 %-95 %] 95 %  BP: (140-205)/(102-159) 158/108     Weight: (!) 155.5 kg (342 lb 13 oz) (Wt per EMR. Not able to re-weigh pt during rounds. Pt visually appears to have a wt of 300lb +.)  Body mass index is 45.43 kg/m².    SpO2: 95 %  O2 Device (Oxygen Therapy): CPAP    No intake or output data in the 24 hours ending 05/20/22 1302    Lines/Drains/Airways       Peripheral Intravenous Line  Duration                  Peripheral IV - Single Lumen 05/20/22 0107 20 G Anterior;Distal;Right Upper Arm <1 day                  Significant Labs:  Recent Results (from the past 72 hour(s))   Comprehensive Metabolic Panel    Collection Time: 05/20/22  1:03 AM   Result Value Ref Range    Sodium Level 143 136 - 145 mmol/L    Potassium Level 4.3 3.5 - 5.1 mmol/L    Chloride 107 98 - 107 mmol/L    Carbon Dioxide 28 22 - 29 mmol/L    Glucose Level 106 (H) 74 - 100 mg/dL    Blood Urea Nitrogen 15.6 8.4 - 25.7 mg/dL    Creatinine 1.23 (H) 0.73 - 1.18 mg/dL    Calcium Level Total 9.4 8.4 - 10.2 mg/dL    Protein Total 7.1 6.4 - 8.3 gm/dL    Albumin Level 3.7 3.5 - 5.0 gm/dL    Globulin 3.4 2.4 - 3.5 gm/dL    Albumin/Globulin Ratio 1.1 1.1 - 2.0 ratio    Bilirubin Total 0.4 <=1.5 mg/dL    Alkaline Phosphatase 71 40 - 150 unit/L    Alanine Aminotransferase 8 0 - 55 unit/L    Aspartate Aminotransferase 16 5 - 34 unit/L    Estimated GFR- >60 mls/min/1.73/m2   Troponin I    Collection Time: 05/20/22  1:03 AM   Result Value Ref Range     Troponin-I <0.010 0.000 - 0.045 ng/mL   BNP    Collection Time: 05/20/22  1:03 AM   Result Value Ref Range    Natriuretic Peptide 34.1 <=100.0 pg/mL   Protime-INR    Collection Time: 05/20/22  1:03 AM   Result Value Ref Range    PT 14.6 (H) 12.5 - 14.5 seconds    INR 1.17 0.00 - 1.30   APTT    Collection Time: 05/20/22  1:03 AM   Result Value Ref Range    PTT 28.3 23.2 - 33.7 seconds   COVID/FLU A&B PCR    Collection Time: 05/20/22  1:03 AM   Result Value Ref Range    Influenza A PCR Not Detected Not Detected    Influenza B PCR Not Detected Not Detected    SARS-CoV-2 PCR Not Detected Not Detected   TSH    Collection Time: 05/20/22  1:03 AM   Result Value Ref Range    Thyroid Stimulating Hormone 2.0706 0.3500 - 4.9400 uIU/mL   T4, Free    Collection Time: 05/20/22  1:03 AM   Result Value Ref Range    Thyroxine Free 0.76 0.70 - 1.48 ng/dL   CBC with Differential    Collection Time: 05/20/22  1:03 AM   Result Value Ref Range    WBC 5.6 4.5 - 11.5 x10(3)/mcL    RBC 5.05 4.70 - 6.10 x10(6)/mcL    Hgb 15.0 14.0 - 18.0 gm/dL    Hct 49.0 42.0 - 52.0 %    MCV 97.0 (H) 80.0 - 94.0 fL    MCH 29.7 27.0 - 31.0 pg    MCHC 30.6 (L) 33.0 - 36.0 mg/dL    RDW 16.3 11.5 - 17.0 %    Platelet 286 130 - 400 x10(3)/mcL    MPV 9.6 9.4 - 12.4 fL    Neut % 49.0 %    Lymph % 33.6 %    Mono % 8.8 %    Eos % 7.7 %    Basophil % 0.5 %    Lymph # 1.87 0.6 - 4.6 x10(3)/mcL    Neut # 2.7 2.1 - 9.2 x10(3)/mcL    Mono # 0.49 0.1 - 1.3 x10(3)/mcL    Eos # 0.43 0 - 0.9 x10(3)/mcL    Baso # 0.03 0 - 0.2 x10(3)/mcL    IG# 0.02 (H) 0 - 0.0155 x10(3)/mcL    IG% 0.4 0 - 0.43 %    NRBC% 0.4 %   POCT ARTERIAL BLOOD GAS Blood Gas    Collection Time: 05/20/22 11:55 AM   Result Value Ref Range    POC PH      POC PCO2      POC PO2      POC Sodium      POC Potassium      POC Ionized Calcium      POC HEMOGLOBIN      POC O2Hb      POC COHb      POC MetHb      POC PCO2      Base Excess, Arterial      O2 Sat, Art      HCO3, Arterial     POCT ARTERIAL BLOOD GAS     Collection Time: 05/20/22 12:09 PM   Result Value Ref Range    POC PH 7.25 (AA) 7.29 - 7.61    POC PCO2 64 (AA) 19 - 50 mmHg    POC PO2 72 (A) 80 - 100 mmHg    POC HEMOGLOBIN 15.9 12.0 - 16.0 g/dL    POC SATURATED O2 91.3 %    POC O2Hb 90.9 (A) 94.0 - 97.0 %    POC COHb 2.7 %    POC MetHb 1.1 0.40 - 1.5 %    POC Potassium 4.9 3.5 - 5.0 mmol/l    POC Sodium 136 (A) 137 - 145 mmol/l    POC Ionized Calcium 1.27 (A) 1.12 - 1.23 mmol/l    Correct Temperature (PH) 7.25 (AA) 7.29 - 7.61    Corrected Temperature (pCO2) 64 (AA) 19 - 50 mmHg    Corrected Temperature (pO2) 72 (A) 80 - 100 mmHg    POC HCO3 28.1 mmol/l    Base Deficit -1.0 -2.0 - 2.0 mmol/l    POC Temp 37.0 °C    Specimen source Arterial sample        Significant Imaging:   Imaging Results              X-Ray Chest 1 View (Final result)  Result time 05/20/22 06:37:20      Final result by Oziel Vale MD (05/20/22 06:37:20)                   Impression:      No acute cardiopulmonary process. Prominent interstitial markings with no focal opacification.      Electronically signed by: Oziel Vale  Date:    05/20/2022  Time:    06:37               Narrative:    EXAMINATION:  XR CHEST 1 VIEW    CLINICAL HISTORY:  shortness of breath;    TECHNIQUE:  Single view of the chest    COMPARISON:  10/30/2019    FINDINGS:  Prominent interstitial markings with no focal opacification.    The cardiomediastinal silhouette is within normal limits.    No acute osseous abnormality.                                      Last Lipids:  No results found for: CHOL  No results found for: HDL  No results found for: LDLCALC  No results found for: TRIG  No results found for: CHOLHDL    EKG:    Results for orders placed or performed during the hospital encounter of 05/20/22   EKG 12-lead    Narrative    Test Reason : R06.02,    Vent. Rate : 119 BPM     Atrial Rate : 286 BPM     P-R Int : 000 ms          QRS Dur : 148 ms      QT Int : 288 ms       P-R-T Axes : -89 026 254 degrees     QTc  Int : 405 ms    Atrial flutter with variable A-V block with premature ventricular or  aberrantly conducted complexes  Right bundle branch block  Abnormal ECG  No previous ECGs available    Referred By:             Confirmed By:        Telemetry: PAF 80s-110s    Physical Exam  Constitutional:       Comments: BiPAP   HENT:      Head: Normocephalic.      Mouth/Throat:      Mouth: Mucous membranes are moist.   Eyes:      Extraocular Movements: Extraocular movements intact.   Cardiovascular:      Rate and Rhythm: Normal rate. Rhythm irregular.      Pulses: Normal pulses.      Heart sounds: Normal heart sounds.   Pulmonary:      Effort: Pulmonary effort is normal. No respiratory distress.      Breath sounds: Rhonchi present.   Abdominal:      Palpations: Abdomen is soft.   Skin:     General: Skin is warm and dry.   Neurological:      Mental Status: He is alert and oriented to person, place, and time.   Psychiatric:         Mood and Affect: Mood normal.         Behavior: Behavior normal.         Home Medications:   No current facility-administered medications on file prior to encounter.     No current outpatient medications on file prior to encounter.       Current Inpatient Medications:    Current Facility-Administered Medications:     acetaminophen tablet 650 mg, 650 mg, Oral, Q8H PRN, Arlyn Jim, AGACNP-BC    acetaminophen tablet 650 mg, 650 mg, Oral, Q4H PRN, Arlyn MARQUEZ Doumit, AGACNP-BC    enoxaparin injection 150 mg, 150 mg, Subcutaneous, Q12H, Jayant Murillo MD, 150 mg at 05/20/22 1144    glucagon (human recombinant) injection 1 mg, 1 mg, Intramuscular, PRN, Arlyn Jim AGACNP-BC    hydrALAZINE injection 10 mg, 10 mg, Intravenous, Q6H PRN, Arlyn Jim AGACNP-BC, 10 mg at 05/20/22 1206    insulin aspart U-100 injection 1-10 Units, 1-10 Units, Subcutaneous, Q6H PRN, Arlyn Jim AGACNP-BC    ondansetron injection 4 mg, 4 mg, Intravenous, Q8H PRN, Arlyn Jim AGACNP-BC  No current outpatient  medications on file.        VTE Risk Mitigation (From admission, onward)           Ordered     enoxaparin injection 150 mg  Every 12 hours (non-standard times)         05/20/22 1056     IP VTE LOW RISK PATIENT  Once         05/20/22 0832     Place sequential compression device  Until discontinued         05/20/22 0832                  Assessment:   Acute on Chronic Hypoxemic Respiratory Fialure requiring BiPAP  Newly Diagnosed PAF/Flutter    - CHADsVASc - 3 Points - 3.2% Stroke Risk per Year  HTN  DM II  COPD/Asthma  Unspecified HF  L Eye Blindness  Morbid Obesity    Plan:   ECHO when HR Stable  Trend Cardiac Biomarkers  Metoprolol Tartrate 25mg PO BID, Uptitrate as Needed for HR Control  Lopressor 5mg IVP Q2HR PRN HR > 120 if SBP > 90mmHg  Start FD Lovenox 1mg/kg SQ BID for CVA Prophylaxis in the Setting of PAF  OP Sleep Study if PT does not have previous Diagnostic Study  Labs and EKG in AM: CBC, CMP and Mg  Will F/U in AM    Thank you for your consult.       Hunter Watson MD  Cardiology

## 2022-05-20 NOTE — PROGRESS NOTES
"[facility] - Emergency Dept  Adult Nutrition  Progress Note    SUMMARY       Recommendations    Recommendation/Intervention: Rec cardiac/diabetic diet      Reason for Assessment    Reason For Assessment: other (see comments) (Dx)  Diagnosis: other (see comments) (SOB, COPD exacerbation, Atrial Flutter, Hypoxia)  Relevant Medical History: HTN, DM, CHF, COPD  General Information Comments: 5/20: Pt sleeping during rounds in ED. RN states MD about to round in ED and anticipate diet to be ordered after. No unintentional wt loss or decrease in appetite prior to admit documented at this time in chart.    Nutrition Risk Screen    Nutrition Risk Screen: no indicators present    Nutrition/Diet History         Anthropometrics    Temp: 98.6 °F (37 °C)  Height: 6' 0.84" (185 cm)  Height (inches): 72.83 in  Weight: (!) 155.5 kg (342 lb 13 oz) (Wt per EMR. Not able to re-weigh pt during rounds. Pt visually appears to have a wt of 300lb +.)  Weight (lb): (!) 342.82 lb  Ideal Body Weight (IBW), Male: 182.98 lb  % Ideal Body Weight, Male (lb): 187.35 %  BMI (Calculated): 45.4       Lab/Procedures/Meds    Pertinent Labs Comments: 5/20: Gluc 106  Pertinent Medications Comments: none nutritionally pertinent    Nutrition Prescription Ordered    Current Diet Order: no diet ordered at this time    Evaluation of Received Nutrient/Fluid Intake       % Intake of Estimated Energy Needs: 50 - 75 %  % Meal Intake: 50 - 75 %    Nutrition Risk    Level of Risk/Frequency of Follow-up: low     Monitor and Evaluation      Food and beverage intake    Nutrition Follow-Up    RD Follow-up?: Yes    "

## 2022-05-20 NOTE — ED PROVIDER NOTES
Encounter Date: 5/20/2022    SCRIBE #1 NOTE: I, Frederick Riley, am scribing for, and in the presence of,  Dr. Murillo. I have scribed the following portions of the note - the EKG reading. Other sections scribed: HPI, ROS, Physical Exam, MDM, Attending.       History     Chief Complaint   Patient presents with    Shortness of Breath     Hx asthma - sob not relieved w/ albuterol at home, saw pcp yest and was scheduled for ekg due to tachycardia     51 y/o AAM with history of HTN, DM, CHF, COPD and asthma presents to ED via EMS for SOB onset yesterday.  Pt also complains of a dry cough.  His SOB has not been relieved with albuterol at home.  He was given DuoNeb by EMS.  Pt is on 2L O2 at home normally.  He saw his PCP yesterday, where he was tachycardic, so he was scheduled for an EKG today.  Pt denies chest pain or fever.  Reports hx of KRISHNA, supposed to use CPAP.  Has not had since moved from [place], four months ago.  No previous hx of aflutter per patient.     The history is provided by the patient.   Shortness of Breath  This is a new problem. The problem occurs continuously.The current episode started yesterday. The problem has not changed since onset.Associated symptoms include cough (dry). Pertinent negatives include no fever, no sore throat, no sputum production, no chest pain, no abdominal pain and no rash. Associated medical issues include asthma, COPD and heart failure.     Review of patient's allergies indicates:   Allergen Reactions    Iodine Shortness Of Breath and Swelling     IT FLARES UP ASTHMA SYMPTOMSFLAR      Shellfish containing products      History reviewed. No pertinent past medical history.  History reviewed. No pertinent surgical history.  History reviewed. No pertinent family history.     Review of Systems   Constitutional: Negative for fever.   HENT: Negative for sore throat.    Eyes: Negative for visual disturbance.   Respiratory: Positive for cough (dry) and shortness of breath. Negative  for sputum production.    Cardiovascular: Negative for chest pain.   Gastrointestinal: Negative for abdominal pain.   Genitourinary: Negative for dysuria.   Musculoskeletal: Negative for joint swelling.   Skin: Negative for rash.   Neurological: Negative for weakness.   Psychiatric/Behavioral: Negative for confusion.   All other systems reviewed and are negative.      Physical Exam     Initial Vitals [05/20/22 0019]   BP Pulse Resp Temp SpO2   (!) 185/129 (!) 128 (!) 28 98.6 °F (37 °C) (!) 93 %      MAP       --         Physical Exam    Nursing note and vitals reviewed.  Constitutional: He appears well-developed and well-nourished. He is not diaphoretic. No distress.   Obese   HENT:   Head: Normocephalic and atraumatic.   Eyes: Conjunctivae are normal.   Opacification of L eye   Neck:   Normal range of motion.  Cardiovascular: Normal heart sounds and intact distal pulses.   No murmur heard.  Irregularly irregular rhythm. Tachycardiac.    Pulmonary/Chest: He is in respiratory distress. He has wheezes (all lung fields). He has no rales.   tachypneic   Abdominal: Abdomen is soft. He exhibits no distension. There is no abdominal tenderness.   Obese     Musculoskeletal:         General: No tenderness or edema. Normal range of motion.      Cervical back: Normal range of motion.     Neurological: He is alert and oriented to person, place, and time. No cranial nerve deficit.   Skin: Skin is warm and dry. Capillary refill takes less than 2 seconds. No rash noted. No erythema.   Psychiatric: He has a normal mood and affect.         ED Course   Critical Care    Date/Time: 5/20/2022 6:33 AM  Performed by: Jayant Murillo MD  Authorized by: Jayant Murillo MD   Total critical care time (exclusive of procedural time) : 33 minutes  Critical care time was exclusive of separately billable procedures and treating other patients and teaching time.  Critical care was necessary to treat or prevent imminent or life-threatening  deterioration of the following conditions: circulatory failure, cardiac failure, respiratory failure and metabolic crisis.  Critical care was time spent personally by me on the following activities: blood draw for specimens, discussions with consultants, obtaining history from patient or surrogate, evaluation of patient's response to treatment, ordering and review of laboratory studies, pulse oximetry, development of treatment plan with patient or surrogate, discussions with primary provider, interpretation of cardiac output measurements, examination of patient, ordering and performing treatments and interventions, ordering and review of radiographic studies, re-evaluation of patient's condition and review of old charts.        Labs Reviewed   COMPREHENSIVE METABOLIC PANEL - Abnormal; Notable for the following components:       Result Value    Glucose Level 106 (*)     Creatinine 1.23 (*)     All other components within normal limits   PROTIME-INR - Abnormal; Notable for the following components:    PT 14.6 (*)     All other components within normal limits   CBC WITH DIFFERENTIAL - Abnormal; Notable for the following components:    MCV 97.0 (*)     MCHC 30.6 (*)     IG# 0.02 (*)     All other components within normal limits   TROPONIN I - Normal   B-TYPE NATRIURETIC PEPTIDE - Normal   APTT - Normal   COVID/FLU A&B PCR - Normal   TSH - Normal   T4, FREE - Normal   CBC W/ AUTO DIFFERENTIAL    Narrative:     The following orders were created for panel order CBC Auto Differential.  Procedure                               Abnormality         Status                     ---------                               -----------         ------                     CBC with Differential[[id number]]        Abnormal            Final result                 Please view results for these tests on the individual orders.     EKG Readings: (Independently Interpreted)   Initial Reading: No STEMI. Rhythm: Atrial Flutter. Heart Rate: 119.  Ectopy: No Ectopy. Conduction: RBBB (variable AV block). ST Segments: Normal ST Segments. T Waves: Normal. Axis: Normal.   1216     ECG Results          EKG 12-lead (In process)  Result time 05/20/22 00:44:58    In process by Interface, Lab In [facility] (05/20/22 00:44:58)                 Narrative:    Test Reason : R06.02,    Vent. Rate : 119 BPM     Atrial Rate : 286 BPM     P-R Int : 000 ms          QRS Dur : 148 ms      QT Int : 288 ms       P-R-T Axes : -89 026 254 degrees     QTc Int : 405 ms    Atrial flutter with variable A-V block with premature ventricular or  aberrantly conducted complexes  Right bundle branch block  Abnormal ECG  No previous ECGs available    Referred By:             Confirmed By:                             Imaging Results          X-Ray Chest 1 View (Final result)  Result time 05/20/22 06:37:20    Final result by Oziel Vale MD (05/20/22 06:37:20)                 Impression:      No acute cardiopulmonary process. Prominent interstitial markings with no focal opacification.      Electronically signed by: Oziel Vale  Date:    05/20/2022  Time:    06:37             Narrative:    EXAMINATION:  XR CHEST 1 VIEW    CLINICAL HISTORY:  shortness of breath;    TECHNIQUE:  Single view of the chest    COMPARISON:  10/30/2019    FINDINGS:  Prominent interstitial markings with no focal opacification.    The cardiomediastinal silhouette is within normal limits.    No acute osseous abnormality.                                 Medications   metoprolol injection 5 mg (5 mg Intravenous Given 5/20/22 0107)   methylPREDNISolone sodium succinate injection 125 mg (125 mg Intravenous Given 5/20/22 0130)   albuterol-ipratropium 2.5 mg-0.5 mg/3 mL nebulizer solution 3 mL (3 mLs Nebulization Given 5/20/22 0147)   labetaloL injection 10 mg (10 mg Intravenous Given 5/20/22 0315)     Medical Decision Making:   Clinical Tests:   Lab Tests: Ordered and Reviewed  Radiological Study: Reviewed and  Ordered  Medical Tests: Ordered and Reviewed    Patient is a 52-year-old male who presents to the emergency department in respiratory distress.  Tachypneic, tachycardic, noted to be in a flutter with RVR.  Hypoxic, placed on 2 L. given metoprolol with improvement in rate.  Unable Cardizem due to limited supply.  Patient given Solu-Medrol, DuoNeb for COPD exacerbation.  Rate controlled, patient given antihypertensive medication.  All results discussed with patient.  Will admit for new onset a flutter with RVR, respiratory distress.  Hemodynamically stable at this time.              Scribe Attestation:   Scribe #1: I performed the above scribed service and the documentation accurately describes the services I performed. I attest to the accuracy of the note.          I, Jayant Murillo MD, personally performed the services described in the documentation as documented by the scribe in my presence and is both accurate and complete.          Clinical Impression:   Final diagnoses:  [R06.02] Shortness of breath  [R06.02] SOB (shortness of breath)  [J44.1] COPD exacerbation  [I48.92] Atrial flutter, unspecified type (Primary)  [I48.92] Atrial flutter with rapid ventricular response  [R09.02] Hypoxia          ED Disposition Condition    Admit               Jayant Murillo MD  05/20/22 0644

## 2022-05-21 PROBLEM — J44.1 COPD EXACERBATION: Status: ACTIVE | Noted: 2022-01-01

## 2022-05-21 NOTE — PLAN OF CARE
Problem: Adult Inpatient Plan of Care  Goal: Plan of Care Review  Outcome: Ongoing, Progressing  Flowsheets (Taken 5/21/2022 0613)  Plan of Care Reviewed With: patient  Goal: Patient-Specific Goal (Individualized)  Outcome: Ongoing, Progressing  Flowsheets (Taken 5/21/2022 0613)  Patient-Specific Goals (Include Timeframe): To get better and go home  Goal: Optimal Comfort and Wellbeing  Outcome: Ongoing, Progressing  Intervention: Monitor Pain and Promote Comfort  Flowsheets (Taken 5/21/2022 0613)  Pain Management Interventions:   position adjusted   care clustered  Intervention: Provide Person-Centered Care  Flowsheets (Taken 5/21/2022 0613)  Trust Relationship/Rapport: care explained

## 2022-05-21 NOTE — PROGRESS NOTES
Consults  [facility] - Emergency Dept  Cardiology  Consult Note    Patient Name: Vladimir Bernal  MRN: [id number]  Admission Date: 5/20/2022  Hospital Length of Stay: 1 days  Code Status: Full Code   Attending Provider: Zhou Pryor MD   Consulting Provider: CHRIS Max  Primary Care Physician: Primary Doctor No  Principal Problem:COPD exacerbation    Patient information was obtained from spouse/SO and ER records.     Subjective:     Chief Complaint: Reason for Consult: PAF/Flutter     HPI: Mr. Bernal is a 53 y/o male who is unknown to [facility]. The patient presented to [facility] on 5.20.22 with c/o SOB and Cough x 2 Days. The patient reported that about 2 days prior he developed SOB that progressively worsened and was associated with a non-productive cough. He denied fever and chills. In the ER his initial workup revealed a SBP in the 180s (185/129), RA O2 Saturation of 93%, Crea 1.23, Normal BNP, CXR with Prominent Interstitial Markings, Flu/COVID-19 Negative and an EKG with PAF/Flutter with RVR. He was given IV Metoprolol and Placed on BiPAP. His HR did improve with the BB Administration. [facility] was consulted for Abnormal EKG: PAF/Flutter with RVR.     PMH: HTN, DM II, COPD/Asthma, HF, Chronic Hypoxemic Respiratory Failure/Home O2, L Eye Blindness, Obesity  PSH: Denies Past Surgical History  Family History: Reviewed and Unremarkable for Heart Disease  Social History: Denies Illicit Drug, ETOH and Tobacco Use    Previous Cardiac Diagnostics: None for Review    5.21.22. NAD. Patient lying in bed. Tele Aflutter RVR -HR up to 120. Denies CP or palps. + stable SOB    Review of patient's allergies indicates:   Allergen Reactions    Iodine Shortness Of Breath and Swelling     IT FLARES UP ASTHMA SYMPTOMSFLAR      Shellfish containing products      Review of Systems:  Review of Systems   Constitutional: Negative for fever.   Respiratory: Positive for shortness of breath.    Cardiovascular:  Negative for chest pain and palpitations.     Objective:     Vital Signs (Most Recent):  Temp: 97.4 °F (36.3 °C) (05/21/22 0830)  Pulse: (!) 125 (05/21/22 0834)  Resp: (!) 22 (05/21/22 0834)  BP: (!) 167/106 (05/21/22 0830)  SpO2: 95 % (05/21/22 0834) Vital Signs (24h Range):  Temp:  [97.4 °F (36.3 °C)-98.5 °F (36.9 °C)] 97.4 °F (36.3 °C)  Pulse:  [] 125  Resp:  [16-24] 22  SpO2:  [93 %-98 %] 95 %  BP: (142-171)/() 167/106     Weight: (!) 167 kg (368 lb 2.7 oz)  Body mass index is 48.79 kg/m².    SpO2: 95 %  O2 Device (Oxygen Therapy): nasal cannula    No intake or output data in the 24 hours ending 05/21/22 1037    Lines/Drains/Airways     Peripheral Intravenous Line  Duration                Peripheral IV - Single Lumen 05/20/22 0107 20 G Anterior;Distal;Right Upper Arm 1 day              Significant Labs:  Recent Results (from the past 72 hour(s))   Comprehensive Metabolic Panel    Collection Time: 05/20/22  1:03 AM   Result Value Ref Range    Sodium Level 143 136 - 145 mmol/L    Potassium Level 4.3 3.5 - 5.1 mmol/L    Chloride 107 98 - 107 mmol/L    Carbon Dioxide 28 22 - 29 mmol/L    Glucose Level 106 (H) 74 - 100 mg/dL    Blood Urea Nitrogen 15.6 8.4 - 25.7 mg/dL    Creatinine 1.23 (H) 0.73 - 1.18 mg/dL    Calcium Level Total 9.4 8.4 - 10.2 mg/dL    Protein Total 7.1 6.4 - 8.3 gm/dL    Albumin Level 3.7 3.5 - 5.0 gm/dL    Globulin 3.4 2.4 - 3.5 gm/dL    Albumin/Globulin Ratio 1.1 1.1 - 2.0 ratio    Bilirubin Total 0.4 <=1.5 mg/dL    Alkaline Phosphatase 71 40 - 150 unit/L    Alanine Aminotransferase 8 0 - 55 unit/L    Aspartate Aminotransferase 16 5 - 34 unit/L    Estimated GFR- >60 mls/min/1.73/m2   Troponin I    Collection Time: 05/20/22  1:03 AM   Result Value Ref Range    Troponin-I <0.010 0.000 - 0.045 ng/mL   BNP    Collection Time: 05/20/22  1:03 AM   Result Value Ref Range    Natriuretic Peptide 34.1 <=100.0 pg/mL   Protime-INR    Collection Time: 05/20/22  1:03 AM   Result  Value Ref Range    PT 14.6 (H) 12.5 - 14.5 seconds    INR 1.17 0.00 - 1.30   APTT    Collection Time: 05/20/22  1:03 AM   Result Value Ref Range    PTT 28.3 23.2 - 33.7 seconds   COVID/FLU A&B PCR    Collection Time: 05/20/22  1:03 AM   Result Value Ref Range    Influenza A PCR Not Detected Not Detected    Influenza B PCR Not Detected Not Detected    SARS-CoV-2 PCR Not Detected Not Detected   TSH    Collection Time: 05/20/22  1:03 AM   Result Value Ref Range    Thyroid Stimulating Hormone 2.0706 0.3500 - 4.9400 uIU/mL   T4, Free    Collection Time: 05/20/22  1:03 AM   Result Value Ref Range    Thyroxine Free 0.76 0.70 - 1.48 ng/dL   CBC with Differential    Collection Time: 05/20/22  1:03 AM   Result Value Ref Range    WBC 5.6 4.5 - 11.5 x10(3)/mcL    RBC 5.05 4.70 - 6.10 x10(6)/mcL    Hgb 15.0 14.0 - 18.0 gm/dL    Hct 49.0 42.0 - 52.0 %    MCV 97.0 (H) 80.0 - 94.0 fL    MCH 29.7 27.0 - 31.0 pg    MCHC 30.6 (L) 33.0 - 36.0 mg/dL    RDW 16.3 11.5 - 17.0 %    Platelet 286 130 - 400 x10(3)/mcL    MPV 9.6 9.4 - 12.4 fL    Neut % 49.0 %    Lymph % 33.6 %    Mono % 8.8 %    Eos % 7.7 %    Basophil % 0.5 %    Lymph # 1.87 0.6 - 4.6 x10(3)/mcL    Neut # 2.7 2.1 - 9.2 x10(3)/mcL    Mono # 0.49 0.1 - 1.3 x10(3)/mcL    Eos # 0.43 0 - 0.9 x10(3)/mcL    Baso # 0.03 0 - 0.2 x10(3)/mcL    IG# 0.02 (H) 0 - 0.0155 x10(3)/mcL    IG% 0.4 0 - 0.43 %    NRBC% 0.4 %   POCT ARTERIAL BLOOD GAS Blood Gas    Collection Time: 05/20/22 11:55 AM   Result Value Ref Range    POC PH      POC PCO2      POC PO2      POC Sodium      POC Potassium      POC Ionized Calcium      POC HEMOGLOBIN      POC O2Hb      POC COHb      POC MetHb      POC PCO2      Base Excess, Arterial      O2 Sat, Art      HCO3, Arterial     POCT ARTERIAL BLOOD GAS    Collection Time: 05/20/22 12:09 PM   Result Value Ref Range    POC PH 7.25 (AA) 7.29 - 7.61    POC PCO2 64 (AA) 19 - 50 mmHg    POC PO2 72 (A) 80 - 100 mmHg    POC HEMOGLOBIN 15.9 12.0 - 16.0 g/dL    POC SATURATED  O2 91.3 %    POC O2Hb 90.9 (A) 94.0 - 97.0 %    POC COHb 2.7 %    POC MetHb 1.1 0.40 - 1.5 %    POC Potassium 4.9 3.5 - 5.0 mmol/l    POC Sodium 136 (A) 137 - 145 mmol/l    POC Ionized Calcium 1.27 (A) 1.12 - 1.23 mmol/l    Correct Temperature (PH) 7.25 (AA) 7.29 - 7.61    Corrected Temperature (pCO2) 64 (AA) 19 - 50 mmHg    Corrected Temperature (pO2) 72 (A) 80 - 100 mmHg    POC HCO3 28.1 mmol/l    Base Deficit -1.0 -2.0 - 2.0 mmol/l    POC Temp 37.0 °C    Specimen source Arterial sample    Echo    Collection Time: 05/20/22  4:20 PM   Result Value Ref Range    BSA 2.93 m2    TDI SEPTAL 0.14 m/s    LV LATERAL E/E' RATIO 9.67 m/s    LV SEPTAL E/E' RATIO 8.29 m/s    Right Atrial Pressure (from IVC) 15 mmHg    EF 43 %    Left Ventricular Outflow Tract Mean Velocity 0.467 cm/s    Left Ventricular Outflow Tract Mean Gradient 1.00 mmHg    TDI LATERAL 0.12 m/s    LVIDd 4.85 3.5 - 6.0 cm    IVS 1.39 (A) 0.6 - 1.1 cm    Posterior Wall 1.01 0.6 - 1.1 cm    LVIDs 4.05 (A) 2.1 - 4.0 cm    FS 16 28 - 44 %    LV mass 222.74 g    LA size 3.70 cm    RVDD 3.88 cm    TAPSE 2.03 cm    Left Ventricle Relative Wall Thickness 0.42 cm    AV mean gradient 3 mmHg    AV valve area 1.95 cm2    AV Velocity Ratio 0.66     AV index (prosthetic) 0.56     MV mean gradient 2 mmHg    MV valve area by continuity eq 2.05 cm2    Mean e' 0.13 m/s    LVOT diameter 2.10 cm    LVOT area 3.5 cm2    LVOT peak yoseph 0.73 m/s    LVOT peak VTI 10.70 cm    Ao peak yoseph 1.10 m/s    Ao VTI 19.0 cm    LVOT stroke volume 37.04 cm3    AV peak gradient 5 mmHg    MV peak gradient 5 mmHg    TV rest pulmonary artery pressure 42 mmHg    E/E' ratio 8.92 m/s    MV Peak E Yoseph 1.16 m/s    TR Max Yoseph 2.60 m/s    MV VTI 18.1 cm    LV Systolic Volume 72.10 mL    LV Systolic Volume Index 25.9 mL/m2    LV Diastolic Volume 110.00 mL    LV Diastolic Volume Index 39.57 mL/m2    LV Mass Index 80 g/m2    Triscuspid Valve Regurgitation Peak Gradient 27 mmHg    LA Volume Index (Mod) 19.4  mL/m2    LA volume (mod) 53.90 cm3   POCT ARTERIAL BLOOD GAS Blood Gas    Collection Time: 05/20/22  5:00 PM   Result Value Ref Range    POC PH      POC PCO2      POC PO2      POC Sodium      POC Potassium      POC Ionized Calcium      POC HEMOGLOBIN      POC O2Hb      POC COHb      POC MetHb      POC PCO2      Base Excess, Arterial -0.3 -2.0 - 2.0 mmol/L    O2 Sat, Art      HCO3, Arterial 28.2 (A) 18.0 - 23.0 MMOL/L   POCT glucose    Collection Time: 05/20/22  5:15 PM   Result Value Ref Range    POCT Glucose 136 (H) 70 - 110 mg/dL   POCT ARTERIAL BLOOD GAS    Collection Time: 05/20/22  5:21 PM   Result Value Ref Range    POC PH 7.28 (AA) 7.29 - 7.61    POC PCO2 60 (AA) 19 - 50 mmHg    POC PO2 85 80 - 100 mmHg    POC HEMOGLOBIN 16.9 (A) 12.0 - 16.0 g/dL    POC SATURATED O2 95.0 %    POC O2Hb 94.3 94.0 - 97.0 %    POC COHb 2.2 %    POC MetHb 1.0 0.40 - 1.5 %    POC Potassium 4.6 3.5 - 5.0 mmol/l    POC Sodium 135 (A) 137 - 145 mmol/l    POC Ionized Calcium 1.19 1.12 - 1.23 mmol/l    Correct Temperature (PH) 7.28 (AA) 7.29 - 7.61    Corrected Temperature (pCO2) 60 (AA) 19 - 50 mmHg    Corrected Temperature (pO2) 85 80 - 100 mmHg    POC HCO3 28.2 mmol/l    Base Deficit -0.3 -2 - 2 mmol/l    POC Temp 37.0 °C    Specimen source Arterial sample    POCT glucose    Collection Time: 05/20/22  8:33 PM   Result Value Ref Range    POCT Glucose 193 (H) 70 - 110 mg/dL   POCT glucose    Collection Time: 05/21/22  4:22 AM   Result Value Ref Range    POCT Glucose 174 (H) 70 - 110 mg/dL   Comprehensive Metabolic Panel    Collection Time: 05/21/22  4:41 AM   Result Value Ref Range    Sodium Level 139 136 - 145 mmol/L    Potassium Level 4.5 3.5 - 5.1 mmol/L    Chloride 106 98 - 107 mmol/L    Carbon Dioxide 27 22 - 29 mmol/L    Glucose Level 164 (H) 74 - 100 mg/dL    Blood Urea Nitrogen 12.0 8.4 - 25.7 mg/dL    Creatinine 0.83 0.73 - 1.18 mg/dL    Calcium Level Total 10.0 8.4 - 10.2 mg/dL    Protein Total 7.6 6.4 - 8.3 gm/dL    Albumin  Level 3.7 3.5 - 5.0 gm/dL    Globulin 3.9 (H) 2.4 - 3.5 gm/dL    Albumin/Globulin Ratio 0.9 (L) 1.1 - 2.0 ratio    Bilirubin Total 0.3 <=1.5 mg/dL    Alkaline Phosphatase 66 40 - 150 unit/L    Alanine Aminotransferase 11 0 - 55 unit/L    Aspartate Aminotransferase 12 5 - 34 unit/L    Estimated GFR- >60 mls/min/1.73/m2   Lipid Panel    Collection Time: 05/21/22  4:41 AM   Result Value Ref Range    Cholesterol Total 196 <=200 mg/dL    HDL Cholesterol 51 35 - 60 mg/dL    Triglyceride 135 34 - 140 mg/dL    Cholesterol/HDL Ratio 4 0 - 5    Very Low Density Lipoprotein 27     LDL Cholesterol 118.00 50.00 - 140.00 mg/dL   Hemoglobin A1C    Collection Time: 05/21/22  4:41 AM   Result Value Ref Range    Hemoglobin A1c 6.3 <=7.0 %    Estimated Average Glucose 134.1 mg/dL   Magnesium    Collection Time: 05/21/22  4:41 AM   Result Value Ref Range    Magnesium Level 1.80 1.60 - 2.60 mg/dL   CBC with Differential    Collection Time: 05/21/22  4:41 AM   Result Value Ref Range    WBC 8.0 4.5 - 11.5 x10(3)/mcL    RBC 5.30 4.70 - 6.10 x10(6)/mcL    Hgb 15.9 14.0 - 18.0 gm/dL    Hct 51.0 42.0 - 52.0 %    MCV 96.2 (H) 80.0 - 94.0 fL    MCH 30.0 27.0 - 31.0 pg    MCHC 31.2 (L) 33.0 - 36.0 mg/dL    RDW 16.4 11.5 - 17.0 %    Platelet 336 130 - 400 x10(3)/mcL    MPV 10.1 9.4 - 12.4 fL    Neut % 88.3 %    Lymph % 9.3 %    Mono % 1.6 %    Eos % 0.0 %    Basophil % 0.1 %    Lymph # 0.75 0.6 - 4.6 x10(3)/mcL    Neut # 7.1 2.1 - 9.2 x10(3)/mcL    Mono # 0.13 0.1 - 1.3 x10(3)/mcL    Eos # 0.00 0 - 0.9 x10(3)/mcL    Baso # 0.01 0 - 0.2 x10(3)/mcL    IG# 0.06 (H) 0 - 0.0155 x10(3)/mcL    IG% 0.7 (H) 0 - 0.43 %    NRBC% 0.6 %       Significant Imaging:   Imaging Results          X-Ray Chest 1 View (Final result)  Result time 05/20/22 06:37:20    Final result by Oziel Vale MD (05/20/22 06:37:20)                 Impression:      No acute cardiopulmonary process. Prominent interstitial markings with no focal  opacification.      Electronically signed by: Oziel Vale  Date:    05/20/2022  Time:    06:37             Narrative:    EXAMINATION:  XR CHEST 1 VIEW    CLINICAL HISTORY:  shortness of breath;    TECHNIQUE:  Single view of the chest    COMPARISON:  10/30/2019    FINDINGS:  Prominent interstitial markings with no focal opacification.    The cardiomediastinal silhouette is within normal limits.    No acute osseous abnormality.                                Last Lipids:  Lab Results   Component Value Date    CHOL 196 05/21/2022     Lab Results   Component Value Date    HDL 51 05/21/2022     No results found for: LDLCALC  Lab Results   Component Value Date    TRIG 135 05/21/2022     No results found for: CHOLHDL    EKG:    Results for orders placed or performed during the hospital encounter of 05/20/22   EKG 12-lead    Narrative    Test Reason : R06.02,    Vent. Rate : 119 BPM     Atrial Rate : 286 BPM     P-R Int : 000 ms          QRS Dur : 148 ms      QT Int : 288 ms       P-R-T Axes : -89 026 254 degrees     QTc Int : 405 ms    Atrial flutter with variable A-V block with premature ventricular or  aberrantly conducted complexes  Right bundle branch block  Abnormal ECG  No previous ECGs available    Referred By:             Confirmed By:        Telemetry: PAF 80s-110s    Physical Exam  Constitutional:       Comments: BiPAP   HENT:      Head: Normocephalic.      Mouth/Throat:      Mouth: Mucous membranes are moist.   Eyes:      Extraocular Movements: Extraocular movements intact.   Cardiovascular:      Rate and Rhythm: Tachycardia present. Rhythm irregular.      Pulses: Normal pulses.      Heart sounds: Normal heart sounds.   Pulmonary:      Effort: Pulmonary effort is normal. No respiratory distress.      Breath sounds: Rhonchi present.   Abdominal:      Palpations: Abdomen is soft.   Skin:     General: Skin is warm and dry.   Neurological:      Mental Status: He is alert and oriented to person, place, and time.    Psychiatric:         Mood and Affect: Mood normal.         Behavior: Behavior normal.         Home Medications:   No current facility-administered medications on file prior to encounter.     Current Outpatient Medications on File Prior to Encounter   Medication Sig Dispense Refill    amLODIPine (NORVASC) 10 MG tablet Take 10 mg by mouth once daily.      atorvastatin (LIPITOR) 80 MG tablet Take 80 mg by mouth once daily.      gabapentin (NEURONTIN) 300 MG capsule Take 300 mg by mouth 3 (three) times daily.      metFORMIN (GLUCOPHAGE-XR) 500 MG ER 24hr tablet Take 500 mg by mouth once daily.      albuterol (ACCUNEB) 0.63 mg/3 mL Nebu Take 0.63 mg by nebulization every 6 (six) hours as needed. Rescue      budesonide-formoterol 160-4.5 mcg (SYMBICORT) 160-4.5 mcg/actuation HFAA Inhale 2 puffs into the lungs every 12 (twelve) hours. Controller      butalbital-acetaminophen-caffeine -40 mg (FIORICET, ESGIC) -40 mg per tablet Take 1 tablet by mouth every 4 (four) hours as needed for Pain.      ibuprofen (ADVIL,MOTRIN) 800 MG tablet Take 800 mg by mouth every 6 (six) hours as needed for Pain.      lisinopriL (PRINIVIL,ZESTRIL) 20 MG tablet Take 20 mg by mouth once daily.      pantoprazole (PROTONIX) 40 MG tablet Take 40 mg by mouth once daily.      predniSONE (DELTASONE) 20 MG tablet Take 20 mg by mouth once daily.      promethazine-codeine 6.25-10 mg/5 ml (PHENERGAN WITH CODEINE) 6.25-10 mg/5 mL syrup Take 5 mLs by mouth every 4 (four) hours as needed for Cough.      tadalafiL (CIALIS) 5 MG tablet Take 10 mg by mouth daily as needed for Erectile Dysfunction.      tiotropium (SPIRIVA) 18 mcg inhalation capsule Inhale 18 mcg into the lungs once daily. Controller      traMADoL (ULTRAM) 50 mg tablet Take 50 mg by mouth 2 (two) times a day.      [DISCONTINUED] naproxen (EC NAPROSYN) 500 MG EC tablet Take 500 mg by mouth 2 (two) times daily.      [DISCONTINUED] oxyCODONE-acetaminophen (PERCOCET)   mg per tablet Take 1 tablet by mouth every 4 (four) hours as needed for Pain.         Current Inpatient Medications:    Current Facility-Administered Medications:     acetaminophen tablet 650 mg, 650 mg, Oral, Q4H PRN, AVELINO Burgos    amLODIPine tablet 5 mg, 5 mg, Oral, Daily, Uday Rich MD, 5 mg at 05/21/22 0919    atorvastatin tablet 80 mg, 80 mg, Oral, Daily, Zhou Pryor MD    butalbital-acetaminophen-caffeine -40 mg per tablet 1 tablet, 1 tablet, Oral, Q4H PRN, Zhou Pryor MD    enoxaparin injection 150 mg, 150 mg, Subcutaneous, Q12H, Jayant Murillo MD, 150 mg at 05/20/22 2224    fluticasone furoate-vilanteroL 100-25 mcg/dose diskus inhaler 1 puff, 1 puff, Inhalation, Daily, Uday Rich MD    gabapentin capsule 300 mg, 300 mg, Oral, TID, Zhou Pryor MD    glucagon (human recombinant) injection 1 mg, 1 mg, Intramuscular, PRN, AVELINO Burgos    hydrALAZINE injection 10 mg, 10 mg, Intravenous, Q6H PRN, AVELINO Burgos, 10 mg at 05/20/22 2029    insulin aspart U-100 injection 1-10 Units, 1-10 Units, Subcutaneous, Q6H PRN, AVELINO Burgos, 2 Units at 05/21/22 0556    ipratropium 0.02 % nebulizer solution 0.5 mg, 0.5 mg, Nebulization, Q4H PRN, Uday Rich MD, 0.5 mg at 05/21/22 0834    levalbuterol nebulizer solution 1.25 mg, 1.25 mg, Nebulization, Q4H PRN, Uday Rich MD    lisinopriL tablet 20 mg, 20 mg, Oral, Daily, Zhou Pryor MD    metFORMIN tablet 500 mg, 500 mg, Oral, BID WM, Zhou Pryor MD    [START ON 5/22/2022] methylPREDNISolone sodium succinate injection 40 mg, 40 mg, Intravenous, Daily, Zhou Pryor MD    metoprolol injection 5 mg, 5 mg, Intravenous, Q15 Min PRN, Jesus FLORIAN. OZIEL Dey, 5 mg at 05/21/22 0557    metoprolol tartrate (LOPRESSOR) tablet 25 mg, 25 mg, Oral, BID, Uday Rich MD, 25 mg at 05/21/22 0918    ondansetron injection 4 mg, 4 mg, Intravenous, Q8H PRN,  Arlyn Jim, [facility]    spironolactone tablet 25 mg, 25 mg, Oral, Daily, Zhou Pryor MD    tiotropium bromide 2.5 mcg/actuation inhaler 2 puff, 2 puff, Inhalation, Daily, Zhou Pryor MD        VTE Risk Mitigation (From admission, onward)         Ordered     enoxaparin injection 150 mg  Every 12 hours (non-standard times)         05/20/22 1056     IP VTE LOW RISK PATIENT  Once         05/20/22 0832     Place sequential compression device  Until discontinued         05/20/22 0832              Assessment:   Acute on Chronic Hypoxemic Respiratory Fialure requiring BiPAP  Newly Diagnosed PAF/Flutter- remains RVR    - CHADsVASc - 3 Points - 3.2% Stroke Risk per Year  HTN  DM II  COPD/Asthma  Unspecified HF  L Eye Blindness  Morbid Obesity    Plan:   ECHO when HR Stable- still tachycardic   Trend Cardiac Biomarkers. Negative thus far   Increase Metoprolol Tartrate to 50 mg BID. Give extra 25mg x 1 dose now. Uptitrate as Needed for HR Control  Lopressor 5mg IVP Q2HR PRN HR > 120 if SBP > 90mmHg  Continue FD Lovenox 1mg/kg SQ BID for CVA Prophylaxis in the Setting of PAF  OP Sleep Study if PT does not have previous Diagnostic Study. Patient reports history of KRISHNA   Labs and EKG in AM: CBC, CMP and Mg          Hunter Watson MD  Cardiology

## 2022-05-21 NOTE — PROGRESS NOTES
[facility] Hospital Medicine Progress Note        Chief Complaint: Inpatient Follow-up for COPD exacerbation     HPI:   Vladimir Bernal is a 52 y.o. male with a PMHx of HTN, DM 2, CHF, COPD/Asthma, Chronic hypoxemic respiratory failure on 2L home O2  who presented to [facility] on 5/20/2022 with a primary complaint of worsening SOB with associated cough x2 days.     Initial ED VS include /129, , RR 28, SpO2 93% on RA, temp 98.6. Labs notable for creatinine 1.23, troponin normal. BNP normal. CXR noted prominent interstital markings. Flu and COVID negative. EKG revealed atrial flutter with variable A-V block with premature ventricular or  aberrantly conducted complexes. He was started on full dose Lovenox. HR improved following Metoprolol administration.  Cardiology services were consulted. He has been placed on CPAP. He was admitted to hospital medicine services for further work-up and management of care.    Interval Hx:   Patient awake and comfortable. C/O back pain and this is chronic. Denies any SOB, chest pain, cough, fever or chills. Explained in detail about his HF, EF and Severe KRISHNA. Need more medication compliance. States he used to have CPAP machine, not anymore.     Objective/physical exam:  General: In no acute distress, afebrile, morbidly obese   Chest: Clear to auscultation bilaterally  Heart: Tachycardic, irregular, +S1, S2, no appreciable murmur  Abdomen: Soft, nontender, BS +  MSK: Warm, no lower extremity edema, no clubbing or cyanosis  Neurologic: Alert and oriented x4, Cranial nerve II-XII intact, Strength 5/5 in all 4 extremities    VITAL SIGNS: 24 HRS MIN & MAX LAST   Temp  Min: 97.4 °F (36.3 °C)  Max: 98.5 °F (36.9 °C) 97.4 °F (36.3 °C)   BP  Min: 142/97  Max: 171/115 (!) 167/106   Pulse  Min: 84  Max: 226  (!) 125   Resp  Min: 16  Max: 24 (!) 22   SpO2  Min: 93 %  Max: 98 % 95 %       Recent Labs   Lab 05/20/22  0103 05/21/22  0441   WBC 5.6 8.0   RBC 5.05  5.30   HGB 15.0 15.9   HCT 49.0 51.0   MCV 97.0* 96.2*   MCH 29.7 30.0   MCHC 30.6* 31.2*   RDW 16.3 16.4    336   MPV 9.6 10.1       Recent Labs   Lab 05/20/22  0103 05/20/22  1209 05/20/22  1721 05/21/22  0441     --   --  139   K 4.3  --   --  4.5   CO2 28  --   --  27   BUN 15.6  --   --  12.0   CREATININE 1.23*  --   --  0.83   CALCIUM 9.4  --   --  10.0   PH  --  7.25* 7.28*  --    MG  --   --   --  1.80   ALBUMIN 3.7  --   --  3.7   ALKPHOS 71  --   --  66   ALT 8  --   --  11   AST 16  --   --  12   BILITOT 0.4  --   --  0.3          Microbiology Results (last 7 days)     ** No results found for the last 168 hours. **           See below for Radiology    Scheduled Med:   amLODIPine  5 mg Oral Daily    atorvastatin  80 mg Oral Daily    enoxaparin  150 mg Subcutaneous Q12H    fluticasone furoate-vilanteroL  1 puff Inhalation Daily    gabapentin  300 mg Oral TID    lisinopriL  20 mg Oral Daily    metFORMIN  500 mg Oral BID WM    [START ON 5/22/2022] methylPREDNISolone sodium succinate injection  40 mg Intravenous Daily    metoprolol tartrate  25 mg Oral BID    spironolactone  25 mg Oral Daily    tiotropium bromide  2 puff Inhalation Daily        Continuous Infusions:       PRN Meds:  acetaminophen, butalbital-acetaminophen-caffeine -40 mg, glucagon (human recombinant), hydrALAZINE, insulin aspart U-100, ipratropium, levalbuterol, metoprolol, ondansetron       Assessment/Plan:  Acute on Chronic Hypoxemic and Hypercapnic Respiratory Failure  Hypertension, benign   New Onset Atrial Flutter with RVR  COPD exacerbation  Chronic systolic HF   KRISHNA  DM 2  Polysubstance abuse, Blind [place] Eye  Morbid Obesity    Plan  Patient looking comfortable. His main complaint today is back pain   Reviewed all home meds and started   Will cont ACEI and Aldactone 25 mg po daily   Wean steroids and cont bronchodailators   Use BIPAP support when sleeping. He will qualify for Trilogy ventilator. Will set up on  Monday   UDS pending  Cont FD lovenox and betablockers for Afib     Cont supportive care     F/U by [facility] team     Labs in am    Patient condition:  Guarded    Anticipated discharge and Disposition:      All diagnosis and differential diagnosis have been reviewed; assessment and plan has been documented; I have personally reviewed the labs and test results that are presently available; I have reviewed the patients medication list; I have reviewed the consulting providers response and recommendations. I have reviewed or attempted to review medical records based upon their availability    All of the patient's questions have been  addressed and answered. Patient's is agreeable to the above stated plan. I will continue to monitor closely and make adjustments to medical management as needed.  _____________________________________________________________________    Nutrition Status:    Radiology:  Echo  · Technically difficult study, No Definity contrast is used in this study.  · The left ventricle is normal in size with mildly decreased systolic   function.  · The visually estimated ejection fraction is 43%.  · Unable to accurately assess diastolic function due to insufficient   parameters.  · Mild right ventricular enlargement with low normal right ventricular   systolic function.  · Mild tricuspid regurgitation. The estimated PA systolic pressure is 42   mmHg.     X-Ray Chest 1 View  Narrative: EXAMINATION:  XR CHEST 1 VIEW    CLINICAL HISTORY:  shortness of breath;    TECHNIQUE:  Single view of the chest    COMPARISON:  10/30/2019    FINDINGS:  Prominent interstitial markings with no focal opacification.    The cardiomediastinal silhouette is within normal limits.    No acute osseous abnormality.  Impression: No acute cardiopulmonary process. Prominent interstitial markings with no focal opacification.    Electronically signed by: Oziel Vale  Date:    05/20/2022  Time:    06:37      Zhou Pryor MD    05/21/2022

## 2022-05-22 NOTE — PROGRESS NOTES
[facility]  HOSPITAL MEDICINE PROGRESS NOTE      Patient Name: Vladimir Bernal  MRN: [id number]  Admission Date: 5/20/2022 12:21 AM  Status: IP- Inpatient   Length of Stay: 2 days  Face-to-Face encounter date: 05/22/2022         CHIEF COMPLAINT  Hospital follow up for COPD exacerbation     HOSPITAL COURSE  52 y.o. male with a PMHx of HTN, DM 2, CHF, COPD/Asthma, Chronic hypoxemic respiratory failure on 2L home O2  who presented to [facility] on 5/20/2022 with a primary complaint of worsening SOB with associated cough x2 days.  Initial ED VS include /129, , RR 28, SpO2 93% on RA, temp 98.6. Labs notable for creatinine 1.23, troponin normal. BNP normal. CXR noted prominent interstital markings. Flu and COVID negative. EKG revealed atrial flutter with variable A-V block with premature ventricular or  aberrantly conducted complexes. He was started on full dose Lovenox. HR improved following Metoprolol administration.  Cardiology services were consulted. He has been placed on CPAP. He was admitted to hospital medicine services for further work-up and management of care.    Today (05/22/2022): In Afib with RVR, HR up to 150s on the monitor. Denies chest pain. Reports palpitations, shortness of breath improving and unproductive cough. Also reports chronic low back pain. No fever. On 2L NC oxygen    OBJECTIVE    VITAL SIGNS: 24 HRS MIN & MAX LAST   Temp  Min: 97.3 °F (36.3 °C)  Max: 98.2 °F (36.8 °C) 97.5 °F (36.4 °C)   BP  Min: 127/83  Max: 159/66 128/89   Pulse  Min: 78  Max: 119  104   Resp  Min: 16  Max: 21 18   SpO2  Min: 92 %  Max: 99 % 96 %       LABS/MICROBIOLOGY/MEDICATIONS/DIAGNOSTICS  I have reviewed all pertinent lab results within the past 24 hours.    Recent Labs   Lab 05/20/22  0103 05/21/22  0441 05/22/22  0603   WBC 5.6 8.0 10.0   RBC 5.05 5.30 5.12   HGB 15.0 15.9 15.1   HCT 49.0 51.0 49.5   MCV 97.0* 96.2* 96.7*   MCH 29.7 30.0 29.5   MCHC 30.6* 31.2* 30.5*   RDW 16.3  16.4 16.1    336 305   MPV 9.6 10.1 10.2       Recent Labs   Lab 05/20/22  0103 05/20/22  1209 05/20/22  1721 05/21/22  0441 05/22/22  0603     --   --  139 142   K 4.3  --   --  4.5 4.3   CO2 28  --   --  27 27   BUN 15.6  --   --  12.0 15.6   CREATININE 1.23*  --   --  0.83 0.99   CALCIUM 9.4  --   --  10.0 9.3   PH  --  7.25* 7.28*  --   --    MG  --   --   --  1.80 1.70   ALBUMIN 3.7  --   --  3.7 3.4*   ALKPHOS 71  --   --  66 55   ALT 8  --   --  11 9   AST 16  --   --  12 13   BILITOT 0.4  --   --  0.3 0.4       Recent Labs     05/21/22  0441 05/22/22  0603   WBC 8.0 10.0   RBC 5.30 5.12   HGB 15.9 15.1   HCT 51.0 49.5   MCV 96.2* 96.7*   MCH 30.0 29.5   MCHC 31.2* 30.5*   RDW 16.4 16.1     Recent Labs     05/20/22  0103 05/21/22  0441 05/22/22  0603   CHLORIDE 107 106 106   CO2 28 27 27   BUN 15.6 12.0 15.6   CREATININE 1.23* 0.83 0.99   GLUCOSE 106* 164* 92   CALCIUM 9.4 10.0 9.3   ALBUMIN 3.7 3.7 3.4*   ALKPHOS 71 66 55   ALT 8 11 9   AST 16 12 13   INR 1.17  --   --    BNP 34.1  --   --    TROPONINI <0.010  --   --        Microbiology Results (last 7 days)     ** No results found for the last 168 hours. **           Echo  · Technically difficult study, No Definity contrast is used in this study.  · The left ventricle is normal in size with mildly decreased systolic   function.  · The visually estimated ejection fraction is 43%.  · Unable to accurately assess diastolic function due to insufficient   parameters.  · Mild right ventricular enlargement with low normal right ventricular   systolic function.  · Mild tricuspid regurgitation. The estimated PA systolic pressure is 42   mmHg.     X-Ray Chest 1 View  Narrative: EXAMINATION:  XR CHEST 1 VIEW    CLINICAL HISTORY:  shortness of breath;    TECHNIQUE:  Single view of the chest    COMPARISON:  10/30/2019    FINDINGS:  Prominent interstitial markings with no focal opacification.    The cardiomediastinal silhouette is within normal limits.    No  acute osseous abnormality.  Impression: No acute cardiopulmonary process. Prominent interstitial markings with no focal opacification.    Electronically signed by: Oziel Vale  Date:    05/20/2022  Time:    06:37        Scheduled Med:   amLODIPine  5 mg Oral Daily    atorvastatin  80 mg Oral Daily    enoxaparin  150 mg Subcutaneous Q12H    fluticasone furoate-vilanteroL  1 puff Inhalation Daily    furosemide (LASIX) injection  40 mg Intravenous Once    gabapentin  300 mg Oral TID    lisinopriL  20 mg Oral Daily    metFORMIN  500 mg Oral BID WM    methylPREDNISolone sodium succinate injection  40 mg Intravenous Daily    metoprolol tartrate  100 mg Oral TID    spironolactone  25 mg Oral Daily    umeclidinium  1 capsule Inhalation Daily        Continuous Infusions:       PRN Meds:  acetaminophen, butalbital-acetaminophen-caffeine -40 mg, glucagon (human recombinant), hydrALAZINE, insulin aspart U-100, ipratropium, levalbuterol, metoprolol, ondansetron       PHYSICAL EXAM  Constitutional: Appears well-developed, well-nourished and appears stated age. No acute distress.   Head/Eyes/Ears/Nose/Mouth/Throat: Normocephalic, atraumatic. Left eye blindness. Conjunctive clear, sclera white. Pinna no masses, lesions, tenderness, drainage. Pharynx pink.  Neck: No JVD present. Normal alignment and mobility.  Cardiovascular: Irregular rhythm, tachycardic, S1 / S2, no systolic or diastolic murmur, no rubs or gallops.  Pulmonary/Chest: Effort normal. No respiratory distress. Breath sounds decreased bilaterally. No rales, rhonchi, rubs or wheezes.    Abdominal: Obese, soft, non-tender, non-distended, no rebound tenderness or guarding, normal bowel sounds in all four quadrants.  Musculoskeletal: Extremities symmetric, no tenderness, weakness, or swelling. Maintains flexion against resistance.   Extremities:  Nails: No clubbing, cyanosis, petechiae or nodes. 2+ bilateral pedal and radial pulses with absence of  bilateral lower ext edema.  Neurological: Alert and oriented to person, place, and time. Cooperative. Speech clear, appropriate. No dysarthria. Cranial nerves II-XII grossly intact. No focal deficits.  Psychiatric: No depression, anxiety or agitation. Normal affect, no hallucinations or suicidal ideations, no pressured speech.    ASSESSMENT  Acute on chronic hypoxemic and hypercapnic respiratory failure, requiring BiPAP  New onset atrial flutter with RVR, CHADsVASc - 3 Points - 3.2% Stroke Risk per Year  Essential hypertension  Acute COPD exacerbation  Chronic systolic congestive heart failure, EF 43%   KRISHNA  Type II diabetes mellitus  Morbid obesity, BMI 48  Polysubstance abuse, UDS positive for cocaine and opiates  Left eye blindness  Chronic hypoxemic respiratory failure on 2L home O2    PLAN  Weaned to nasal cannula oxygen  BiPAP hs and prn  Continue steroids, bronchodailators   Patient looking comfortable. His main complaint today is back pain   Metoprolol, ACE-I, Aldactone  Cardiology following. Prn IV Lopressor  Telemetry monitoring  On therapeutic dose Lovenox  Outpatient sleep study and CPAP vs. Trilogy  DVT Prophylaxis: on Lovenox    Patient condition:  Stable      Anticipated discharge and disposition: Home when medically ready  __________________________________________________________________________    All diagnosis and differential diagnosis have been reviewed; assessment and plan have been documented. I have personally reviewed the test results that are presently available; I have reviewed the patient's medication list. I have reviewed the consulting providers' recommendations. I have reviewed or attempted to review medical records based upon their availability.  All of the patient's questions have been addressed and answered. Patient is agreeable to the above stated plan. I will continue to monitor closely and make adjustment to medical management as needed.    This document was created with the  assistance of a voice recognition software. There may be transcription errors as a result of using this technology, however minimal. Effort has been made to ensure accuracy of transcription but any obvious errors or omissions should be clarified with the author of this document.        Yanci Barney MD   [facility] Medicine  05/22/2022

## 2022-05-22 NOTE — PROGRESS NOTES
[facility] - Emergency Dept  Cardiology  Progress Note    Patient Name: Vladimir Bernal  MRN: [id number]  Admission Date: 5/20/2022  Hospital Length of Stay: 2 days  Code Status: Full Code   Attending Provider: Zhou Pryor MD   Consulting Provider: CHRIS Zepeda  Primary Care Physician: Primary Doctor No  Principal Problem:COPD exacerbation    Patient information was obtained from spouse/SO and ER records.     Subjective:     Chief Complaint: Reason for Consult: PAF/Flutter     HPI: Mr. Bernal is a 51 y/o male who is unknown to [facility]. The patient presented to [facility] on 5.20.22 with c/o SOB and Cough x 2 Days. The patient reported that about 2 days prior he developed SOB that progressively worsened and was associated with a non-productive cough. He denied fever and chills. In the ER his initial workup revealed a SBP in the 180s (185/129), RA O2 Saturation of 93%, Crea 1.23, Normal BNP, CXR with Prominent Interstitial Markings, Flu/COVID-19 Negative and an EKG with PAF/Flutter with RVR. He was given IV Metoprolol and Placed on BiPAP. His HR did improve with the BB Administration. [facility] was consulted for Abnormal EKG: PAF/Flutter with RVR.       Hospital Course:  5.21.22. NAD. Patient lying in bed. Tele Aflutter RVR -HR up to 120. Denies CP or palps. + stable SOB  5.22.22: Patient awake in bed. NAD. Remains Afib, 120's. Denies CP. + cough. Expiratory wheezing noted.       PMH: HTN, DM II, COPD/Asthma, HF, Chronic Hypoxemic Respiratory Failure/Home O2, L Eye Blindness, Obesity  PSH: Denies Past Surgical History  Family History: Reviewed and Unremarkable for Heart Disease  Social History: Denies Illicit Drug, ETOH and Tobacco Use    Previous Cardiac Diagnostics:   TTE 05/20/22:  TDS. No definity contrast used in this study. LV normal in size with mildly decreases systolic function. EF 43%. Unable to accurately assess diastolic function due to insufficient parameters. Mild RV enlargement with  low normal RVSF. Mild TR. Estimate PASP 42mmHg. AoV not well visualized due to poor sonic window. Normal leaflet mobility. No AR/AS. Trace MR.  No significant LA. Elevatd CVP 15mmHg      Review of Systems:  Review of Systems   Constitutional: Negative for fever.   Respiratory: Positive for shortness of breath.    Cardiovascular: Negative for chest pain and palpitations.     Objective:     Vital Signs (Most Recent):  Temp: 98.2 °F (36.8 °C) (05/22/22 0821)  Pulse: (!) 119 (05/22/22 0841)  Resp: 18 (05/22/22 0841)  BP: (!) 143/110 (05/22/22 0821)  SpO2: (!) 92 % (05/22/22 0841) Vital Signs (24h Range):  Temp:  [97.3 °F (36.3 °C)-98.2 °F (36.8 °C)] 98.2 °F (36.8 °C)  Pulse:  [] 119  Resp:  [16-21] 18  SpO2:  [92 %-99 %] 92 %  BP: (127-159)/() 143/110     Weight: (!) 167 kg (368 lb 2.7 oz)  Body mass index is 48.79 kg/m².    SpO2: (!) 92 %  O2 Device (Oxygen Therapy): BiPAP      Intake/Output Summary (Last 24 hours) at 5/22/2022 0843  Last data filed at 5/22/2022 0400  Gross per 24 hour   Intake 3460 ml   Output --   Net 3460 ml       Lines/Drains/Airways     Peripheral Intravenous Line  Duration                Peripheral IV - Single Lumen 05/20/22 0107 20 G Anterior;Distal;Right Upper Arm 2 days              Significant Labs:  Recent Results (from the past 72 hour(s))   Comprehensive Metabolic Panel    Collection Time: 05/20/22  1:03 AM   Result Value Ref Range    Sodium Level 143 136 - 145 mmol/L    Potassium Level 4.3 3.5 - 5.1 mmol/L    Chloride 107 98 - 107 mmol/L    Carbon Dioxide 28 22 - 29 mmol/L    Glucose Level 106 (H) 74 - 100 mg/dL    Blood Urea Nitrogen 15.6 8.4 - 25.7 mg/dL    Creatinine 1.23 (H) 0.73 - 1.18 mg/dL    Calcium Level Total 9.4 8.4 - 10.2 mg/dL    Protein Total 7.1 6.4 - 8.3 gm/dL    Albumin Level 3.7 3.5 - 5.0 gm/dL    Globulin 3.4 2.4 - 3.5 gm/dL    Albumin/Globulin Ratio 1.1 1.1 - 2.0 ratio    Bilirubin Total 0.4 <=1.5 mg/dL    Alkaline Phosphatase 71 40 - 150 unit/L    Alanine  Aminotransferase 8 0 - 55 unit/L    Aspartate Aminotransferase 16 5 - 34 unit/L    Estimated GFR- >60 mls/min/1.73/m2   Troponin I    Collection Time: 05/20/22  1:03 AM   Result Value Ref Range    Troponin-I <0.010 0.000 - 0.045 ng/mL   BNP    Collection Time: 05/20/22  1:03 AM   Result Value Ref Range    Natriuretic Peptide 34.1 <=100.0 pg/mL   Protime-INR    Collection Time: 05/20/22  1:03 AM   Result Value Ref Range    PT 14.6 (H) 12.5 - 14.5 seconds    INR 1.17 0.00 - 1.30   APTT    Collection Time: 05/20/22  1:03 AM   Result Value Ref Range    PTT 28.3 23.2 - 33.7 seconds   COVID/FLU A&B PCR    Collection Time: 05/20/22  1:03 AM   Result Value Ref Range    Influenza A PCR Not Detected Not Detected    Influenza B PCR Not Detected Not Detected    SARS-CoV-2 PCR Not Detected Not Detected   TSH    Collection Time: 05/20/22  1:03 AM   Result Value Ref Range    Thyroid Stimulating Hormone 2.0706 0.3500 - 4.9400 uIU/mL   T4, Free    Collection Time: 05/20/22  1:03 AM   Result Value Ref Range    Thyroxine Free 0.76 0.70 - 1.48 ng/dL   CBC with Differential    Collection Time: 05/20/22  1:03 AM   Result Value Ref Range    WBC 5.6 4.5 - 11.5 x10(3)/mcL    RBC 5.05 4.70 - 6.10 x10(6)/mcL    Hgb 15.0 14.0 - 18.0 gm/dL    Hct 49.0 42.0 - 52.0 %    MCV 97.0 (H) 80.0 - 94.0 fL    MCH 29.7 27.0 - 31.0 pg    MCHC 30.6 (L) 33.0 - 36.0 mg/dL    RDW 16.3 11.5 - 17.0 %    Platelet 286 130 - 400 x10(3)/mcL    MPV 9.6 9.4 - 12.4 fL    Neut % 49.0 %    Lymph % 33.6 %    Mono % 8.8 %    Eos % 7.7 %    Basophil % 0.5 %    Lymph # 1.87 0.6 - 4.6 x10(3)/mcL    Neut # 2.7 2.1 - 9.2 x10(3)/mcL    Mono # 0.49 0.1 - 1.3 x10(3)/mcL    Eos # 0.43 0 - 0.9 x10(3)/mcL    Baso # 0.03 0 - 0.2 x10(3)/mcL    IG# 0.02 (H) 0 - 0.0155 x10(3)/mcL    IG% 0.4 0 - 0.43 %    NRBC% 0.4 %   POCT ARTERIAL BLOOD GAS Blood Gas    Collection Time: 05/20/22 11:55 AM   Result Value Ref Range    POC PH      POC PCO2      POC PO2      POC Sodium       POC Potassium      POC Ionized Calcium      POC HEMOGLOBIN      POC O2Hb      POC COHb      POC MetHb      POC PCO2      Base Excess, Arterial      O2 Sat, Art      HCO3, Arterial     POCT ARTERIAL BLOOD GAS    Collection Time: 05/20/22 12:09 PM   Result Value Ref Range    POC PH 7.25 (AA) 7.29 - 7.61    POC PCO2 64 (AA) 19 - 50 mmHg    POC PO2 72 (A) 80 - 100 mmHg    POC HEMOGLOBIN 15.9 12.0 - 16.0 g/dL    POC SATURATED O2 91.3 %    POC O2Hb 90.9 (A) 94.0 - 97.0 %    POC COHb 2.7 %    POC MetHb 1.1 0.40 - 1.5 %    POC Potassium 4.9 3.5 - 5.0 mmol/l    POC Sodium 136 (A) 137 - 145 mmol/l    POC Ionized Calcium 1.27 (A) 1.12 - 1.23 mmol/l    Correct Temperature (PH) 7.25 (AA) 7.29 - 7.61    Corrected Temperature (pCO2) 64 (AA) 19 - 50 mmHg    Corrected Temperature (pO2) 72 (A) 80 - 100 mmHg    POC HCO3 28.1 mmol/l    Base Deficit -1.0 -2.0 - 2.0 mmol/l    POC Temp 37.0 °C    Specimen source Arterial sample    Echo    Collection Time: 05/20/22  4:20 PM   Result Value Ref Range    BSA 2.93 m2    TDI SEPTAL 0.14 m/s    LV LATERAL E/E' RATIO 9.67 m/s    LV SEPTAL E/E' RATIO 8.29 m/s    Right Atrial Pressure (from IVC) 15 mmHg    EF 43 %    Left Ventricular Outflow Tract Mean Velocity 0.467 cm/s    Left Ventricular Outflow Tract Mean Gradient 1.00 mmHg    TDI LATERAL 0.12 m/s    LVIDd 4.85 3.5 - 6.0 cm    IVS 1.39 (A) 0.6 - 1.1 cm    Posterior Wall 1.01 0.6 - 1.1 cm    LVIDs 4.05 (A) 2.1 - 4.0 cm    FS 16 28 - 44 %    LV mass 222.74 g    LA size 3.70 cm    RVDD 3.88 cm    TAPSE 2.03 cm    Left Ventricle Relative Wall Thickness 0.42 cm    AV mean gradient 3 mmHg    AV valve area 1.95 cm2    AV Velocity Ratio 0.66     AV index (prosthetic) 0.56     MV mean gradient 2 mmHg    MV valve area by continuity eq 2.05 cm2    Mean e' 0.13 m/s    LVOT diameter 2.10 cm    LVOT area 3.5 cm2    LVOT peak david 0.73 m/s    LVOT peak VTI 10.70 cm    Ao peak david 1.10 m/s    Ao VTI 19.0 cm    LVOT stroke volume 37.04 cm3    AV peak gradient 5  mmHg    MV peak gradient 5 mmHg    TV rest pulmonary artery pressure 42 mmHg    E/E' ratio 8.92 m/s    MV Peak E Yoseph 1.16 m/s    TR Max Yoseph 2.60 m/s    MV VTI 18.1 cm    LV Systolic Volume 72.10 mL    LV Systolic Volume Index 25.9 mL/m2    LV Diastolic Volume 110.00 mL    LV Diastolic Volume Index 39.57 mL/m2    LV Mass Index 80 g/m2    Triscuspid Valve Regurgitation Peak Gradient 27 mmHg    LA Volume Index (Mod) 19.4 mL/m2    LA volume (mod) 53.90 cm3   POCT ARTERIAL BLOOD GAS Blood Gas    Collection Time: 05/20/22  5:00 PM   Result Value Ref Range    POC PH      POC PCO2      POC PO2      POC Sodium      POC Potassium      POC Ionized Calcium      POC HEMOGLOBIN      POC O2Hb      POC COHb      POC MetHb      POC PCO2      Base Excess, Arterial -0.3 -2.0 - 2.0 mmol/L    O2 Sat, Art      HCO3, Arterial 28.2 (A) 18.0 - 23.0 MMOL/L   POCT glucose    Collection Time: 05/20/22  5:15 PM   Result Value Ref Range    POCT Glucose 136 (H) 70 - 110 mg/dL   POCT ARTERIAL BLOOD GAS    Collection Time: 05/20/22  5:21 PM   Result Value Ref Range    POC PH 7.28 (AA) 7.29 - 7.61    POC PCO2 60 (AA) 19 - 50 mmHg    POC PO2 85 80 - 100 mmHg    POC HEMOGLOBIN 16.9 (A) 12.0 - 16.0 g/dL    POC SATURATED O2 95.0 %    POC O2Hb 94.3 94.0 - 97.0 %    POC COHb 2.2 %    POC MetHb 1.0 0.40 - 1.5 %    POC Potassium 4.6 3.5 - 5.0 mmol/l    POC Sodium 135 (A) 137 - 145 mmol/l    POC Ionized Calcium 1.19 1.12 - 1.23 mmol/l    Correct Temperature (PH) 7.28 (AA) 7.29 - 7.61    Corrected Temperature (pCO2) 60 (AA) 19 - 50 mmHg    Corrected Temperature (pO2) 85 80 - 100 mmHg    POC HCO3 28.2 mmol/l    Base Deficit -0.3 -2 - 2 mmol/l    POC Temp 37.0 °C    Specimen source Arterial sample    POCT glucose    Collection Time: 05/20/22  8:33 PM   Result Value Ref Range    POCT Glucose 193 (H) 70 - 110 mg/dL   POCT glucose    Collection Time: 05/21/22  4:22 AM   Result Value Ref Range    POCT Glucose 174 (H) 70 - 110 mg/dL   Comprehensive Metabolic Panel     Collection Time: 05/21/22  4:41 AM   Result Value Ref Range    Sodium Level 139 136 - 145 mmol/L    Potassium Level 4.5 3.5 - 5.1 mmol/L    Chloride 106 98 - 107 mmol/L    Carbon Dioxide 27 22 - 29 mmol/L    Glucose Level 164 (H) 74 - 100 mg/dL    Blood Urea Nitrogen 12.0 8.4 - 25.7 mg/dL    Creatinine 0.83 0.73 - 1.18 mg/dL    Calcium Level Total 10.0 8.4 - 10.2 mg/dL    Protein Total 7.6 6.4 - 8.3 gm/dL    Albumin Level 3.7 3.5 - 5.0 gm/dL    Globulin 3.9 (H) 2.4 - 3.5 gm/dL    Albumin/Globulin Ratio 0.9 (L) 1.1 - 2.0 ratio    Bilirubin Total 0.3 <=1.5 mg/dL    Alkaline Phosphatase 66 40 - 150 unit/L    Alanine Aminotransferase 11 0 - 55 unit/L    Aspartate Aminotransferase 12 5 - 34 unit/L    Estimated GFR- >60 mls/min/1.73/m2   Lipid Panel    Collection Time: 05/21/22  4:41 AM   Result Value Ref Range    Cholesterol Total 196 <=200 mg/dL    HDL Cholesterol 51 35 - 60 mg/dL    Triglyceride 135 34 - 140 mg/dL    Cholesterol/HDL Ratio 4 0 - 5    Very Low Density Lipoprotein 27     LDL Cholesterol 118.00 50.00 - 140.00 mg/dL   Hemoglobin A1C    Collection Time: 05/21/22  4:41 AM   Result Value Ref Range    Hemoglobin A1c 6.3 <=7.0 %    Estimated Average Glucose 134.1 mg/dL   Magnesium    Collection Time: 05/21/22  4:41 AM   Result Value Ref Range    Magnesium Level 1.80 1.60 - 2.60 mg/dL   CBC with Differential    Collection Time: 05/21/22  4:41 AM   Result Value Ref Range    WBC 8.0 4.5 - 11.5 x10(3)/mcL    RBC 5.30 4.70 - 6.10 x10(6)/mcL    Hgb 15.9 14.0 - 18.0 gm/dL    Hct 51.0 42.0 - 52.0 %    MCV 96.2 (H) 80.0 - 94.0 fL    MCH 30.0 27.0 - 31.0 pg    MCHC 31.2 (L) 33.0 - 36.0 mg/dL    RDW 16.4 11.5 - 17.0 %    Platelet 336 130 - 400 x10(3)/mcL    MPV 10.1 9.4 - 12.4 fL    Neut % 88.3 %    Lymph % 9.3 %    Mono % 1.6 %    Eos % 0.0 %    Basophil % 0.1 %    Lymph # 0.75 0.6 - 4.6 x10(3)/mcL    Neut # 7.1 2.1 - 9.2 x10(3)/mcL    Mono # 0.13 0.1 - 1.3 x10(3)/mcL    Eos # 0.00 0 - 0.9 x10(3)/mcL     Baso # 0.01 0 - 0.2 x10(3)/mcL    IG# 0.06 (H) 0 - 0.0155 x10(3)/mcL    IG% 0.7 (H) 0 - 0.43 %    NRBC% 0.6 %   POCT glucose    Collection Time: 05/21/22 11:28 AM   Result Value Ref Range    POCT Glucose 138 (H) 70 - 110 mg/dL   POCT glucose    Collection Time: 05/21/22  4:04 PM   Result Value Ref Range    POCT Glucose 124 (H) 70 - 110 mg/dL   POCT glucose    Collection Time: 05/21/22  7:57 PM   Result Value Ref Range    POCT Glucose 135 (H) 70 - 110 mg/dL   Comprehensive Metabolic Panel    Collection Time: 05/22/22  6:03 AM   Result Value Ref Range    Sodium Level 142 136 - 145 mmol/L    Potassium Level 4.3 3.5 - 5.1 mmol/L    Chloride 106 98 - 107 mmol/L    Carbon Dioxide 27 22 - 29 mmol/L    Glucose Level 92 74 - 100 mg/dL    Blood Urea Nitrogen 15.6 8.4 - 25.7 mg/dL    Creatinine 0.99 0.73 - 1.18 mg/dL    Calcium Level Total 9.3 8.4 - 10.2 mg/dL    Protein Total 6.3 (L) 6.4 - 8.3 gm/dL    Albumin Level 3.4 (L) 3.5 - 5.0 gm/dL    Globulin 2.9 2.4 - 3.5 gm/dL    Albumin/Globulin Ratio 1.2 1.1 - 2.0 ratio    Bilirubin Total 0.4 <=1.5 mg/dL    Alkaline Phosphatase 55 40 - 150 unit/L    Alanine Aminotransferase 9 0 - 55 unit/L    Aspartate Aminotransferase 13 5 - 34 unit/L    Estimated GFR- >60 mls/min/1.73/m2   Magnesium    Collection Time: 05/22/22  6:03 AM   Result Value Ref Range    Magnesium Level 1.70 1.60 - 2.60 mg/dL   CBC with Differential    Collection Time: 05/22/22  6:03 AM   Result Value Ref Range    WBC 10.0 4.5 - 11.5 x10(3)/mcL    RBC 5.12 4.70 - 6.10 x10(6)/mcL    Hgb 15.1 14.0 - 18.0 gm/dL    Hct 49.5 42.0 - 52.0 %    MCV 96.7 (H) 80.0 - 94.0 fL    MCH 29.5 27.0 - 31.0 pg    MCHC 30.5 (L) 33.0 - 36.0 mg/dL    RDW 16.1 11.5 - 17.0 %    Platelet 305 130 - 400 x10(3)/mcL    MPV 10.2 9.4 - 12.4 fL    Neut % 69.1 %    Lymph % 22.6 %    Mono % 7.2 %    Eos % 0.4 %    Basophil % 0.4 %    Lymph # 2.26 0.6 - 4.6 x10(3)/mcL    Neut # 6.9 2.1 - 9.2 x10(3)/mcL    Mono # 0.72 0.1 - 1.3 x10(3)/mcL     Eos # 0.04 0 - 0.9 x10(3)/mcL    Baso # 0.04 0 - 0.2 x10(3)/mcL    IG# 0.03 (H) 0 - 0.0155 x10(3)/mcL    IG% 0.3 0 - 0.43 %    NRBC% 0.3 %   POCT glucose    Collection Time: 05/22/22  6:05 AM   Result Value Ref Range    POCT Glucose 102 70 - 110 mg/dL       Significant Imaging:   Imaging Results          X-Ray Chest 1 View (Final result)  Result time 05/20/22 06:37:20    Final result by Oziel Vale MD (05/20/22 06:37:20)                 Impression:      No acute cardiopulmonary process. Prominent interstitial markings with no focal opacification.      Electronically signed by: Oziel Vale  Date:    05/20/2022  Time:    06:37             Narrative:    EXAMINATION:  XR CHEST 1 VIEW    CLINICAL HISTORY:  shortness of breath;    TECHNIQUE:  Single view of the chest    COMPARISON:  10/30/2019    FINDINGS:  Prominent interstitial markings with no focal opacification.    The cardiomediastinal silhouette is within normal limits.    No acute osseous abnormality.                                Last Lipids:  Lab Results   Component Value Date    CHOL 196 05/21/2022     Lab Results   Component Value Date    HDL 51 05/21/2022     No results found for: LDLCALC  Lab Results   Component Value Date    TRIG 135 05/21/2022     No results found for: CHOLHDL    EKG:        Telemetry: 's  Physical Exam  Constitutional:       Comments: BiPAP   HENT:      Head: Normocephalic.      Mouth/Throat:      Mouth: Mucous membranes are moist.   Eyes:      Extraocular Movements: Extraocular movements intact.   Cardiovascular:      Rate and Rhythm: Tachycardia present. Rhythm irregular.      Pulses: Normal pulses.      Heart sounds: Normal heart sounds.   Pulmonary:      Effort: Pulmonary effort is normal. No respiratory distress.      Breath sounds: Wheezing and rhonchi present.   Abdominal:      Palpations: Abdomen is soft.   Skin:     General: Skin is warm and dry.   Neurological:      Mental Status: He is alert and oriented to  person, place, and time.   Psychiatric:         Mood and Affect: Mood normal.         Behavior: Behavior normal.         Home Medications:   No current facility-administered medications on file prior to encounter.     Current Outpatient Medications on File Prior to Encounter   Medication Sig Dispense Refill    amLODIPine (NORVASC) 10 MG tablet Take 10 mg by mouth once daily.      atorvastatin (LIPITOR) 80 MG tablet Take 80 mg by mouth once daily.      gabapentin (NEURONTIN) 300 MG capsule Take 300 mg by mouth 3 (three) times daily.      metFORMIN (GLUCOPHAGE-XR) 500 MG ER 24hr tablet Take 500 mg by mouth once daily.      albuterol (ACCUNEB) 0.63 mg/3 mL Nebu Take 0.63 mg by nebulization every 6 (six) hours as needed. Rescue      budesonide-formoterol 160-4.5 mcg (SYMBICORT) 160-4.5 mcg/actuation HFAA Inhale 2 puffs into the lungs every 12 (twelve) hours. Controller      butalbital-acetaminophen-caffeine -40 mg (FIORICET, ESGIC) -40 mg per tablet Take 1 tablet by mouth every 4 (four) hours as needed for Pain.      ibuprofen (ADVIL,MOTRIN) 800 MG tablet Take 800 mg by mouth every 6 (six) hours as needed for Pain.      lisinopriL (PRINIVIL,ZESTRIL) 20 MG tablet Take 20 mg by mouth once daily.      pantoprazole (PROTONIX) 40 MG tablet Take 40 mg by mouth once daily.      predniSONE (DELTASONE) 20 MG tablet Take 20 mg by mouth once daily.      promethazine-codeine 6.25-10 mg/5 ml (PHENERGAN WITH CODEINE) 6.25-10 mg/5 mL syrup Take 5 mLs by mouth every 4 (four) hours as needed for Cough.      tadalafiL (CIALIS) 5 MG tablet Take 10 mg by mouth daily as needed for Erectile Dysfunction.      tiotropium (SPIRIVA) 18 mcg inhalation capsule Inhale 18 mcg into the lungs once daily. Controller      traMADoL (ULTRAM) 50 mg tablet Take 50 mg by mouth 2 (two) times a day.         Current Inpatient Medications:    Current Facility-Administered Medications:     acetaminophen tablet 650 mg, 650 mg, Oral, Q4H  PRN, MARY KAY BurgosP-BC    amLODIPine tablet 5 mg, 5 mg, Oral, Daily, Uday Rich MD, 5 mg at 05/22/22 0810    atorvastatin tablet 80 mg, 80 mg, Oral, Daily, Zhou Pryor MD, 80 mg at 05/22/22 0810    butalbital-acetaminophen-caffeine -40 mg per tablet 1 tablet, 1 tablet, Oral, Q4H PRN, Zhou Pryor MD    enoxaparin injection 150 mg, 150 mg, Subcutaneous, Q12H, Jayant Murillo MD, 150 mg at 05/21/22 2240    fluticasone furoate-vilanteroL 100-25 mcg/dose diskus inhaler 1 puff, 1 puff, Inhalation, Daily, Uday Rich MD, 1 puff at 05/22/22 0821    gabapentin capsule 300 mg, 300 mg, Oral, TID, Zhou Pryor MD, 300 mg at 05/22/22 0810    glucagon (human recombinant) injection 1 mg, 1 mg, Intramuscular, PRN, Arlyn Jim AGACNP-BC    hydrALAZINE injection 10 mg, 10 mg, Intravenous, Q6H PRN, ALEXIS Burgos-BC, 10 mg at 05/20/22 2029    insulin aspart U-100 injection 1-10 Units, 1-10 Units, Subcutaneous, Q6H PRN, MARY KAY BurgosP-BC, 2 Units at 05/21/22 0556    ipratropium 0.02 % nebulizer solution 0.5 mg, 0.5 mg, Nebulization, Q4H PRN, Uday Rich MD, 0.5 mg at 05/22/22 0841    levalbuterol nebulizer solution 1.25 mg, 1.25 mg, Nebulization, Q4H PRN, Uday Rich MD, 1.25 mg at 05/22/22 0841    lisinopriL tablet 20 mg, 20 mg, Oral, Daily, Zhou Pryor MD, 20 mg at 05/22/22 0810    metFORMIN tablet 500 mg, 500 mg, Oral, BID WM, Zhou Pryor MD, 500 mg at 05/22/22 0809    methylPREDNISolone sodium succinate injection 40 mg, 40 mg, Intravenous, Daily, Zhou Pryor MD    metoprolol injection 5 mg, 5 mg, Intravenous, Q15 Min PRN, Jesus Dey NP, 5 mg at 05/21/22 0557    metoprolol tartrate (LOPRESSOR) tablet 50 mg, 50 mg, Oral, BID, CHRIS Max, 50 mg at 05/22/22 0810    ondansetron injection 4 mg, 4 mg, Intravenous, Q8H PRN, Arlyn Jim, [facility]    spironolactone tablet 25 mg, 25 mg, Oral, Daily,  Zhou Pryor MD, 25 mg at 05/22/22 0810    umeclidinium 62.5 mcg/actuation inhalation capsule 62.5 mcg, 1 capsule, Inhalation, Daily, Zhou Pryor MD, 62.5 mcg at 05/21/22 1130        VTE Risk Mitigation (From admission, onward)         Ordered     enoxaparin injection 150 mg  Every 12 hours (non-standard times)         05/20/22 1056     IP VTE LOW RISK PATIENT  Once         05/20/22 0832     Place sequential compression device  Until discontinued         05/20/22 0832              Assessment:   Acute on Chronic Hypoxemic Respiratory Failure requiring BiPAP  COPD exacerbation  Newly Diagnosed PAF/Flutter- remains RVR    - CHADsVASc - 3 Points - 3.2% Stroke Risk per Year  SHF, unspecified  --EF 43%  HTN  DM II  COPD/Asthma  L Eye Blindness  Morbid Obesity    Plan:   Patient remains in Afib, RVR With poorly controlled HR. Increase Metoprolol tartrate to 100mg BID.   ECHO revealing reduced EF-43%. Continue Metoprolol tartrate, will change to succinate daily  once HR control attained. Continue lisinopril 40 mg daily. Give Lasix 40 mg IVP. Will consider ischemic evaluation in near future to evaluate newly reduced EF pending resolution of COPD exacerbation.   Lopressor 5mg IVP Q2HR PRN HR > 120 if SBP > 90mmHg  Continue FD Lovenox 1mg/kg SQ BID for CVA Prophylaxis in the Setting of PAF with conversion to xarelto 20 mg daily prior to DC  OP Sleep Study if PT does not have previous Diagnostic Study. Patient reports history of KRISHNA   Labs and EKG in AM: CBC, CMP and Mg          MALIK JainP-C  Cardiology

## 2022-05-23 NOTE — PROGRESS NOTES
[facility] - Emergency Dept  Cardiology  Progress Note    Patient Name: Vladimir Bernal  MRN: [id number]  Admission Date: 5/20/2022  Hospital Length of Stay: 3 days  Code Status: Full Code   Attending Provider: Zhou Pryor MD   Consulting Provider: Juan Manuel Al [facility]  Primary Care Physician: Primary Doctor No  Principal Problem:COPD exacerbation    Patient information was obtained from spouse/SO and ER records.     Subjective:     Chief Complaint: Reason for Consult: PAF/Flutter     HPI: Mr. Bernal is a 51 y/o male who is unknown to [facility]. The patient presented to [facility] on 5.20.22 with c/o SOB and Cough x 2 Days. The patient reported that about 2 days prior he developed SOB that progressively worsened and was associated with a non-productive cough. He denied fever and chills. In the ER his initial workup revealed a SBP in the 180s (185/129), RA O2 Saturation of 93%, Crea 1.23, Normal BNP, CXR with Prominent Interstitial Markings, Flu/COVID-19 Negative and an EKG with PAF/Flutter with RVR. He was given IV Metoprolol and Placed on BiPAP. His HR did improve with the BB Administration. [facility] was consulted for Abnormal EKG: PAF/Flutter with RVR.       Hospital Course:  5.21.22. NAD. Patient lying in bed. Tele Aflutter RVR -HR up to 120. Denies CP or palps. + stable SOB  5.22.22: Patient awake in bed. NAD. Remains Afib, 120's. Denies CP. + cough. Expiratory wheezing noted.   5.23.22: NAD noted. Still with RVR on tele. Denies CP, still SOB. VSS.      PMH: HTN, DM II, COPD/Asthma, HF, Chronic Hypoxemic Respiratory Failure/Home O2, L Eye Blindness, Obesity  PSH: Denies Past Surgical History  Family History: Reviewed and Unremarkable for Heart Disease  Social History: Denies Illicit Drug, ETOH and Tobacco Use    Previous Cardiac Diagnostics:   TTE 05/20/22:  TDS. No definity contrast used in this study. LV normal in size with mildly decreases systolic function. EF 43%. Unable to accurately assess  diastolic function due to insufficient parameters. Mild RV enlargement with low normal RVSF. Mild TR. Estimate PASP 42mmHg. AoV not well visualized due to poor sonic window. Normal leaflet mobility. No AR/AS. Trace MR.  No significant TN. Elevatd CVP 15mmHg      Review of Systems:  Review of Systems   Constitutional: Negative for fever.   Respiratory: Positive for shortness of breath.    Cardiovascular: Negative for chest pain and palpitations.   Gastrointestinal: Negative for nausea and vomiting.   Musculoskeletal: Negative for myalgias.   Neurological: Positive for dizziness.   Psychiatric/Behavioral: Negative for depression.     Objective:     Vital Signs (Most Recent):  Temp: 98.1 °F (36.7 °C) (05/23/22 0736)  Pulse: 105 (05/23/22 0851)  Resp: 16 (05/23/22 0851)  BP: 137/82 (05/23/22 0736)  SpO2: 96 % (05/23/22 0840) Vital Signs (24h Range):  Temp:  [97.5 °F (36.4 °C)-98.1 °F (36.7 °C)] 98.1 °F (36.7 °C)  Pulse:  [] 105  Resp:  [16-24] 16  SpO2:  [90 %-99 %] 96 %  BP: (128-157)/() 137/82     Weight: (!) 167 kg (368 lb 2.7 oz)  Body mass index is 48.79 kg/m².    SpO2: 96 %  O2 Device (Oxygen Therapy): nasal cannula      Intake/Output Summary (Last 24 hours) at 5/23/2022 1100  Last data filed at 5/22/2022 2000  Gross per 24 hour   Intake --   Output 700 ml   Net -700 ml       Lines/Drains/Airways     Peripheral Intravenous Line  Duration                Peripheral IV - Single Lumen 05/20/22 0107 20 G Anterior;Distal;Right Upper Arm 3 days              Significant Labs:  Recent Results (from the past 72 hour(s))   POCT ARTERIAL BLOOD GAS Blood Gas    Collection Time: 05/20/22 11:55 AM   Result Value Ref Range    POC PH      POC PCO2      POC PO2      POC Sodium      POC Potassium      POC Ionized Calcium      POC HEMOGLOBIN      POC O2Hb      POC COHb      POC MetHb      POC PCO2      Base Excess, Arterial      O2 Sat, Art      HCO3, Arterial     POCT ARTERIAL BLOOD GAS    Collection Time: 05/20/22 12:09  PM   Result Value Ref Range    POC PH 7.25 (AA) 7.29 - 7.61    POC PCO2 64 (AA) 19 - 50 mmHg    POC PO2 72 (A) 80 - 100 mmHg    POC HEMOGLOBIN 15.9 12.0 - 16.0 g/dL    POC SATURATED O2 91.3 %    POC O2Hb 90.9 (A) 94.0 - 97.0 %    POC COHb 2.7 %    POC MetHb 1.1 0.40 - 1.5 %    POC Potassium 4.9 3.5 - 5.0 mmol/l    POC Sodium 136 (A) 137 - 145 mmol/l    POC Ionized Calcium 1.27 (A) 1.12 - 1.23 mmol/l    Correct Temperature (PH) 7.25 (AA) 7.29 - 7.61    Corrected Temperature (pCO2) 64 (AA) 19 - 50 mmHg    Corrected Temperature (pO2) 72 (A) 80 - 100 mmHg    POC HCO3 28.1 mmol/l    Base Deficit -1.0 -2.0 - 2.0 mmol/l    POC Temp 37.0 °C    Specimen source Arterial sample    Echo    Collection Time: 05/20/22  4:20 PM   Result Value Ref Range    BSA 2.93 m2    TDI SEPTAL 0.14 m/s    LV LATERAL E/E' RATIO 9.67 m/s    LV SEPTAL E/E' RATIO 8.29 m/s    Right Atrial Pressure (from IVC) 15 mmHg    EF 43 %    Left Ventricular Outflow Tract Mean Velocity 0.467 cm/s    Left Ventricular Outflow Tract Mean Gradient 1.00 mmHg    TDI LATERAL 0.12 m/s    LVIDd 4.85 3.5 - 6.0 cm    IVS 1.39 (A) 0.6 - 1.1 cm    Posterior Wall 1.01 0.6 - 1.1 cm    LVIDs 4.05 (A) 2.1 - 4.0 cm    FS 16 28 - 44 %    LV mass 222.74 g    LA size 3.70 cm    RVDD 3.88 cm    TAPSE 2.03 cm    Left Ventricle Relative Wall Thickness 0.42 cm    AV mean gradient 3 mmHg    AV valve area 1.95 cm2    AV Velocity Ratio 0.66     AV index (prosthetic) 0.56     MV mean gradient 2 mmHg    MV valve area by continuity eq 2.05 cm2    Mean e' 0.13 m/s    LVOT diameter 2.10 cm    LVOT area 3.5 cm2    LVOT peak yoseph 0.73 m/s    LVOT peak VTI 10.70 cm    Ao peak yoseph 1.10 m/s    Ao VTI 19.0 cm    LVOT stroke volume 37.04 cm3    AV peak gradient 5 mmHg    MV peak gradient 5 mmHg    TV rest pulmonary artery pressure 42 mmHg    E/E' ratio 8.92 m/s    MV Peak E Yoseph 1.16 m/s    TR Max Yoseph 2.60 m/s    MV VTI 18.1 cm    LV Systolic Volume 72.10 mL    LV Systolic Volume Index 25.9 mL/m2    LV  Diastolic Volume 110.00 mL    LV Diastolic Volume Index 39.57 mL/m2    LV Mass Index 80 g/m2    Triscuspid Valve Regurgitation Peak Gradient 27 mmHg    LA Volume Index (Mod) 19.4 mL/m2    LA volume (mod) 53.90 cm3   POCT ARTERIAL BLOOD GAS Blood Gas    Collection Time: 05/20/22  5:00 PM   Result Value Ref Range    POC PH      POC PCO2      POC PO2      POC Sodium      POC Potassium      POC Ionized Calcium      POC HEMOGLOBIN      POC O2Hb      POC COHb      POC MetHb      POC PCO2      Base Excess, Arterial -0.3 -2.0 - 2.0 mmol/L    O2 Sat, Art      HCO3, Arterial 28.2 (A) 18.0 - 23.0 MMOL/L   POCT glucose    Collection Time: 05/20/22  5:15 PM   Result Value Ref Range    POCT Glucose 136 (H) 70 - 110 mg/dL   POCT ARTERIAL BLOOD GAS    Collection Time: 05/20/22  5:21 PM   Result Value Ref Range    POC PH 7.28 (AA) 7.29 - 7.61    POC PCO2 60 (AA) 19 - 50 mmHg    POC PO2 85 80 - 100 mmHg    POC HEMOGLOBIN 16.9 (A) 12.0 - 16.0 g/dL    POC SATURATED O2 95.0 %    POC O2Hb 94.3 94.0 - 97.0 %    POC COHb 2.2 %    POC MetHb 1.0 0.40 - 1.5 %    POC Potassium 4.6 3.5 - 5.0 mmol/l    POC Sodium 135 (A) 137 - 145 mmol/l    POC Ionized Calcium 1.19 1.12 - 1.23 mmol/l    Correct Temperature (PH) 7.28 (AA) 7.29 - 7.61    Corrected Temperature (pCO2) 60 (AA) 19 - 50 mmHg    Corrected Temperature (pO2) 85 80 - 100 mmHg    POC HCO3 28.2 mmol/l    Base Deficit -0.3 -2 - 2 mmol/l    POC Temp 37.0 °C    Specimen source Arterial sample    POCT glucose    Collection Time: 05/20/22  8:33 PM   Result Value Ref Range    POCT Glucose 193 (H) 70 - 110 mg/dL   POCT glucose    Collection Time: 05/21/22  4:22 AM   Result Value Ref Range    POCT Glucose 174 (H) 70 - 110 mg/dL   Comprehensive Metabolic Panel    Collection Time: 05/21/22  4:41 AM   Result Value Ref Range    Sodium Level 139 136 - 145 mmol/L    Potassium Level 4.5 3.5 - 5.1 mmol/L    Chloride 106 98 - 107 mmol/L    Carbon Dioxide 27 22 - 29 mmol/L    Glucose Level 164 (H) 74 - 100  mg/dL    Blood Urea Nitrogen 12.0 8.4 - 25.7 mg/dL    Creatinine 0.83 0.73 - 1.18 mg/dL    Calcium Level Total 10.0 8.4 - 10.2 mg/dL    Protein Total 7.6 6.4 - 8.3 gm/dL    Albumin Level 3.7 3.5 - 5.0 gm/dL    Globulin 3.9 (H) 2.4 - 3.5 gm/dL    Albumin/Globulin Ratio 0.9 (L) 1.1 - 2.0 ratio    Bilirubin Total 0.3 <=1.5 mg/dL    Alkaline Phosphatase 66 40 - 150 unit/L    Alanine Aminotransferase 11 0 - 55 unit/L    Aspartate Aminotransferase 12 5 - 34 unit/L    Estimated GFR- >60 mls/min/1.73/m2   Lipid Panel    Collection Time: 05/21/22  4:41 AM   Result Value Ref Range    Cholesterol Total 196 <=200 mg/dL    HDL Cholesterol 51 35 - 60 mg/dL    Triglyceride 135 34 - 140 mg/dL    Cholesterol/HDL Ratio 4 0 - 5    Very Low Density Lipoprotein 27     LDL Cholesterol 118.00 50.00 - 140.00 mg/dL   Hemoglobin A1C    Collection Time: 05/21/22  4:41 AM   Result Value Ref Range    Hemoglobin A1c 6.3 <=7.0 %    Estimated Average Glucose 134.1 mg/dL   Magnesium    Collection Time: 05/21/22  4:41 AM   Result Value Ref Range    Magnesium Level 1.80 1.60 - 2.60 mg/dL   CBC with Differential    Collection Time: 05/21/22  4:41 AM   Result Value Ref Range    WBC 8.0 4.5 - 11.5 x10(3)/mcL    RBC 5.30 4.70 - 6.10 x10(6)/mcL    Hgb 15.9 14.0 - 18.0 gm/dL    Hct 51.0 42.0 - 52.0 %    MCV 96.2 (H) 80.0 - 94.0 fL    MCH 30.0 27.0 - 31.0 pg    MCHC 31.2 (L) 33.0 - 36.0 mg/dL    RDW 16.4 11.5 - 17.0 %    Platelet 336 130 - 400 x10(3)/mcL    MPV 10.1 9.4 - 12.4 fL    Neut % 88.3 %    Lymph % 9.3 %    Mono % 1.6 %    Eos % 0.0 %    Basophil % 0.1 %    Lymph # 0.75 0.6 - 4.6 x10(3)/mcL    Neut # 7.1 2.1 - 9.2 x10(3)/mcL    Mono # 0.13 0.1 - 1.3 x10(3)/mcL    Eos # 0.00 0 - 0.9 x10(3)/mcL    Baso # 0.01 0 - 0.2 x10(3)/mcL    IG# 0.06 (H) 0 - 0.0155 x10(3)/mcL    IG% 0.7 (H) 0 - 0.43 %    NRBC% 0.6 %   POCT glucose    Collection Time: 05/21/22 11:28 AM   Result Value Ref Range    POCT Glucose 138 (H) 70 - 110 mg/dL   POCT glucose     Collection Time: 05/21/22  4:04 PM   Result Value Ref Range    POCT Glucose 124 (H) 70 - 110 mg/dL   POCT glucose    Collection Time: 05/21/22  7:57 PM   Result Value Ref Range    POCT Glucose 135 (H) 70 - 110 mg/dL   Comprehensive Metabolic Panel    Collection Time: 05/22/22  6:03 AM   Result Value Ref Range    Sodium Level 142 136 - 145 mmol/L    Potassium Level 4.3 3.5 - 5.1 mmol/L    Chloride 106 98 - 107 mmol/L    Carbon Dioxide 27 22 - 29 mmol/L    Glucose Level 92 74 - 100 mg/dL    Blood Urea Nitrogen 15.6 8.4 - 25.7 mg/dL    Creatinine 0.99 0.73 - 1.18 mg/dL    Calcium Level Total 9.3 8.4 - 10.2 mg/dL    Protein Total 6.3 (L) 6.4 - 8.3 gm/dL    Albumin Level 3.4 (L) 3.5 - 5.0 gm/dL    Globulin 2.9 2.4 - 3.5 gm/dL    Albumin/Globulin Ratio 1.2 1.1 - 2.0 ratio    Bilirubin Total 0.4 <=1.5 mg/dL    Alkaline Phosphatase 55 40 - 150 unit/L    Alanine Aminotransferase 9 0 - 55 unit/L    Aspartate Aminotransferase 13 5 - 34 unit/L    Estimated GFR- >60 mls/min/1.73/m2   Magnesium    Collection Time: 05/22/22  6:03 AM   Result Value Ref Range    Magnesium Level 1.70 1.60 - 2.60 mg/dL   CBC with Differential    Collection Time: 05/22/22  6:03 AM   Result Value Ref Range    WBC 10.0 4.5 - 11.5 x10(3)/mcL    RBC 5.12 4.70 - 6.10 x10(6)/mcL    Hgb 15.1 14.0 - 18.0 gm/dL    Hct 49.5 42.0 - 52.0 %    MCV 96.7 (H) 80.0 - 94.0 fL    MCH 29.5 27.0 - 31.0 pg    MCHC 30.5 (L) 33.0 - 36.0 mg/dL    RDW 16.1 11.5 - 17.0 %    Platelet 305 130 - 400 x10(3)/mcL    MPV 10.2 9.4 - 12.4 fL    Neut % 69.1 %    Lymph % 22.6 %    Mono % 7.2 %    Eos % 0.4 %    Basophil % 0.4 %    Lymph # 2.26 0.6 - 4.6 x10(3)/mcL    Neut # 6.9 2.1 - 9.2 x10(3)/mcL    Mono # 0.72 0.1 - 1.3 x10(3)/mcL    Eos # 0.04 0 - 0.9 x10(3)/mcL    Baso # 0.04 0 - 0.2 x10(3)/mcL    IG# 0.03 (H) 0 - 0.0155 x10(3)/mcL    IG% 0.3 0 - 0.43 %    NRBC% 0.3 %   POCT glucose    Collection Time: 05/22/22  6:05 AM   Result Value Ref Range    POCT Glucose 102  70 - 110 mg/dL   POCT glucose    Collection Time: 05/22/22 11:17 AM   Result Value Ref Range    POCT Glucose 100 70 - 110 mg/dL   POCT glucose    Collection Time: 05/22/22  5:04 PM   Result Value Ref Range    POCT Glucose 110 70 - 110 mg/dL   POCT glucose    Collection Time: 05/22/22  7:58 PM   Result Value Ref Range    POCT Glucose 115 (H) 70 - 110 mg/dL   POCT glucose    Collection Time: 05/23/22  4:14 AM   Result Value Ref Range    POCT Glucose 96 70 - 110 mg/dL   Drug Screen, Urine    Collection Time: 05/23/22  5:33 AM   Result Value Ref Range    Amphetamines, Urine Negative Negative    Barbituates, Urine Negative Negative    Benzodiazepine, Urine Negative Negative    Cannabinoids, Urine Negative Negative    Cocaine, Urine Positive (A) Negative    Fentanyl, Urine Negative Negative    Mdma, Urine Negative Negative    Opiates, Urine Negative Negative    Phencyclidine, Urine Negative Negative    pH, Urine 6.0 3.0 - 11.0    Specific Gravity, Urine Auto 1.010 1.001 - 1.035       Significant Imaging:   Imaging Results          X-Ray Chest 1 View (Final result)  Result time 05/20/22 06:37:20    Final result by Oziel Vale MD (05/20/22 06:37:20)                 Impression:      No acute cardiopulmonary process. Prominent interstitial markings with no focal opacification.      Electronically signed by: Oziel Vale  Date:    05/20/2022  Time:    06:37             Narrative:    EXAMINATION:  XR CHEST 1 VIEW    CLINICAL HISTORY:  shortness of breath;    TECHNIQUE:  Single view of the chest    COMPARISON:  10/30/2019    FINDINGS:  Prominent interstitial markings with no focal opacification.    The cardiomediastinal silhouette is within normal limits.    No acute osseous abnormality.                                Last Lipids:  Lab Results   Component Value Date    CHOL 196 05/21/2022     Lab Results   Component Value Date    HDL 51 05/21/2022     No results found for: LDLCALC  Lab Results   Component Value Date     TRIG 135 05/21/2022     No results found for: CHOLHDL        Telemetry: -110's  Physical Exam  Constitutional:       Comments: BiPAP   HENT:      Head: Normocephalic.      Mouth/Throat:      Mouth: Mucous membranes are moist.   Eyes:      Extraocular Movements: Extraocular movements intact.   Cardiovascular:      Rate and Rhythm: Tachycardia present. Rhythm irregular.      Pulses: Normal pulses.      Heart sounds: Normal heart sounds.   Pulmonary:      Effort: Pulmonary effort is normal. No respiratory distress.      Breath sounds: Wheezing and rhonchi present.   Abdominal:      Palpations: Abdomen is soft.   Skin:     General: Skin is warm and dry.   Neurological:      Mental Status: He is alert and oriented to person, place, and time.   Psychiatric:         Mood and Affect: Mood normal.         Behavior: Behavior normal.         Home Medications:   No current facility-administered medications on file prior to encounter.     Current Outpatient Medications on File Prior to Encounter   Medication Sig Dispense Refill    amLODIPine (NORVASC) 10 MG tablet Take 10 mg by mouth once daily.      atorvastatin (LIPITOR) 80 MG tablet Take 80 mg by mouth once daily.      gabapentin (NEURONTIN) 300 MG capsule Take 300 mg by mouth 3 (three) times daily.      metFORMIN (GLUCOPHAGE-XR) 500 MG ER 24hr tablet Take 500 mg by mouth once daily.      albuterol (ACCUNEB) 0.63 mg/3 mL Nebu Take 0.63 mg by nebulization every 6 (six) hours as needed. Rescue      budesonide-formoterol 160-4.5 mcg (SYMBICORT) 160-4.5 mcg/actuation HFAA Inhale 2 puffs into the lungs every 12 (twelve) hours. Controller      butalbital-acetaminophen-caffeine -40 mg (FIORICET, ESGIC) -40 mg per tablet Take 1 tablet by mouth every 4 (four) hours as needed for Pain.      ibuprofen (ADVIL,MOTRIN) 800 MG tablet Take 800 mg by mouth every 6 (six) hours as needed for Pain.      lisinopriL (PRINIVIL,ZESTRIL) 20 MG tablet Take 20 mg by mouth  once daily.      pantoprazole (PROTONIX) 40 MG tablet Take 40 mg by mouth once daily.      predniSONE (DELTASONE) 20 MG tablet Take 20 mg by mouth once daily.      promethazine-codeine 6.25-10 mg/5 ml (PHENERGAN WITH CODEINE) 6.25-10 mg/5 mL syrup Take 5 mLs by mouth every 4 (four) hours as needed for Cough.      tadalafiL (CIALIS) 5 MG tablet Take 10 mg by mouth daily as needed for Erectile Dysfunction.      tiotropium (SPIRIVA) 18 mcg inhalation capsule Inhale 18 mcg into the lungs once daily. Controller      traMADoL (ULTRAM) 50 mg tablet Take 50 mg by mouth 2 (two) times a day.         Current Inpatient Medications:    Current Facility-Administered Medications:     acetaminophen tablet 650 mg, 650 mg, Oral, Q4H PRN, AVELINO Burgos, 650 mg at 05/22/22 2013    amLODIPine tablet 5 mg, 5 mg, Oral, Daily, Uday Rich MD, 5 mg at 05/23/22 0851    atorvastatin tablet 80 mg, 80 mg, Oral, Daily, Zhou Pryor MD, 80 mg at 05/23/22 0851    benzonatate capsule 200 mg, 200 mg, Oral, TID PRN, Yanci Barney MD, 200 mg at 05/22/22 1506    butalbital-acetaminophen-caffeine -40 mg per tablet 1 tablet, 1 tablet, Oral, Q4H PRN, Zhou Pryor MD    enoxaparin injection 150 mg, 150 mg, Subcutaneous, Q12H, Jayant Murillo MD, 150 mg at 05/22/22 2304    fluticasone furoate-vilanteroL 100-25 mcg/dose diskus inhaler 1 puff, 1 puff, Inhalation, Daily, Uday Rich MD, 1 puff at 05/23/22 0851    gabapentin capsule 300 mg, 300 mg, Oral, TID, Zhou Pryor MD, 300 mg at 05/23/22 0851    glucagon (human recombinant) injection 1 mg, 1 mg, Intramuscular, PRN, AVELINO Burgos    guaiFENesin 100 mg/5 ml syrup 200 mg, 200 mg, Oral, Q4H PRN, Yanci Barney MD, 200 mg at 05/22/22 1217    hydrALAZINE injection 10 mg, 10 mg, Intravenous, Q6H PRN, Arlyn Jim [facility], 10 mg at 05/20/22 2029    insulin aspart U-100 injection 1-10 Units, 1-10 Units, Subcutaneous, Q6H PRN,  Arlyn Jim [facility], 2 Units at 05/21/22 0556    ipratropium 0.02 % nebulizer solution 0.5 mg, 0.5 mg, Nebulization, Q4H PRN, Uday Rich MD, 0.5 mg at 05/23/22 0840    levalbuterol nebulizer solution 1.25 mg, 1.25 mg, Nebulization, Q4H PRN, Uday Rich MD, 1.25 mg at 05/22/22 0841    lisinopriL tablet 20 mg, 20 mg, Oral, Daily, Zhou Pryor MD, 20 mg at 05/23/22 0851    metoprolol injection 5 mg, 5 mg, Intravenous, Q15 Min PRN, Jesus Munozo, NP, 5 mg at 05/22/22 0845    metoprolol tartrate (LOPRESSOR) tablet 100 mg, 100 mg, Oral, TID, Briana Mccoy, FNP, 100 mg at 05/23/22 0851    ondansetron injection 4 mg, 4 mg, Intravenous, Q8H PRN, Arlyn Jim, [facility]    predniSONE tablet 40 mg, 40 mg, Oral, Daily, Prateek Murillo MD    spironolactone tablet 25 mg, 25 mg, Oral, Daily, Zhou Pryor MD, 25 mg at 05/23/22 0851    umeclidinium 62.5 mcg/actuation inhalation capsule 62.5 mcg, 1 capsule, Inhalation, Daily, Zhou Pryor MD, 62.5 mcg at 05/21/22 1130          Assessment:   Acute on Chronic Hypoxemic Respiratory Failure requiring BiPAP  COPD exacerbation  Newly Diagnosed PAF/Flutter- remains RVR    - CHADsVASc - 3 Points - 3.2% Stroke Risk per Year  SHF, unspecified  --EF 43%  HTN  DM II  COPD/Asthma  L Eye Blindness  Morbid Obesity    Plan:   Amio bolus and gtt per protocol  Continue Metoprolol tartrate 100mg TID.   Continue lisinopril 40 mg daily. Give Lasix 40 mg IVP.   Will consider ischemic evaluation in near future to evaluate newly reduced EF pending resolution of COPD exacerbation.   Lopressor 5mg IVP Q2HR PRN HR > 120 if SBP > 90mmHg  Continue FD Lovenox 1mg/kg SQ BID for CVA Prophylaxis in the Setting of PAF with conversion to xarelto 20 mg daily prior to DC  OP Sleep Study if PT does not have previous Diagnostic Study. Patient reports history of KRISHNA   Labs and EKG in AM: CBC, CMP and Mg      Hunter Watson MD  Cardiology  05/23/2022

## 2022-05-23 NOTE — PROGRESS NOTES
"[facility]  [facility] MED[facility]   PROGRESS NOTE      CHIEF COMPLAINT  Hospital follow up  CHF    HOSPITAL COURSE  52 y.o. male with a PMHx of HTN, DM 2, CHF, COPD/Asthma, Chronic hypoxemic respiratory failure on 2L home O2  who presented to [facility] on 5/20/2022 with a primary complaint of worsening SOB with associated cough x2 days.  He was supposed to be on home oxygen and an IPV the for his obstructive sleep apnea however since his move from [place] to [place] 3 months ago he has not been using it as he does not have it and had to leave and back in [place].  Initial ED VS include /129, , RR 28, SpO2 93% on RA, temp 98.6. Labs notable for creatinine 1.23, troponin normal. BNP normal. CXR noted prominent interstital markings. Flu and COVID negative. EKG revealed atrial flutter with variable A-V block with premature ventricular or  aberrantly conducted complexes. He was started on full dose Lovenox. HR improved following Metoprolol administration.  Cardiology services were consulted. He has been placed on CPAP. He was admitted to hospital medicine services for further work-up and management of care.     Today (05/22/2022): In Afib with RVR, HR up to 150s on the monitor. Denies chest pain. Reports palpitations, shortness of breath improving and unproductive cough. Also reports chronic low back pain. No fever. On 2L NC oxygen    Today   seen and examined this morning.  He feels better today.  No wheezing on exam. He did admit to using cocaine 1 line for his birthday.        OBJECTIVE/PHYSICAL EXAM  /82   Pulse 105   Temp 98.1 °F (36.7 °C) (Axillary)   Resp 16   Ht 6' 0.84" (1.85 m)   Wt (!) 167 kg (368 lb 2.7 oz)   SpO2 96%   BMI 48.79 kg/m²   General: In no acute distress, afebrile,  morbidly obese  Chest: Clear to auscultation bilaterally  Heart: S1, S2, no appreciable murmur  Abdomen: Soft, nontender, BS +  MSK: Warm, no lower extremity edema, no clubbing or " cyanosis  Neurologic: Alert and oriented x4, moving all extremities with good strength         ASSESSMENT/PLAN  Acute on chronic hypoxemic and hypercapnic respiratory failure, requiring BiPAP  New onset atrial flutter with RVR, CHADsVASc - 3 Points - 3.2% Stroke Risk per Year  Essential hypertension  Acute COPD exacerbation  -Resolved  Chronic systolic congestive heart failure, EF 43%   RKISHNA  Type II diabetes mellitus  Morbid obesity, BMI 48  Polysubstance abuse, UDS positive for cocaine and opiates  Left eye blindness  Chronic hypoxemic respiratory failure on 2L home O2     PLAN   cardiology is following.  Adjusting rate control meds.  Possibly ischemic evaluation?  On full-dose Lovenox for now.  Weaned to nasal cannula oxygen  BiPAP hs and prn.  Needs outpatient sleep study.    Three more days of prednisone 40 mg.  Cardiology following. Prn IV Lopressor  Telemetry monitoring  Case management consult for outpatient sleep study referral and getting him home oxygen as he was previously on that.    DVT Prophylaxis: on Lovenox    Anticipated discharge and disposition: pending cardiac eval, cleared from our end  __________________________________________________________________________    LABS/MICROBIOLOGY/MEDICATIONS/DIAGNOSTICS    LABS  Recent Labs     05/22/22  0603      K 4.3   CHLORIDE 106   CO2 27   BUN 15.6   CREATININE 0.99   GLUCOSE 92   CALCIUM 9.3   ALKPHOS 55   AST 13   ALT 9   ALBUMIN 3.4*     Recent Labs     05/22/22  0603   WBC 10.0   RBC 5.12   HCT 49.5   MCV 96.7*          MICROBIOLOGY  Microbiology Results (last 7 days)     ** No results found for the last 168 hours. **          MEDICATIONS   amLODIPine  5 mg Oral Daily    atorvastatin  80 mg Oral Daily    enoxaparin  150 mg Subcutaneous Q12H    fluticasone furoate-vilanteroL  1 puff Inhalation Daily    gabapentin  300 mg Oral TID    lisinopriL  20 mg Oral Daily    methylPREDNISolone sodium succinate injection  40 mg Intravenous  Daily    metoprolol tartrate  100 mg Oral TID    spironolactone  25 mg Oral Daily    umeclidinium  1 capsule Inhalation Daily      INFUSIONS      DIAGNOSTIC TESTS  X-Ray Chest 1 View   Final Result      No acute cardiopulmonary process. Prominent interstitial markings with no focal opacification.         Electronically signed by: Oziel Vale   Date:    05/20/2022   Time:    06:37               All diagnosis and differential diagnosis have been reviewed; assessment and plan has been documented. I have personally reviewed the labs and test results that are presently available; I have reviewed the patients medication list. I have reviewed the consulting providers response and recommendations. I have reviewed or attempted to review medical records based upon their availability.  All of the patient's questions have been addressed and answered. Patient's is agreeable to the above stated plan. I will continue to monitor closely and make adjustments to medical management as needed.  This document was created using [employer]*Know[employer] Fluency Direct.  Transcription errors may have been made.  Please contact me if any questions may rise regarding documentation to clarify verbiage.        Prateek Murillo MD   05/23/2022   Internal Medicine

## 2022-05-24 NOTE — PROGRESS NOTES
[facility] - Emergency Dept  Cardiology  Progress Note    Patient Name: Vladimir Bernal  MRN: [id number]  Admission Date: 5/20/2022  Hospital Length of Stay: 4 days  Code Status: Full Code   Attending Provider: Zhou Pryor MD   Consulting Provider: CHRIS Landa  Primary Care Physician: Primary Doctor No  Principal Problem:COPD exacerbation    Patient information was obtained from spouse/SO and ER records.     Subjective:     Chief Complaint: Reason for Consult: PAF/Flutter     HPI: Mr. Bernal is a 53 y/o male who is unknown to [facility]. The patient presented to [facility] on 5.20.22 with c/o SOB and Cough x 2 Days. The patient reported that about 2 days prior he developed SOB that progressively worsened and was associated with a non-productive cough. He denied fever and chills. In the ER his initial workup revealed a SBP in the 180s (185/129), RA O2 Saturation of 93%, Crea 1.23, Normal BNP, CXR with Prominent Interstitial Markings, Flu/COVID-19 Negative and an EKG with PAF/Flutter with RVR. He was given IV Metoprolol and Placed on BiPAP. His HR did improve with the BB Administration. [facility] was consulted for Abnormal EKG: PAF/Flutter with RVR.       Hospital Course:  5.21.22. NAD. Patient lying in bed. Tele Aflutter RVR -HR up to 120. Denies CP or palps. + stable SOB  5.22.22: Patient awake in bed. NAD. Remains Afib, 120's. Denies CP. + cough. Expiratory wheezing noted.   5.23.22: NAD noted. Still with RVR on tele. Denies CP, still SOB. VSS.  5.24.22 Remains in atrial flutter with CVR. Patient reported occasional CP.      PMH: HTN, DM II, COPD/Asthma, HF, Chronic Hypoxemic Respiratory Failure/Home O2, L Eye Blindness, Obesity  PSH: Denies Past Surgical History  Family History: Reviewed and Unremarkable for Heart Disease  Social History: Drug screen positive for coacaine, ETOH and Tobacco Use    Previous Cardiac Diagnostics:   TTE 05/20/22:  TDS. No definity contrast used in this study. LV normal  in size with mildly decreases systolic function. EF 43%. Unable to accurately assess diastolic function due to insufficient parameters. Mild RV enlargement with low normal RVSF. Mild TR. Estimate PASP 42mmHg. AoV not well visualized due to poor sonic window. Normal leaflet mobility. No AR/AS. Trace MR.  No significant SC. Elevatd CVP 15mmHg      Review of Systems:  Review of Systems   Constitutional: Negative for fever.   Respiratory: Positive for shortness of breath.    Cardiovascular: Negative for chest pain and palpitations.   Gastrointestinal: Negative for nausea and vomiting.   Musculoskeletal: Negative for myalgias.   Neurological: Positive for dizziness.   Psychiatric/Behavioral: Negative for depression.     Objective:     Vital Signs (Most Recent):  Temp: 97.4 °F (36.3 °C) (05/24/22 0752)  Pulse: 90 (05/24/22 0825)  Resp: 18 (05/24/22 0825)  BP: 107/77 (05/24/22 0752)  SpO2: 98 % (05/24/22 0752) Vital Signs (24h Range):  Temp:  [97.4 °F (36.3 °C)-98.1 °F (36.7 °C)] 97.4 °F (36.3 °C)  Pulse:  [] 90  Resp:  [14-19] 18  SpO2:  [91 %-100 %] 98 %  BP: (107-144)/() 107/77     Weight: (!) 167 kg (368 lb 2.7 oz)  Body mass index is 48.79 kg/m².    SpO2: 98 %  O2 Device (Oxygen Therapy): BiPAP      Intake/Output Summary (Last 24 hours) at 5/24/2022 0907  Last data filed at 5/24/2022 0500  Gross per 24 hour   Intake 1240 ml   Output 875 ml   Net 365 ml       Lines/Drains/Airways     Peripheral Intravenous Line  Duration                Peripheral IV - Single Lumen 05/20/22 0107 20 G Anterior;Distal;Right Upper Arm 4 days              Significant Labs:  Recent Results (from the past 72 hour(s))   POCT glucose    Collection Time: 05/21/22 11:28 AM   Result Value Ref Range    POCT Glucose 138 (H) 70 - 110 mg/dL   POCT glucose    Collection Time: 05/21/22  4:04 PM   Result Value Ref Range    POCT Glucose 124 (H) 70 - 110 mg/dL   POCT glucose    Collection Time: 05/21/22  7:57 PM   Result Value Ref Range    POCT  Glucose 135 (H) 70 - 110 mg/dL   Comprehensive Metabolic Panel    Collection Time: 05/22/22  6:03 AM   Result Value Ref Range    Sodium Level 142 136 - 145 mmol/L    Potassium Level 4.3 3.5 - 5.1 mmol/L    Chloride 106 98 - 107 mmol/L    Carbon Dioxide 27 22 - 29 mmol/L    Glucose Level 92 74 - 100 mg/dL    Blood Urea Nitrogen 15.6 8.4 - 25.7 mg/dL    Creatinine 0.99 0.73 - 1.18 mg/dL    Calcium Level Total 9.3 8.4 - 10.2 mg/dL    Protein Total 6.3 (L) 6.4 - 8.3 gm/dL    Albumin Level 3.4 (L) 3.5 - 5.0 gm/dL    Globulin 2.9 2.4 - 3.5 gm/dL    Albumin/Globulin Ratio 1.2 1.1 - 2.0 ratio    Bilirubin Total 0.4 <=1.5 mg/dL    Alkaline Phosphatase 55 40 - 150 unit/L    Alanine Aminotransferase 9 0 - 55 unit/L    Aspartate Aminotransferase 13 5 - 34 unit/L    Estimated GFR- >60 mls/min/1.73/m2   Magnesium    Collection Time: 05/22/22  6:03 AM   Result Value Ref Range    Magnesium Level 1.70 1.60 - 2.60 mg/dL   CBC with Differential    Collection Time: 05/22/22  6:03 AM   Result Value Ref Range    WBC 10.0 4.5 - 11.5 x10(3)/mcL    RBC 5.12 4.70 - 6.10 x10(6)/mcL    Hgb 15.1 14.0 - 18.0 gm/dL    Hct 49.5 42.0 - 52.0 %    MCV 96.7 (H) 80.0 - 94.0 fL    MCH 29.5 27.0 - 31.0 pg    MCHC 30.5 (L) 33.0 - 36.0 mg/dL    RDW 16.1 11.5 - 17.0 %    Platelet 305 130 - 400 x10(3)/mcL    MPV 10.2 9.4 - 12.4 fL    Neut % 69.1 %    Lymph % 22.6 %    Mono % 7.2 %    Eos % 0.4 %    Basophil % 0.4 %    Lymph # 2.26 0.6 - 4.6 x10(3)/mcL    Neut # 6.9 2.1 - 9.2 x10(3)/mcL    Mono # 0.72 0.1 - 1.3 x10(3)/mcL    Eos # 0.04 0 - 0.9 x10(3)/mcL    Baso # 0.04 0 - 0.2 x10(3)/mcL    IG# 0.03 (H) 0 - 0.0155 x10(3)/mcL    IG% 0.3 0 - 0.43 %    NRBC% 0.3 %   POCT glucose    Collection Time: 05/22/22  6:05 AM   Result Value Ref Range    POCT Glucose 102 70 - 110 mg/dL   POCT glucose    Collection Time: 05/22/22 11:17 AM   Result Value Ref Range    POCT Glucose 100 70 - 110 mg/dL   POCT glucose    Collection Time: 05/22/22  5:04 PM   Result  Value Ref Range    POCT Glucose 110 70 - 110 mg/dL   POCT glucose    Collection Time: 05/22/22  7:58 PM   Result Value Ref Range    POCT Glucose 115 (H) 70 - 110 mg/dL   POCT glucose    Collection Time: 05/23/22  4:14 AM   Result Value Ref Range    POCT Glucose 96 70 - 110 mg/dL   Drug Screen, Urine    Collection Time: 05/23/22  5:33 AM   Result Value Ref Range    Amphetamines, Urine Negative Negative    Barbituates, Urine Negative Negative    Benzodiazepine, Urine Negative Negative    Cannabinoids, Urine Negative Negative    Cocaine, Urine Positive (A) Negative    Fentanyl, Urine Negative Negative    MDMA, Urine Negative Negative    Opiates, Urine Negative Negative    Phencyclidine, Urine Negative Negative    pH, Urine 6.0 3.0 - 11.0    Specific Gravity, Urine Auto 1.010 1.001 - 1.035   POCT glucose    Collection Time: 05/23/22  4:43 PM   Result Value Ref Range    POCT Glucose 186 (H) 70 - 110 mg/dL   POCT glucose    Collection Time: 05/23/22  9:12 PM   Result Value Ref Range    POCT Glucose 114 (H) 70 - 110 mg/dL       Significant Imaging:   Imaging Results          X-Ray Chest 1 View (Final result)  Result time 05/20/22 06:37:20    Final result by Oziel Vale MD (05/20/22 06:37:20)                 Impression:      No acute cardiopulmonary process. Prominent interstitial markings with no focal opacification.      Electronically signed by: Oziel Vale  Date:    05/20/2022  Time:    06:37             Narrative:    EXAMINATION:  XR CHEST 1 VIEW    CLINICAL HISTORY:  shortness of breath;    TECHNIQUE:  Single view of the chest    COMPARISON:  10/30/2019    FINDINGS:  Prominent interstitial markings with no focal opacification.    The cardiomediastinal silhouette is within normal limits.    No acute osseous abnormality.                                Last Lipids:  Lab Results   Component Value Date    CHOL 196 05/21/2022     Lab Results   Component Value Date    HDL 51 05/21/2022     No results found for:  LDLCALC  Lab Results   Component Value Date    TRIG 135 05/21/2022     No results found for: CHOLHDL        Telemetry: -110's  Physical Exam  Constitutional:       Comments: BiPAP   HENT:      Head: Normocephalic.      Mouth/Throat:      Mouth: Mucous membranes are moist.   Eyes:      Extraocular Movements: Extraocular movements intact.   Cardiovascular:      Rate and Rhythm: Normal rate. Rhythm irregular.      Pulses: Normal pulses.      Heart sounds: Normal heart sounds.   Pulmonary:      Effort: Pulmonary effort is normal. No respiratory distress.      Breath sounds: Wheezing and rhonchi present.   Abdominal:      Palpations: Abdomen is soft.   Skin:     General: Skin is warm and dry.   Neurological:      Mental Status: He is alert and oriented to person, place, and time.   Psychiatric:         Mood and Affect: Mood normal.         Behavior: Behavior normal.         Home Medications:   No current facility-administered medications on file prior to encounter.     Current Outpatient Medications on File Prior to Encounter   Medication Sig Dispense Refill    amLODIPine (NORVASC) 10 MG tablet Take 10 mg by mouth once daily.      atorvastatin (LIPITOR) 80 MG tablet Take 80 mg by mouth once daily.      gabapentin (NEURONTIN) 300 MG capsule Take 300 mg by mouth 3 (three) times daily.      metFORMIN (GLUCOPHAGE-XR) 500 MG ER 24hr tablet Take 500 mg by mouth once daily.      albuterol (ACCUNEB) 0.63 mg/3 mL Nebu Take 0.63 mg by nebulization every 6 (six) hours as needed. Rescue      budesonide-formoterol 160-4.5 mcg (SYMBICORT) 160-4.5 mcg/actuation HFAA Inhale 2 puffs into the lungs every 12 (twelve) hours. Controller      butalbital-acetaminophen-caffeine -40 mg (FIORICET, ESGIC) -40 mg per tablet Take 1 tablet by mouth every 4 (four) hours as needed for Pain.      ibuprofen (ADVIL,MOTRIN) 800 MG tablet Take 800 mg by mouth every 6 (six) hours as needed for Pain.      lisinopriL  (PRINIVIL,ZESTRIL) 20 MG tablet Take 20 mg by mouth once daily.      pantoprazole (PROTONIX) 40 MG tablet Take 40 mg by mouth once daily.      predniSONE (DELTASONE) 20 MG tablet Take 20 mg by mouth once daily.      promethazine-codeine 6.25-10 mg/5 ml (PHENERGAN WITH CODEINE) 6.25-10 mg/5 mL syrup Take 5 mLs by mouth every 4 (four) hours as needed for Cough.      tadalafiL (CIALIS) 5 MG tablet Take 10 mg by mouth daily as needed for Erectile Dysfunction.      tiotropium (SPIRIVA) 18 mcg inhalation capsule Inhale 18 mcg into the lungs once daily. Controller      traMADoL (ULTRAM) 50 mg tablet Take 50 mg by mouth 2 (two) times a day.         Current Inpatient Medications:    Current Facility-Administered Medications:     acetaminophen tablet 650 mg, 650 mg, Oral, Q4H PRN, Arlyn Jim, [facility], 650 mg at 05/22/22 2013    amiodarone 360 mg/200 mL (1.8 mg/mL) infusion, 0.5 mg/min, Intravenous, Continuous, Juan Manuel Al, [facility]-BC, Last Rate: 16.7 mL/hr at 05/24/22 0657, 0.5 mg/min at 05/24/22 0657    amLODIPine tablet 5 mg, 5 mg, Oral, Daily, Uday Rich MD, 5 mg at 05/24/22 0824    atorvastatin tablet 80 mg, 80 mg, Oral, Daily, Zhou Pryor MD, 80 mg at 05/24/22 0824    benzonatate capsule 200 mg, 200 mg, Oral, TID PRN, Yanci Barney MD, 200 mg at 05/22/22 1506    butalbital-acetaminophen-caffeine -40 mg per tablet 1 tablet, 1 tablet, Oral, Q4H PRN, Zhou Pryor MD    enoxaparin injection 150 mg, 150 mg, Subcutaneous, Q12H, Jayant Murillo MD, 150 mg at 05/23/22 2236    fluticasone furoate-vilanteroL 100-25 mcg/dose diskus inhaler 1 puff, 1 puff, Inhalation, Daily, Uday Rich MD, 1 puff at 05/24/22 0825    gabapentin capsule 300 mg, 300 mg, Oral, TID, Zhou Pryor MD, 300 mg at 05/24/22 0824    glucagon (human recombinant) injection 1 mg, 1 mg, Intramuscular, PRN, Arlyn Jim, [facility]    guaiFENesin 100 mg/5 ml syrup 200 mg, 200 mg, Oral, Q4H PRN,  Yanci Barney MD, 200 mg at 05/22/22 1217    hydrALAZINE injection 10 mg, 10 mg, Intravenous, Q6H PRN, ALEXIS Burgos-BC, 10 mg at 05/20/22 2029    insulin aspart U-100 injection 1-10 Units, 1-10 Units, Subcutaneous, Q6H PRN, Arlyn Jim AGACNREINALDO-BC, 2 Units at 05/21/22 0556    ipratropium 0.02 % nebulizer solution 0.5 mg, 0.5 mg, Nebulization, Q4H PRN, Uday Rich MD, 0.5 mg at 05/23/22 1625    levalbuterol nebulizer solution 1.25 mg, 1.25 mg, Nebulization, Q4H PRN, Uday Rich MD, 1.25 mg at 05/23/22 1625    lisinopriL tablet 20 mg, 20 mg, Oral, Daily, Zhou Pryor MD, 20 mg at 05/24/22 0825    metoprolol injection 5 mg, 5 mg, Intravenous, Q15 Min PRN, Jesus Dey NP, 5 mg at 05/22/22 0845    metoprolol tartrate (LOPRESSOR) tablet 100 mg, 100 mg, Oral, TID, CHRIS Zepeda, 100 mg at 05/24/22 0825    ondansetron injection 4 mg, 4 mg, Intravenous, Q8H PRN, SRINIVAS BurgosCNP-BC    predniSONE tablet 40 mg, 40 mg, Oral, Daily, Prateek Murillo MD, 40 mg at 05/24/22 0824    spironolactone tablet 25 mg, 25 mg, Oral, Daily, Zhou Pryor MD, 25 mg at 05/24/22 0825    umeclidinium 62.5 mcg/actuation inhalation capsule 62.5 mcg, 1 capsule, Inhalation, Daily, Zhou Pryor MD, 62.5 mcg at 05/21/22 1130          Assessment:   Acute on Chronic Hypoxemic Respiratory Failure requiring BiPAP  COPD exacerbation  Newly Diagnosed PAF/Flutter- remains RVR    - CHADsVASc - 3 Points - 3.2% Stroke Risk per Year  SHF, unspecified  --EF 43%  HTN  DM II  COPD/Asthma  L Eye Blindness  Morbid Obesity  Substance Abuse-cocaine (patient stated that he plans on never using cocaine again)    Plan:   Transition to oral Amiodarone load: 400 mg po bid x 3 days, then 200 mg po bid x 3 days, then 200 mg po daily.  Transition to Metoprolol succinate 100mg po BID  Continue lisinopril 40 mg daily.   Continue aldactone 25 mg po daily.  Will consider ischemic evaluation in near future to  evaluate newly reduced EF pending resolution of COPD exacerbation.   Lopressor 5mg IVP Q2HR PRN HR > 120 if SBP > 90mmHg  Continue FD Lovenox 1mg/kg SQ BID for CVA Prophylaxis in the Setting of PAF with conversion to xarelto 20 mg daily prior to DC  OP Sleep Study if PT does not have previous Diagnostic Study. Patient reports history of KRISHNA   Labs and EKG in AM: CBC, CMP and Mg

## 2022-05-24 NOTE — PROGRESS NOTES
"[facility]  HOSPITAL MEDICINE   PROGRESS NOTE      CHIEF COMPLAINT  Hospital follow up  CHF    HOSPITAL COURSE  52 y.o. male with a PMHx of HTN, DM 2, CHF, COPD/Asthma, Chronic hypoxemic respiratory failure on 2L home O2  who presented to [facility] on 5/20/2022 with a primary complaint of worsening SOB with associated cough x2 days.  He was supposed to be on home oxygen and an IPV the for his obstructive sleep apnea however since his move from [place] to [place] 3 months ago he has not been using it as he does not have it and had to leave and back in [place].  Initial ED VS include /129, , RR 28, SpO2 93% on RA, temp 98.6. Labs notable for creatinine 1.23, troponin normal. BNP normal. CXR noted prominent interstital markings. Flu and COVID negative. EKG revealed atrial flutter with variable A-V block with premature ventricular or  aberrantly conducted complexes. He was started on full dose Lovenox. HR improved following Metoprolol administration.  Cardiology services were consulted. He has been placed on CPAP. He was admitted to hospital medicine services for further work-up and management of care.     Today (05/22/2022): In Afib with RVR, HR up to 150s on the monitor. Denies chest pain. Reports palpitations, shortness of breath improving and unproductive cough. Also reports chronic low back pain. No fever. On 2L NC oxygen    Today  Seen examined this morning.  His IV was not connected to his hand.  He is currently on amiodarone drip with heart rate ranging in the upper 100s.        OBJECTIVE/PHYSICAL EXAM  /77   Pulse 90   Temp 97.4 °F (36.3 °C) (Axillary)   Resp 18   Ht 6' 0.84" (1.85 m)   Wt (!) 167 kg (368 lb 2.7 oz)   SpO2 98%   BMI 48.79 kg/m²   General: In no acute distress, afebrile,  morbidly obese  Chest: Clear to auscultation bilaterally  Heart: S1, S2, no appreciable murmur  Abdomen: Soft, nontender, BS +  MSK: Warm, no lower extremity edema, no " clubbing or cyanosis  Neurologic: Alert and oriented x4, moving all extremities with good strength         ASSESSMENT/PLAN  Acute on chronic hypoxemic and hypercapnic respiratory failure, requiring BiPAP  New onset atrial flutter with RVR  Essential hypertension  Acute COPD exacerbation-Resolved  Chronic systolic congestive heart failure, EF 43%   KRISHNA  Type II diabetes mellitus  Morbid obesity, BMI 48  Polysubstance abuse, UDS positive for cocaine and opiates  Left eye blindness  Chronic hypoxemic respiratory failure on 2L home O2        cardiology is following.  Adjusting rate control meds.  Possibly ischemic evaluation?  On full-dose Lovenox for now.  Amiodarone drip  Weaned to nasal cannula oxygen  BiPAP hs and prn.  Needs outpatient sleep study.   2 more days of prednisone 40 mg.  Case management consult for outpatient sleep study referral and getting him home oxygen as he was previously on that.    DVT Prophylaxis: on Lovenox    Critical care diagnosis:  Atrial fibrillation with RVR  Critical care time spent:  35 minutes    Anticipated discharge and disposition:  Discharge once heart rate controlled and cleared from cards standpoint.   __________________________________________________________________________    LABS/MICROBIOLOGY/MEDICATIONS/DIAGNOSTICS    LABS  Recent Labs     05/22/22  0603      K 4.3   CHLORIDE 106   CO2 27   BUN 15.6   CREATININE 0.99   GLUCOSE 92   CALCIUM 9.3   ALKPHOS 55   AST 13   ALT 9   ALBUMIN 3.4*     Recent Labs     05/22/22  0603   WBC 10.0   RBC 5.12   HCT 49.5   MCV 96.7*          MICROBIOLOGY  Microbiology Results (last 7 days)     ** No results found for the last 168 hours. **          MEDICATIONS   amLODIPine  5 mg Oral Daily    atorvastatin  80 mg Oral Daily    enoxaparin  150 mg Subcutaneous Q12H    fluticasone furoate-vilanteroL  1 puff Inhalation Daily    gabapentin  300 mg Oral TID    lisinopriL  20 mg Oral Daily    metoprolol tartrate  100 mg Oral  TID    predniSONE  40 mg Oral Daily    spironolactone  25 mg Oral Daily    umeclidinium  1 capsule Inhalation Daily      INFUSIONS   amiodarone in dextrose 5% 0.5 mg/min (05/24/22 0657)       DIAGNOSTIC TESTS  X-Ray Chest 1 View   Final Result      No acute cardiopulmonary process. Prominent interstitial markings with no focal opacification.         Electronically signed by: Oziel Vale   Date:    05/20/2022   Time:    06:37               All diagnosis and differential diagnosis have been reviewed; assessment and plan has been documented. I have personally reviewed the labs and test results that are presently available; I have reviewed the patients medication list. I have reviewed the consulting providers response and recommendations. I have reviewed or attempted to review medical records based upon their availability.  All of the patient's questions have been addressed and answered. Patient's is agreeable to the above stated plan. I will continue to monitor closely and make adjustments to medical management as needed.  This document was created using M*Modal Fluency Direct.  Transcription errors may have been made.  Please contact me if any questions may rise regarding documentation to clarify verbiage.        Prateek Murillo MD   05/24/2022   Internal Medicine

## 2022-05-24 NOTE — PLAN OF CARE
05/24/22 1456   Discharge Assessment   Assessment Type Discharge Planning Assessment   Confirmed/corrected address, phone number and insurance Yes   Source of Information patient   Lives With parent(s)   Do you expect to return to your current living situation? Yes   Walking or Climbing Stairs Difficulty none   Dressing/Bathing Difficulty none   Equipment Currently Used at Home none   How do you get to doctors appointments? health plan transportation   Discharge Plan A Home   DME Needed Upon Discharge  oxygen   Discharge Plan discussed with: Patient   PCP is Dr. Sophie Navarrete

## 2022-05-25 PROBLEM — I48.91 A-FIB: Status: ACTIVE | Noted: 2022-01-01

## 2022-05-25 PROBLEM — I50.43 CHF (CONGESTIVE HEART FAILURE), NYHA CLASS I, ACUTE ON CHRONIC, COMBINED: Status: ACTIVE | Noted: 2022-01-01

## 2022-05-25 NOTE — DISCHARGE SUMMARY
[facility]  HOSPITAL MEDICINE   DISCHARGE SUMMARY    Patient Name: Vladimir Bernal  MRN: [id number]  Admission Date: 5/20/2022  Hospital Length of Stay: 5 days  Discharge Date and Time: 05/25/2022  Discharge Provider: Prateek Murillo  Primary Care Provider: Sophie Navarrete MD      DISCHARGE DIAGNOSIS  Acute on chronic hypoxemic and hypercapnic respiratory failure, requiring BiPAP  New onset atrial flutter with RVR  Essential hypertension  Acute COPD exacerbation-Resolved  Acute on Chronic systolic congestive heart failure, EF 43%   KRISHNA  Type II diabetes mellitus  Morbid obesity, BMI 48  Polysubstance abuse, UDS positive for cocaine and opiates  Left eye blindness  Chronic hypoxemic respiratory failure on 2L home O2        HOSPITAL COURSE  52 y.o. male with a PMHx of HTN, DM 2, CHF, COPD/Asthma, Chronic hypoxemic respiratory failure on 2L home O2  who presented to [facility] on 5/20/2022 with a primary complaint of worsening SOB with associated cough x2 days.  He was supposed to be on home oxygen and an IPV the for his obstructive sleep apnea however since his move from [place] to [place] 3 months ago he has not been using it as he does not have it and had to leave and back in [place].  Initial ED VS include /129, , RR 28, SpO2 93% on RA, temp 98.6. Labs notable for creatinine 1.23, troponin normal. BNP normal. CXR noted prominent interstital markings. Flu and COVID negative. EKG revealed atrial flutter with variable A-V block with premature ventricular or  aberrantly conducted complexes. He was started on full dose Lovenox. HR improved following Metoprolol administration.  Cardiology services were consulted. He has been placed on CPAP. He was admitted to hospital medicine services for further work-up and management of care.    After reviewing his case it seems that he was admitted for shortness of breath likely stemming from noncompliance as he was not able to use his oxygen or  "sleep apnea machine which he left in [place] a couple months ago when he moved here.  He was prescribed 2 L oxygen 24 hours previously and was not able to use it would intermittently use his family members.  Chest x-ray revealing only prominent interstitial markings.  Echocardiogram noting ejection fraction of 43% and Cardiology plan to do a ischemic evaluation as outpatient.  At this time they have provided for rate control with metoprolol and tried to convert him over on amiodarone however has not worked so far.  Otherwise UDS was positive for cocaine and opioids which I have explained to him to discontinue this.  He was also treated for COPD exacerbation in the event that that is also playing a role with nebulizing treatments as well as steroids.  Otherwise he is doing well and we will provide him home prescription for oxygen as well as a outpatient sleep study.  He will be going home today on the medications that cardiology has recommended.        PHYSICAL EXAM  BP (!) 139/92 (BP Location: Left arm, Patient Position: Sitting)   Pulse 90   Temp 97.5 °F (36.4 °C) (Oral)   Resp 20   Ht 6' 0.84" (1.85 m)   Wt (!) 167 kg (368 lb 2.7 oz)   SpO2 95%   BMI 48.79 kg/m²    General: In no acute distress, afebrile, morbidly obese  Chest: Clear to auscultation bilaterally  Heart: S1, S2, no appreciable murmur  Abdomen: Soft, nontender, BS +  MSK: Warm, no lower extremity edema, no clubbing or cyanosis  Neurologic: Alert and oriented x4, moving all extremities with good strength  _____________________________________________________________________________      DISCHARGE MEDICATION RECONCILIATION  Current Discharge Medication List      START taking these medications    Details   amiodarone (PACERONE) 200 MG Tab Take 2 tablets BID x 1 day, then Take 1 tablet BID x 3 days, then Take 1 tablet daily  Qty: 40 tablet, Refills: 0      metoprolol succinate (TOPROL-XL) 100 MG 24 hr tablet Take 1 tablet (100 mg total) by mouth " 2 (two) times daily.  Qty: 60 tablet, Refills: 0    Comments: .      rivaroxaban (XARELTO) 10 mg Tab Take 2 tablets (20 mg total) by mouth daily with dinner or evening meal.  Qty: 30 tablet, Refills: 0      spironolactone (ALDACTONE) 25 MG tablet Take 1 tablet (25 mg total) by mouth once daily.  Qty: 30 tablet, Refills: 0    Comments: .         CONTINUE these medications which have CHANGED    Details   albuterol (ACCUNEB) 0.63 mg/3 mL Nebu Take 3 mLs (0.63 mg total) by nebulization every 6 (six) hours as needed (shortness of breath and wheezing). Rescue  Qty: 75 mL, Refills: 0      amLODIPine (NORVASC) 10 MG tablet Take 0.5 tablets (5 mg total) by mouth once daily.  Qty: 30 tablet, Refills: 0    Comments: .      lisinopriL (PRINIVIL,ZESTRIL) 20 MG tablet Take 2 tablets (40 mg total) by mouth once daily.  Qty: 30 tablet, Refills: 0    Comments: .         CONTINUE these medications which have NOT CHANGED    Details   atorvastatin (LIPITOR) 80 MG tablet Take 80 mg by mouth once daily.      gabapentin (NEURONTIN) 300 MG capsule Take 300 mg by mouth 3 (three) times daily.      metFORMIN (GLUCOPHAGE-XR) 500 MG ER 24hr tablet Take 500 mg by mouth once daily.      budesonide-formoterol 160-4.5 mcg (SYMBICORT) 160-4.5 mcg/actuation HFAA Inhale 2 puffs into the lungs every 12 (twelve) hours. Controller      butalbital-acetaminophen-caffeine -40 mg (FIORICET, ESGIC) -40 mg per tablet Take 1 tablet by mouth every 4 (four) hours as needed for Pain.      pantoprazole (PROTONIX) 40 MG tablet Take 40 mg by mouth once daily.      promethazine-codeine 6.25-10 mg/5 ml (PHENERGAN WITH CODEINE) 6.25-10 mg/5 mL syrup Take 5 mLs by mouth every 4 (four) hours as needed for Cough.      tadalafiL (CIALIS) 5 MG tablet Take 10 mg by mouth daily as needed for Erectile Dysfunction.      tiotropium (SPIRIVA) 18 mcg inhalation capsule Inhale 18 mcg into the lungs once daily. Controller      traMADoL (ULTRAM) 50 mg tablet Take 50 mg by  mouth 2 (two) times a day.         STOP taking these medications       ibuprofen (ADVIL,MOTRIN) 800 MG tablet Comments:   Reason for Stopping:         naproxen (EC NAPROSYN) 500 MG EC tablet Comments:   Reason for Stopping:         oxyCODONE-acetaminophen (PERCOCET)  mg per tablet Comments:   Reason for Stopping:         predniSONE (DELTASONE) 20 MG tablet Comments:   Reason for Stopping:                  CONSULTS  Consults (From admission, onward)        Status Ordering Provider     Inpatient consult to Social Work/Case Management  Once        Provider:  (Not yet assigned)    Acknowledged GUANACO GUTIERREZ     Inpatient consult to Social Work/Case Management  Once        Provider:  (Not yet assigned)    Acknowledged GUANACO GUTIERREZ     Inpatient consult to Cardiology  Once        Provider:  Lily Watson MD    Completed ROSALINA LYNCH            FOLLOW UP   Follow-up Information     Sophie Navarrete MD Follow up in 1 week(s).    Contact information:  [address] [place] [address] [id number] [phone]             [facility] cardiology clinic Follow up in 1 week(s).                           DISCHARGE INSTRUCTIONS  Explained in detail to the patient about the discharge plan, medications, and follow-up visits. Pt understands and agrees with the treatment plan.  Discharged Condition: stable  Diet as tolerated  Activities as tolerated  Discharge to:     TIME SPENT ON DISCHARGE  35 minutes        Guanaco Luke M.D on 5/25/2022  Internal Medicine  Department of [facility] Medicine    This document was created using electronic dictation services.  Please excuse any errors that may have been made.  Contact me if any questions regarding documentation to clarify verbiage.

## 2022-05-25 NOTE — NURSING
Patient received discharge instructions, teachings, prescriptions delivered, and follow up appointments. Patient verbalizes understanding. No complications or distress noted from patient. Tele dc'd. Oxygen delivered. All questions and concerns answered. Patient awaiting ride and wheelchair.

## 2022-05-25 NOTE — NURSING
Patient's O2 sat on room air at rest is 97%. Patient ambulated in the jones on room air and O2 sat decreased to 88%. Patient ambulated on oxygen at 2lpm on nasal cannula and O2 sat increased to 97%.

## 2022-05-25 NOTE — PROGRESS NOTES
[facility] - Emergency Dept  Cardiology  Progress Note    Patient Name: Vladimir Bernal  MRN: [id number]  Admission Date: 5/20/2022  Hospital Length of Stay: 5 days  Code Status: Full Code   Attending Provider: Zhou Pryor MD   Consulting Provider: CHRIS Landa  Primary Care Physician: Sophie Navarrete MD  Principal Problem:COPD exacerbation    Patient information was obtained from spouse/SO and ER records.     Subjective:     Chief Complaint: Reason for Consult: PAF/Flutter     HPI: Mr. Bernal is a 53 y/o male who is unknown to [facility]. The patient presented to [facility] on 5.20.22 with c/o SOB and Cough x 2 Days. The patient reported that about 2 days prior he developed SOB that progressively worsened and was associated with a non-productive cough. He denied fever and chills. In the ER his initial workup revealed a SBP in the 180s (185/129), RA O2 Saturation of 93%, Crea 1.23, Normal BNP, CXR with Prominent Interstitial Markings, Flu/COVID-19 Negative and an EKG with PAF/Flutter with RVR. He was given IV Metoprolol and Placed on BiPAP. His HR did improve with the BB Administration. [facility] was consulted for Abnormal EKG: PAF/Flutter with RVR.       Hospital Course:  5.21.22. NAD. Patient lying in bed. Tele Aflutter RVR -HR up to 120. Denies CP or palps. + stable SOB  5.22.22: Patient awake in bed. NAD. Remains Afib, 120's. Denies CP. + cough. Expiratory wheezing noted.   5.23.22: NAD noted. Still with RVR on tele. Denies CP, still SOB. VSS.  5.24.22 Remains in atrial flutter with CVR. Patient reported occasional CP.  5.25.22 VSS. Patient in a flutter with CVR. Denies CP/SOB/Palps.      PMH: HTN, DM II, COPD/Asthma, HF, Chronic Hypoxemic Respiratory Failure/Home O2, L Eye Blindness, Obesity  PSH: Denies Past Surgical History  Family History: Reviewed and Unremarkable for Heart Disease  Social History: Drug screen positive for coacaine, ETOH and Tobacco Use    Previous Cardiac Diagnostics:   TTE  05/20/22:  TDS. No definity contrast used in this study. LV normal in size with mildly decreases systolic function. EF 43%. Unable to accurately assess diastolic function due to insufficient parameters. Mild RV enlargement with low normal RVSF. Mild TR. Estimate PASP 42mmHg. AoV not well visualized due to poor sonic window. Normal leaflet mobility. No AR/AS. Trace MR.  No significant CO. Elevatd CVP 15mmHg      Review of Systems:  Review of Systems   Constitutional: Negative for fever.   Cardiovascular: Negative for chest pain and palpitations.   Gastrointestinal: Negative for nausea and vomiting.   Musculoskeletal: Negative for myalgias.   Psychiatric/Behavioral: Negative for depression.   All other systems reviewed and are negative.    Objective:     Vital Signs (Most Recent):  Temp: 97.5 °F (36.4 °C) (05/25/22 1124)  Pulse: 90 (05/25/22 1124)  Resp: 20 (05/25/22 1124)  BP: (!) 139/92 (05/25/22 1124)  SpO2: 95 % (05/25/22 1124) Vital Signs (24h Range):  Temp:  [96.8 °F (36 °C)-98 °F (36.7 °C)] 97.5 °F (36.4 °C)  Pulse:  [] 90  Resp:  [18-22] 20  SpO2:  [95 %-100 %] 95 %  BP: ()/() 139/92     Weight: (!) 167 kg (368 lb 2.7 oz)  Body mass index is 48.79 kg/m².    SpO2: 95 %  O2 Device (Oxygen Therapy): nasal cannula      Intake/Output Summary (Last 24 hours) at 5/25/2022 1129  Last data filed at 5/25/2022 0400  Gross per 24 hour   Intake 1550 ml   Output 1005 ml   Net 545 ml       Lines/Drains/Airways     None               Significant Labs:  Recent Results (from the past 72 hour(s))   POCT glucose    Collection Time: 05/22/22  5:04 PM   Result Value Ref Range    POCT Glucose 110 70 - 110 mg/dL   POCT glucose    Collection Time: 05/22/22  7:58 PM   Result Value Ref Range    POCT Glucose 115 (H) 70 - 110 mg/dL   POCT glucose    Collection Time: 05/23/22  4:14 AM   Result Value Ref Range    POCT Glucose 96 70 - 110 mg/dL   Drug Screen, Urine    Collection Time: 05/23/22  5:33 AM   Result Value Ref  Range    Amphetamines, Urine Negative Negative    Barbituates, Urine Negative Negative    Benzodiazepine, Urine Negative Negative    Cannabinoids, Urine Negative Negative    Cocaine, Urine Positive (A) Negative    Fentanyl, Urine Negative Negative    MDMA, Urine Negative Negative    Opiates, Urine Negative Negative    Phencyclidine, Urine Negative Negative    pH, Urine 6.0 3.0 - 11.0    Specific Gravity, Urine Auto 1.010 1.001 - 1.035   POCT glucose    Collection Time: 05/23/22  4:43 PM   Result Value Ref Range    POCT Glucose 186 (H) 70 - 110 mg/dL   POCT glucose    Collection Time: 05/23/22  9:12 PM   Result Value Ref Range    POCT Glucose 114 (H) 70 - 110 mg/dL   Comprehensive Metabolic Panel    Collection Time: 05/24/22 10:21 AM   Result Value Ref Range    Sodium Level 140 136 - 145 mmol/L    Potassium Level 4.2 3.5 - 5.1 mmol/L    Chloride 103 98 - 107 mmol/L    Carbon Dioxide 30 (H) 22 - 29 mmol/L    Glucose Level 127 (H) 74 - 100 mg/dL    Blood Urea Nitrogen 14.3 8.4 - 25.7 mg/dL    Creatinine 0.95 0.73 - 1.18 mg/dL    Calcium Level Total 9.3 8.4 - 10.2 mg/dL    Protein Total 6.1 (L) 6.4 - 8.3 gm/dL    Albumin Level 3.3 (L) 3.5 - 5.0 gm/dL    Globulin 2.8 2.4 - 3.5 gm/dL    Albumin/Globulin Ratio 1.2 1.1 - 2.0 ratio    Bilirubin Total 0.3 <=1.5 mg/dL    Alkaline Phosphatase 50 40 - 150 unit/L    Alanine Aminotransferase 10 0 - 55 unit/L    Aspartate Aminotransferase 16 5 - 34 unit/L    Estimated GFR- >60 mls/min/1.73/m2   Magnesium    Collection Time: 05/24/22 10:21 AM   Result Value Ref Range    Magnesium Level 1.70 1.60 - 2.60 mg/dL   POCT glucose    Collection Time: 05/24/22  8:33 PM   Result Value Ref Range    POCT Glucose 158 (H) 70 - 110 mg/dL   Basic Metabolic Panel    Collection Time: 05/25/22  4:14 AM   Result Value Ref Range    Sodium Level 142 136 - 145 mmol/L    Potassium Level 4.7 3.5 - 5.1 mmol/L    Chloride 103 98 - 107 mmol/L    Carbon Dioxide 30 (H) 22 - 29 mmol/L    Glucose  Level 96 74 - 100 mg/dL    Blood Urea Nitrogen 14.5 8.4 - 25.7 mg/dL    Creatinine 1.05 0.73 - 1.18 mg/dL    BUN/Creatinine Ratio 14     Calcium Level Total 9.3 8.4 - 10.2 mg/dL    Estimated GFR- >60 mls/min/1.73/m2    Anion Gap 9.0 mEq/L   Phosphorus    Collection Time: 05/25/22  4:14 AM   Result Value Ref Range    Phosphorus Level 3.7 2.3 - 4.7 mg/dL   Magnesium    Collection Time: 05/25/22  4:14 AM   Result Value Ref Range    Magnesium Level 1.90 1.60 - 2.60 mg/dL       Significant Imaging:   Imaging Results          X-Ray Chest 1 View (Final result)  Result time 05/20/22 06:37:20    Final result by Oziel Vale MD (05/20/22 06:37:20)                 Impression:      No acute cardiopulmonary process. Prominent interstitial markings with no focal opacification.      Electronically signed by: Oziel Vale  Date:    05/20/2022  Time:    06:37             Narrative:    EXAMINATION:  XR CHEST 1 VIEW    CLINICAL HISTORY:  shortness of breath;    TECHNIQUE:  Single view of the chest    COMPARISON:  10/30/2019    FINDINGS:  Prominent interstitial markings with no focal opacification.    The cardiomediastinal silhouette is within normal limits.    No acute osseous abnormality.                                Last Lipids:  Lab Results   Component Value Date    CHOL 196 05/21/2022     Lab Results   Component Value Date    HDL 51 05/21/2022     No results found for: LDLCALC  Lab Results   Component Value Date    TRIG 135 05/21/2022     No results found for: CHOLHDL        Telemetry: -110's  Physical Exam  HENT:      Head: Normocephalic.      Mouth/Throat:      Mouth: Mucous membranes are moist.   Eyes:      Extraocular Movements: Extraocular movements intact.   Cardiovascular:      Rate and Rhythm: Normal rate. Rhythm irregular.      Pulses: Normal pulses.      Heart sounds: Normal heart sounds.   Pulmonary:      Effort: Pulmonary effort is normal. No respiratory distress.      Breath sounds:  Normal breath sounds.   Abdominal:      Palpations: Abdomen is soft.   Skin:     General: Skin is warm and dry.   Neurological:      Mental Status: He is alert and oriented to person, place, and time.   Psychiatric:         Mood and Affect: Mood normal.         Behavior: Behavior normal.         Home Medications:   No current facility-administered medications on file prior to encounter.     Current Outpatient Medications on File Prior to Encounter   Medication Sig Dispense Refill    amLODIPine (NORVASC) 10 MG tablet Take 10 mg by mouth once daily.      atorvastatin (LIPITOR) 80 MG tablet Take 80 mg by mouth once daily.      gabapentin (NEURONTIN) 300 MG capsule Take 300 mg by mouth 3 (three) times daily.      metFORMIN (GLUCOPHAGE-XR) 500 MG ER 24hr tablet Take 500 mg by mouth once daily.      albuterol (ACCUNEB) 0.63 mg/3 mL Nebu Take 0.63 mg by nebulization every 6 (six) hours as needed. Rescue      budesonide-formoterol 160-4.5 mcg (SYMBICORT) 160-4.5 mcg/actuation HFAA Inhale 2 puffs into the lungs every 12 (twelve) hours. Controller      butalbital-acetaminophen-caffeine -40 mg (FIORICET, ESGIC) -40 mg per tablet Take 1 tablet by mouth every 4 (four) hours as needed for Pain.      ibuprofen (ADVIL,MOTRIN) 800 MG tablet Take 800 mg by mouth every 6 (six) hours as needed for Pain.      lisinopriL (PRINIVIL,ZESTRIL) 20 MG tablet Take 20 mg by mouth once daily.      pantoprazole (PROTONIX) 40 MG tablet Take 40 mg by mouth once daily.      predniSONE (DELTASONE) 20 MG tablet Take 20 mg by mouth once daily.      promethazine-codeine 6.25-10 mg/5 ml (PHENERGAN WITH CODEINE) 6.25-10 mg/5 mL syrup Take 5 mLs by mouth every 4 (four) hours as needed for Cough.      tadalafiL (CIALIS) 5 MG tablet Take 10 mg by mouth daily as needed for Erectile Dysfunction.      tiotropium (SPIRIVA) 18 mcg inhalation capsule Inhale 18 mcg into the lungs once daily. Controller      traMADoL (ULTRAM) 50 mg tablet  Take 50 mg by mouth 2 (two) times a day.         Current Inpatient Medications:    Current Facility-Administered Medications:     acetaminophen tablet 650 mg, 650 mg, Oral, Q4H PRN, AVELINO Burgos, 650 mg at 05/24/22 1628    [START ON 5/27/2022] amiodarone tablet 200 mg, 200 mg, Oral, BID, Indigo B Lebas, FNP    [START ON 5/30/2022] amiodarone tablet 200 mg, 200 mg, Oral, Daily, Indigo B Lebas, FNP    amiodarone tablet 400 mg, 400 mg, Oral, BID, Indigo B Lebas, FNP, 400 mg at 05/25/22 0847    amLODIPine tablet 5 mg, 5 mg, Oral, Daily, Uday Rich MD, 5 mg at 05/25/22 0848    atorvastatin tablet 80 mg, 80 mg, Oral, Daily, Zhou Pryor MD, 80 mg at 05/25/22 0848    benzonatate capsule 200 mg, 200 mg, Oral, TID PRN, Yanci Barney MD, 200 mg at 05/25/22 0054    butalbital-acetaminophen-caffeine -40 mg per tablet 1 tablet, 1 tablet, Oral, Q4H PRN, Zhou Pryor MD    cloNIDine tablet 0.2 mg, 0.2 mg, Oral, TID PRN, Trenton Lux MD    enoxaparin injection 150 mg, 150 mg, Subcutaneous, Q12H, Jayant Murillo MD, 150 mg at 05/24/22 2317    fluticasone furoate-vilanteroL 100-25 mcg/dose diskus inhaler 1 puff, 1 puff, Inhalation, Daily, Uady Rich MD, 1 puff at 05/25/22 0850    gabapentin capsule 300 mg, 300 mg, Oral, TID, Zhou Pryor MD, 300 mg at 05/25/22 0848    glucagon (human recombinant) injection 1 mg, 1 mg, Intramuscular, PRN, AVELINO Burgos    guaiFENesin 100 mg/5 ml syrup 200 mg, 200 mg, Oral, Q4H PRN, Yanci Barney MD, 200 mg at 05/25/22 0054    hydrALAZINE injection 10 mg, 10 mg, Intravenous, Q6H PRN, MARY KAY BurgosP-BC, 10 mg at 05/20/22 2029    insulin aspart U-100 injection 1-10 Units, 1-10 Units, Subcutaneous, Q6H PRN, MARY KAY BurgosP-BC, 2 Units at 05/21/22 0556    ipratropium 0.02 % nebulizer solution 0.5 mg, 0.5 mg, Nebulization, Q4H PRN, Uday Rich MD, 0.5 mg at 05/25/22 0824    levalbuterol nebulizer solution  1.25 mg, 1.25 mg, Nebulization, Q4H PRN, Uday Rich MD, 1.25 mg at 05/24/22 2046    lisinopriL tablet 20 mg, 20 mg, Oral, Daily, Zhou Pryor MD, 20 mg at 05/25/22 0848    magnesium oxide tablet 400 mg, 400 mg, Oral, BID, Indigo B Lebas, FNP, 400 mg at 05/25/22 0848    metoprolol injection 5 mg, 5 mg, Intravenous, Q15 Min PRN, Jesus Q. Yissel, NP, 5 mg at 05/22/22 0845    metoprolol succinate (TOPROL-XL) 24 hr tablet 100 mg, 100 mg, Oral, BID, Indigo B Lebas, FNP, 100 mg at 05/25/22 0847    ondansetron injection 4 mg, 4 mg, Intravenous, Q8H PRN, Arlyn Jim, AGACNP-BC    predniSONE tablet 40 mg, 40 mg, Oral, Daily, Prateek Murillo MD, 40 mg at 05/25/22 0848    spironolactone tablet 25 mg, 25 mg, Oral, Daily, Zhou Pryor MD, 25 mg at 05/25/22 0848    umeclidinium 62.5 mcg/actuation inhalation capsule 62.5 mcg, 1 capsule, Inhalation, Daily, Zhou Pryor MD, 62.5 mcg at 05/21/22 1130          Assessment:   Acute on Chronic Hypoxemic Respiratory Failure requiring BiPAP-resolved  COPD exacerbation  Newly Diagnosed PAF/Flutter- now CVR    - CHADsVASc - 3 Points - 3.2% Stroke Risk per Year  SHF, unspecified  --EF 43%  HTN  DM II  COPD/Asthma  L Eye Blindness  Morbid Obesity  Substance Abuse-cocaine (patient stated that he plans on never using cocaine again)    Plan:   Continue oral Amiodarone load: 400 mg po bid x 3 days, then 200 mg po bid x 3 days, then 200 mg po daily.  Continue Metoprolol succinate 100mg po BID  Continue lisinopril 40 mg daily.   Continue aldactone 25 mg po daily.  Ischemic evaluation as an outpatient to evaluate newly reduced EF pending resolution of COPD exacerbation.   Lopressor 5mg IVP Q2HR PRN HR > 120 if SBP > 90mmHg  Xarelto 20 mg po daily for CVA Prophylaxis in the Setting of PAF   OP Sleep Study if PT does not have previous Diagnostic Study. Patient reports history of KRISHNA   Follow up with Dr. Bellamy in 5-7 days for further cardiac evaluation.  Will be  available as needed.

## 2022-05-25 NOTE — PLAN OF CARE
Order for home oxygen and nebulizer noted. Patient has used [employer] in the past and would like the use them again. FOC obtained. Referral sent via [employer]. Referral for [facility] via [employer].

## 2022-05-26 NOTE — PROGRESS NOTES
C3 nurse spoke with Vladimir Bernal for a TCC post hospital discharge follow up call. The patient has a scheduled HOSFU appointment with Sophie Navarrete MD on 05/31/2022 @ 1 pm.

## 2022-06-07 PROBLEM — J96.02 ACUTE RESPIRATORY FAILURE WITH HYPOXIA AND HYPERCARBIA: Status: ACTIVE | Noted: 2022-01-01

## 2022-06-07 PROBLEM — J96.01 ACUTE RESPIRATORY FAILURE WITH HYPOXIA AND HYPERCARBIA: Status: ACTIVE | Noted: 2022-01-01

## 2022-06-07 PROBLEM — I50.9 CHF EXACERBATION: Status: ACTIVE | Noted: 2022-01-01

## 2022-06-07 PROBLEM — J44.1 COPD EXACERBATION: Status: ACTIVE | Noted: 2022-01-01

## 2022-06-07 NOTE — H&P
[facility] ICU History & Physical Note     Resident Team: [facility] Medicine List 3  Attending Physician: MD Margaret  Resident: MD Sandeep    Date of Admit: 6/7/2022    Chief Complaint     Shortness of Breath (Pt to the ED with increased labored breathing, shortness of breath x 2-3 days, drowsy. )      Subjective:      History of Present Illness:  Vladimir Bernal is a 52 y.o. male who with a history of CHF, COPD who presented to [facility] ED on 6/7/2022  with a primary complaint of Shortness of Breath (Pt to the ED with increased labored breathing, shortness of breath x 2-3 days, drowsy. )  Patient minimally responsive on interview without family member in room. ED reports patient decompensated, originally awake and talking then progressing to lethargic. Chart review notable for recent admission to [facility] for CHF exacerbation requiring BiPAP w/ discharge on 5/25/2022. Pending outpatient ischemia workup with [facility].   In ED, ABG notable for hypercapnic and hypoxemic respiratory failure with pH 7.12. patient placed on BiPAP with mentation and ABG improvement to pH 7.17 and pCO2 from 92 to 79. Episode of tachycardia to 200's spontaneously returning to rate of 130's. Labs notable for 's. Imaging unremarkable. [facility] IM consulted for admission.       Past Medical History:  Blindness in 1 eye   HFrEF  COPD on chronic oxygen at home 2 LNC  Depression  DM    Past Surgical History:  Past Surgical History:   Procedure Laterality Date    MANDIBLE SURGERY         Family History:  No family history on file.   Unable to discuss with patient due to status     Social History:   Hx of polysubstance abuse     Allergies:  Review of patient's allergies indicates:   Allergen Reactions    Iodine Shortness Of Breath and Swelling     IT FLARES UP ASTHMA SYMPTOMSFLAR      Shellfish containing products        Home Medications:  Prior to Admission medications    Medication Sig Start Date End Date Taking? Authorizing Provider   albuterol (ACCUNEB) 0.63 mg/3  mL Nebu Take 3 mLs (0.63 mg total) by nebulization every 6 (six) hours as needed (shortness of breath and wheezing). Rescue 5/25/22   Prateek Murillo MD   amiodarone (PACERONE) 200 MG Tab Take 2 tablets BID x 1 day, then Take 1 tablet BID x 3 days, then Take 1 tablet daily 5/25/22   Prateek Murillo MD   amLODIPine (NORVASC) 10 MG tablet Take 0.5 tablets (5 mg total) by mouth once daily. 5/25/22   Prateek Murillo MD   atorvastatin (LIPITOR) 80 MG tablet Take 80 mg by mouth once daily.    Historical Provider   budesonide-formoterol 160-4.5 mcg (SYMBICORT) 160-4.5 mcg/actuation HFAA Inhale 2 puffs into the lungs every 12 (twelve) hours. Controller    Historical Provider   butalbital-acetaminophen-caffeine -40 mg (FIORICET, ESGIC) -40 mg per tablet Take 1 tablet by mouth every 4 (four) hours as needed for Pain.    Historical Provider   gabapentin (NEURONTIN) 300 MG capsule Take 300 mg by mouth 3 (three) times daily.    Historical Provider   lisinopriL (PRINIVIL,ZESTRIL) 20 MG tablet Take 2 tablets (40 mg total) by mouth once daily. 5/25/22   Prateek Murillo MD   metFORMIN (GLUCOPHAGE-XR) 500 MG ER 24hr tablet Take 500 mg by mouth once daily.    Historical Provider   metoprolol succinate (TOPROL-XL) 100 MG 24 hr tablet Take 1 tablet (100 mg total) by mouth 2 (two) times daily. 5/25/22   Prateek Murillo MD   pantoprazole (PROTONIX) 40 MG tablet Take 40 mg by mouth once daily.    Historical Provider   promethazine-codeine 6.25-10 mg/5 ml (PHENERGAN WITH CODEINE) 6.25-10 mg/5 mL syrup Take 5 mLs by mouth every 4 (four) hours as needed for Cough.    Historical Provider   rivaroxaban (XARELTO) 10 mg Tab Take 2 tablets (20 mg total) by mouth daily with dinner or evening meal. 5/25/22   Prateek Murillo MD   spironolactone (ALDACTONE) 25 MG tablet Take 1 tablet (25 mg total) by mouth once daily. 5/26/22   Prateek Murillo MD   tadalafiL (CIALIS) 5 MG tablet Take 10 mg by mouth daily as needed for Erectile Dysfunction.    Historical  Provider   tiotropium (SPIRIVA) 18 mcg inhalation capsule Inhale 18 mcg into the lungs once daily. Controller    Historical Provider   traMADoL (ULTRAM) 50 mg tablet Take 50 mg by mouth 2 (two) times a day.    Historical Provider         Review of Systems:  Unable to elicit ROS due to patient status          Objective:   Last 24 Hour Vital Signs:  BP  Min: 122/110  Max: 153/91  Temp  Av.8 °F (36.6 °C)  Min: 97.8 °F (36.6 °C)  Max: 97.8 °F (36.6 °C)  Pulse  Av.2  Min: 83  Max: 94  Resp  Av.9  Min: 23  Max: 26  SpO2  Av.4 %  Min: 91 %  Max: 100 %  Height  Av' (182.9 cm)  Min: 6' (182.9 cm)  Max: 6' (182.9 cm)  Weight  Av.8 kg (378 lb 13.2 oz)  Min: 171.8 kg (378 lb 12 oz)  Max: 171.9 kg (378 lb 14.4 oz)  Body mass index is 51.37 kg/m².  No intake/output data recorded.    Physical Examination:  Physical Exam  Vitals reviewed.   Constitutional:       Interventions: Face mask in place.      Comments: Eye opening to voice, responding to commands, no verbal response, on BiPAP   HENT:      Head: Normocephalic.   Cardiovascular:      Rate and Rhythm: Tachycardia present. Rhythm irregular.      Pulses: Normal pulses.      Heart sounds: Normal heart sounds.   Pulmonary:      Effort: Pulmonary effort is normal.      Breath sounds: Normal breath sounds.      Comments: BiPAP in place 8, referred bipap sounds in all lung fields   Abdominal:      General: Bowel sounds are normal.      Comments: Obese, no grimace to palpation, no masses noted. Laproscopy scar noted to epigastric abdomen    Musculoskeletal:         General: No swelling or deformity.   Skin:     General: Skin is warm and dry.      Capillary Refill: Capillary refill takes less than 2 seconds.      Findings: No bruising, lesion or rash.   Neurological:      Mental Status: He is lethargic.      GCS: GCS eye subscore is 3. GCS motor subscore is 6.      Comments: E3           Laboratory:  Most Recent Data:  CBC:   Lab Results   Component  Value Date    WBC 7.8 06/07/2022    HGB 14.8 06/07/2022    HCT 48.4 06/07/2022     06/07/2022    MCV 98.0 (H) 06/07/2022    RDW 15.6 06/07/2022     WBC Differential:   Recent Labs   Lab 06/07/22  1041   WBC 7.8   HGB 14.8   HCT 48.4      MCV 98.0*     BMP:   Lab Results   Component Value Date     06/07/2022    K 5.1 06/07/2022    CO2 28 06/07/2022    BUN 18.0 06/07/2022    CREATININE 1.29 (H) 06/07/2022    CALCIUM 9.5 06/07/2022    MG 1.80 06/07/2022    PHOS 4.2 06/07/2022     LFTs:   Lab Results   Component Value Date    ALBUMIN 3.6 06/07/2022    BILITOT 0.2 06/07/2022    AST 16 06/07/2022    ALKPHOS 62 06/07/2022    ALT 8 06/07/2022     Coags:   Lab Results   Component Value Date    INR 1.17 05/20/2022    PROTIME 14.6 (H) 05/20/2022    PTT 28.3 05/20/2022     FLP:   Lab Results   Component Value Date    CHOL 196 05/21/2022    HDL 51 05/21/2022    TRIG 135 05/21/2022     DM:   Lab Results   Component Value Date    HGBA1C 6.3 05/21/2022    CREATININE 1.29 (H) 06/07/2022     Thyroid:   Lab Results   Component Value Date    TSH 2.0706 05/20/2022      Anemia: No results found for: IRON, TIBC, FERRITIN, SATURATEDIRO  No results found for: OGMUMDAV01  No results found for: FOLATE     Cardiac:   Lab Results   Component Value Date    TROPONINI <0.010 06/07/2022    .1 (H) 06/07/2022     Urinalysis:   Lab Results   Component Value Date    COLORU YELLOW 09/15/2019    PHUA 7.5 10/04/2019    NITRITE Negative 09/15/2019    KETONESU Negative 09/15/2019    UROBILINOGEN 0.2 09/15/2019    WBCUA NONE SEEN 09/15/2019       Trended Lab Data:  Recent Labs   Lab 06/07/22  1041   WBC 7.8   HGB 14.8   HCT 48.4      MCV 98.0*   RDW 15.6      K 5.1   CO2 28   BUN 18.0   CREATININE 1.29*   ALBUMIN 3.6   BILITOT 0.2   AST 16   ALKPHOS 62   ALT 8       Trended Cardiac Data:  Recent Labs   Lab 06/07/22  1041   TROPONINI <0.010   .1*       Microbiology Data:  Microbiology Results (last 7 days)     **  No results found for the last 168 hours. **           Other Results:  EKG (my interpretation): RBBB, occasional PVC noted, unifocal, aflutter.     Radiology:  Imaging Results          X-Ray Chest AP Portable (Final result)  Result time 06/07/22 11:13:59    Final result by Oziel Vale MD (06/07/22 11:13:59)                 Impression:      Study limited by motion.  No definite acute abnormality.  Prominent interstitial markings with no focal opacification.      Electronically signed by: Oziel Vale  Date:    06/07/2022  Time:    11:13             Narrative:    EXAMINATION:  XR CHEST AP PORTABLE    CLINICAL HISTORY:  Chest Pain;    TECHNIQUE:  Single view of the chest    COMPARISON:  05/20/2022    FINDINGS:  Prominent interstitial markings with no focal opacification    The cardiomediastinal silhouette is within normal limits.    No acute osseous abnormality.                                 Assessment & Plan:     1) Acute Hypoxic and Hypercapnic Respiratory Failure   2) A-fib w. RVR   3) VY  4) HFrEF (EF 43% 5/22)  5) unquantified COPD on home O2   6) DM   7) Blindness in one eye   8) Hx of substance abuse   9) HTN    Admit to ICU   ABG and mentation improving with BiPAP, continue therapy at 14/7. Repeat ABG in 4 hours   S/p mag sulfate 2 gm, Solu-medrol 125 mg in ED   Low threshold to intubate   DuoNebs q4 hours, continue home Symbicort as able  S/p 60 IV lasix in ED  Condom cath placement for strict I&O  UDS pending   No evidence of infection, will hold off on antibiotics at this time   SSI, hold home antihyperglycemics     CODE STATUS: FULL  Access: PIV  Antibiotics: None  Diet: NPO  DVT Prophylaxis: Lovenox 40 mg BID  GI Prophylaxis: Pepcid 20 gm IV BID  Fluids: none       Disposition: Admit to ICU for bipap therapy, low threshold to intubate.     Marlin Hopkins MD  [facility] -2

## 2022-06-08 NOTE — CONSULTS
[facility] History and Physical       Attending Physician: Zeferino Robles MD    Subjective:      Brief HPI:  Vladimir Bernal is a 52 y.o. male who with a history of CHF, COPD who presented to [facility] ED on 6/7/2022  with a primary complaint of Shortness of Breath (Pt to the ED with increased labored breathing, shortness of breath x 2-3 days, drowsy. )  Patient minimally responsive on interview without family member in room. ED reports patient decompensated, originally awake and talking then progressing to lethargic. Chart review notable for recent admission to [facility] for CHF exacerbation requiring BiPAP w/ discharge on 5/25/2022. Pending outpatient ischemia workup with [facility].   In ED, ABG notable for hypercapnic and hypoxemic respiratory failure with pH 7.12. patient placed on BiPAP with mentation and ABG improvement to pH 7.17 and pCO2 from 92 to 79. Episode of tachycardia to 200's spontaneously returning to rate of 130's. Labs notable for 's. Imaging unremarkable. [facility] IM consulted for admission.      Past Medical History:  Blindness in 1 eye   HFrEF  COPD on chronic oxygen at home 2 LNC  Depression  DM    Past Surgical History:   Procedure Laterality Date    MANDIBLE SURGERY         Review of patient's allergies indicates:   Allergen Reactions    Iodine Shortness Of Breath and Swelling     IT FLARES UP ASTHMA SYMPTOMSFLAR      Shellfish containing products        Current Outpatient Medications   Medication Instructions    albuterol (ACCUNEB) 0.63 mg, Nebulization, Every 6 hours PRN, Rescue    amiodarone (PACERONE) 200 MG Tab Take 2 tablets BID x 1 day, then Take 1 tablet BID x 3 days, then Take 1 tablet daily    amLODIPine (NORVASC) 5 mg, Oral, Daily    atorvastatin (LIPITOR) 80 mg, Oral, Daily    budesonide-formoterol 160-4.5 mcg (SYMBICORT) 160-4.5 mcg/actuation HFAA 2 puffs, Inhalation, Every 12 hours, Controller    butalbital-acetaminophen-caffeine -40 mg (FIORICET, ESGIC) -40 mg per  tablet 1 tablet, Oral, Every 4 hours PRN    gabapentin (NEURONTIN) 300 mg, Oral, 3 times daily    lisinopriL (PRINIVIL,ZESTRIL) 40 mg, Oral, Daily    metFORMIN (GLUCOPHAGE-XR) 500 mg, Oral, Daily    metoprolol succinate (TOPROL-XL) 100 mg, Oral, 2 times daily    pantoprazole (PROTONIX) 40 mg, Oral, Daily    promethazine-codeine 6.25-10 mg/5 ml (PHENERGAN WITH CODEINE) 6.25-10 mg/5 mL syrup 5 mLs, Oral, Every 4 hours PRN    rivaroxaban (XARELTO) 20 mg, Oral, With dinner    spironolactone (ALDACTONE) 25 mg, Oral, Daily    tadalafiL (CIALIS) 10 mg, Oral, Daily PRN    tiotropium (SPIRIVA) 18 mcg, Inhalation, Daily, Controller    traMADoL (ULTRAM) 50 mg, Oral, 2 times daily        Family History    None         Tobacco Use    Smoking status: Not on file    Smokeless tobacco: Not on file   Substance and Sexual Activity    Alcohol use: Not on file    Drug use: Not on file    Sexual activity: Not on file        Review of Systems:  12 point ROS completed and negative except as indicated above.     Objective:     Vital Signs:  Vital Signs (Most Recent):  Temp: 98.4 °F (36.9 °C) (06/08/22 1200)  Pulse: (!) 136 (06/08/22 1200)  Resp: 19 (06/08/22 1200)  BP: (!) 124/98 (06/08/22 1200)  SpO2: (!) 93 % (06/08/22 1200) Vital Signs (24h Range):  Temp:  [97.5 °F (36.4 °C)-98.9 °F (37.2 °C)] 98.4 °F (36.9 °C)  Pulse:  [] 136  Resp:  [13-31] 19  SpO2:  [77 %-96 %] 93 %  BP: (108-149)/() 124/98   Body mass index is 51.37 kg/m².     Intake/Output Summary (Last 24 hours) at 6/8/2022 1500  Last data filed at 6/8/2022 0600  Gross per 24 hour   Intake --   Output 2101 ml   Net -2101 ml       Physical Examination:  General:  Well developed, well nourished, no acute respiratory distress  Head: Normocephalic, atraumatic  Eyes: PERRL, EOMI, anicteric sclera  Throat: No posterior pharyngeal erythema or exudate, no tonsillar exudate  Neck: supple, normal ROM, no JVD  CVS:  RRR, S1 and S2 normal, no murmurs, no added  heart sounds, rubs, gallops, regular peripheral pulses, and no peripheral edema  Resp:  Lungs clear to auscultation bilaterally, no wheezes, rales, or rhonci  GI:  Abdomen soft, non-tender, non-distended, normoactive bowel sounds  MSK:  Full range of motion, no obvious deformities   Skin:  No rashes, ulcers, erythema  Neuro:  Alert and oriented x3, No focal neuro deficits, CNII-XII grossly intact  Psych:  Appropriate mood and affect     Laboratory:    Recent Labs   Lab 06/08/22 0319   WBC 6.2   HGB 15.4   HCT 50.3      MCV 96.5*   RDW 15.0     Recent Labs   Lab 06/07/22  1826   TROPONINI <0.010     Recent Labs   Lab 06/07/22  1041 06/07/22  1826   TROPONINI <0.010 <0.010   .1*  --      No results for input(s): CHOL, HDL, LDLCALC, TRIG, CHOLHDL in the last 168 hours. Recent Labs   Lab 06/08/22 0319      K 5.0   CO2 28   BUN 19.2   CREATININE 1.26*   CALCIUM 10.0     Recent Labs   Lab 06/08/22  0319   ALBUMIN 3.6   BILITOT 0.7   AST 10   ALKPHOS 54   ALT 10     No results for input(s): IRON, TIBC, FERRITIN, SATURATEDIRO, [id number], FOLATE in the last 168 hours.  No results for input(s): TSH, [id number], HGBA1C, INR, PROTIME, PTT in the last 168 hours.       Microbiology Data:  Microbiology Results (last 7 days)     ** No results found for the last 168 hours. **           Other Results:      Radiology:  X-Ray Chest AP Portable 06/07/2022    Narrative  EXAMINATION:  XR CHEST AP PORTABLE    CLINICAL HISTORY:  Chest Pain;    TECHNIQUE:  Single view of the chest    COMPARISON:  05/20/2022    FINDINGS:  Prominent interstitial markings with no focal opacification    The cardiomediastinal silhouette is within normal limits.    No acute osseous abnormality.    Impression  Study limited by motion.  No definite acute abnormality.  Prominent interstitial markings with no focal opacification.      Electronically signed by: Oziel Vale  Date:    06/07/2022  Time:    11:13       Current Medications:      Infusions:        Scheduled:   amiodarone  200 mg Oral Daily    amLODIPine  5 mg Oral Daily    atorvastatin  80 mg Oral Daily    famotidine (PF)  20 mg Intravenous Q12H    fluticasone furoate-vilanteroL  1 puff Inhalation Daily    lisinopriL  40 mg Oral Daily    metoprolol succinate  100 mg Oral BID    rivaroxaban  20 mg Oral Daily with dinner    spironolactone  25 mg Oral Daily         PRN:   albuterol-ipratropium    dextrose 10%    dextrose 10%    sodium chloride 0.9%        Antibiotics and Day Number of Therapy:  Antibiotics (From admission, onward)            None         TTE 09/2019:  Moderate concentric left ventricular hypertrophy, ejection fraction 80%, grade 1 diastolic dysfunction.  TTE 05/20/22:  TDS. No definity contrast used in this study. LV normal in size with mildly decreases systolic function. EF 43%. Unable to accurately assess diastolic function due to insufficient parameters. Mild RV enlargement with low normal RVSF. Mild TR. Estimate PASP 42mmHg. AoV not well visualized due to poor sonic window. Normal leaflet mobility. No AR/AS. Trace MR.  No significant ND. Elevatd CVP 15mmHg       Nuclear stress 07/2018:  Negative Lexiscan nuclear stress for ischemia    Assessment & Plan:   Atrial flutter/ AFIB:  -Patient is currently on metoprolol succinate 100 b.i.d., patient was started amiodarone at his previous hospitalization  -Would load patient with digoxin IV 0.5 mg then 0.25 mg 6 hours later and then 0.25 mg 6 hours laters. Then .25 mg malignance daily afterwards  -Stop amio  -Continue toprol xl 100 mg bid, and anticoagulation  -Will follow up with patient in 2 weeks outpatient.  -Ensure that patient use CPAP/Trilogy       Chris Razo MD  Internal Medicine Resident PGY-I

## 2022-06-08 NOTE — PLAN OF CARE
06/08/22 0859   Discharge Assessment   Assessment Type Discharge Planning Assessment   Confirmed/corrected address, phone number and insurance Yes   Confirmed Demographics Correct on Facesheet   Source of Information patient   When was your last doctors appointment?   (PCP: Dr. Sophie Navarrete)   Lives With parent(s)   Do you expect to return to your current living situation? Yes   Do you have help at home or someone to help you manage your care at home? Yes   Who are your caregiver(s) and their phone number(s)? Mom- Barbara Bernal; Pt doesn't know phone number   Prior to hospitilization cognitive status: Alert/Oriented;No Deficits   Current cognitive status: Alert/Oriented;No Deficits   Walking or Climbing Stairs Difficulty stair climbing difficulty, requires equipment   Mobility Management Knee brace and crutches   Dressing/Bathing Difficulty none   Home Accessibility stairs to enter home   Number of Stairs, Main Entrance three   Stair Railings, Main Entrance railing on right side (ascending)   Home Layout Able to live on 1st floor   Equipment Currently Used at Home crutches;glucometer;oxygen  (Has concentrator, 9 small tanks, and 1 large tank from [employer])   Do you currently have service(s) that help you manage your care at home? No   Do you take prescription medications? Yes  ([address])   Do you have any problems affording any of your prescribed medications? No   Is the patient taking medications as prescribed? yes   Who is going to help you get home at discharge? States does not have ride   Are you on dialysis? No   Do you take coumadin? No   Discharge Plan A Home with family   DME Needed Upon Discharge  other (see comments)  (Trilogy)   Discharge Plan discussed with: Patient   Discharge Barriers Identified Transportation   Relationship/Environment   Name(s) of Who Lives With Patient Mother: Barbara Bernal; does not know phone number   New referral for Trilogy. Referral sent to [employer] via [employer].  Will follow.

## 2022-06-08 NOTE — PROGRESS NOTES
[facility] - ICU  Adult Nutrition  Progress Note    SUMMARY       Recommendations    Recommendation/Intervention: 1. continue heart healthy diet 2. pt education on diet/ complete 3. MVI/fe 4. biweekly wt. Will monitor nutrition status    Assessment and Plan  Pt tolerating oral diet; good appetite; on bipap while sleeping; pt obese--BMI 51; encouraged diet adherence.   Nutrition Education:    Patient educated on: Heart Healthy Diet.      Teaching Method:  Explanation and Printed Materials    Patient's Understanding: Verbalizes understanding and Needs further teaching    Barriers to learning: Desire / Motivation    Expected Compliance:  fair    All questions were answered. Dietitian's contact information provided.      Reason for Assessment    Reason For Assessment: other (see comments) (nutrtion dx --CHF/COPD)  Diagnosis: cardiac disease, pulmonary disease, other (see comments) (Acute Hypoxic/hypercapnic respiratory failure, Afib, CHF, COPD, DM, blindness--1 eye, Hx substance abuse, HTN)  Relevant Medical History: CHF, COPD  General Information Comments: Pt having difficult time staying awake; reportedd good appetite ; on bipap/ + SOB; no wt loss; pt is obese--BMI 51.3. + leg edema; on diuretic. Encouraged diet adherence.    Nutrition Risk Screen    Nutrition Risk Screen: no indicators present    Nutrition/Diet History    Patient Reported Diet/Restrictions/Preferences: general  Typical Food/Fluid Intake: 3 meals/ snacks  Factors Affecting Nutritional Intake: None identified at this time    Anthropometrics    Temp: 98.3 °F (36.8 °C)  Height Method: Estimated  Height: 6' (182.9 cm)  Height (inches): 72 in  Weight Method: Bed Scale  Weight: (!) 171.8 kg (378 lb 12 oz)  Weight (lb): (!) 378.75 lb  Ideal Body Weight (IBW), Male: 178 lb  % Ideal Body Weight, Male (lb): 212.78 %  BMI (Calculated): 51.4  BMI Grade: greater than 40 - morbid obesity  Usual Body Weight (UBW), kg: (!) 171.8 kg  % Usual Body Weight:  100.21  % Weight Change From Usual Weight: 0 %       Lab/Procedures/Meds    Pertinent Labs Reviewed: reviewed  Pertinent Labs Comments: (6-8) H/H 15.4/50.3 Gluc 93 Bun 19.2 Cr 1.2 K 5.0  Pertinent Medications Reviewed: reviewed  Pertinent Medications Comments: albuterol, atorvastatin, famotodine, lisinopri, spironolactone      Nutrition Prescription Ordered    Current Diet Order: heart healthy  Nutrition Order Comments: good appetite    Evaluation of Received Nutrient/Fluid Intake    Energy Calories Required: meeting needs  Protein Required: meeting needs  Fluid Required: meeting needs  Tolerance: tolerating  % Intake of Estimated Energy Needs: 75 - 100 %  % Meal Intake: 75 - 100 %    Nutrition Risk    Level of Risk/Frequency of Follow-up: low     Monitor and Evaluation    Food and Nutrient Intake: food and beverage intake  Knowledge/Beliefs/Attitudes: food and nutrition knowledge/skill  Anthropometric Measurements: weight     Nutrition Follow-Up    RD Follow-up?: Yes

## 2022-06-09 NOTE — PROGRESS NOTES
[facility] Medicine Wards Progress Note     Resident Team: [facility] Medicine List 3  Attending Physician: DO Jose  Resident: MD Sandeep  Intern: MD Hayley       Subjective:      Brief HPI:  Vladimir Bernal is a 52 y.o. male who with a history of CHF, COPD who presented to [facility] ED on 2022  with a primary complaint of Shortness of Breath (Pt to the ED with increased labored breathing, shortness of breath x 2-3 days, drowsy. )  Patient minimally responsive on interview without family member in room. ED reports patient decompensated, originally awake and talking then progressing to lethargic. Chart review notable for recent admission to [facility] for CHF exacerbation requiring BiPAP w/ discharge on 2022. Pending outpatient ischemia workup with [facility].   In ED, ABG notable for hypercapnic and hypoxemic respiratory failure with pH 7.12. patient placed on BiPAP with mentation and ABG improvement to pH 7.17 and pCO2 from 92 to 79. Episode of tachycardia to 200's spontaneously returning to rate of 130's. Labs notable for 's. Imaging unremarkable. [facility] IM consulted for admission.     Interval History: NAEON. Patient remains tachycardic without symptoms. Slept with BiPAP, tolerated well. No complaints this AM.       Review of Systems:  ROS completed and negative except as indicated above.     Objective:     Last 24 Hour Vital Signs:  BP  Min: 116/72  Max: 124/78  Temp  Av.2 °F (36.8 °C)  Min: 97.9 °F (36.6 °C)  Max: 98.5 °F (36.9 °C)  Pulse  Av.7  Min: 85  Max: 139  Resp  Av.7  Min: 19  Max: 45  SpO2  Av.5 %  Min: 89 %  Max: 98 %  Weight  Av.8 kg (378 lb 12 oz)  Min: 171.8 kg (378 lb 12 oz)  Max: 171.8 kg (378 lb 12 oz)  I/O last 3 completed shifts:  In: 1320 [P.O.:1320]  Out:  [Urine:1950; Stool:1]    Physical Examination:  Physical Exam  Vitals reviewed.   Constitutional:       Appearance: Normal appearance. He is obese.   HENT:      Head: Normocephalic and atraumatic.   Eyes:      Comments:  Blindness of right eye   Cardiovascular:      Rate and Rhythm: Tachycardia present.      Pulses: Normal pulses.      Heart sounds: Normal heart sounds. No murmur heard.    No gallop.   Pulmonary:      Effort: Pulmonary effort is normal. No respiratory distress.      Breath sounds: Normal breath sounds. No stridor. No wheezing or rhonchi.   Abdominal:      Palpations: Abdomen is soft.   Musculoskeletal:         General: Normal range of motion.   Skin:     General: Skin is warm and dry.      Capillary Refill: Capillary refill takes less than 2 seconds.   Neurological:      General: No focal deficit present.      Mental Status: He is alert and oriented to person, place, and time.           Laboratory:  Most Recent Data:  CBC:   Lab Results   Component Value Date    WBC 8.9 06/09/2022    HGB 14.9 06/09/2022    HCT 46.9 06/09/2022     06/09/2022    MCV 93.8 06/09/2022    RDW 15.4 06/09/2022     WBC Differential:   Recent Labs   Lab 06/07/22  1041 06/08/22  0319 06/09/22  0316   WBC 7.8 6.2 8.9   HGB 14.8 15.4 14.9   HCT 48.4 50.3 46.9    328 360   MCV 98.0* 96.5* 93.8     BMP:   Lab Results   Component Value Date     06/09/2022    K 3.7 06/09/2022    CO2 32 (H) 06/09/2022    BUN 30.3 (H) 06/09/2022    CREATININE 1.39 (H) 06/09/2022    CALCIUM 9.8 06/09/2022    MG 1.80 06/09/2022    PHOS 4.2 06/07/2022     LFTs:   Lab Results   Component Value Date    ALBUMIN 3.2 (L) 06/09/2022    BILITOT 0.5 06/09/2022    AST 8 06/09/2022    ALKPHOS 55 06/09/2022    ALT 6 06/09/2022     Coags:   Lab Results   Component Value Date    INR 1.17 05/20/2022    PROTIME 14.6 (H) 05/20/2022    PTT 28.3 05/20/2022     FLP:   Lab Results   Component Value Date    CHOL 196 05/21/2022    HDL 51 05/21/2022    TRIG 135 05/21/2022     DM:   Lab Results   Component Value Date    HGBA1C 6.3 05/21/2022    CREATININE 1.39 (H) 06/09/2022     Thyroid:   Lab Results   Component Value Date    TSH 2.0706 05/20/2022      Anemia: No results  found for: IRON, TIBC, FERRITIN, SATURATEDIRO  No results found for: [id number]  No results found for: FOLATE     Cardiac:   Lab Results   Component Value Date    TROPONINI <0.010 06/07/2022    .1 (H) 06/07/2022         Microbiology Data:  Microbiology Results (last 7 days)     ** No results found for the last 168 hours. **           Other Results:    Radiology:  Imaging Results          X-Ray Chest AP Portable (Final result)  Result time 06/07/22 11:13:59    Final result by Oziel Vale MD (06/07/22 11:13:59)                 Impression:      Study limited by motion.  No definite acute abnormality.  Prominent interstitial markings with no focal opacification.      Electronically signed by: Oziel Vale  Date:    06/07/2022  Time:    11:13             Narrative:    EXAMINATION:  XR CHEST AP PORTABLE    CLINICAL HISTORY:  Chest Pain;    TECHNIQUE:  Single view of the chest    COMPARISON:  05/20/2022    FINDINGS:  Prominent interstitial markings with no focal opacification    The cardiomediastinal silhouette is within normal limits.    No acute osseous abnormality.                                Current Medications:     Infusions:       Scheduled:   amLODIPine  5 mg Oral Daily    atorvastatin  80 mg Oral Daily    digoxin  250 mcg Intravenous Once    digoxin  250 mcg Intravenous Once    [START ON 6/10/2022] digoxin  0.25 mg Oral Daily    fluticasone furoate-vilanteroL  1 puff Inhalation Daily    furosemide (LASIX) injection  40 mg Intravenous Daily    metoprolol succinate  100 mg Oral BID    pantoprazole  40 mg Oral Daily    potassium chloride  40 mEq Oral Once    rivaroxaban  20 mg Oral Daily with dinner        PRN:  albuterol-ipratropium, dextrose 10%, dextrose 10%, sodium chloride 0.9%      Assessment & Plan:     1) Acute Hypoxic and Hypercapnic Respiratory Failure (resolved)  2) A-flutter w. RVR   3) VY  4) HFrEF (EF 43% 5/22)  5) unquantified COPD on home O2   6) Blindness in one eye   7)  Hx of substance abuse   8) HTN     Cardiology consult for a flutter, see consult note  Digoxin load today, start 125 mcg oral tomorrow. D/c amiodarone  If HR remain elevate tomorrow, consider increasing metoprolol to 150 mg BID  Pending trilogy for discharge  Holding lisinopril and spironolactone due to VY  Lasix 40 IV daily per cardiology rec with plan to discharge on oral lasix     CODE STATUS: FULL  Access: PIV  Antibiotics: None  Diet: Heart Healthy   DVT Prophylaxis: Xarelto 20 mg qHS  GI Prophylaxis: Protonix 40 mg daily   Fluids: none     Disposition: Continue to monitor inpatient, medication changes as above for a flutter. Pending trilogy for discharge      Marlin Hopkins MD  [facility] HO-2

## 2022-06-09 NOTE — ED PROVIDER NOTES
Encounter Date: 6/7/2022       History     Chief Complaint   Patient presents with    Shortness of Breath     Pt to the ED with increased labored breathing, shortness of breath x 2-3 days, drowsy.      52-year-old male presents to ED for shortness of breath.  states it has significantly worsened over the last 2 days.  Denies any associated chest pain or pressure.  Denies trauma.  Denies fever cough congestion, no worsening or change in leg swelling or weight change.  Denies trauma.  Reports history of COPD, asthma and congestive heart failure.  Reports compliance with medication.  States at this time he just cannot catch his breath.  Feels mildly sleepy.  He has no other complaints at this time.        Review of patient's allergies indicates:   Allergen Reactions    Iodine Shortness Of Breath and Swelling     IT FLARES UP ASTHMA SYMPTOMSFLAR      Shellfish containing products      No past medical history on file.  Past Surgical History:   Procedure Laterality Date    MANDIBLE SURGERY       No family history on file.     Review of Systems   Constitutional: Positive for fatigue. Negative for chills and fever.   HENT: Negative for congestion, rhinorrhea and sore throat.    Eyes: Negative for pain, discharge and itching.   Respiratory: Positive for shortness of breath. Negative for cough, chest tightness, wheezing and stridor.    Cardiovascular: Negative for chest pain and palpitations.   Gastrointestinal: Negative for abdominal pain, nausea and vomiting.   Genitourinary: Negative for dysuria and hematuria.   Musculoskeletal: Negative for myalgias and neck pain.   Skin: Negative for color change and rash.   Neurological: Negative for dizziness, weakness and headaches.   Psychiatric/Behavioral: Negative for confusion. The patient is not hyperactive.        Physical Exam     Initial Vitals   BP Pulse Resp Temp SpO2   06/07/22 1039 06/07/22 1039 06/07/22 1039 06/07/22 1123 06/07/22 1039   (!) 122/110 94 (!) 25 97.8 °F  (36.6 °C) 100 %      MAP       --                Physical Exam    Constitutional: He appears well-developed and well-nourished. He is not diaphoretic. He appears distressed.   HENT:   Head: Normocephalic and atraumatic.   Eyes: Conjunctivae and EOM are normal. Pupils are equal, round, and reactive to light.   Neck: Neck supple. No tracheal deviation present.   Normal range of motion.  Cardiovascular: Regular rhythm and normal heart sounds. Tachycardia present.    Pulmonary/Chest: No accessory muscle usage. Tachypnea noted. He is in respiratory distress. He has decreased breath sounds. He has wheezes. He has no rhonchi. He has no rales.   Abdominal: Abdomen is soft. There is no abdominal tenderness. There is no rebound.   Musculoskeletal:         General: No tenderness. Normal range of motion.      Cervical back: Normal range of motion and neck supple.     Neurological: He is alert and oriented to person, place, and time. He has normal strength. GCS score is 15. GCS eye subscore is 4. GCS verbal subscore is 5. GCS motor subscore is 6.   Skin: Skin is warm and dry. Capillary refill takes less than 2 seconds. No rash noted.   Psychiatric: He has a normal mood and affect. His behavior is normal. Judgment and thought content normal.         ED Course   Critical Care    Date/Time: 6/17/2022 3:42 AM  Performed by: Jarrett Garcia MD  Authorized by: Jarrett Garcia MD   Total critical care time (exclusive of procedural time) : 75 minutes  Critical care time was exclusive of separately billable procedures and treating other patients.  Critical care was necessary to treat or prevent imminent or life-threatening deterioration of the following conditions: respiratory failure.  Critical care was time spent personally by me on the following activities: evaluation of patient's response to treatment, obtaining history from patient or surrogate, ordering and review of laboratory studies, pulse oximetry, review of old charts, development  of treatment plan with patient or surrogate, examination of patient, ordering and performing treatments and interventions, ordering and review of radiographic studies, re-evaluation of patient's condition and ventilator management.        Labs Reviewed   COMPREHENSIVE METABOLIC PANEL - Abnormal; Notable for the following components:       Result Value    Creatinine 1.29 (*)     Globulin 4.0 (*)     Albumin/Globulin Ratio 0.9 (*)     All other components within normal limits   B-TYPE NATRIURETIC PEPTIDE - Abnormal; Notable for the following components:    Natriuretic Peptide 410.1 (*)     All other components within normal limits   CBC WITH DIFFERENTIAL - Abnormal; Notable for the following components:    MCV 98.0 (*)     MCHC 30.6 (*)     IG# 0.14 (*)     IG% 1.8 (*)     All other components within normal limits   DRUG SCREEN, URINE (BEAKER) - Abnormal; Notable for the following components:    Cocaine, Urine Positive (*)     All other components within normal limits    Narrative:     Cut off concentrations:    Amphetamines - 1000 ng/ml  Barbiturates - 200 ng/ml  Benzodiazepine - 200 ng/ml  Cannabinoids (THC) - 50 ng/ml  Cocaine - 300 ng/ml  Fentanyl - 1.0 ng/ml  MDMA - 500 ng/ml  Opiates - 300 ng/ml   Phencyclidine (PCP) - 25 ng/ml    Specimen submitted for drug analysis and tested for pH and specific gravity in order to evaluate sample integrity. Suspect tampering if specific gravity is <1.003 and/or pH is not within the range of 4.5 - 8.0  False negatives may result form substances such as bleach added to urine.  False positives may result for the presence of a substance with similar chemical structure to the drug or its metabolite.    This test provides only a PRELIMINARY analytical test result. A more specific alternate chemical method must be used in order to obtain a confirmed analytical result. Gas chromatography/mass spectrometry (GC/MS) is the preferred confirmatory method. Other chemical confirmation  methods are available. Clinical consideration and professional judgement should be applied to any drug of abuse test result, particularly when preliminary positive results are used.    Positive results will be confirmed only at the physicians request. Unconfirmed screening results are to be used only for medical purposes (treatment).        TROPONIN I - Normal   MAGNESIUM - Normal   PHOSPHORUS - Normal   COVID/FLU A&B PCR - Normal   CBC W/ AUTO DIFFERENTIAL    Narrative:     The following orders were created for panel order CBC auto differential.  Procedure                               Abnormality         Status                     ---------                               -----------         ------                     CBC with Differential[[id number]]        Abnormal            Final result                 Please view results for these tests on the individual orders.   EXTRA TUBES    Narrative:     The following orders were created for panel order EXTRA TUBES.  Procedure                               Abnormality         Status                     ---------                               -----------         ------                     Light Blue Top Hold[[id number]]                              In process                   Please view results for these tests on the individual orders.   LIGHT BLUE TOP HOLD   POCT CHEMISTRY PANEL   POCT TROPONIN     EKG Readings: (Independently Interpreted)   Initial Reading: No STEMI. Rhythm: Normal Sinus Rhythm. Ectopy: No Ectopy. Conduction: RBBB. Axis: Left Axis Deviation. Clinical Impression: Normal Sinus Rhythm with RBBB     ECG Results          EKG 12-lead (Final result)  Result time 06/07/22 19:03:05    Final result by Interface, Lab In [facility] (06/07/22 19:03:05)                 Narrative:    Test Reason : I48.91,    Vent. Rate : 099 BPM     Atrial Rate : 250 BPM     P-R Int : 000 ms          QRS Dur : 124 ms      QT Int : 440 ms       P-R-T Axes : -45 215 -87 degrees     QTc Int  : 564 ms    Atrial flutter with variable A-V block  Right ventricular hypertrophy  Lateral infarct ,age undetermined  Inferior infarct (cited on or before 07-JUN-2022)  ST and T wave abnormality, consider anterior ischemia  Abnormal ECG  When compared with ECG of 07-JUN-2022 10:44,  Atrial flutter has replaced Sinus rhythm    Confirmed by Jose Alejandro Andino MD ([id number]) on 6/7/2022 7:02:59 PM    Referred By: [email or web address]   SELF           Confirmed By:Jose Alejandro Andino MD                             EKG 12-lead (Final result)  Result time 06/07/22 19:00:19    Final result by Interface, Lab In [facility] (06/07/22 19:00:19)                 Narrative:    Test Reason : R07.9,    Vent. Rate : 091 BPM     Atrial Rate : 091 BPM     P-R Int : 160 ms          QRS Dur : 200 ms      QT Int : 410 ms       P-R-T Axes : 000 245 021 degrees     QTc Int : 504 ms    Sinus rhythm with frequent Premature ventricular complexes  Right bundle branch block  Cannot rule out Inferior infarct ,age undetermined  Abnormal ECG  When compared with ECG of 25-MAY-2022 03:52,  Sinus rhythm has replaced Atrial fibrillation    Confirmed by Jose Alejandro Andino MD ([id number]) on 6/7/2022 7:00:13 PM    Referred By:             Confirmed By:Jose Alejandro Andino MD                            Imaging Results          X-Ray Chest AP Portable (Final result)  Result time 06/07/22 11:13:59    Final result by Oziel Vale MD (06/07/22 11:13:59)                 Impression:      Study limited by motion.  No definite acute abnormality.  Prominent interstitial markings with no focal opacification.      Electronically signed by: Oziel Vale  Date:    06/07/2022  Time:    11:13             Narrative:    EXAMINATION:  XR CHEST AP PORTABLE    CLINICAL HISTORY:  Chest Pain;    TECHNIQUE:  Single view of the chest    COMPARISON:  05/20/2022    FINDINGS:  Prominent interstitial markings with no focal opacification    The cardiomediastinal silhouette is within normal limits.    No acute osseous  abnormality.                                 Medications   aspirin 325 MG tablet (  Not Given 6/7/22 1115)   sodium chloride 0.9% flush 10 mL (has no administration in time range)   amLODIPine tablet 5 mg (5 mg Oral Given 6/9/22 0849)   atorvastatin tablet 80 mg (80 mg Oral Given 6/9/22 0848)   fluticasone furoate-vilanteroL 100-25 mcg/dose diskus inhaler 1 puff (1 puff Inhalation Given 6/9/22 0802)   dextrose 10% bolus 125 mL (has no administration in time range)   dextrose 10% bolus 250 mL (has no administration in time range)   rivaroxaban tablet 20 mg (20 mg Oral Given 6/8/22 1001)   albuterol-ipratropium 2.5 mg-0.5 mg/3 mL nebulizer solution 3 mL (3 mLs Nebulization Given 6/8/22 2153)   metoprolol succinate (TOPROL-XL) 24 hr tablet 100 mg (100 mg Oral Given 6/9/22 0849)   furosemide injection 40 mg (40 mg Intravenous Given 6/9/22 0848)   pantoprazole EC tablet 40 mg (40 mg Oral Given 6/9/22 0848)   digoxin injection 250 mcg (has no administration in time range)   digoxin tablet 0.25 mg (has no administration in time range)   aspirin tablet 325 mg (325 mg Oral Given 6/7/22 1101)   magnesium sulfate 2g in water 50mL IVPB (premix) (0 g Intravenous Stopped 6/7/22 1253)   methylPREDNISolone sodium succinate injection 125 mg (125 mg Intravenous Given 6/7/22 1054)   albuterol-ipratropium (DUO-NEB) 2.5 mg-0.5 mg/3 mL nebulizer solution (3 mLs  Given 6/7/22 1121)   albuterol-ipratropium (DUO-NEB) 2.5 mg-0.5 mg/3 mL nebulizer solution (6 mLs  Given 6/7/22 1131)   naloxone 0.4 mg/mL injection 0.4 mg (0.4 mg Intravenous Given 6/7/22 1158)   furosemide injection 60 mg (60 mg Intravenous Given 6/7/22 1418)   albuterol-ipratropium (DUO-NEB) 2.5 mg-0.5 mg/3 mL nebulizer solution (3 mLs  Given 6/7/22 1610)   digoxin injection 500 mcg (500 mcg Intravenous Given 6/9/22 0708)   digoxin injection 250 mcg (250 mcg Intravenous Given 6/9/22 1400)   potassium chloride SA CR tablet 40 mEq (40 mEq Oral Given 6/9/22 1108)     Medical  Decision Making:   Clinical Tests:   Lab Tests: Reviewed and Ordered  Radiological Study: Reviewed and Ordered  Medical Tests: Reviewed and Ordered  ED Management:  52-year-old male with history of asthma COPD and CHF presents for 2 days worsening shortness of breath.  Presents in respiratory distress. tachypneic with oxygen requirement. obese  male with minimal air movement on auscultation of lungs.  Denying any chest pain or pressure.  Initial mentation GCS 15 however gradually began to decline. mildly somnolent but arousable to sternal rub.  Chest x-ray showed no acute process but limited secondary to body habitus. immediately placed on BiPAP. while on BiPAP had several runs of V-tach.  Provided steroids and magnesium but held off initially on cardio sensitive agents terbutaline and nebulize treatment.  After patient's mentation continued to decline and no further V-tach episodes persisted these medications were given. Worked very closely and for extensive period of time with RT to optimize settings.  Patient's mentation began improving.  ABG demonstrates significant initial respiratory acidosis, uncompensated.  After trial on BiPAP gases began improving.  Discussed with RT who stated it would likely take several hours to remove that level of carbon dioxide build up. Since pt was improving both clinically and numerically held off on intubation. Did not want to perform 2/2 body habitus, Mallampati score, high pressure and oxygen requirement and likely rapid desaturation with paralytic use.  Consult edinternal medicine for admission.  Initial EKG showed a bundle ирина block but no other underlying acute ischemia and initial cardiac enzymes negative.  Patient's care was ultimately transitioned to ICU team.  Patient continued to improve while under my care in the ER. (zmora)                      Clinical Impression:   Final diagnoses:  [J44.1] COPD exacerbation (Primary)  [I50.9] Acute on chronic  congestive heart failure, unspecified heart failure type  [J96.22] Acute on chronic respiratory failure with hypercapnia  [I50.9] CHF exacerbation          ED Disposition Condition    Admit               Jarrett Garcia MD  06/17/22 0354

## 2022-06-09 NOTE — NURSING TRANSFER
Nursing Transfer Note      6/9/2022     Reason patient is being transferred: Clinical Improvement; bed open on telemetry    Transfer To. Telemetry Unit Room 402 From ICU Room 418     Transfer via Oxygen Supplementation    Transfer with personal belongings      Transported by Wheelchair  Medicines sent: N/A    Any special needs or follow-up needed: Trilogy Paper work for [employer] sent with patient.     Chart send with patient: Yes     Notified: Patient notified friends and family    Patient reassessed at: 1235    Upon arrival to floor: Telemetry Monitor applied.

## 2022-06-10 NOTE — NURSING
DISCHARGE INSTRUCTIONS GIVEN, WITH UNDERSTANDING VERBALIZED. PATIENT NOTIFIED THAT TRILOGY WILL BE DELIVERED TO HOME. MEDS DELIVERED TO BED. CAB PHONED PER HOSPITAL SERVICES FOR TRANSPORT TO HOME.DENIES ANY QUESTIONS OR CONCERNS.

## 2022-06-10 NOTE — PLAN OF CARE
[facility] [employer], and will be delivered to pt's home upon discharge.

## 2022-06-10 NOTE — CONSULTS
[facility] Cardiology History and Physical       Attending Physician: Zeferino Robles MD    Subjective:      Brief HPI:  Vladimir Bernal is a 52 y.o. male who with a history of CHF, COPD who presented to [facility] ED on 6/7/2022  with a primary complaint of Shortness of Breath (Pt to the ED with increased labored breathing, shortness of breath x 2-3 days, drowsy. )  Patient minimally responsive on interview without family member in room. ED reports patient decompensated, originally awake and talking then progressing to lethargic. Chart review notable for recent admission to [facility] for CHF exacerbation requiring BiPAP w/ discharge on 5/25/2022. Pending outpatient ischemia workup with [facility].   In ED, ABG notable for hypercapnic and hypoxemic respiratory failure with pH 7.12. patient placed on BiPAP with mentation and ABG improvement to pH 7.17 and pCO2 from 92 to 79. Episode of tachycardia to 200's spontaneously returning to rate of 130's. Labs notable for 's. Imaging unremarkable. [facility] IM consulted for admission.        Patient was to get a Lexiscan today however is too big for machine at this time.  Patient has responded clinically to the digoxin medical load and has had heart rates in the low 60s to low 100s.  Patient denies any chest pain.  Patient says that his shortness breath is much improved.  Past Medical History:  Blindness in 1 eye   HFrEF  COPD on chronic oxygen at home 2 LNC  Depression  DM    Past Surgical History:   Procedure Laterality Date    MANDIBLE SURGERY         Review of patient's allergies indicates:   Allergen Reactions    Iodine Shortness Of Breath and Swelling     IT FLARES UP ASTHMA SYMPTOMSFLAR      Shellfish containing products        Current Outpatient Medications   Medication Instructions    albuterol (ACCUNEB) 0.63 mg, Nebulization, Every 6 hours PRN, Rescue    amiodarone (PACERONE) 200 MG Tab Take 2 tablets BID x 1 day, then Take 1 tablet BID x 3 days, then Take 1 tablet daily     amLODIPine (NORVASC) 5 mg, Oral, Daily    atorvastatin (LIPITOR) 80 mg, Oral, Daily    budesonide-formoterol 160-4.5 mcg (SYMBICORT) 160-4.5 mcg/actuation HFAA 2 puffs, Inhalation, Every 12 hours, Controller    butalbital-acetaminophen-caffeine -40 mg (FIORICET, ESGIC) -40 mg per tablet 1 tablet, Oral, Every 4 hours PRN    gabapentin (NEURONTIN) 300 mg, Oral, 3 times daily    lisinopriL (PRINIVIL,ZESTRIL) 40 mg, Oral, Daily    metFORMIN (GLUCOPHAGE-XR) 500 mg, Oral, Daily    metoprolol succinate (TOPROL-XL) 100 mg, Oral, 2 times daily    pantoprazole (PROTONIX) 40 mg, Oral, Daily    promethazine-codeine 6.25-10 mg/5 ml (PHENERGAN WITH CODEINE) 6.25-10 mg/5 mL syrup 5 mLs, Oral, Every 4 hours PRN    rivaroxaban (XARELTO) 20 mg, Oral, With dinner    spironolactone (ALDACTONE) 25 mg, Oral, Daily    tadalafiL (CIALIS) 10 mg, Oral, Daily PRN    tiotropium (SPIRIVA) 18 mcg, Inhalation, Daily, Controller    traMADoL (ULTRAM) 50 mg, Oral, 2 times daily        Family History    None         Tobacco Use    Smoking status: Not on file    Smokeless tobacco: Not on file   Substance and Sexual Activity    Alcohol use: Not on file    Drug use: Not on file    Sexual activity: Not on file        Review of Systems:  12 point ROS completed and negative except as indicated above.     Objective:     Vital Signs:  Vital Signs (Most Recent):  Temp: 98.3 °F (36.8 °C) (06/10/22 1100)  Pulse: 101 (06/10/22 1100)  Resp: 18 (06/10/22 1100)  BP: 96/67 (06/10/22 1100)  SpO2: (!) 94 % (06/10/22 1100) Vital Signs (24h Range):  Temp:  [97.5 °F (36.4 °C)-98.5 °F (36.9 °C)] 98.3 °F (36.8 °C)  Pulse:  [] 101  Resp:  [18-22] 18  SpO2:  [91 %-97 %] 94 %  BP: ()/(65-85) 96/67   Body mass index is 51.37 kg/m².     Intake/Output Summary (Last 24 hours) at 6/10/2022 1328  Last data filed at 6/10/2022 1100  Gross per 24 hour   Intake 822 ml   Output 3740 ml   Net -2918 ml       Physical Examination:  General:  Well  developed, well nourished, no acute respiratory distress  Head: Normocephalic, atraumatic  Eyes: PERRL, EOMI, anicteric sclera  Throat: No posterior pharyngeal erythema or exudate, no tonsillar exudate  Neck: supple, normal ROM, no JVD  CVS:  RRR, S1 and S2 normal, no murmurs, no added heart sounds, rubs, gallops, regular peripheral pulses, and no peripheral edema  Resp:  Lungs clear to auscultation bilaterally, no wheezes, rales, or rhonci  GI:  Abdomen soft, non-tender, non-distended, normoactive bowel sounds  MSK:  Full range of motion, no obvious deformities   Skin:  No rashes, ulcers, erythema  Neuro:  Alert and oriented x3, No focal neuro deficits, CNII-XII grossly intact  Psych:  Appropriate mood and affect     Laboratory:    Recent Labs   Lab 06/10/22  0344   WBC 7.0   HGB 16.1   HCT 49.7      MCV 92.0   RDW 15.0     No results for input(s): TROPONINI, CKTOTAL, CKMB, BNP in the last 24 hours.  Recent Labs   Lab 06/07/22  1041 06/07/22  1826   TROPONINI <0.010 <0.010   .1*  --      No results for input(s): CHOL, HDL, LDLCALC, TRIG, CHOLHDL in the last 168 hours. Recent Labs   Lab 06/10/22  0344      K 4.2   CO2 31*   BUN 20.6   CREATININE 1.04   CALCIUM 9.4   MG 1.40*   PHOS 3.4     Recent Labs   Lab 06/10/22  0344   ALBUMIN 3.3*   BILITOT 0.5   AST 10   ALKPHOS 52   ALT 7     No results for input(s): IRON, TIBC, FERRITIN, SATURATEDIRO, [id number], FOLATE in the last 168 hours.  No results for input(s): TSH, I1AOUTR, HGBA1C, INR, PROTIME, PTT in the last 168 hours.       Microbiology Data:  Microbiology Results (last 7 days)     ** No results found for the last 168 hours. **           Other Results:      Radiology:  X-Ray Chest AP Portable 06/07/2022    Narrative  EXAMINATION:  XR CHEST AP PORTABLE    CLINICAL HISTORY:  Chest Pain;    TECHNIQUE:  Single view of the chest    COMPARISON:  05/20/2022    FINDINGS:  Prominent interstitial markings with no focal opacification    The  cardiomediastinal silhouette is within normal limits.    No acute osseous abnormality.    Impression  Study limited by motion.  No definite acute abnormality.  Prominent interstitial markings with no focal opacification.      Electronically signed by: Oziel Vale  Date:    06/07/2022  Time:    11:13       Current Medications:     Infusions:        Scheduled:   atorvastatin  80 mg Oral Daily    digoxin  0.25 mg Oral Daily    fluticasone furoate-vilanteroL  1 puff Inhalation Daily    furosemide (LASIX) injection  40 mg Intravenous Daily    metoprolol succinate  100 mg Oral BID    pantoprazole  40 mg Oral Daily    rivaroxaban  20 mg Oral Daily with dinner         PRN:   acetaminophen    albuterol-ipratropium    benzonatate    dextrose 10%    dextrose 10%    sodium chloride 0.9%        Antibiotics and Day Number of Therapy:  Antibiotics (From admission, onward)            None         TTE 09/2019:  Moderate concentric left ventricular hypertrophy, ejection fraction 80%, grade 1 diastolic dysfunction.  TTE 05/20/22:  TDS. No definity contrast used in this study. LV normal in size with mildly decreases systolic function. EF 43%. Unable to accurately assess diastolic function due to insufficient parameters. Mild RV enlargement with low normal RVSF. Mild TR. Estimate PASP 42mmHg. AoV not well visualized due to poor sonic window. Normal leaflet mobility. No AR/AS. Trace MR.  No significant ME. Elevatd CVP 15mmHg       Nuclear stress 07/2018:  Negative Lexiscan nuclear stress for ischemia    Assessment & Plan:   Atrial flutter/ AFIB:  -patient is too big for the Lexiscan, will see patient in clinic to arrange a left heart catheterization.  -patient is currently at controlled rate of less than 110 bpm.  If patient's heart rate is below 50 could consider switching digoxin 250 mcg to 125 mcg.  However at this time continue current regimen of Toprol  b.i.d. and digoxin 250 mcg p.o. q.d.  -will evaluate  patient for outpatient JCARLOS and cardioversion  -ensure that patient gets is trilogy as sleep apnea and OHS are a common cause of atrial fibrillation/a flutter.    Chris Razo MD  Internal Medicine Resident PGY-I

## 2022-06-10 NOTE — HPI
Vladimir Bernal is a 52 y.o. male who with a history of CHF, COPD who presented to [facility] ED on 6/7/2022  with a primary complaint of Shortness of Breath (Pt to the ED with increased labored breathing, shortness of breath x 2-3 days, drowsy. )  Patient minimally responsive on interview without family member in room. ED reports patient decompensated, originally awake and talking then progressing to lethargic. Chart review notable for recent admission to [facility] for CHF exacerbation requiring BiPAP w/ discharge on 5/25/2022. Pending outpatient ischemia workup with [facility].   In ED, ABG notable for hypercapnic and hypoxemic respiratory failure with pH 7.12. patient placed on BiPAP with mentation and ABG improvement to pH 7.17 and pCO2 from 92 to 79. Episode of tachycardia to 200's spontaneously returning to rate of 130's. Labs notable for 's. Imaging unremarkable. [facility] IM consulted for admission.

## 2022-06-10 NOTE — PROGRESS NOTES
[facility] Medicine Wards Progress Note     Resident Team: [facility] Medicine List 3  Attending Physician: DO oJse  Resident: MD Sandeep  Intern: MD Hayley       Subjective:      Brief HPI:  Vladimir Bernal is a 52 y.o. male who with a history of CHF, COPD who presented to [facility] ED on 2022  with a primary complaint of Shortness of Breath (Pt to the ED with increased labored breathing, shortness of breath x 2-3 days, drowsy. )  Patient minimally responsive on interview without family member in room. ED reports patient decompensated, originally awake and talking then progressing to lethargic. Chart review notable for recent admission to [facility] for CHF exacerbation requiring BiPAP w/ discharge on 2022. Pending outpatient ischemia workup with [facility].   In ED, ABG notable for hypercapnic and hypoxemic respiratory failure with pH 7.12. patient placed on BiPAP with mentation and ABG improvement to pH 7.17 and pCO2 from 92 to 79. Episode of tachycardia to 200's spontaneously returning to rate of 130's. Labs notable for 's. Imaging unremarkable. [facility] IM consulted for admission.     Interval History: NAEON. Patient heart rate with improved control today. Noted bradycardia with digoxin loading yesterday, patient asymptomatic. Slept well with bipap. Unable to get lexiscan this AM due to weight limit of machine.       Review of Systems:  ROS completed and negative except as indicated above.     Objective:     Last 24 Hour Vital Signs:  BP  Min: 95/64  Max: 128/85  Temp  Av.1 °F (36.7 °C)  Min: 97.5 °F (36.4 °C)  Max: 98.5 °F (36.9 °C)  Pulse  Av.8  Min: 68  Max: 133  Resp  Av  Min: 18  Max: 22  SpO2  Av.3 %  Min: 91 %  Max: 97 %  I/O last 3 completed shifts:  In: 1542 [P.O.:1542]  Out: 6025 [Urine:6025]    Physical Examination:  Physical Exam  Vitals reviewed.   Constitutional:       Appearance: Normal appearance. He is obese.   HENT:      Head: Normocephalic and atraumatic.      Mouth/Throat:       Mouth: Mucous membranes are moist.      Pharynx: Oropharynx is clear.   Eyes:      Comments: Blindness of right eye   Cardiovascular:      Rate and Rhythm: Normal rate and regular rhythm.      Pulses: Normal pulses.      Heart sounds: Normal heart sounds. No murmur heard.    No gallop.   Pulmonary:      Effort: Pulmonary effort is normal. No respiratory distress.      Breath sounds: Normal breath sounds. No stridor. No wheezing or rhonchi.   Abdominal:      Palpations: Abdomen is soft.   Musculoskeletal:         General: Normal range of motion.   Skin:     General: Skin is warm and dry.      Capillary Refill: Capillary refill takes less than 2 seconds.   Neurological:      General: No focal deficit present.      Mental Status: He is alert and oriented to person, place, and time.           Laboratory:  Most Recent Data:  CBC:   Lab Results   Component Value Date    WBC 7.0 06/10/2022    HGB 16.1 06/10/2022    HCT 49.7 06/10/2022     06/10/2022    MCV 92.0 06/10/2022    RDW 15.0 06/10/2022     WBC Differential:   Recent Labs   Lab 06/07/22  1041 06/08/22  0319 06/09/22  0316 06/10/22  0344   WBC 7.8 6.2 8.9 7.0   HGB 14.8 15.4 14.9 16.1   HCT 48.4 50.3 46.9 49.7    328 360 312   MCV 98.0* 96.5* 93.8 92.0     BMP:   Lab Results   Component Value Date     06/10/2022    K 4.2 06/10/2022    CO2 31 (H) 06/10/2022    BUN 20.6 06/10/2022    CREATININE 1.04 06/10/2022    CALCIUM 9.4 06/10/2022    MG 1.40 (L) 06/10/2022    PHOS 3.4 06/10/2022     LFTs:   Lab Results   Component Value Date    ALBUMIN 3.3 (L) 06/10/2022    BILITOT 0.5 06/10/2022    AST 10 06/10/2022    ALKPHOS 52 06/10/2022    ALT 7 06/10/2022     Coags:   Lab Results   Component Value Date    INR 1.17 05/20/2022    PROTIME 14.6 (H) 05/20/2022    PTT 28.3 05/20/2022     FLP:   Lab Results   Component Value Date    CHOL 196 05/21/2022    HDL 51 05/21/2022    TRIG 135 05/21/2022     DM:   Lab Results   Component Value Date    HGBA1C 6.3  05/21/2022    CREATININE 1.04 06/10/2022     Thyroid:   Lab Results   Component Value Date    TSH 2.0706 05/20/2022      Anemia: No results found for: IRON, TIBC, FERRITIN, SATURATEDIRO  No results found for: [id number]  No results found for: FOLATE     Cardiac:   Lab Results   Component Value Date    TROPONINI <0.010 06/07/2022    .1 (H) 06/07/2022         Microbiology Data:  Microbiology Results (last 7 days)     ** No results found for the last 168 hours. **           Other Results:    Radiology:  Imaging Results          X-Ray Chest AP Portable (Final result)  Result time 06/07/22 11:13:59    Final result by Oziel Vale MD (06/07/22 11:13:59)                 Impression:      Study limited by motion.  No definite acute abnormality.  Prominent interstitial markings with no focal opacification.      Electronically signed by: Oziel Vale  Date:    06/07/2022  Time:    11:13             Narrative:    EXAMINATION:  XR CHEST AP PORTABLE    CLINICAL HISTORY:  Chest Pain;    TECHNIQUE:  Single view of the chest    COMPARISON:  05/20/2022    FINDINGS:  Prominent interstitial markings with no focal opacification    The cardiomediastinal silhouette is within normal limits.    No acute osseous abnormality.                                Current Medications:     Infusions:       Scheduled:   atorvastatin  80 mg Oral Daily    digoxin  0.25 mg Oral Daily    fluticasone furoate-vilanteroL  1 puff Inhalation Daily    furosemide (LASIX) injection  40 mg Intravenous Daily    metoprolol succinate  100 mg Oral BID    pantoprazole  40 mg Oral Daily    rivaroxaban  20 mg Oral Daily with dinner        PRN:  acetaminophen, albuterol-ipratropium, benzonatate, dextrose 10%, dextrose 10%, sodium chloride 0.9%      Assessment & Plan:     1) Acute Hypoxic and Hypercapnic Respiratory Failure (resolved)  2) A-flutter w. RVR (improved)  3) VY (resolved)  4) HFrEF (EF 43% 5/22)  5) unquantified COPD on home O2   6)  Blindness in one eye   7) Hx of substance abuse   8) HTN     Cardiology consult for a flutter, see consult note  Digoxin 125 mcg oral tomorrow.  Pending trilogy for discharge  Holding lisinopril and spironolactone due to well controlled blood pressure on digoxin  Lasix 40 IV daily per cardiology rec with plan to discharge on oral lasix, extra dose yesterday afternoon per cardiology      CODE STATUS: FULL  Access: PIV  Antibiotics: None  Diet: Heart Healthy   DVT Prophylaxis: Xarelto 20 mg qHS  GI Prophylaxis: Protonix 40 mg daily   Fluids: none     Disposition: Continue to monitor inpatient,Pending trilogy for discharge      Marlin Hopkins MD  [facility] HO-2

## 2022-06-10 NOTE — DISCHARGE SUMMARY
[facility] Medicine  Discharge Summary      Patient Name: Vladimir Bernal  MRN: [id number]  Patient Class: IP- Inpatient  Admission Date: 6/7/2022  Hospital Length of Stay: 3 days  Discharge Date and Time:  06/10/2022 4:09 PM  Attending Physician: Zeferino Robles MD   Discharging Provider: Marlin Hopkins MD  Primary Care Provider: Sophie Navarrete MD      HPI:   Vladimir Bernal is a 52 y.o. male who with a history of CHF, COPD who presented to [facility] ED on 6/7/2022  with a primary complaint of Shortness of Breath (Pt to the ED with increased labored breathing, shortness of breath x 2-3 days, drowsy. )  Patient minimally responsive on interview without family member in room. ED reports patient decompensated, originally awake and talking then progressing to lethargic. Chart review notable for recent admission to [facility] for CHF exacerbation requiring BiPAP w/ discharge on 5/25/2022. Pending outpatient ischemia workup with [facility].   In ED, ABG notable for hypercapnic and hypoxemic respiratory failure with pH 7.12. patient placed on BiPAP with mentation and ABG improvement to pH 7.17 and pCO2 from 92 to 79. Episode of tachycardia to 200's spontaneously returning to rate of 130's. Labs notable for 's. Imaging unremarkable. [facility] IM consulted for admission.       * No surgery found *      Hospital Course:   Patient admitted to ICU for acute hypercapneic respiratory failure. Patient responded to BiPAP therapy with improvement in mentation and ABGs. Patient was found to be in a-flutter w/ RVR. Patient was stepped down to telemetry and cardiology was consulted. Patient was initiated on daily lasix therapy and digoxin, amiodarone was discontinued. Patient HR was controlled on date of discharge, BP were lower but asymptomatic. Holding lisinopril, spironolactone, and norvasc on discharge. Attempted to conduct a nuclear medicine stress test during hospitalization, however patient was over weight  limit for facility machine. Followup with cardiology on 6/30/2022 for further ischemic workup. Patient was approved for Trilogy for nightly use, will be delivered to patient home. Patient was deemed stable for discharge. Follow up in post wards in 2 weeks. Medications sent to patient pharmacy.          Goals of Care Treatment Preferences:  Code Status: Full Code      Consults:   Consults (From admission, onward)        Status Ordering Provider     Inpatient consult to Cardiology  Once        Provider:  Vidhya Bellamy MD    Completed ABBY KEATING     Inpatient consult to Social Work/Case Management  Once        Provider:  (Not yet assigned)    Completed ABBY KEATING     Inpatient consult to Hospitalist  Once        Provider:  (Not yet assigned)    Acknowledged RUBEN VASQUEZ new Assessment & Plan notes have been filed under this hospital service since the last note was generated.  Service: Hospital Medicine    Final Active Diagnoses:    Diagnosis Date Noted POA    PRINCIPAL PROBLEM:  Acute respiratory failure with hypoxia and hypercarbia [J96.01, J96.02] 06/07/2022 Unknown    CHF exacerbation [I50.9] 06/07/2022 Unknown    COPD exacerbation [J44.1] 06/07/2022 Unknown    Atrial fibrillation with RVR [I48.91] 05/25/2022 Yes      Problems Resolved During this Admission:       Discharged Condition: good    Disposition:     Follow Up:   Follow-up Information     [facility] Follow up on 6/22/2022.    Specialty: Internal Medicine  Why: per Dr. Garcia  Office will call with appointment  Contact information:  [address] [id number] [phone]           [facility] Follow up on 6/30/2022.    Specialty: Cardiology  Contact information:  [address] [address] [phone]                     Patient Instructions:      Ambulatory referral/consult to Internal Medicine   Standing Status: Future    Referral Priority: Routine Referral Type: Consultation   Referral Reason: Specialty Services Required   Requested Specialty: Internal Medicine   Number of Visits Requested: 1     Ambulatory referral/consult to Cardiology   Standing Status: Future   Referral Priority: Routine Referral Type: Consultation   Referral Reason: Specialty Services Required   Requested Specialty: Cardiology   Number of Visits Requested: 1       Significant Diagnostic Studies: Labs:   CMP   Recent Labs   Lab 06/09/22 0316 06/10/22  0344    141   K 3.7 4.2   CO2 32* 31*   BUN 30.3* 20.6   CREATININE 1.39* 1.04   CALCIUM 9.8 9.4   ALBUMIN 3.2* 3.3*   BILITOT 0.5 0.5   ALKPHOS 55 52   AST 8 10   ALT 6 7   , CBC   Recent Labs   Lab 06/09/22 0316 06/10/22  0344   WBC 8.9 7.0   HGB 14.9 16.1   HCT 46.9 49.7    312   , Troponin   Recent Labs   Lab 06/07/22  1041 06/07/22  1826   TROPONINI <0.010 <0.010    and A1C:   Recent Labs   Lab 05/21/22  0441   HGBA1C 6.3     Cardiac Graphics: Echocardiogram:   Transthoracic echo (TTE) complete (Cupid Only):   Results for orders placed or performed during the hospital encounter of 05/20/22   Echo   Result Value Ref Range    BSA 2.93 m2    TDI SEPTAL 0.14 m/s    LV LATERAL E/E' RATIO 9.67 m/s    LV SEPTAL E/E' RATIO 8.29 m/s    Right Atrial Pressure (from IVC) 15 mmHg    EF 43 %    Left Ventricular Outflow Tract Mean Velocity 0.467 cm/s    Left Ventricular Outflow Tract Mean Gradient 1.00 mmHg    TDI LATERAL 0.12 m/s    LVIDd 4.85 3.5 - 6.0 cm    IVS 1.39 (A) 0.6 - 1.1 cm    Posterior Wall 1.01 0.6 - 1.1 cm    LVIDs 4.05 (A) 2.1 - 4.0 cm    FS 16 28 - 44 %    LV mass 222.74 g    LA size 3.70 cm    RVDD 3.88 cm    TAPSE 2.03 cm    Left Ventricle Relative Wall Thickness 0.42 cm    AV mean gradient 3 mmHg    AV valve area 1.95 cm2    AV Velocity Ratio 0.66     AV index (prosthetic) 0.56     MV mean gradient 2 mmHg    MV valve area by continuity eq 2.05 cm2    Mean e' 0.13 m/s    LVOT diameter 2.10  cm    LVOT area 3.5 cm2    LVOT peak yoseph 0.73 m/s    LVOT peak VTI 10.70 cm    Ao peak yoseph 1.10 m/s    Ao VTI 19.0 cm    LVOT stroke volume 37.04 cm3    AV peak gradient 5 mmHg    MV peak gradient 5 mmHg    TV rest pulmonary artery pressure 42 mmHg    E/E' ratio 8.92 m/s    MV Peak E Yoseph 1.16 m/s    TR Max Yoseph 2.60 m/s    MV VTI 18.1 cm    LV Systolic Volume 72.10 mL    LV Systolic Volume Index 25.9 mL/m2    LV Diastolic Volume 110.00 mL    LV Diastolic Volume Index 39.57 mL/m2    LV Mass Index 80 g/m2    Triscuspid Valve Regurgitation Peak Gradient 27 mmHg    LA Volume Index (Mod) 19.4 mL/m2    LA volume (mod) 53.90 cm3    Narrative    · Technically difficult study, No Definity contrast is used in this study.  · The left ventricle is normal in size with mildly decreased systolic   function.  · The visually estimated ejection fraction is 43%.  · Unable to accurately assess diastolic function due to insufficient   parameters.  · Mild right ventricular enlargement with low normal right ventricular   systolic function.  · Mild tricuspid regurgitation. The estimated PA systolic pressure is 42   mmHg.          Pending Diagnostic Studies:     Procedure Component Value Units Date/Time    EXTRA TUBES [[id number]] Collected: 06/07/22 1851    Order Status: Sent Lab Status: In process Updated: 06/07/22 1852    Specimen: Blood, Venous     Narrative:      The following orders were created for panel order EXTRA TUBES.  Procedure                               Abnormality         Status                     ---------                               -----------         ------                     Light Blue Top Hold[[id number]]                              In process                 Lavender Top Hold[[id number]]                                In process                 Gold Top Hold[[id number]]                                    In process                   Please view results for these tests on the individual orders.    EXTRA TUBES  [[id number]] Collected: 06/07/22 1052    Order Status: Sent Lab Status: In process Updated: 06/07/22 1052    Specimen: Blood, Venous     Narrative:      The following orders were created for panel order EXTRA TUBES.  Procedure                               Abnormality         Status                     ---------                               -----------         ------                     Light Blue Top Hold[[id number]]                              In process                   Please view results for these tests on the individual orders.    NM Myocardial Perfusion Spect Multi Pharmacologic [[id number]]     Order Status: Sent Lab Status: No result          Medications:  Reconciled Home Medications:      Medication List      START taking these medications    digoxin 250 mcg tablet  Commonly known as: LANOXIN  Take 1 tablet (0.25 mg total) by mouth once daily.  Start taking on: June 11, 2022     furosemide 40 MG tablet  Commonly known as: LASIX  Take 1 tablet (40 mg total) by mouth once daily.        CONTINUE taking these medications    albuterol 0.63 mg/3 mL Nebu  Commonly known as: ACCUNEB  Take 3 mLs (0.63 mg total) by nebulization every 6 (six) hours as needed (shortness of breath and wheezing). Rescue     amLODIPine 10 MG tablet  Commonly known as: NORVASC  Take 0.5 tablets (5 mg total) by mouth once daily.     atorvastatin 80 MG tablet  Commonly known as: LIPITOR  Take 80 mg by mouth once daily.     budesonide-formoterol 160-4.5 mcg 160-4.5 mcg/actuation Hfaa  Commonly known as: SYMBICORT  Inhale 2 puffs into the lungs every 12 (twelve) hours. Controller     butalbital-acetaminophen-caffeine -40 mg -40 mg per tablet  Commonly known as: FIORICET, ESGIC  Take 1 tablet by mouth every 4 (four) hours as needed for Pain.     gabapentin 300 MG capsule  Commonly known as: NEURONTIN  Take 300 mg by mouth 3 (three) times daily.     metFORMIN 500 MG ER 24hr tablet  Commonly known as: GLUCOPHAGE-XR  Take 500 mg by  mouth once daily.     metoprolol succinate 100 MG 24 hr tablet  Commonly known as: TOPROL-XL  Take 1 tablet (100 mg total) by mouth 2 (two) times daily.     pantoprazole 40 MG tablet  Commonly known as: PROTONIX  Take 40 mg by mouth once daily.     promethazine-codeine 6.25-10 mg/5 ml 6.25-10 mg/5 mL syrup  Commonly known as: PHENERGAN with CODEINE  Take 5 mLs by mouth every 4 (four) hours as needed for Cough.     rivaroxaban 10 mg Tab  Commonly known as: XARELTO  Take 2 tablets (20 mg total) by mouth daily with dinner or evening meal.     tadalafiL 5 MG tablet  Commonly known as: CIALIS  Take 10 mg by mouth daily as needed for Erectile Dysfunction.     tiotropium 18 mcg inhalation capsule  Commonly known as: SPIRIVA  Inhale 18 mcg into the lungs once daily. Controller     traMADoL 50 mg tablet  Commonly known as: ULTRAM  Take 50 mg by mouth 2 (two) times a day.        STOP taking these medications    amiodarone 200 MG Tab  Commonly known as: PACERONE     lisinopriL 20 MG tablet  Commonly known as: PRINIVIL,ZESTRIL     spironolactone 25 MG tablet  Commonly known as: ALDACTONE            Indwelling Lines/Drains at time of discharge:   Lines/Drains/Airways     None                 Time spent on the discharge of patient: 35 minutes         Marlin Hopkins MD  Department of Hospital Medicine  [facility]

## 2022-06-10 NOTE — HOSPITAL COURSE
Patient admitted to ICU for acute hypercapneic respiratory failure. Patient responded to BiPAP therapy with improvement in mentation and ABGs. Patient was found to be in a-flutter w/ RVR. Patient was stepped down to telemetry and cardiology was consulted. Patient was initiated on daily lasix therapy and digoxin, amiodarone was discontinued. Patient HR was controlled on date of discharge, BP were lower but asymptomatic. Holding lisinopril, spironolactone, and norvasc on discharge. Attempted to conduct a nuclear medicine stress test during hospitalization, however patient was over weight limit for facility machine. Followup with cardiology on 6/30/2022 for further ischemic workup. Patient was approved for Trilogy for nightly use, will be delivered to patient home. Patient was deemed stable for discharge. Follow up in post wards in 2 weeks. Medications sent to patient pharmacy.

## 2022-06-11 NOTE — PROGRESS NOTES
C3 nurse attempted to contact Vladimir Bernal for a TCC post hospital discharge follow up call. No answer. The patient does not have a scheduled HOSFU appointment that I can locate.

## 2022-07-25 NOTE — ED PROVIDER NOTES
Encounter Date: 7/24/2022    SCRIBE #1 NOTE: I, Olinda Granados, am scribing for, and in the presence of,  Dr. Fonseca. I have scribed the entire note.       History     Chief Complaint   Patient presents with    Chest Pain     Patient reports sob and chest pain     52 year old male with a hx of COPD on continuous home oxygen at 2L, DM, A Fib on Xarelto, CHF presents to the ED for SOB. Pt states at around 1400 today he began experiencing chest pain and SOB. Pt states he is also dealing with a nonproductive cough. Pt denies any fever, nausea, or vomiting. Pt states he is a former smoker.     The history is provided by the patient. No  was used.   Chest Pain  The current episode started today. Chest pain occurs intermittently. The chest pain is unchanged. The pain is associated with breathing. The quality of the pain is described as aching. The pain does not radiate. Primary symptoms include shortness of breath and cough. Pertinent negatives for primary symptoms include no fever, no palpitations, no nausea and no vomiting.   The shortness of breath began today. The shortness of breath developed suddenly. The shortness of breath is moderate. The patient's medical history is significant for COPD.   The cough began today. The cough is non-productive.   Pertinent negatives for associated symptoms include no diaphoresis, no numbness and no weakness. He tried nothing for the symptoms. Risk factors include male gender and obesity.     Review of patient's allergies indicates:   Allergen Reactions    Iodine Shortness Of Breath and Swelling     IT FLARES UP ASTHMA SYMPTOMSFLAR      Shellfish containing products      No past medical history on file.  Past Surgical History:   Procedure Laterality Date    MANDIBLE SURGERY       No family history on file.     Review of Systems   Constitutional: Negative for chills, diaphoresis and fever.   HENT: Negative for congestion and sore throat.    Eyes: Negative for visual  disturbance.   Respiratory: Positive for cough and shortness of breath.    Cardiovascular: Positive for chest pain. Negative for palpitations.   Gastrointestinal: Negative for diarrhea, nausea and vomiting.   Genitourinary: Negative for dysuria and hematuria.   Skin: Negative for rash.   Neurological: Negative for syncope, weakness, numbness and headaches.   All other systems reviewed and are negative.      Physical Exam     Initial Vitals [07/24/22 2154]   BP Pulse Resp Temp SpO2   (!) 126/96 (!) 132 (!) 28 97.9 °F (36.6 °C) (!) 86 %      MAP       --         Physical Exam    Nursing note and vitals reviewed.  Constitutional: He appears well-developed and well-nourished. He is not diaphoretic. He does not appear ill. No distress.   HENT:   Head: Normocephalic and atraumatic.   Right Ear: External ear normal.   Left Ear: External ear normal.   Nose: Nose normal.   Mouth/Throat: Oropharynx is clear and moist.   Eyes: EOM are normal. Pupils are equal, round, and reactive to light.   Left eye blindness, cloudy (chronic)    Neck: Neck supple. No tracheal deviation present.   Cardiovascular: Normal heart sounds and intact distal pulses. An irregular rhythm present.  Tachycardia present.    No murmur heard.  Pulmonary/Chest: Accessory muscle usage present. Tachypnea noted. He is in respiratory distress (moderate). He has decreased breath sounds (very tight diffusely). He has wheezes (diffusely). He has no rhonchi. He has no rales.   Abdominal: Abdomen is soft. Bowel sounds are normal. He exhibits no distension. There is no abdominal tenderness.   No right CVA tenderness.  No left CVA tenderness.   Musculoskeletal:         General: No edema. Normal range of motion.      Cervical back: Neck supple.     Neurological: He is alert and oriented to person, place, and time. He has normal strength. No cranial nerve deficit or sensory deficit. GCS score is 15. GCS eye subscore is 4. GCS verbal subscore is 5. GCS motor subscore is  6.   Skin: Skin is warm and dry. Capillary refill takes less than 2 seconds.   Psychiatric: He has a normal mood and affect. His mood appears not anxious.         ED Course   Critical Care    Date/Time: 7/24/2022 9:00 PM  Performed by: Darby Fonseca MD  Authorized by: Darby Fonseca MD   Direct patient critical care time: 20 minutes  Additional history critical care time: 5 minutes  Ordering / reviewing critical care time: 5 minutes  Documentation critical care time: 5 minutes  Consulting other physicians critical care time: 3 minutes  Total critical care time (exclusive of procedural time) : 38 minutes  Critical care was necessary to treat or prevent imminent or life-threatening deterioration of the following conditions: cardiac failure, CNS failure or compromise, respiratory failure, circulatory failure and shock.  Critical care was time spent personally by me on the following activities: development of treatment plan with patient or surrogate, discussions with primary provider, evaluation of patient's response to treatment, examination of patient, obtaining history from patient or surrogate, ordering and performing treatments and interventions, ordering and review of laboratory studies, ordering and review of radiographic studies, pulse oximetry, re-evaluation of patient's condition, review of old charts and ventilator management.        Labs Reviewed   COMPREHENSIVE METABOLIC PANEL - Abnormal; Notable for the following components:       Result Value    Glucose Level 102 (*)     All other components within normal limits   CBC WITH DIFFERENTIAL - Abnormal; Notable for the following components:    MCV 96.9 (*)     MCHC 30.3 (*)     All other components within normal limits   TROPONIN I - Normal   B-TYPE NATRIURETIC PEPTIDE - Normal   COVID/FLU A&B PCR - Normal   CBC W/ AUTO DIFFERENTIAL    Narrative:     The following orders were created for panel order CBC auto differential.  Procedure                                Abnormality         Status                     ---------                               -----------         ------                     CBC with Differential[[id number]]        Abnormal            Final result                 Please view results for these tests on the individual orders.      Latest Reference Range & Units 07/24/22 23:17   POC Sodium 137 - 145 mmol/l 134 !   POC Potassium 3.5 - 5.0 mmol/l 4.0   POC Ionized Calcium 1.12 - 1.23 mmol/l 1.23   POC HEMOGLOBIN 12.0 - 16.0 g/dL 14.8   POC PH 7.29 - 7.61  7.27 !!   POC PCO2 19 - 50 mmHg 67 !!   POC PO2 80 - 100 mmHg 84   POC HCO3 mmol/l 30.8   POC SATURATED O2 % 94.7   POC O2Hb 94.0 - 97.0 % 93.0 !   POC COHb % 2.8   POC MetHb 0.40 - 1.5 % 1.3   Specimen source  Arterial sample   Base Deficit -2.0 - 2.0 mmol/l 1.8   Corrected Temperature (pCO2) 19 - 50 mmHg 67 !!   Corrected Temperature (pO2) 80 - 100 mmHg 84   Correct Temperature (PH) 7.29 - 7.61  7.27 !!     EKG Readings: (Independently Interpreted)   Initial Reading: No STEMI. Rhythm: Atrial Flutter. Heart Rate: 114. Ectopy: No Ectopy. Conduction: Normal. ST Segments: Normal ST Segments. T Waves Flipped: II, III, AVF, I, AVL, V5 and V6. Axis: Normal.   Done on 7/24/22 at 2156.        Imaging Results          X-Ray Chest AP Portable (Final result)  Result time 07/25/22 06:32:27    Final result by Oziel Vale MD (07/25/22 06:32:27)                 Impression:      No acute cardiopulmonary process.      Electronically signed by: Oziel Vale  Date:    07/25/2022  Time:    06:32             Narrative:    EXAMINATION:  XR CHEST AP PORTABLE    CLINICAL HISTORY:  Asthma;    TECHNIQUE:  Single view of the chest    COMPARISON:  06/07/2022    FINDINGS:  No focal opacification, pleural effusion, or pneumothorax.    The cardiomediastinal silhouette is within normal limits.    No acute osseous abnormality.                              X-Rays:   Independently Interpreted Readings:   Chest X-Ray: No acute  abnormalities.     Medications   levoFLOXacin 750 mg/150 mL IVPB 750 mg (has no administration in time range)   albuterol-ipratropium 2.5 mg-0.5 mg/3 mL nebulizer solution 3 mL (3 mLs Nebulization Given 7/24/22 2311)   methylPREDNISolone sodium succinate injection 125 mg (125 mg Intravenous Given 7/24/22 2215)   magnesium sulfate 2g in water 50mL IVPB (premix) (0 g Intravenous Stopped 7/24/22 2231)     Medical Decision Making:   Initial Assessment:   COPD patient here with respiratory distress, very diminished and wheezing. A Fib with RVR in 120s-130s   Independently Interpreted Test(s):   I have ordered and independently interpreted X-rays - see prior notes.  Clinical Tests:   Lab Tests: Ordered and Reviewed  The following lab test(s) were unremarkable: CBC, CMP, Troponin and BNP  Radiological Study: Ordered and Reviewed  Medical Tests: Ordered and Reviewed  ED Management:  Significant improvement with IV Solumedrol and IV Mag  Given DuoNeb as well  HR improved with respiratory improvement   ABG with hypercapnia, patient much improved and mentating well at this time. Concerns he would become more hypercapneic overnight so placed on BiPap   Levaquin IV for COPD exacerbation  Admitted to hospitalist   Other:   I have discussed this case with another health care provider.          Scribe Attestation:   Scribe #1: I performed the above scribed service and the documentation accurately describes the services I performed. I attest to the accuracy of the note.    Attending Attestation:           Physician Attestation for Scribe:  Physician Attestation Statement for Scribe #1: I, Dr. Fonseca, reviewed documentation, as scribed by Olinda Granados in my presence, and it is both accurate and complete.             ED Course as of 07/25/22 1342   Sun Jul 24, 2022   2251 Respiratory status improved after steroids and mag  [KM]   2331 POC PCO2(!!): 67  Patient awake and talking, feeling better after treatment. Patient says he wears CPAP  at night and does think it would help [KM]      ED Course User Index  [KM] Darby Fonseca MD             Clinical Impression:   Final diagnoses:  [J96.01, J96.02] Acute respiratory failure with hypoxia and hypercapnia (Primary)  [J44.1] Acute exacerbation of chronic obstructive pulmonary disease (COPD)          ED Disposition Condition    Admit               Darby Fonseca MD  07/25/22 1342       Darby Fonseca MD  07/25/22 7162

## 2022-07-25 NOTE — H&P
[facility] Hospital Medicine History & Physical Examination       Patient Name: Vladimir Bernal  MRN: [id number]  Patient Class: IP- Inpatient   Admission Date: 7/24/2022  9:56 PM  Length of Stay: 0  Admitting Service: Hospital Medicine   Attending Physician: Sergo Lee MD   Primary Care Provider: Sophie Navarrete MD  History source: EMR, patient and/or patient's family    CHIEF COMPLAINT   Chest Pain (Patient reports sob and chest pain)    HISTORY OF PRESENT ILLNESS:   52-year-old male with history of COPD, obesity hypoventilation syndrome, KRISHNA, heart failure with reduced ejection fraction of 43%, hyperlipidemia, DM2, cocaine abuse and additional past medical history as below presents to the ER complaining of cough and chest tightness as well as dyspnea over the prior 24 hours.  He was satting in the mid 80s on room air on arrival and was tachycardic in atrial flutter with RVR on arrival, ultimately was placed on BiPAP.  EKG showed T-wave inversions in inferolateral leads.  Laboratory work was overall unremarkable.  He did have improvement in his respiratory status after being initiated on BiPAP.  Hospitalist service has been consulted for admission.      PAST MEDICAL HISTORY:   COPD on home oxygen at 2 L  Obesity hypoventilation syndrome  Type 2 diabetes mellitus  Atrial fibrillation/flutter on Xarelto  Heart failure with reduced ejection fraction 43%  Cocaine abuse  Hyperlipidemia  Left eye blindness  Morbid obesity    PAST SURGICAL HISTORY:   Mandible surgery    ALLERGIES:   Iodine and Shellfish containing products    FAMILY HISTORY:   Reviewed and non-contributory     SOCIAL HISTORY:     Social History     Tobacco Use    Smoking status: Not on file    Smokeless tobacco: Not on file   Substance Use Topics    Alcohol use: Not on file        HOME MEDICATIONS:     Medication Sig   albuterol (ACCUNEB) 0.63 mg/3 mL Nebu Take 3 mLs (0.63 mg total) by nebulization every 6 (six)  hours as needed (shortness of breath and wheezing). Rescue   atorvastatin (LIPITOR) 80 MG tablet Take 80 mg by mouth once daily.   budesonide-formoterol 160-4.5 mcg (SYMBICORT) 160-4.5 mcg/actuation HFAA Inhale 2 puffs into the lungs every 12 (twelve) hours. Controller   butalbital-acetaminophen-caffeine -40 mg (FIORICET, ESGIC) -40 mg per tablet Take 1 tablet by mouth every 4 (four) hours as needed for Pain.   digoxin (LANOXIN) 250 mcg tablet Take 1 tablet (0.25 mg total) by mouth once daily.   furosemide (LASIX) 40 MG tablet Take 1 tablet (40 mg total) by mouth once daily.   gabapentin (NEURONTIN) 300 MG capsule Take 300 mg by mouth 3 (three) times daily.   lisinopriL 10 MG tablet Take 1 tablet (10 mg total) by mouth once daily.   metFORMIN (GLUCOPHAGE-XR) 500 MG ER 24hr tablet Take 500 mg by mouth once daily.   metoprolol succinate (TOPROL-XL) 100 MG 24 hr tablet Take 1 tablet (100 mg total) by mouth 2 (two) times daily.   pantoprazole (PROTONIX) 40 MG tablet Take 40 mg by mouth once daily.   promethazine-codeine 6.25-10 mg/5 ml (PHENERGAN WITH CODEINE) 6.25-10 mg/5 mL syrup Take 5 mLs by mouth every 4 (four) hours as needed for Cough.   rivaroxaban (XARELTO) 10 mg Tab Take 2 tablets (20 mg total) by mouth daily with dinner or evening meal.   tadalafiL (CIALIS) 5 MG tablet Take 10 mg by mouth daily as needed for Erectile Dysfunction.   tiotropium (SPIRIVA) 18 mcg inhalation capsule Inhale 18 mcg into the lungs once daily. Controller   traMADoL (ULTRAM) 50 mg tablet Take 50 mg by mouth 2 (two) times a day.   amiodarone (PACERONE) 200 MG Tab Take 2 tablets BID x 1 day, then Take 1 tablet BID x 3 days, then Take 1 tablet daily   spironolactone (ALDACTONE) 25 MG tablet Take 1 tablet (25 mg total) by mouth once daily.       REVIEW OF SYSTEMS:   Except as documented, all other systems reviewed and negative     PHYSICAL EXAM:   T 97.9 °F (36.6 °C)   BP (!) 184/136   P 99   RR (!) 22   O2 (!) 94 %  GENERAL:  awake, alert, oriented and in no acute distress, non-toxic appearing   HEENT: normocephalic atraumatic   NECK: supple   LUNGS: Clear bilaterally, no wheezing or rales, no accessory muscle use   CVS: Regular rate and rhythm, normal peripheral perfusion  ABD: Soft, non-tender, non-distended, bowel sounds present  EXTREMITIES: no clubbing or cyanosis  SKIN: Warm, dry.   NEURO: alert and oriented, grossly without focal deficits   PSYCHIATRIC: Cooperative    LABS AND IMAGING:     Recent Labs     07/24/22 2218   WBC 6.8   RBC 4.90   HGB 14.4   HCT 47.5   MCV 96.9*   MCH 29.4   MCHC 30.3*   RDW 15.7        No results for input(s): LACTIC in the last 72 hours.  No results for input(s): INR, APTT, D-DIMER in the last 72 hours.  No results for input(s): HGBA1C, CHOL, TRIG, LDL, VLDL, HDL in the last 72 hours.   Recent Labs     07/24/22 2218      K 4.5   CHLORIDE 107   CO2 26   BUN 14.0   CREATININE 1.01   GLUCOSE 102*   CALCIUM 9.4   ALBUMIN 3.6   GLOBULIN 3.4   ALKPHOS 65   ALT 5   AST 11   BILITOT 0.6     Recent Labs     07/24/22 2218   BNP 28.0   TROPONINI <0.010          X-Ray Chest AP Portable  Narrative: EXAMINATION:  XR CHEST AP PORTABLE    CLINICAL HISTORY:  Chest Pain;    TECHNIQUE:  Single view of the chest    COMPARISON:  05/20/2022    FINDINGS:  Prominent interstitial markings with no focal opacification    The cardiomediastinal silhouette is within normal limits.    No acute osseous abnormality.  Impression: Study limited by motion.  No definite acute abnormality.  Prominent interstitial markings with no focal opacification.    Electronically signed by: Oziel Vale  Date:    06/07/2022  Time:    11:13      ASSESSMENT & PLAN:   Acute exacerbation of COPD  Acute hypoxemic and hypercapnic respiratory failure secondary to above  COPD on home oxygen at 2 L  Obesity hypoventilation syndrome  Type 2 diabetes mellitus  AFib/flutter on Xarelto  Heart failure with reduced ejection fraction 43%  Cocaine  abuse  Hyperlipidemia  Left eye blindness  Morbid obesity    - continue antibiotics, nebulizer treatments, and steroids  - obtain a urine drug screen  - resume home medications as appropriate    DVT prophylaxis: cont Xarelto   Code status: full code    If patient was admitted under observational status it is with my approval/permission.     At least 55 min was spent on this history and physical.  Time seen: 11PM  Critical care time = 35 min; Critical care diagnosis = hypoxia  Sergo Lee MD

## 2022-07-25 NOTE — PROGRESS NOTES
[facility] Hospital Medicine Progress Note        Chief Complaint: Inpatient follow-up on respiratory failure    HPI:   Patient is a 52-year-old  male with history of COPD, obesity hypoventilation syndrome, KRISHNA, heart failure with reduced ejection fraction of 43%, hyperlipidemia, DM2, cocaine abuse who presents to the ER complaining of cough and chest tightness as well as dyspnea over the prior 24 hours.   his oxygen saturation was in the mid 80s on room air on arrival and he was tachycardic in atrial flutter with RVR on arrival.  He was ultimately was placed on BiPAP.  EKG showed T-wave inversions in inferolateral leads.  Laboratory work was overall unremarkable.  He had improvement in his respiratory status after being initiated on BiPAP.  Hospitalist service has been consulted for admission.    Interval Hx:   Earlier patient was complaining about BiPAP mask and not being able to eat and threatening to leave AMA but after I gave the nurse to permission to discontinue the BiPAP and left him eat, he decided to stay.    Objective/physical exam:  General:  Morbidly obese  male in no acute distress  HENT: normocephalic, atraumatic  Eye: PERRL, EOMI, clear conjunctiva  Neck: full ROM, no thyromegaly, no JVD  Respiratory:  Diminished breath sounds bilaterally  Cardiovascular: regular rate and rhythm  Gastrointestinal: non-distended, positive bowel sounds, non-tender  Musculoskeletal: no gross deformity  Integumentary: warm, dry, intact, no rashes  Neurological: cranial nerves grossly intact, no focal neurological deficit  Psychiatric: cooperative, flat affect      VITAL SIGNS: 24 HRS MIN & MAX LAST   Temp  Min: 97.9 °F (36.6 °C)  Max: 101.1 °F (38.4 °C) (!) 101.1 °F (38.4 °C)   BP  Min: 126/108  Max: 184/136 (!) 158/97   Pulse  Min: 66  Max: 134  77   Resp  Min: 13  Max: 28 20   SpO2  Min: 86 %  Max: 98 % 97 %       Recent Labs   Lab 07/24/22  0971  07/25/22  0420   WBC 6.8 6.5   RBC 4.90 5.08   HGB 14.4 15.2   HCT 47.5 49.4   MCV 96.9* 97.2*   MCH 29.4 29.9   MCHC 30.3* 30.8*   RDW 15.7 15.7    307   MPV 9.7 9.6       Recent Labs   Lab 07/24/22  2218 07/24/22  2317 07/25/22  0216 07/25/22  0420 07/25/22  0544     --   --  140 138   K 4.5  --   --  6.0* 5.9*   CO2 26  --   --  30* 26   BUN 14.0  --   --  12.8 12.7   CREATININE 1.01  --   --  1.02 1.18   CALCIUM 9.4  --   --  9.7 9.9   PH  --  7.27* 7.26*  --   --    ALBUMIN 3.6  --   --  3.9  --    ALKPHOS 65  --   --  67  --    ALT 5  --   --  7  --    AST 11  --   --  12  --    BILITOT 0.6  --   --  0.5  --           Microbiology Results (last 7 days)     ** No results found for the last 168 hours. **           See below for Radiology    Scheduled Med:   atorvastatin  80 mg Oral Daily    fluticasone furoate-vilanteroL  1 puff Inhalation Daily    furosemide (LASIX) injection  40 mg Intravenous Q12H    gabapentin  300 mg Oral TID    levoFLOXacin  750 mg Intravenous Q24H    lisinopriL  10 mg Oral Daily    metFORMIN  500 mg Oral Daily    methylPREDNISolone sodium succinate injection  80 mg Intravenous Q6H    rivaroxaban  20 mg Oral Daily with dinner    umeclidinium  1 capsule Inhalation Daily        Continuous Infusions:  None.    PRN Meds:  acetaminophen, albuterol-ipratropium, butalbital-acetaminophen-caffeine -40 mg, hydrALAZINE, labetaloL, melatonin, sodium chloride 0.9%       Assessment/Plan:  Fever  Acute exacerbation of COPD  Acute hypoxemic and hypercapnic respiratory failure secondary to above  COPD on home oxygen at 2 liters/minute  Hypertensive urgency  Obesity hypoventilation syndrome  Type 2 non-insulin-dependent diabetes mellitus  Chronic atrial flutter on Xarelto  Cardiomyopathy with reduced ejection fraction 43%  Hyperlipidemia  Left eye blindness  Morbid obesity  Medical noncompliance  Cocaine abuse      Plan:  Continue supplemental oxygen, empiric antibiotics,  nebulized bronchodilators, and intravenous corticosteroids  Resume appropriate home medications        Critical Care Diagnosis: Hypertensive Urgency requiring IV antihypertensives  Critical Care Interventions: Hands-on evaluation, review of labs, radiographs, medical records, and discussion with the patient and medical staff in order to assess and manage the high probability of imminent or life-threatening deterioration of cardio-respiratory status requiring vasopressor support and/or intubation and mechanical ventilation.  Critical Care Time Spent: 35 minutes    VTE prophylaxis:  Xarelto    Patient condition:  Stable    Anticipated discharge and Disposition:     TBD    All diagnosis and differential diagnosis have been reviewed; assessment and plan has been documented; I have personally reviewed the labs and test results that are presently available; I have reviewed the patients medication list; I have reviewed the consulting providers response and recommendations. I have reviewed or attempted to review medical records based upon their availability    All of the patient's questions have been  addressed and answered. Patient's is agreeable to the above stated plan. I will continue to monitor closely and make adjustments to medical management as needed.  _____________________________________________________________________    Nutrition Status:    Radiology:  X-Ray Chest AP Portable  Narrative: EXAMINATION:  XR CHEST AP PORTABLE    CLINICAL HISTORY:  Asthma;    TECHNIQUE:  Single view of the chest    COMPARISON:  06/07/2022    FINDINGS:  No focal opacification, pleural effusion, or pneumothorax.    The cardiomediastinal silhouette is within normal limits.    No acute osseous abnormality.  Impression: No acute cardiopulmonary process.    Electronically signed by: Oziel Vale  Date:    07/25/2022  Time:    06:32      Caleb Stephen MD   07/25/2022

## 2022-07-25 NOTE — PROGRESS NOTES
Met with patient to conduct initial  dc planning assessment. Patient lives at [address] [place] [address] with his mother. There are 3-4 steps with a railing to enter the home and no stairs within. There is running water and electricity. PH [phone]. Emergency contact Candie Warner sister [phone]. Pt is single with no children. He is disabled and not currently working. His sister assists him with all ADLs. He uses medicaid transport to appointments and will need  to schedule a medicaid ride home upon dc. PCP Dr. Navarrete. He has a CPAP, oxygen (unsure who he fills with), glucometer and rollling walker at home. He wears glasses. No  hx or [place] benefits. He is able to afford food and meds and fills with [facility] on [address]. He does not have a living will, POA or DNR. He denied known needs prior to dc.

## 2022-07-26 NOTE — PROGRESS NOTES
[facility] Hospital Medicine Progress Note        Chief Complaint: Inpatient follow-up on respiratory failure    HPI:   Patient is a 52-year-old  male with history of COPD, obesity hypoventilation syndrome, KRISHNA, heart failure with reduced ejection fraction of 43%, hyperlipidemia, DM2, cocaine abuse who presents to the ER complaining of cough and chest tightness as well as dyspnea over the prior 24 hours.   his oxygen saturation was in the mid 80s on room air on arrival and he was tachycardic in atrial flutter with RVR on arrival.  He was ultimately was placed on BiPAP.  EKG showed T-wave inversions in inferolateral leads.  Laboratory work was overall unremarkable.  He had improvement in his respiratory status after being initiated on BiPAP.  Hospitalist service has been consulted for admission.    Interval Hx:   Patient is sitting on the edge of the bed.  He remains with dyspnea on exertion.  He is afebrile, on supplemental oxygen at 2 liters/minute via nasal cannula, and hemodynamically stable.  He is tolerating a diet without nausea or vomiting and is asking for some supplemental nutrition, and namely Alex crackers. He is also requesting narcotics for his back pain.    Objective/physical exam:  General:  Morbidly obese  male in no acute distress  HENT: normocephalic, atraumatic  Eye:  Opacified left cornea with resulting blindness  Neck: full ROM, no thyromegaly, no JVD  Respiratory:  Diminished breath sounds bilaterally  Cardiovascular: regular rate and rhythm  Gastrointestinal: non-distended, positive bowel sounds, non-tender  Musculoskeletal: no gross deformity  Integumentary: warm, dry, intact, no rashes  Neurological: cranial nerves grossly intact, no focal neurological deficit  Psychiatric: cooperative, flat affect      VITAL SIGNS: 24 HRS MIN & MAX LAST   Temp  Min: 97.3 °F (36.3 °C)  Max: 101.1 °F (38.4 °C) 98.1 °F (36.7 °C)   BP  Min: 121/72   Max: 158/97 126/82   Pulse  Min: 76  Max: 95  84   Resp  Min: 16  Max: 20 18   SpO2  Min: 92 %  Max: 99 % 96 %       Recent Labs   Lab 07/24/22 2218 07/25/22  0420 07/26/22  0353   WBC 6.8 6.5 12.8*   RBC 4.90 5.08 4.82   HGB 14.4 15.2 14.4   HCT 47.5 49.4 45.9   MCV 96.9* 97.2* 95.2*   MCH 29.4 29.9 29.9   MCHC 30.3* 30.8* 31.4*   RDW 15.7 15.7 15.5    307 314   MPV 9.7 9.6 9.9       Recent Labs   Lab 07/24/22 2218 07/24/22 2317 07/25/22 0216 07/25/22  0420 07/25/22  0544 07/25/22  1559 07/26/22  0353     --   --  140 138 139 137   K 4.5  --   --  6.0* 5.9* 4.3 4.7   CO2 26  --   --  30* 26 25 28   BUN 14.0  --   --  12.8 12.7 19.4 19.7   CREATININE 1.01  --   --  1.02 1.18 1.05 0.95   CALCIUM 9.4  --   --  9.7 9.9 9.4 9.4   PH  --  7.27* 7.26*  --   --   --   --    ALBUMIN 3.6  --   --  3.9  --   --   --    ALKPHOS 65  --   --  67  --   --   --    ALT 5  --   --  7  --   --   --    AST 11  --   --  12  --   --   --    BILITOT 0.6  --   --  0.5  --   --   --           Microbiology Results (last 7 days)     ** No results found for the last 168 hours. **           See below for Radiology    Scheduled Med:   atorvastatin  80 mg Oral Daily    fluticasone furoate-vilanteroL  1 puff Inhalation Daily    furosemide (LASIX) injection  40 mg Intravenous Q12H    gabapentin  300 mg Oral TID    levoFLOXacin  750 mg Intravenous Q24H    lisinopriL  10 mg Oral Daily    metFORMIN  500 mg Oral Daily    methylPREDNISolone sodium succinate injection  80 mg Intravenous Q6H    [START ON 7/27/2022] pantoprazole  40 mg Oral Daily    rivaroxaban  20 mg Oral Daily with dinner    umeclidinium  1 capsule Inhalation Daily        Continuous Infusions:  None.    PRN Meds:  acetaminophen, albuterol-ipratropium, benzonatate, butalbital-acetaminophen-caffeine -40 mg, enalaprilat, hydrALAZINE, melatonin, oxyCODONE-acetaminophen, oxyCODONE-acetaminophen, sodium chloride 0.9%       Assessment/Plan:  Fever, last  recorded fever was at 3:00 p.m. yesterday  Acute exacerbation of COPD  Acute hypoxemic and hypercapnic respiratory failure secondary to above  COPD on home oxygen at 2 liters/minute  Essential hypertension  Obesity hypoventilation syndrome  Type 2 non-insulin-dependent diabetes mellitus  Chronic atrial flutter on Xarelto  Cardiomyopathy with reduced ejection fraction 43%  Hyperlipidemia  Left eye blindness  Morbid obesity  Medical noncompliance  Cocaine abuse      Plan:  Continue supplemental oxygen, empiric antibiotics, nebulized bronchodilators, intravenous diuretics and intravenous corticosteroids  Resumed appropriate home medications.  It is unclear if he is still supposed to be on amiodarone and digoxin.      VTE prophylaxis:  Xarelto    Patient condition:  Stable    Anticipated discharge and Disposition:     TBD    All diagnosis and differential diagnosis have been reviewed; assessment and plan has been documented; I have personally reviewed the labs and test results that are presently available; I have reviewed the patients medication list; I have reviewed the consulting providers response and recommendations. I have reviewed or attempted to review medical records based upon their availability    All of the patient's questions have been  addressed and answered. Patient's is agreeable to the above stated plan. I will continue to monitor closely and make adjustments to medical management as needed.  _____________________________________________________________________    Nutrition Status:    Radiology:  X-Ray Chest AP Portable  Narrative: EXAMINATION:  XR CHEST AP PORTABLE    CLINICAL HISTORY:  Asthma;    TECHNIQUE:  Single view of the chest    COMPARISON:  06/07/2022    FINDINGS:  No focal opacification, pleural effusion, or pneumothorax.    The cardiomediastinal silhouette is within normal limits.    No acute osseous abnormality.  Impression: No acute cardiopulmonary process.    Electronically signed  by: Oziel Vale  Date:    07/25/2022  Time:    06:32      Caleb Stephen MD   07/26/2022

## 2022-07-28 NOTE — PROGRESS NOTES
C3 nurse attempted to contact Vladimir Larsonews for a TCC post hospital discharge follow up call. No answer. No voicemail available. The patient has a scheduled HOSFU appointment with Sophie Navarrete MD on 08/02/2022 @ 230 pm.

## 2022-07-29 NOTE — PROGRESS NOTES
C3 nurse attempted to contact Vladimir Bernal for a TCC post hospital discharge follow up call. No answer. Left voicemail with call back information. The patient has a scheduled HOSFU appointment with Sophie Navarrete MD on 08/02/2022 @ 230 pm.

## 2022-07-29 NOTE — PROGRESS NOTES
C3 nurse spoke with Vladimir Bernal regarding a TCC post hospital discharge follow up call. Patient stated unable to talk right now and requested a call back later today.The patient has a scheduled HOSFU appointment with Sophie Navarrete MD on 08/02/2022 @ 230 pm.

## 2022-08-08 NOTE — DISCHARGE SUMMARY
[facility] Hospital Medicine Discharge Summary    Admit Date: 7/24/2022  Discharge Date and Time:  7/27/2022 2:48 PM  Admitting Physician: Sergo Lee MD   Discharging Physician: Caleb Stephen MD.  Primary Care Physician: Sophie Navarrete MD  Consults:  None.    Discharge Diagnoses:  Fever, resolved  Acute exacerbation of chronic obstructive pulmonary disease  Acute on chronic hypoxemic and hypercapnic respiratory failure secondary to above  Chronic hypoxic respiratory failure on home oxygen at 2 liters/minute  Essential hypertension  Obesity hypoventilation syndrome  Type 2 non-insulin-dependent diabetes mellitus  Chronic atrial flutter on Xarelto  Cardiomyopathy with reduced left ventricular ejection fraction of 43%  Hyperlipidemia  Left eye blindness  Morbid obesity  Medical noncompliance  Cocaine abuse       Hospital Course:   Patient is a 52-year-old  male with history of COPD, obesity hypoventilation syndrome, KRISHNA, heart failure with reduced ejection fraction of 43%, hyperlipidemia, DM2, cocaine abuse who presents to the ER complaining of cough and chest tightness as well as dyspnea over the prior 24 hours.   his oxygen saturation was in the mid 80s on room air on arrival and he was tachycardic in atrial flutter with RVR on arrival.  He was ultimately was placed on BiPAP.  EKG showed T-wave inversions in inferolateral leads.  Laboratory work was overall unremarkable.  He had improvement in his respiratory status after being initiated on BiPAP.  Hospitalist service has been consulted for admission.  Patient was treated with supplemental oxygen, empiric antibiotics, nebulized bronchodilators, intravenous diuretics and intravenous corticosteroids with improvement in his symptoms and he requested discharge.  Patient was seen and examined on the day of discharge.    Vitals:  VITAL SIGNS: 24 HRS MIN & MAX LAST   No data recorded 98.2 °F (36.8 °C)   No  data recorded (!) 140/81   No data recorded  (!) 120   No data recorded 18   No data recorded 95 %       Physical Exam:  General:  Morbidly obese  male in no acute distress  HENT: normocephalic, atraumatic  Eye:  Opacified left cornea with resulting blindness  Neck: full ROM, no thyromegaly, no JVD  Respiratory:  Diminished breath sounds bilaterally  Cardiovascular: regular rate and rhythm  Gastrointestinal: non-distended, positive bowel sounds, non-tender  Musculoskeletal: no gross deformity  Integumentary: warm, dry, intact, no rashes  Neurological: cranial nerves grossly intact, no focal neurological deficit  Psychiatric: cooperative, flat affect       Procedures Performed: No admission procedures for hospital encounter.     Significant Diagnostic Studies: See Full reports for all details    No results for input(s): WBC, RBC, HGB, HCT, MCV, MCH, MCHC, RDW, PLT, MPV, GRAN, LYMPH, MONO, BASO, NRBC in the last 168 hours.    No results for input(s): NA, K, CL, CO2, ANIONGAP, BUN, CREATININE, GLU, CALCIUM, PH, MG, ALBUMIN, PROT, ALKPHOS, ALT, AST, BILITOT in the last 168 hours.     Microbiology Results (last 7 days)     ** No results found for the last 168 hours. **           X-Ray Chest AP Portable  Narrative: EXAMINATION:  XR CHEST AP PORTABLE    CLINICAL HISTORY:  Asthma;    TECHNIQUE:  Single view of the chest    COMPARISON:  06/07/2022    FINDINGS:  No focal opacification, pleural effusion, or pneumothorax.    The cardiomediastinal silhouette is within normal limits.    No acute osseous abnormality.  Impression: No acute cardiopulmonary process.    Electronically signed by: Oziel Vale  Date:    07/25/2022  Time:    06:32         Medication List      START taking these medications    predniSONE 10 MG tablet  Commonly known as: DELTASONE  Take 4 tablets (40 mg total) by mouth once daily for 3 days, THEN 3 tablets (30 mg total) once daily for 3 days, THEN 2 tablets (20 mg total) once daily for 3  days, THEN 1 tablet (10 mg total) once daily for 3 days.  Start taking on: July 27, 2022        CONTINUE taking these medications    albuterol 0.63 mg/3 mL Nebu  Commonly known as: ACCUNEB  Take 3 mLs (0.63 mg total) by nebulization every 6 (six) hours as needed (shortness of breath and wheezing). Rescue     atorvastatin 80 MG tablet  Commonly known as: LIPITOR     budesonide-formoterol 160-4.5 mcg 160-4.5 mcg/actuation Hfaa  Commonly known as: SYMBICORT  Inhale 2 puffs into the lungs every 12 (twelve) hours. Controller     butalbital-acetaminophen-caffeine -40 mg -40 mg per tablet  Commonly known as: FIORICET, ESGIC     digoxin 250 mcg tablet  Commonly known as: LANOXIN  Take 1 tablet (0.25 mg total) by mouth once daily.     furosemide 40 MG tablet  Commonly known as: LASIX  Take 1 tablet (40 mg total) by mouth once daily.     gabapentin 300 MG capsule  Commonly known as: NEURONTIN     lisinopriL 10 MG tablet  Take 1 tablet (10 mg total) by mouth once daily.     metFORMIN 500 MG ER 24hr tablet  Commonly known as: GLUCOPHAGE-XR     metoprolol succinate 100 MG 24 hr tablet  Commonly known as: TOPROL-XL  Take 1 tablet (100 mg total) by mouth 2 (two) times daily.     pantoprazole 40 MG tablet  Commonly known as: PROTONIX     promethazine-codeine 6.25-10 mg/5 ml 6.25-10 mg/5 mL syrup  Commonly known as: PHENERGAN with CODEINE  Take 5 mLs by mouth every 4 (four) hours as needed for Cough.     rivaroxaban 10 mg Tab  Commonly known as: XARELTO  Take 2 tablets (20 mg total) by mouth daily with dinner or evening meal.     tadalafiL 5 MG tablet  Commonly known as: CIALIS     tiotropium 18 mcg inhalation capsule  Commonly known as: SPIRIVA  Inhale 1 capsule (18 mcg total) into the lungs once daily. Controller        STOP taking these medications    amiodarone 200 MG Tab  Commonly known as: PACERONE     spironolactone 25 MG tablet  Commonly known as: ALDACTONE     traMADoL 50 mg tablet  Commonly known as: ULTRAM            Where to Get Your Medications      These medications were sent to [facility] - [place], [place] - [address]  [address], [place] [address]    Phone: [phone]   · albuterol 0.63 mg/3 mL Nebu  · budesonide-formoterol 160-4.5 mcg 160-4.5 mcg/actuation Hfaa  · digoxin 250 mcg tablet  · furosemide 40 MG tablet  · metoprolol succinate 100 MG 24 hr tablet  · predniSONE 10 MG tablet  · promethazine-codeine 6.25-10 mg/5 ml 6.25-10 mg/5 mL syrup  · tiotropium 18 mcg inhalation capsule          Explained in detail to the patient about the discharge plan, medications, and follow-up visits.  Patient understands and agrees with the treatment plan.  Discharge Disposition: Home or Self Care   Discharged Condition: stable  Diet- cardiac   Medications per discharge medication reconciliation list  Activity as tolerated   Follow-up Information     Sophie MD [facility] Follow up.    Why: keep scheduled appointment    Aug. 2nd at 2:30 p.m.  Contact information:  [address] [place] [address] [place] [address]  [phone]                         Discharge time 35 minutes    For worsening symptoms, chest pain, shortness of breath, increased abdominal pain, high grade fever, stroke or stroke like symptoms, immediately go to the nearest Emergency Room or call 911 as soon as possible.      Caleb Chung M.D, on 8/8/2022. at 1:43 PM.

## 2022-08-18 PROBLEM — R07.9 CHEST PAIN: Status: ACTIVE | Noted: 2022-01-01

## 2022-08-18 NOTE — H&P
MD Addendum:  I, Dr. Maciel assumed care of this patient today, 8/18/22.  For the patient encounter, I performed the substantive portion of the visit, I reviewed the NP/PA documentation, treatment plan, and medical decision making.  I had face to face time with this patient      A. History:   Patient states he has been having chest pain for about 2 days left-sided intermittent comes and goes.    No associated symptoms.    B. Physical exam:  General: Appears comfortable, no acute distress.  Integumentary: Warm, dry, intact.    Musculoskeletal: Purposeful movement noted.   Respiratory: No accessory muscle use. Breath sounds are equal.  Cardiovascular: Regular rate. No peripheral edema.    C. Medical decision making:  chest pain likely secondary to cocaine use.   we will continue to trend troponins,   Hypertensive urgency-noncompliance, restart home medications.      Can likely be discharged for 48 hours pending results.     Left knee pain will order x-ray to further evaluate.  Cardiac diet can be initiated.    All diagnosis and differential diagnosis have been reviewed; assessment and plan has been documented; I have personally reviewed the labs and test results that are presently available; I have reviewed the patients medication list; I have reviewed the consulting providers response and recommendations. I have reviewed or attempted to review medical records based upon their availability.    I will continue to monitor closely and make adjustments to medical management as needed.     Dr. Susie Maciel DO  8/18/22

## 2022-08-18 NOTE — H&P
[facility] Medicine History & Physical Examination       Patient Name: Vladimir Bernal  MRN: [id number]  Patient Class: OP- Observation   Admission Date: 8/18/2022   Admitting Physician: ANASTASIA Service   Length of Stay: 0  Attending Physician: Dr. CESAR Maciel  Primary Care Provider: Sophie Navarrete MD  Face-to-Face encounter date: 08/18/2022 at 1030  Code Status: Full Code  Chief Complaint: Chest Pain (Presents with c/o worsening chest pain x2 days. )        Patient information was obtained from patient, patient's family, past medical records and ER records.     HISTORY OF PRESENT ILLNESS:   Vladimir Bernal is a 52 y.o. male who has a PMH which includes atrial fibrillation, anxiety, depression, COPD, asthma, CHF, HTN, HLD, DM, KRISHNA, GERD, blind eye; presented to the ED at [facility] on 8/18/2022 with a primary complaint of  Chest pain over the past 2 days worsening with exertion; with associated SOB, palpitations, diaphoresis, headache.  Patient denies any cough, congestion, fever, chills, or any sick contacts.He reports his on O2 at home at 2L per NC and uses CPAP at night. Labs significant for glucose 101, digoxin level less than 0.19; other indices unremarkable.  Urine drug screen positive for cocaine and opiates.  Chest x-ray revealed no acute cardiopulmonary process. On presentation in the ED patient noted to be in atrial fibrillation with RVR which he was also noted to be hypertensive with /126; he was given aspirin, nitroglycerin, morphine, and Cardizem..  Patient reports he is usually compliant with his medication however he reports he has missed some doses recently.  When asked about illicit drug use patient reported using some time socially but not every day.  He denies any falls traumas or injuries.  Patient is admitted to hospital medicine services for further management.  Cardiology Services have been consulted.    PAST MEDICAL HISTORY:   Atrial  fibrillation  Anxiety  Depression  COPD  Asthma  CHF  HTN  HLD  DM  KRISHNA  GERD  Left eye blindness  Obesity    PAST SURGICAL HISTORY:     Past Surgical History:   Procedure Laterality Date    MANDIBLE SURGERY         ALLERGIES:   Iodine and Shellfish containing products    FAMILY HISTORY:   Reviewed and negative    SOCIAL HISTORY:   Drinks alcohol socially   Smokes cigarettes   Uses cocaine socially   Lives with family     HOME MEDICATIONS:   As documented  Prior to Admission medications    Medication Sig Start Date End Date Taking? Authorizing Provider   albuterol (ACCUNEB) 0.63 mg/3 mL Nebu Take 3 mLs (0.63 mg total) by nebulization every 6 (six) hours as needed (shortness of breath and wheezing). Rescue 7/27/22   Caleb Stephen MD   atorvastatin (LIPITOR) 80 MG tablet Take 80 mg by mouth once daily.    Historical Provider   budesonide-formoterol 160-4.5 mcg (SYMBICORT) 160-4.5 mcg/actuation HFAA Inhale 2 puffs into the lungs every 12 (twelve) hours. Controller 7/27/22   Caleb Stephen MD   butalbital-acetaminophen-caffeine -40 mg (FIORICET, ESGIC) -40 mg per tablet Take 1 tablet by mouth every 4 (four) hours as needed for Pain.    Historical Provider   digoxin (LANOXIN) 250 mcg tablet Take 1 tablet (0.25 mg total) by mouth once daily. 7/27/22   Caleb Stephen MD   furosemide (LASIX) 40 MG tablet Take 1 tablet (40 mg total) by mouth once daily. 7/27/22 8/26/22  Caleb Stephen MD   gabapentin (NEURONTIN) 300 MG capsule Take 300 mg by mouth 3 (three) times daily.    Historical Provider   lisinopriL 10 MG tablet Take 1 tablet (10 mg total) by mouth once daily. 6/11/22 6/11/23  Chris Razo MD   metFORMIN (GLUCOPHAGE-XR) 500 MG ER 24hr tablet Take 500 mg by mouth once daily.    Historical Provider   metoprolol succinate (TOPROL-XL) 100 MG 24 hr tablet Take 1 tablet (100 mg total) by mouth 2 (two) times daily. 7/27/22   Caleb Stephen MD   pantoprazole (PROTONIX) 40 MG tablet  Take 40 mg by mouth once daily.    Historical Provider   promethazine-codeine 6.25-10 mg/5 ml (PHENERGAN WITH CODEINE) 6.25-10 mg/5 mL syrup Take 5 mLs by mouth every 4 (four) hours as needed for Cough. 7/27/22   Caleb Stephen MD   rivaroxaban (XARELTO) 10 mg Tab Take 2 tablets (20 mg total) by mouth daily with dinner or evening meal. 5/25/22   Prateek Murillo MD   tadalafiL (CIALIS) 5 MG tablet Take 10 mg by mouth daily as needed for Erectile Dysfunction.    Historical Provider   tiotropium (SPIRIVA) 18 mcg inhalation capsule Inhale 1 capsule (18 mcg total) into the lungs once daily. Controller 7/27/22   Caleb Stephen MD   amiodarone (PACERONE) 200 MG Tab Take 2 tablets BID x 1 day, then Take 1 tablet BID x 3 days, then Take 1 tablet daily 5/25/22 6/10/22  Prateek Murillo MD   spironolactone (ALDACTONE) 25 MG tablet Take 1 tablet (25 mg total) by mouth once daily. 5/26/22 6/10/22  Prateek Murillo MD       REVIEW OF SYSTEMS:   Except as documented, all other systems reviewed and negative     PHYSICAL EXAM:     VITAL SIGNS: 24 HRS MIN & MAX LAST   Temp  Min: 98.2 °F (36.8 °C)  Max: 98.2 °F (36.8 °C) 98.2 °F (36.8 °C)   BP  Min: 137/96  Max: 182/126 (!) (P) 146/91   Pulse  Min: 70  Max: 213  (P) 86   Resp  Min: 16  Max: 30 (!) 22   SpO2  Min: 92 %  Max: 100 % (P) 97 %       General appearance:  Chronically ill-appearing looks older than stated age ; nontoxic, obese, fatigued, NAD  HENT: Atraumatic head. Moist mucous membranes of oral cavity.  Eyes: PERRL, blindness to left eye  Neck: Atraumatic, Supple.   Lungs: Clear to auscultation bilaterally. No wheezing present.   Heart: Regular rate and rhythm. S1 and S2 present with no murmurs/gallop/rub. No pedal edema. No JVD present.   Abdomen: Soft, non-distended, non-tender. No rebound tenderness/guarding. Bowel sounds are normal.   Extremities: RDZ, generalized weakness  Skin: warm and dry  Neuro: Motor and sensory exams grossly intact. No focal deficits,  blindness  Psych/mental status: Appropriate mood and affect. Responds appropriately to questions.     LABS AND IMAGING:     Recent Labs   Lab 08/18/22  0229   WBC 6.7   RBC 4.52*   HGB 13.6*   HCT 42.9   MCV 94.9*   MCH 30.1   MCHC 31.7*   RDW 16.1      MPV 9.5       Recent Labs   Lab 08/18/22  0054      K 4.4   CO2 25   BUN 13.3   CREATININE 1.16   CALCIUM 9.8   MG 1.70   ALBUMIN 3.6   ALKPHOS 66   ALT 7   AST 14   BILITOT 0.5       Microbiology Results (last 7 days)       ** No results found for the last 168 hours. **             X-Ray Chest AP Portable  Narrative: EXAMINATION:  XR CHEST AP PORTABLE    CLINICAL HISTORY:  Chest Pain;    TECHNIQUE:  Single view of the chest    COMPARISON:  07/24/2022    FINDINGS:  No focal opacification, pleural effusion, or pneumothorax.    The cardiomediastinal silhouette remains prominent.    No acute osseous abnormality.  Impression: No acute cardiopulmonary process.    Electronically signed by: Oziel Vale  Date:    08/18/2022  Time:    07:08    Previous Cardiac Diagnostics: per Cardiology   TTE 05/20/22:  TDS. No definity contrast used in this study. LV normal in size with mildly decreases systolic function. EF 43%. Unable to accurately assess diastolic function due to insufficient parameters. Mild RV enlargement with low normal RVSF. Mild TR. Estimate PASP 42mmHg. AoV not well visualized due to poor sonic window. Normal leaflet mobility. No AR/AS. Trace MR.  No significant MD. Elevatd CVP 15mmHg       ASSESSMENT & PLAN:   ASSESSMENT:  Acute chest pain resolving   Atrial fibrillation/flutter with RVR  Hypertensive urgency  Chronic systolic heart failure-EF 43%   COPD- with mild exacerbation on chronic O2 therapy   Cocaine abuse- UDS positive  Left eye blindness  DM type 2 with hyperglycemia  KRISHNA- with CPAP at night  Morbidly obese  Weakness    PLAN:  Consult Cardiology Services appreciate assistance and recommendations  Telemetry monitoring   Trend out troponin  levels   Accu-Cheks with sliding scale   Continue with supplemental oxygen therapy wean down as tolerated to O2 as at home  Monitor for any withdrawals with polysubstance abuse   Repeat lab work in a.m.   Resume home medication as deemed necessary       VTE Prophylaxis: On home Xarelto for DVT prophylaxis and will be advised to be as mobile as possible    Patient condition: Fair  __________________________________________________________________________  INPATIENT LIST OF MEDICATIONS     Scheduled Meds:   diltiaZEM        diltiaZEM  240 mg Oral Daily     Continuous Infusions:  PRN Meds:.acetaminophen, dextrose 10%, dextrose 10%, glucagon (human recombinant), glucose, glucose, HYDROcodone-acetaminophen, insulin aspart U-100, melatonin, naloxone, ondansetron, simethicone, sodium chloride 0.9%      IHernandez FNP have reviewed and discussed the case with Dr. CESAR Maciel.  Please see the following addendum for further assessment and plan from the attending MD.  CHRIS Holley   08/18/2022    _____________________________________________________________________________MD Addendum:  I, Dr. Maciel assumed care of this patient today, 8/18/22.  For the patient encounter, I performed the substantive portion of the visit, I reviewed the NP/PA documentation, treatment plan, and medical decision making.  I had face to face time with this patient      A. History:   Patient states he has been having chest pain for about 2 days left-sided intermittent comes and goes.    No associated symptoms.     B. Physical exam:  General: Appears comfortable, no acute distress.  Integumentary: Warm, dry, intact.     Musculoskeletal: Purposeful movement noted.   Respiratory: No accessory muscle use. Breath sounds are equal.  Cardiovascular: Regular rate. No peripheral edema.     C. Medical decision making:  chest pain likely secondary to cocaine use.   we will continue to trend troponins,   Hypertensive  urgency-noncompliance, restart home medications.       Can likely be discharged for 48 hours pending results.     Left knee pain will order x-ray to further evaluate.  Cardiac diet can be initiated.     All diagnosis and differential diagnosis have been reviewed; assessment and plan has been documented; I have personally reviewed the labs and test results that are presently available; I have reviewed the patients medication list; I have reviewed the consulting providers response and recommendations. I have reviewed or attempted to review medical records based upon their availability.    I will continue to monitor closely and make adjustments to medical management as needed.     Dr. Susie Maciel DO  8/18/22

## 2022-08-18 NOTE — CONSULTS
Inpatient consult to Cardiology  Consult performed by: CHRIS Zepeda  Consult ordered by: CHRIS Zepeda        [facility] - Emergency Dept  Cardiology  Consult Note    Patient Name: Vladimir Bernal  MRN: [id number]  Admission Date: 8/18/2022  Hospital Length of Stay: 0 days  Code Status: Prior   Attending Provider: Trenton Lux MD   Consulting Provider: CHRIS Zepeda  Primary Care Physician: Sophie Navarrete MD  Principal Problem:<principal problem not specified>    Patient information was obtained from patient and ER records.     Subjective:     Chief Complaint:      HPI:   Mr. Bernal is a 53 y/o male, known to [facility] through inpatient admits only, who presented to ED with c/o constant chest pressure with associated SOB, palpitations, and diaphoresis. Denies N/V. ED workup revealing negative CXR. Troponin negative x 1. BNP 87.6, CBC/BMP unremarkable. He was found to be hypertensive 182/126, P 135. EKG revealed Aflutter with variable A-V block. He was treated with ASA, NTG, Morphine, Zofran, Cardizem bolus and started on Cardizem drip with improvement in HR. [facility] has been consulted for CP, A-fluttter RVR, and hypertensive urgency.     **of note, previous attempt to obtain Lexiscan prohibited due to patient size**    PMH: HTN, DM II, COPD/Asthma, HF, Chronic Hypoxemic Respiratory Failure/Home O2, L Eye Blindness, Obesity, Chronic systolic HF, PAF/flutter/xarelto, KRISHNA  PSH: Denies Past Surgical History  Family History: Reviewed and Unremarkable for Heart Disease  Social History: Drug screen positive for cocaine, ETOH and Tobacco Use     Previous Cardiac Diagnostics:   TTE 05/20/22:  TDS. No definity contrast used in this study. LV normal in size with mildly decreases systolic function. EF 43%. Unable to accurately assess diastolic function due to insufficient parameters. Mild RV enlargement with low normal RVSF. Mild TR. Estimate PASP 42mmHg. AoV not well visualized due to poor  sonic window. Normal leaflet mobility. No AR/AS. Trace MR.  No significant NV. Elevatd CVP 15mmHg     No current facility-administered medications on file prior to encounter.     Current Outpatient Medications on File Prior to Encounter   Medication Sig    albuterol (ACCUNEB) 0.63 mg/3 mL Nebu Take 3 mLs (0.63 mg total) by nebulization every 6 (six) hours as needed (shortness of breath and wheezing). Rescue    atorvastatin (LIPITOR) 80 MG tablet Take 80 mg by mouth once daily.    budesonide-formoterol 160-4.5 mcg (SYMBICORT) 160-4.5 mcg/actuation HFAA Inhale 2 puffs into the lungs every 12 (twelve) hours. Controller    butalbital-acetaminophen-caffeine -40 mg (FIORICET, ESGIC) -40 mg per tablet Take 1 tablet by mouth every 4 (four) hours as needed for Pain.    digoxin (LANOXIN) 250 mcg tablet Take 1 tablet (0.25 mg total) by mouth once daily.    furosemide (LASIX) 40 MG tablet Take 1 tablet (40 mg total) by mouth once daily.    gabapentin (NEURONTIN) 300 MG capsule Take 300 mg by mouth 3 (three) times daily.    lisinopriL 10 MG tablet Take 1 tablet (10 mg total) by mouth once daily.    metFORMIN (GLUCOPHAGE-XR) 500 MG ER 24hr tablet Take 500 mg by mouth once daily.    metoprolol succinate (TOPROL-XL) 100 MG 24 hr tablet Take 1 tablet (100 mg total) by mouth 2 (two) times daily.    pantoprazole (PROTONIX) 40 MG tablet Take 40 mg by mouth once daily.    promethazine-codeine 6.25-10 mg/5 ml (PHENERGAN WITH CODEINE) 6.25-10 mg/5 mL syrup Take 5 mLs by mouth every 4 (four) hours as needed for Cough.    rivaroxaban (XARELTO) 10 mg Tab Take 2 tablets (20 mg total) by mouth daily with dinner or evening meal.    tadalafiL (CIALIS) 5 MG tablet Take 10 mg by mouth daily as needed for Erectile Dysfunction.    tiotropium (SPIRIVA) 18 mcg inhalation capsule Inhale 1 capsule (18 mcg total) into the lungs once daily. Controller    [DISCONTINUED] amiodarone (PACERONE) 200 MG Tab Take 2 tablets BID x 1 day, then Take 1  tablet BID x 3 days, then Take 1 tablet daily    [DISCONTINUED] spironolactone (ALDACTONE) 25 MG tablet Take 1 tablet (25 mg total) by mouth once daily.       Review of Systems   Respiratory: Negative.    Cardiovascular: Positive for palpitations. Negative for chest pain and leg swelling.   Gastrointestinal: Positive for abdominal distention.   Genitourinary: Negative.    Musculoskeletal: Negative.    Skin: Negative.    Psychiatric/Behavioral: Negative.        Objective:     Vital Signs (Most Recent):  Temp: 98.2 °F (36.8 °C) (08/18/22 0021)  Pulse: 78 (08/18/22 0636)  Resp: 16 (08/18/22 0636)  BP: (!) 137/96 (08/18/22 0636)  SpO2: 100 % (08/18/22 0636) Vital Signs (24h Range):  Temp:  [98.2 °F (36.8 °C)] 98.2 °F (36.8 °C)  Pulse:  [] 78  Resp:  [16-25] 16  SpO2:  [92 %-100 %] 100 %  BP: (137-182)/() 137/96     Weight: (!) 156.5 kg (345 lb)  Body mass index is 46.79 kg/m².    SpO2: 100 %  O2 Device (Oxygen Therapy): room air    No intake or output data in the 24 hours ending 08/18/22 0800    Lines/Drains/Airways       Peripheral Intravenous Line  Duration                  Peripheral IV - Single Lumen 08/18/22 0053 18 G Left Hand <1 day                    Significant Labs:  Recent Results (from the past 72 hour(s))   Comprehensive metabolic panel    Collection Time: 08/18/22 12:54 AM   Result Value Ref Range    Sodium Level 140 136 - 145 mmol/L    Potassium Level 4.4 3.5 - 5.1 mmol/L    Chloride 106 98 - 107 mmol/L    Carbon Dioxide 25 22 - 29 mmol/L    Glucose Level 101 (H) 74 - 100 mg/dL    Blood Urea Nitrogen 13.3 8.4 - 25.7 mg/dL    Creatinine 1.16 0.73 - 1.18 mg/dL    Calcium Level Total 9.8 8.4 - 10.2 mg/dL    Protein Total 7.6 6.4 - 8.3 gm/dL    Albumin Level 3.6 3.5 - 5.0 gm/dL    Globulin 4.0 (H) 2.4 - 3.5 gm/dL    Albumin/Globulin Ratio 0.9 (L) 1.1 - 2.0 ratio    Bilirubin Total 0.5 <=1.5 mg/dL    Alkaline Phosphatase 66 40 - 150 unit/L    Alanine Aminotransferase 7 0 - 55 unit/L    Aspartate  Aminotransferase 14 5 - 34 unit/L    eGFR >60 mls/min/1.73/m2   Troponin I #1    Collection Time: 08/18/22 12:54 AM   Result Value Ref Range    Troponin-I <0.010 0.000 - 0.045 ng/mL   Digoxin Level    Collection Time: 08/18/22 12:54 AM   Result Value Ref Range    Digoxin Level <0.19 (L) 0.80 - 2.00 ng/mL   Magnesium    Collection Time: 08/18/22 12:54 AM   Result Value Ref Range    Magnesium Level 1.70 1.60 - 2.60 mg/dL   B-Type natriuretic peptide (BNP)    Collection Time: 08/18/22  1:32 AM   Result Value Ref Range    Natriuretic Peptide 87.6 <=100.0 pg/mL   CBC with Differential    Collection Time: 08/18/22  2:29 AM   Result Value Ref Range    WBC 6.7 4.5 - 11.5 x10(3)/mcL    RBC 4.52 (L) 4.70 - 6.10 x10(6)/mcL    Hgb 13.6 (L) 14.0 - 18.0 gm/dL    Hct 42.9 42.0 - 52.0 %    MCV 94.9 (H) 80.0 - 94.0 fL    MCH 30.1 27.0 - 31.0 pg    MCHC 31.7 (L) 33.0 - 36.0 mg/dL    RDW 16.1 11.5 - 17.0 %    Platelet 277 130 - 400 x10(3)/mcL    MPV 9.5 7.4 - 10.4 fL    Neut % 63.0 %    Lymph % 25.0 %    Mono % 9.3 %    Eos % 1.9 %    Basophil % 0.4 %    Lymph # 1.67 0.6 - 4.6 x10(3)/mcL    Neut # 4.2 2.1 - 9.2 x10(3)/mcL    Mono # 0.62 0.1 - 1.3 x10(3)/mcL    Eos # 0.13 0 - 0.9 x10(3)/mcL    Baso # 0.03 0 - 0.2 x10(3)/mcL    IG# 0.03 0 - 0.04 x10(3)/mcL    IG% 0.4 %    NRBC% 0.0 %   COVID-19 Rapid Screening    Collection Time: 08/18/22  4:45 AM   Result Value Ref Range    SARS COV-2 MOLECULAR Negative Negative       Significant Imaging:  Imaging Results              X-Ray Chest AP Portable (Final result)  Result time 08/18/22 07:08:59      Final result by Oziel Vale MD (08/18/22 07:08:59)                   Impression:      No acute cardiopulmonary process.      Electronically signed by: Oziel Vale  Date:    08/18/2022  Time:    07:08               Narrative:    EXAMINATION:  XR CHEST AP PORTABLE    CLINICAL HISTORY:  Chest Pain;    TECHNIQUE:  Single view of the chest    COMPARISON:  07/24/2022    FINDINGS:  No focal  opacification, pleural effusion, or pneumothorax.    The cardiomediastinal silhouette remains prominent.    No acute osseous abnormality.                        ED Interpretation by Jennifer Andres MD (08/18/22 01:53:53, [facility] - Emergency Dept, Emergency Medicine)    Cardiomegaly with fluid in fissure                                    EKG:       Telemetry:      Physical Exam  HENT:      Mouth/Throat:      Mouth: Mucous membranes are moist.   Cardiovascular:      Rate and Rhythm: Normal rate. Rhythm irregular.      Pulses: Normal pulses.      Heart sounds: Normal heart sounds.   Pulmonary:      Effort: Pulmonary effort is normal.      Breath sounds: Normal breath sounds.   Abdominal:      Palpations: Abdomen is soft.   Musculoskeletal:         General: Normal range of motion.   Skin:     General: Skin is warm.      Capillary Refill: Capillary refill takes less than 2 seconds.   Neurological:      General: No focal deficit present.      Mental Status: He is alert.   Psychiatric:         Mood and Affect: Mood normal.         Home Medications:   No current facility-administered medications on file prior to encounter.     Current Outpatient Medications on File Prior to Encounter   Medication Sig Dispense Refill    albuterol (ACCUNEB) 0.63 mg/3 mL Nebu Take 3 mLs (0.63 mg total) by nebulization every 6 (six) hours as needed (shortness of breath and wheezing). Rescue 75 mL 0    atorvastatin (LIPITOR) 80 MG tablet Take 80 mg by mouth once daily.      budesonide-formoterol 160-4.5 mcg (SYMBICORT) 160-4.5 mcg/actuation HFAA Inhale 2 puffs into the lungs every 12 (twelve) hours. Controller 6 g 0    butalbital-acetaminophen-caffeine -40 mg (FIORICET, ESGIC) -40 mg per tablet Take 1 tablet by mouth every 4 (four) hours as needed for Pain.      digoxin (LANOXIN) 250 mcg tablet Take 1 tablet (0.25 mg total) by mouth once daily. 30 tablet 0    furosemide (LASIX) 40 MG tablet Take 1 tablet (40 mg  total) by mouth once daily. 30 tablet 0    gabapentin (NEURONTIN) 300 MG capsule Take 300 mg by mouth 3 (three) times daily.      lisinopriL 10 MG tablet Take 1 tablet (10 mg total) by mouth once daily. 90 tablet 3    metFORMIN (GLUCOPHAGE-XR) 500 MG ER 24hr tablet Take 500 mg by mouth once daily.      metoprolol succinate (TOPROL-XL) 100 MG 24 hr tablet Take 1 tablet (100 mg total) by mouth 2 (two) times daily. 60 tablet 0    pantoprazole (PROTONIX) 40 MG tablet Take 40 mg by mouth once daily.      promethazine-codeine 6.25-10 mg/5 ml (PHENERGAN WITH CODEINE) 6.25-10 mg/5 mL syrup Take 5 mLs by mouth every 4 (four) hours as needed for Cough. 118 mL 0    rivaroxaban (XARELTO) 10 mg Tab Take 2 tablets (20 mg total) by mouth daily with dinner or evening meal. 30 tablet 0    tadalafiL (CIALIS) 5 MG tablet Take 10 mg by mouth daily as needed for Erectile Dysfunction.      tiotropium (SPIRIVA) 18 mcg inhalation capsule Inhale 1 capsule (18 mcg total) into the lungs once daily. Controller 30 capsule 0    [DISCONTINUED] amiodarone (PACERONE) 200 MG Tab Take 2 tablets BID x 1 day, then Take 1 tablet BID x 3 days, then Take 1 tablet daily 40 tablet 0    [DISCONTINUED] spironolactone (ALDACTONE) 25 MG tablet Take 1 tablet (25 mg total) by mouth once daily. 30 tablet 0       Current Inpatient Medications:    Current Facility-Administered Medications:     diltiaZEM (CARDIZEM) 125 mg in dextrose 5 % 125 mL infusion, 10 mg/hr, Intravenous, Continuous, Jennifer Andres MD, Last Rate: 10 mL/hr at 08/18/22 0151, 10 mg/hr at 08/18/22 0151    diltiaZEM (CARDIZEM) 5 mg/mL injection, , , ,     Current Outpatient Medications:     albuterol (ACCUNEB) 0.63 mg/3 mL Nebu, Take 3 mLs (0.63 mg total) by nebulization every 6 (six) hours as needed (shortness of breath and wheezing). Rescue, Disp: 75 mL, Rfl: 0    atorvastatin (LIPITOR) 80 MG tablet, Take 80 mg by mouth once daily., Disp: , Rfl:     budesonide-formoterol 160-4.5 mcg (SYMBICORT)  160-4.5 mcg/actuation HFAA, Inhale 2 puffs into the lungs every 12 (twelve) hours. Controller, Disp: 6 g, Rfl: 0    butalbital-acetaminophen-caffeine -40 mg (FIORICET, ESGIC) -40 mg per tablet, Take 1 tablet by mouth every 4 (four) hours as needed for Pain., Disp: , Rfl:     digoxin (LANOXIN) 250 mcg tablet, Take 1 tablet (0.25 mg total) by mouth once daily., Disp: 30 tablet, Rfl: 0    furosemide (LASIX) 40 MG tablet, Take 1 tablet (40 mg total) by mouth once daily., Disp: 30 tablet, Rfl: 0    gabapentin (NEURONTIN) 300 MG capsule, Take 300 mg by mouth 3 (three) times daily., Disp: , Rfl:     lisinopriL 10 MG tablet, Take 1 tablet (10 mg total) by mouth once daily., Disp: 90 tablet, Rfl: 3    metFORMIN (GLUCOPHAGE-XR) 500 MG ER 24hr tablet, Take 500 mg by mouth once daily., Disp: , Rfl:     metoprolol succinate (TOPROL-XL) 100 MG 24 hr tablet, Take 1 tablet (100 mg total) by mouth 2 (two) times daily., Disp: 60 tablet, Rfl: 0    pantoprazole (PROTONIX) 40 MG tablet, Take 40 mg by mouth once daily., Disp: , Rfl:     promethazine-codeine 6.25-10 mg/5 ml (PHENERGAN WITH CODEINE) 6.25-10 mg/5 mL syrup, Take 5 mLs by mouth every 4 (four) hours as needed for Cough., Disp: 118 mL, Rfl: 0    rivaroxaban (XARELTO) 10 mg Tab, Take 2 tablets (20 mg total) by mouth daily with dinner or evening meal., Disp: 30 tablet, Rfl: 0    tadalafiL (CIALIS) 5 MG tablet, Take 10 mg by mouth daily as needed for Erectile Dysfunction., Disp: , Rfl:     tiotropium (SPIRIVA) 18 mcg inhalation capsule, Inhale 1 capsule (18 mcg total) into the lungs once daily. Controller, Disp: 30 capsule, Rfl: 0         VTE Risk Mitigation (From admission, onward)      None            Assessment:   Chest pain  --troponin negative x 1  Hypertensive Urgency  --admit /126  PAflutter, RVR  --CHADS VASC score- 3  Chronic systolic HF  --EF 43%  HLD  T2DM  COPD  KRISHNA  --Home CPAP  Polysubstance abuse  --UDS + Cocaine and opiates    Plan:   Troponin  negative x 1.   BP improved. Wean Cardizem drip to off. Start Cardizem 240 mg daily. Resume lisinopril 10 mg daily  Currently Aflutter, CVR. Continue digoxin 250 mcg po daily and xarelto 20 mg daily  Encouraged cocaine cessation. Avoid Metoprolol succinate due to cocaine usage. Avoid coreg due to COPD.     Thank you for your consult.     CHRIS Zepeda  Cardiology  [facility] -   08/18/2022 8:00 AM

## 2022-08-19 NOTE — PT/OT/SLP EVAL
Physical Therapy Evaluation    Patient Name:  Vladimir Bernal   MRN:  [id number]    Recommendations:     Discharge Recommendations:  home   Discharge Equipment Recommendations: walker, rolling   Barriers to discharge: impaired mobility    Assessment:     Vladimir Bernal is a 52 y.o. male admitted with a medical diagnosis of Chest pain.  He presents with the following impairments/functional limitations:  impaired functional mobility, pain in R foot secondary to reported gout. Patient mobilized to EOB with supervision, performed sit<>stand w/ CGA, progressing to SBA with RW. Pt ambulated 50ft, 120ft with CGA and RW, decreased gait speed. Attempted to have patient climb stairs, patient only able to climb x1 stair using handrails- reports he is limited by pain in foot. Pt educated on safe technique for stairs and anticipated d/c disposition home. PT recommending d/c home with RW and family care.     Rehab Prognosis: Good; patient would benefit from acute skilled PT services to address these deficits and reach maximum level of function.    Recent Surgery: * No surgery found *      Plan:     During this hospitalization, patient to be seen 5 x/week to address the identified rehab impairments via gait training, therapeutic activities, therapeutic exercises and progress toward the following goals:    · Plan of Care Expires:       Subjective     Chief Complaint: pain in R foot secondary to gout  Patient/Family Comments/goals: to go home  Pain/Comfort:  · Pain Rating 1:  (Pt did not rate pain on pain scale, however reported gout pain in R foot.)    Patients cultural, spiritual, Restorationist conflicts given the current situation: no    Living Environment:  Pt lives in [facility] with 4 steps to enter with mother.  Prior to admission, patients level of function was independent.  Equipment used at home: none.  DME owned (not currently used): none.  Upon discharge, patient will have assistance from mother.    Objective:     Communicated with  RN and  prior to session.  Patient found supine with peripheral IV, pulse ox (continuous), telemetry  upon PT entry to room.    General Precautions: Standard, fall   Orthopedic Precautions:N/A   Braces: N/A  Respiratory Status: Nasal cannula, flow 2 L/min; SpO2 94% at rest and with ambulation on 2L O2.    Exams:  · RLE ROM: WFL except limited R foot/ankle due to pain  · RLE Strength: WFL  · LLE ROM: WFL  · LLE Strength: WFL    Functional Mobility:  · Bed Mobility:  Supine to Sit: supervision  · Transfers:  Sit to Stand:  stand by assistance with rolling walker  · Gait: Pt ambulated 70ft, 120ft with CGA progressing to SBA with RW, decreased gait speed  · Stairs:  Pt ascended/descended 1 stair(s) with No Assistive Device with bilateral handrails with Minimal Assistance.     AM-PAC 6 CLICK MOBILITY  Total Score:21     Patient left sitting edge of bed with all lines intact, call button in reach and RN notified.    GOALS:   Multidisciplinary Problems     Physical Therapy Goals        Problem: Physical Therapy    Goal Priority Disciplines Outcome Goal Variances Interventions   Physical Therapy Goal     PT, PT/OT Ongoing, Progressing     Description: Goals to be met by: 22     Patient will increase functional independence with mobility by performin. Gait  x 200 feet with Modified Merrick using Rolling Walker.   2. Ascend/descend 4 stair with bilateral Handrails Modified Merrick using No Assistive Device.                      History:     No past medical history on file.    Past Surgical History:   Procedure Laterality Date    MANDIBLE SURGERY         Time Tracking:     PT Received On: 22  PT Start Time: 1105     PT Stop Time: 1132  PT Total Time (min): 27 min     Billable Minutes: Evaluation Mod complexity and Therapeutic Activity 17min      2022

## 2022-08-19 NOTE — PROGRESS NOTES
[facility]  Hospital Medicine Progress Note        Chief Complaint: chest pain  HPI:   52 y.o. male who has a PMH which includes atrial fibrillation, anxiety, depression, COPD, asthma, CHF, HTN, HLD, DM, KRISHNA, GERD, blind eye; presented to the ED at [facility] on 8/18/2022 with a primary complaint of  Chest pain over the past 2 days worsening with exertion; with associated SOB, palpitations, diaphoresis, headache.  Patient denies any cough, congestion, fever, chills, or any sick contacts.He reports his on O2 at home at 2L per NC and uses CPAP at night. Labs significant for glucose 101, digoxin level less than 0.19; other indices unremarkable.  Urine drug screen positive for cocaine and opiates.  Chest x-ray revealed no acute cardiopulmonary process. On presentation in the ED patient noted to be in atrial fibrillation with RVR which he was also noted to be hypertensive with /126; he was given aspirin, nitroglycerin, morphine, and Cardizem..  Patient reports he is usually compliant with his medication however he reports he has missed some doses recently.  When asked about illicit drug use patient reported using some time socially but not every day.  He denies any falls traumas or injuries.  Patient is admitted to hospital medicine services for further management.  Cardiology Services have been consulted.  Interval Hx:   No overnight events.     Objective/physical exam:    General: Appears comfortable, no acute distress.  Integumentary: Warm, dry, intact.  Musculoskeletal: Purposeful movement noted.   Respiratory: No accessory muscle use. Breath sounds are equal.  Cardiovascular: Regular rate. No peripheral edema.    VITAL SIGNS: 24 HRS MIN & MAX LAST   Temp  Min: 97.6 °F (36.4 °C)  Max: 99.4 °F (37.4 °C) 97.8 °F (36.6 °C)   BP  Min: 84/56  Max: 150/97 (!) 84/56   Pulse  Min: 60  Max: 87  73   Resp  Min: 16  Max: 118 (!) 118   SpO2  Min: 85 %  Max: 99 % 95 %     X-Ray Knee 1 or 2 View  Left  Narrative: EXAMINATION:  XR KNEE 1 OR 2 VIEW LEFT    CLINICAL HISTORY:  pain trauma;    COMPARISON:  3 October 2019    FINDINGS:  Two views left knee.  There is no acute fracture or dislocation.  Ossification is seen along the medial and lateral femoral condyles suggestive of old injury.  There is some mild osteophytosis as well.  No significant joint space narrowing.  No sizeable joint effusion.  Impression: No acute findings.    Electronically signed by: Bakari Perrin  Date:    08/18/2022  Time:    16:00  X-Ray Chest AP Portable  Narrative: EXAMINATION:  XR CHEST AP PORTABLE    CLINICAL HISTORY:  Chest Pain;    TECHNIQUE:  Single view of the chest    COMPARISON:  07/24/2022    FINDINGS:  No focal opacification, pleural effusion, or pneumothorax.    The cardiomediastinal silhouette remains prominent.    No acute osseous abnormality.  Impression: No acute cardiopulmonary process.    Electronically signed by: Oziel Vale  Date:    08/18/2022  Time:    07:08    Recent Labs   Lab 08/18/22  0229 08/19/22  0544   WBC 6.7 5.8   RBC 4.52* 4.38*   HGB 13.6* 13.3*   HCT 42.9 44.1   MCV 94.9* 100.7*   MCH 30.1 30.4   MCHC 31.7* 30.2*   RDW 16.1 15.9    275   MPV 9.5 9.8       Recent Labs   Lab 08/18/22  0054 08/19/22  0544    138   K 4.4 4.2   CO2 25 26   BUN 13.3 9.2   CREATININE 1.16 0.91   CALCIUM 9.8 9.4   MG 1.70 1.80   ALBUMIN 3.6 3.2*   ALKPHOS 66 62   ALT 7 6   AST 14 7   BILITOT 0.5 0.8          Microbiology Results (last 7 days)       ** No results found for the last 168 hours. **             See below for Radiology    Scheduled Med:   digoxin  0.25 mg Oral Daily    diltiaZEM  240 mg Oral Daily    lisinopriL  10 mg Oral Daily    rivaroxaban  20 mg Oral Daily with dinner        Continuous Infusions:       PRN Meds:  acetaminophen, dextrose 10%, dextrose 10%, glucagon (human recombinant), glucose, glucose, HYDROcodone-acetaminophen, insulin aspart U-100, levalbuterol, melatonin, naloxone,  ondansetron, simethicone, sodium chloride 0.9%     Nutrition Status:  Cardiac diet    Assessment/Plan:  Acute chest pain resolving   Atrial fibrillation/flutter with RVR  Hypertensive urgency  Chronic systolic heart failure-EF 43%   COPD- with mild exacerbation on chronic O2 therapy   Cocaine abuse- UDS positive  Left eye blindness  DM type 2 with hyperglycemia  KRISHNA- with CPAP at night  Morbidly obese  Weakness    Plan  Patient will complains of right until pain, what add Toradol.  May benefit from Mobic.  No noted history of gout?  No medication for gout?  Patient states he has gout?      Recurrent chest pain when told he is going to be discharge, that has since resolved will check EKG and troponin follow-up.  Continue telemetry.  Repeat troponin in the morning if within normal limits today.    Will order ABG.    Strongly recommend CPAP at bedtime.  Strongly recommend discontinuation of crack/cocaine  Strongly recommend medication compliance.    Slightly hypotensive will provide gentle IV hydration and avoid antihypertensives at this time.  Will consult Cardiology.  Echocardiogram pending.        Anticipated discharge and Disposition:      All diagnosis and differential diagnosis have been reviewed; assessment and plan has been documented; I have personally reviewed the labs and test results that are presently available; I have reviewed the patients medication list; I have reviewed the consulting providers response and recommendations. I have reviewed or attempted to review medical records based upon their availability    All of the patient's questions have been  addressed and answered. Patient's is agreeable to the above stated plan.   I will continue to monitor closely and make adjustments to medical management as needed.    Susie Maciel, DO   08/19/2022        This note was created with the assistance of Dragon voice recognition software. There may be transcription errors as a result of using this technology  however minimal. Effort has been made to assure accuracy of transcription but any obvious errors or omissions should be clarified with the author of the document.

## 2022-08-19 NOTE — PROGRESS NOTES
[facility] -   Cardiology  Progress Note    Patient Name: Valdimir Bernal  MRN: [id number]  Admission Date: 8/18/2022  Hospital Length of Stay: 0 days  Code Status: Full Code   Attending Provider: Trenton Lux MD   Consulting Provider: CHRIS Zepeda  Primary Care Physician: Sophie Navarrete MD  Principal Problem:Chest pain    Patient information was obtained from patient and ER records.     Subjective:     Chief Complaint:      HPI:   Mr. Bernal is a 51 y/o male, known to [facility] through inpatient admits only, who presented to ED with c/o constant chest pressure with associated SOB, palpitations, and diaphoresis. Denies N/V. ED workup revealing negative CXR. Troponin negative x 1. BNP 87.6, CBC/BMP unremarkable. He was found to be hypertensive 182/126, P 135. EKG revealed Aflutter with variable A-V block. He was treated with ASA, NTG, Morphine, Zofran, Cardizem bolus and started on Cardizem drip with improvement in HR. [facility] has been consulted for CP, A-fluttter RVR, and hypertensive urgency.     **of note, previous attempt to obtain Lexiscan prohibited due to patient size**    Hospital course:  8/19/22: Patient awake in bed. Currently wearing O2 due to desaturations overnight. Patient has hx of KRISHNA. Remains Aflutter, now rate controlled. BP improved    PMH: HTN, DM II, COPD/Asthma, HF, Chronic Hypoxemic Respiratory Failure/Home O2, L Eye Blindness, Obesity, Chronic systolic HF, PAF/flutter/xarelto, KRISHNA  PSH: Denies Past Surgical History  Family History: Reviewed and Unremarkable for Heart Disease  Social History: Drug screen positive for cocaine, ETOH and Tobacco Use     Previous Cardiac Diagnostics:   TTE 05/20/22:  TDS. No definity contrast used in this study. LV normal in size with mildly decreases systolic function. EF 43%. Unable to accurately assess diastolic function due to insufficient parameters. Mild RV enlargement with low normal RVSF. Mild TR. Estimate PASP 42mmHg. AoV not well  visualized due to poor sonic window. Normal leaflet mobility. No AR/AS. Trace MR.  No significant RI. Elevatd CVP 15mmHg     No current facility-administered medications on file prior to encounter.     Current Outpatient Medications on File Prior to Encounter   Medication Sig    albuterol (ACCUNEB) 0.63 mg/3 mL Nebu Take 3 mLs (0.63 mg total) by nebulization every 6 (six) hours as needed (shortness of breath and wheezing). Rescue    atorvastatin (LIPITOR) 80 MG tablet Take 80 mg by mouth once daily.    budesonide-formoterol 160-4.5 mcg (SYMBICORT) 160-4.5 mcg/actuation HFAA Inhale 2 puffs into the lungs every 12 (twelve) hours. Controller    butalbital-acetaminophen-caffeine -40 mg (FIORICET, ESGIC) -40 mg per tablet Take 1 tablet by mouth every 4 (four) hours as needed for Pain.    digoxin (LANOXIN) 250 mcg tablet Take 1 tablet (0.25 mg total) by mouth once daily.    furosemide (LASIX) 40 MG tablet Take 1 tablet (40 mg total) by mouth once daily.    gabapentin (NEURONTIN) 300 MG capsule Take 300 mg by mouth 3 (three) times daily.    lisinopriL 10 MG tablet Take 1 tablet (10 mg total) by mouth once daily.    metFORMIN (GLUCOPHAGE-XR) 500 MG ER 24hr tablet Take 500 mg by mouth once daily.    metoprolol succinate (TOPROL-XL) 100 MG 24 hr tablet Take 1 tablet (100 mg total) by mouth 2 (two) times daily.    pantoprazole (PROTONIX) 40 MG tablet Take 40 mg by mouth once daily.    promethazine-codeine 6.25-10 mg/5 ml (PHENERGAN WITH CODEINE) 6.25-10 mg/5 mL syrup Take 5 mLs by mouth every 4 (four) hours as needed for Cough.    rivaroxaban (XARELTO) 10 mg Tab Take 2 tablets (20 mg total) by mouth daily with dinner or evening meal.    tadalafiL (CIALIS) 5 MG tablet Take 10 mg by mouth daily as needed for Erectile Dysfunction.    tiotropium (SPIRIVA) 18 mcg inhalation capsule Inhale 1 capsule (18 mcg total) into the lungs once daily. Controller    [DISCONTINUED] amiodarone (PACERONE) 200 MG Tab  Take 2 tablets BID x 1 day, then Take 1 tablet BID x 3 days, then Take 1 tablet daily    [DISCONTINUED] spironolactone (ALDACTONE) 25 MG tablet Take 1 tablet (25 mg total) by mouth once daily.       Review of Systems   Respiratory: Negative.    Cardiovascular: Positive for palpitations. Negative for chest pain and leg swelling.   Gastrointestinal: Positive for abdominal distention.   Genitourinary: Negative.    Musculoskeletal: Negative.    Skin: Negative.    Psychiatric/Behavioral: Negative.        Objective:     Vital Signs (Most Recent):  Temp: 99 °F (37.2 °C) (08/19/22 0319)  Pulse: 70 (08/19/22 0319)  Resp: 18 (08/19/22 0410)  BP: 122/81 (08/19/22 0319)  SpO2: 98 % (08/19/22 0319) Vital Signs (24h Range):  Temp:  [97.7 °F (36.5 °C)-99.4 °F (37.4 °C)] 99 °F (37.2 °C)  Pulse:  [67-87] 70  Resp:  [16-30] 18  SpO2:  [96 %-100 %] 98 %  BP: (104-150)/(60-98) 122/81     Weight: (!) 156.5 kg (345 lb 0.3 oz)  Body mass index is 46.79 kg/m².    SpO2: 98 %  O2 Device (Oxygen Therapy): Oxymask      Intake/Output Summary (Last 24 hours) at 8/19/2022 0615  Last data filed at 8/19/2022 0418  Gross per 24 hour   Intake 1 ml   Output 250 ml   Net -249 ml       Lines/Drains/Airways     Peripheral Intravenous Line  Duration                Peripheral IV - Single Lumen 08/18/22 0053 18 G Left Hand 1 day                Significant Labs:  Recent Results (from the past 72 hour(s))   Comprehensive metabolic panel    Collection Time: 08/18/22 12:54 AM   Result Value Ref Range    Sodium Level 140 136 - 145 mmol/L    Potassium Level 4.4 3.5 - 5.1 mmol/L    Chloride 106 98 - 107 mmol/L    Carbon Dioxide 25 22 - 29 mmol/L    Glucose Level 101 (H) 74 - 100 mg/dL    Blood Urea Nitrogen 13.3 8.4 - 25.7 mg/dL    Creatinine 1.16 0.73 - 1.18 mg/dL    Calcium Level Total 9.8 8.4 - 10.2 mg/dL    Protein Total 7.6 6.4 - 8.3 gm/dL    Albumin Level 3.6 3.5 - 5.0 gm/dL    Globulin 4.0 (H) 2.4 - 3.5 gm/dL    Albumin/Globulin Ratio 0.9 (L) 1.1 - 2.0  ratio    Bilirubin Total 0.5 <=1.5 mg/dL    Alkaline Phosphatase 66 40 - 150 unit/L    Alanine Aminotransferase 7 0 - 55 unit/L    Aspartate Aminotransferase 14 5 - 34 unit/L    eGFR >60 mls/min/1.73/m2   Troponin I #1    Collection Time: 08/18/22 12:54 AM   Result Value Ref Range    Troponin-I <0.010 0.000 - 0.045 ng/mL   Digoxin Level    Collection Time: 08/18/22 12:54 AM   Result Value Ref Range    Digoxin Level <0.19 (L) 0.80 - 2.00 ng/mL   Magnesium    Collection Time: 08/18/22 12:54 AM   Result Value Ref Range    Magnesium Level 1.70 1.60 - 2.60 mg/dL   B-Type natriuretic peptide (BNP)    Collection Time: 08/18/22  1:32 AM   Result Value Ref Range    Natriuretic Peptide 87.6 <=100.0 pg/mL   CBC with Differential    Collection Time: 08/18/22  2:29 AM   Result Value Ref Range    WBC 6.7 4.5 - 11.5 x10(3)/mcL    RBC 4.52 (L) 4.70 - 6.10 x10(6)/mcL    Hgb 13.6 (L) 14.0 - 18.0 gm/dL    Hct 42.9 42.0 - 52.0 %    MCV 94.9 (H) 80.0 - 94.0 fL    MCH 30.1 27.0 - 31.0 pg    MCHC 31.7 (L) 33.0 - 36.0 mg/dL    RDW 16.1 11.5 - 17.0 %    Platelet 277 130 - 400 x10(3)/mcL    MPV 9.5 7.4 - 10.4 fL    Neut % 63.0 %    Lymph % 25.0 %    Mono % 9.3 %    Eos % 1.9 %    Basophil % 0.4 %    Lymph # 1.67 0.6 - 4.6 x10(3)/mcL    Neut # 4.2 2.1 - 9.2 x10(3)/mcL    Mono # 0.62 0.1 - 1.3 x10(3)/mcL    Eos # 0.13 0 - 0.9 x10(3)/mcL    Baso # 0.03 0 - 0.2 x10(3)/mcL    IG# 0.03 0 - 0.04 x10(3)/mcL    IG% 0.4 %    NRBC% 0.0 %   COVID-19 Rapid Screening    Collection Time: 08/18/22  4:45 AM   Result Value Ref Range    SARS COV-2 MOLECULAR Negative Negative   Drug Screen, Urine    Collection Time: 08/18/22  7:58 AM   Result Value Ref Range    Amphetamines, Urine Negative Negative    Barbituates, Urine Negative Negative    Benzodiazepine, Urine Negative Negative    Cannabinoids, Urine Negative Negative    Cocaine, Urine Positive (A) Negative    Fentanyl, Urine Negative Negative    MDMA, Urine Negative Negative    Opiates, Urine Positive (A)  Negative    Phencyclidine, Urine Negative Negative    pH, Urine 6.0 3.0 - 11.0    Specific Gravity, Urine Auto 1.025 1.001 - 1.035   POCT glucose    Collection Time: 08/18/22  9:57 PM   Result Value Ref Range    POCT Glucose 93 70 - 110 mg/dL       Significant Imaging:  Imaging Results          X-Ray Knee 1 or 2 View Left (Final result)  Result time 08/18/22 16:00:51    Final result by Bakari Perrin MD (08/18/22 16:00:51)                 Impression:      No acute findings.      Electronically signed by: Bakari Perrin  Date:    08/18/2022  Time:    16:00             Narrative:    EXAMINATION:  XR KNEE 1 OR 2 VIEW LEFT    CLINICAL HISTORY:  pain trauma;    COMPARISON:  3 October 2019    FINDINGS:  Two views left knee.  There is no acute fracture or dislocation.  Ossification is seen along the medial and lateral femoral condyles suggestive of old injury.  There is some mild osteophytosis as well.  No significant joint space narrowing.  No sizeable joint effusion.                               X-Ray Chest AP Portable (Final result)  Result time 08/18/22 07:08:59    Final result by Oziel Vale MD (08/18/22 07:08:59)                 Impression:      No acute cardiopulmonary process.      Electronically signed by: Oziel Vale  Date:    08/18/2022  Time:    07:08             Narrative:    EXAMINATION:  XR CHEST AP PORTABLE    CLINICAL HISTORY:  Chest Pain;    TECHNIQUE:  Single view of the chest    COMPARISON:  07/24/2022    FINDINGS:  No focal opacification, pleural effusion, or pneumothorax.    The cardiomediastinal silhouette remains prominent.    No acute osseous abnormality.                    ED Interpretation by Jennifer Andres MD (08/18/22 01:53:53, [facility] - Emergency Dept, Emergency Medicine)    Cardiomegaly with fluid in fissure                              EKG:       Telemetry:      Physical Exam  HENT:      Mouth/Throat:      Mouth: Mucous membranes are moist.   Cardiovascular:       Rate and Rhythm: Normal rate. Rhythm irregular.      Pulses: Normal pulses.      Heart sounds: Normal heart sounds.   Pulmonary:      Effort: Pulmonary effort is normal.      Breath sounds: Normal breath sounds.   Abdominal:      Palpations: Abdomen is soft.   Musculoskeletal:         General: Normal range of motion.   Skin:     General: Skin is warm.      Capillary Refill: Capillary refill takes less than 2 seconds.   Neurological:      General: No focal deficit present.      Mental Status: He is alert.   Psychiatric:         Mood and Affect: Mood normal.         Home Medications:   No current facility-administered medications on file prior to encounter.     Current Outpatient Medications on File Prior to Encounter   Medication Sig Dispense Refill    albuterol (ACCUNEB) 0.63 mg/3 mL Nebu Take 3 mLs (0.63 mg total) by nebulization every 6 (six) hours as needed (shortness of breath and wheezing). Rescue 75 mL 0    atorvastatin (LIPITOR) 80 MG tablet Take 80 mg by mouth once daily.      budesonide-formoterol 160-4.5 mcg (SYMBICORT) 160-4.5 mcg/actuation HFAA Inhale 2 puffs into the lungs every 12 (twelve) hours. Controller 6 g 0    butalbital-acetaminophen-caffeine -40 mg (FIORICET, ESGIC) -40 mg per tablet Take 1 tablet by mouth every 4 (four) hours as needed for Pain.      digoxin (LANOXIN) 250 mcg tablet Take 1 tablet (0.25 mg total) by mouth once daily. 30 tablet 0    furosemide (LASIX) 40 MG tablet Take 1 tablet (40 mg total) by mouth once daily. 30 tablet 0    gabapentin (NEURONTIN) 300 MG capsule Take 300 mg by mouth 3 (three) times daily.      lisinopriL 10 MG tablet Take 1 tablet (10 mg total) by mouth once daily. 90 tablet 3    metFORMIN (GLUCOPHAGE-XR) 500 MG ER 24hr tablet Take 500 mg by mouth once daily.      metoprolol succinate (TOPROL-XL) 100 MG 24 hr tablet Take 1 tablet (100 mg total) by mouth 2 (two) times daily. 60 tablet 0    pantoprazole (PROTONIX) 40 MG tablet Take 40 mg  by mouth once daily.      promethazine-codeine 6.25-10 mg/5 ml (PHENERGAN WITH CODEINE) 6.25-10 mg/5 mL syrup Take 5 mLs by mouth every 4 (four) hours as needed for Cough. 118 mL 0    rivaroxaban (XARELTO) 10 mg Tab Take 2 tablets (20 mg total) by mouth daily with dinner or evening meal. 30 tablet 0    tadalafiL (CIALIS) 5 MG tablet Take 10 mg by mouth daily as needed for Erectile Dysfunction.      tiotropium (SPIRIVA) 18 mcg inhalation capsule Inhale 1 capsule (18 mcg total) into the lungs once daily. Controller 30 capsule 0    [DISCONTINUED] amiodarone (PACERONE) 200 MG Tab Take 2 tablets BID x 1 day, then Take 1 tablet BID x 3 days, then Take 1 tablet daily 40 tablet 0    [DISCONTINUED] spironolactone (ALDACTONE) 25 MG tablet Take 1 tablet (25 mg total) by mouth once daily. 30 tablet 0       Current Inpatient Medications:    Current Facility-Administered Medications:     acetaminophen tablet 650 mg, 650 mg, Oral, Q4H PRN, Hernandez Moon, FNP    dextrose 10% bolus 125 mL, 12.5 g, Intravenous, PRN, Hernandez Moon, FNP    dextrose 10% bolus 250 mL, 25 g, Intravenous, PRN, Hernandez Moon, FNP    digoxin tablet 0.25 mg, 0.25 mg, Oral, Daily, Briana Mccoy, FNP, 0.25 mg at 08/18/22 1432    diltiaZEM 24 hr capsule 240 mg, 240 mg, Oral, Daily, Briana Mccoy, FNP, 240 mg at 08/18/22 0945    glucagon (human recombinant) injection 1 mg, 1 mg, Intramuscular, PRN, Hernandez Moon, FNP    glucose chewable tablet 16 g, 16 g, Oral, PRN, Hernandez Moon FNP    glucose chewable tablet 24 g, 24 g, Oral, PRN, Hernandez Moon, FNP    HYDROcodone-acetaminophen 5-325 mg per tablet 1 tablet, 1 tablet, Oral, Q6H PRN, MALIK HolleyP, 1 tablet at 08/19/22 0410    insulin aspart U-100 injection 0-5 Units, 0-5 Units, Subcutaneous, QID (AC + HS) PRN, Hernandez Moon, FNP    levalbuterol nebulizer solution 1.25 mg, 1.25 mg, Nebulization, Q6H PRN, Suzanne EPPS  Gurinder AGACNP-BC, 1.25 mg at 08/19/22 0147    lisinopriL tablet 10 mg, 10 mg, Oral, Daily, CHRIS Zepeda, 10 mg at 08/18/22 1432    melatonin tablet 6 mg, 6 mg, Oral, Nightly PRN, CHRIS Holley    naloxone 0.4 mg/mL injection 0.02 mg, 0.02 mg, Intravenous, PRN, CHRIS Holley    ondansetron injection 4 mg, 4 mg, Intravenous, Q6H PRN, CHRIS Holley    rivaroxaban tablet 20 mg, 20 mg, Oral, Daily with dinner, CHRIS Zepeda, 20 mg at 08/18/22 2151    simethicone chewable tablet 80 mg, 1 tablet, Oral, QID PRN, CHRIS Holley    sodium chloride 0.9% flush 10 mL, 10 mL, Intravenous, Q12H PRN, CHRIS Holley         VTE Risk Mitigation (From admission, onward)         Ordered     rivaroxaban tablet 20 mg  With dinner         08/18/22 1258     Reason for No Pharmacological VTE Prophylaxis  Once        Question:  Reasons:  Answer:  Already adequately anticoagulated on oral Anticoagulants    08/18/22 0843     IP VTE HIGH RISK PATIENT  Once         08/18/22 0843     Place sequential compression device  Until discontinued         08/18/22 0843                Assessment:   Chest pain  --troponin negative x 1  Hypertensive Urgency  --admit /126  PAflutter, RVR  --CHADS VASC score- 3  Chronic systolic HF  --EF 43%  HLD  T2DM  COPD  KRISHNA  --Home CPAP  Polysubstance abuse  --UDS + Cocaine and opiates    Plan:   Troponin negative x 1.   BP improved. Continue Cardizem 240 mg daily and lisinopril 10 mg daily  Currently Aflutter, CVR. Continue cardizem, digoxin, and xarelto Encouraged cocaine cessation. Avoid Metoprolol succinate due to cocaine usage. Avoid coreg due to COPD.     [facility] signing off. Reconsult if needed.    CHRIS Zepeda  Cardiology  [facility] -   08/19/2022 8:00 AM

## 2022-08-19 NOTE — PLAN OF CARE
Problem: Physical Therapy  Goal: Physical Therapy Goal  Description: Goals to be met by: 22     Patient will increase functional independence with mobility by performin. Gait  x 200 feet with Modified [place] using Rolling Walker.   2. Ascend/descend 4 stair with bilateral Handrails Modified [place] using No Assistive Device.     Outcome: Ongoing, Progressing

## 2022-08-20 NOTE — DISCHARGE SUMMARY
[facility] Hospital Medicine Discharge Summary    Admit Date: 8/18/2022  Discharge Date and Time: 8/20/202211:43 AM  Admitting Physician:  Team  Discharging Physician: Susie Maciel DO.  Primary Care Physician: Sophie Navarrete MD  Consults: Cardiology    Discharge Diagnoses:  Acute chest pain-without evidence of cardiac etiology.  Likely secondary to cocaine use.  Atrial fibrillation/flutter with RVR  Hypertensive urgency  Chronic systolic heart failure-EF 43%   COPD- with mild exacerbation on chronic O2 therapy   Medication noncompliance  Cocaine abuse- UDS positive  Left eye blindness  DM type 2 with hyperglycemia  KRISHNA- with CPAP at night  Morbidly obese  Weakness       Hospital Course:   52 y.o. male who has a PMH which includes atrial fibrillation, anxiety, depression, COPD, asthma, CHF, HTN, HLD, DM, KRISHNA, GERD, blind eye; presented to the ED at [facility] on 8/18/2022 with a primary complaint of  Chest pain over the past 2 days worsening with exertion; with associated SOB, palpitations, diaphoresis, headache.  Patient denies any cough, congestion, fever, chills, or any sick contacts.He reports his on O2 at home at 2L per NC and uses CPAP at night. Labs significant for glucose 101, digoxin level less than 0.19; other indices unremarkable.  Urine drug screen positive for cocaine and opiates.  Chest x-ray revealed no acute cardiopulmonary process. On presentation in the ED patient noted to be in atrial fibrillation with RVR which he was also noted to be hypertensive with /126; he was given aspirin, nitroglycerin, morphine, and Cardizem..  Patient reports he is usually compliant with his medication however he reports he has missed some doses recently.  When asked about illicit drug use patient reported using some time socially but not every day.  He denies any falls traumas or injuries.  Patient is admitted to hospital medicine services for further management.  Cardiology  Services have been consulted.    Patient complained of left knee pain during hospital course states that this trauma occurred several months ago however x-ray of the left knee was ordered that showed no acute findings.  He then complained of right toe pain with inability to walk, until was evaluated no evidence swelling redness or warmth that which is consistent with gout which patient states he has.  Also no evidence of gout documented and patient tells me he does not take medicine for gout.  Pain is much better today.  PT has team evaluated patient and felt he was cleared from their standpoint.    Patient also complained of chest pain, troponins have remained negative and cardiologist has seen evaluated patient and recommended discontinuation of metoprolol.  Continue use of digoxin, lisinopril and Cardizem.  And we also strongly recommend cessation of cocaine use.      Patient is extremely noncompliant and is a consistent drug user both of which is not beneficial to his overall medical care.  Patient is requesting promethazine prescription at discharge, patient will need to follow up with his PCP for such prescription.    However patient is advised to use his CPAP machine at bedtime as recommended and take his blood pressure medications daily as prescribed.    Patient is strongly advised to take all of his medications as they are prescribed.      Pt was seen and examined on the day of discharge, patient is sleeping but arousable and returns back to sleep.    All of his needs are met.  Discharge plan has been discussed.  Nurse is aware of plan.  Patient will need a ride home thus over will be provided.    Prescriptions have been sent downstairs for delivery prior to patient discharge of the hospital.    Patient is strongly advised to return to ED or call 911 in case of emergency and arm symptoms worsen.  Vitals:  VITAL SIGNS: 24 HRS MIN & MAX LAST   Temp  Min: 97.5 °F (36.4 °C)  Max: 98.4 °F (36.9 °C) 98.4 °F (36.9  °C)   BP  Min: 84/56  Max: 175/101 136/83   Pulse  Min: 60  Max: 93  72   Resp  Min: 18  Max: 118 20   SpO2  Min: 92 %  Max: 99 % 99 %       Physical Exam:  General:  Obese male, left eye blindness.  Appears comfortable, no acute distress.  Integumentary: Warm, dry, intact.  Neuro:  Alert awake oriented.  Poor insight.    Musculoskeletal: Purposeful movement noted.   Respiratory: No accessory muscle use. Breath sounds are equal.  Cardiovascular: Regular rate. No peripheral edema.      Procedures Performed: No admission procedures for hospital encounter.     Significant Diagnostic Studies: See Full reports for all details    Recent Labs   Lab 08/18/22  0229 08/19/22  0544   WBC 6.7 5.8   RBC 4.52* 4.38*   HGB 13.6* 13.3*   HCT 42.9 44.1   MCV 94.9* 100.7*   MCH 30.1 30.4   MCHC 31.7* 30.2*   RDW 16.1 15.9    275   MPV 9.5 9.8       Recent Labs   Lab 08/18/22  0054 08/19/22  0544    138   K 4.4 4.2   CO2 25 26   BUN 13.3 9.2   CREATININE 1.16 0.91   CALCIUM 9.8 9.4   MG 1.70 1.80   ALBUMIN 3.6 3.2*   ALKPHOS 66 62   ALT 7 6   AST 14 7   BILITOT 0.5 0.8        Microbiology Results (last 7 days)     ** No results found for the last 168 hours. **           Echo  · The left ventricle is normal in size with concentric remodeling and   mildly decreased systolic function.  · The estimated ejection fraction is 40-45%.  · There is mild left ventricular global hypokinesis.  · Grade I left ventricular diastolic dysfunction.  · Moderate right ventricular enlargement with mildly reduced right   ventricular systolic function.  · There is mild pulmonary hypertension.  · The estimated PA systolic pressure is 44 mmHg.  · Elevated central venous pressure (15 mmHg).            Medication List      START taking these medications    diltiaZEM 240 MG 24 hr capsule  Commonly known as: CARDIZEM CD  Take 1 capsule (240 mg total) by mouth once daily.     levalbuterol 1.25 mg/3 mL nebulizer solution  Commonly known as:  XOPENEX  Take 3 mLs (1.25 mg total) by nebulization every 6 (six) hours as needed for Wheezing. Rescue        CHANGE how you take these medications    lisinopriL 20 MG tablet  Commonly known as: PRINIVIL,ZESTRIL  Take 1 tablet (20 mg total) by mouth once daily.  Start taking on: August 21, 2022  What changed:   · medication strength  · how much to take        CONTINUE taking these medications    atorvastatin 80 MG tablet  Commonly known as: LIPITOR     budesonide-formoterol 160-4.5 mcg 160-4.5 mcg/actuation Hfaa  Commonly known as: SYMBICORT  Inhale 2 puffs into the lungs every 12 (twelve) hours. Controller     butalbital-acetaminophen-caffeine -40 mg -40 mg per tablet  Commonly known as: FIORICET, ESGIC     digoxin 250 mcg tablet  Commonly known as: LANOXIN  Take 1 tablet (0.25 mg total) by mouth once daily.     furosemide 40 MG tablet  Commonly known as: LASIX  Take 1 tablet (40 mg total) by mouth once daily.  Start taking on: August 21, 2022     gabapentin 300 MG capsule  Commonly known as: NEURONTIN     metFORMIN 500 MG ER 24hr tablet  Commonly known as: GLUCOPHAGE-XR     pantoprazole 40 MG tablet  Commonly known as: PROTONIX     rivaroxaban 10 mg Tab  Commonly known as: XARELTO  Take 2 tablets (20 mg total) by mouth daily with dinner or evening meal.     tadalafiL 5 MG tablet  Commonly known as: CIALIS     tiotropium 18 mcg inhalation capsule  Commonly known as: SPIRIVA  Inhale 1 capsule (18 mcg total) into the lungs once daily. Controller        STOP taking these medications    albuterol 0.63 mg/3 mL Nebu  Commonly known as: ACCUNEB     amiodarone 200 MG Tab  Commonly known as: PACERONE     metoprolol succinate 100 MG 24 hr tablet  Commonly known as: TOPROL-XL     promethazine-codeine 6.25-10 mg/5 ml 6.25-10 mg/5 mL syrup  Commonly known as: PHENERGAN with CODEINE     spironolactone 25 MG tablet  Commonly known as: ALDACTONE           Where to Get Your Medications      These medications were sent  to [facility] - [place], [place] - [address]  [address], [facility] [address]    Phone: [phone]   · digoxin 250 mcg tablet  · diltiaZEM 240 MG 24 hr capsule  · furosemide 40 MG tablet  · levalbuterol 1.25 mg/3 mL nebulizer solution  · lisinopriL 20 MG tablet  · rivaroxaban 10 mg Tab          Explained in detail to the patient about the discharge plan, medications, and follow-up visits. Pt understands and agrees with the treatment plan  Discharge Disposition: Home or Self Care   Discharged Condition: stable  Diet-   Dietary Orders (From admission, onward)     Start     Ordered    08/18/22 1755  Diet diabetic Cardiac  Diet effective now        Question:  Diet Modifier:  Answer:  Cardiac    08/18/22 1755               Medications Per DC med rec  Activities as tolerated   Follow-up Information     [facility] Follow up.    Why: [facility] cardiology 7-10 days post discharge  Contact information:  [address] [id number] [phone]                       For further questions contact hospitalist office    Discharge time 33 minutes    For worsening symptoms, chest pain, shortness of breath, increased abdominal pain, high grade fever, stroke or stroke like symptoms, immediately go to the nearest Emergency Room or call 911 as soon as possible.      Susie Delarosa M.D, on 8/20/2022. at 11:43 AM.

## 2022-08-21 NOTE — PROGRESS NOTES
C3 nurse attempted to contact Vladimir Bernal for a TCC post hospital discharge follow up call. The patient is unable to conduct the call @ this time. The patient requested a callback.    The patient does not have a scheduled HOSFU appointment within 5-7 days post hospital discharge date 8/20/2022.

## 2022-08-22 NOTE — PROGRESS NOTES
C3 nurse attempted to contact Vladimir Larsonews for a TCC post hospital discharge follow up call. No answer. LVM with CB information. The patient does not have a scheduled HOSFU appointment, and the pt does not have an [facility] PCP.

## 2022-09-22 NOTE — ED NOTES
Pt presents to ed via ems with complaints of sob x3 days. Pt also w/ complaints of upper abd pain x 3days. Pt denies CP, n/v. Pt w/ hx of COPD, Asthma, CHF. EMS reports a fib en route, was given 5mg of metoprolol. Pt states he does use breathing treatments at home. Pt in NAD, AAOx4. ED MD at bedside. Cardiac-respiratory monitors in progress

## 2022-09-22 NOTE — CONSULTS
"[facility] - [facility] Medical Telemetry  Cardiology  Consult Note    Patient Name: Vladimir Bernal  MRN: [id number]  Admission Date: 9/22/2022  Hospital Length of Stay: 0 days  Code Status: Full Code   Attending Provider: David Pires MD   Consulting Provider: Lily Watson MD  Primary Care Physician: Sophie Navarrete MD  Principal Problem: SOB, A-fib with RVR    Patient information was obtained from patient and ER records.     Subjective:     Chief Complaint:  Atrial Fib w/ RVR     HPI:   Mr. Bernal is a 52 y.o.  male with a PMH significant for paroxysmal a-fib/flutter on Xarelto, HTN, HFrEF (EF 40-45%), COPD, DM Type II, KRISHNA on CPAP, and cocaine use as well as questionable medication compliance who reported to [facility] ED this morning for SOB associated with palpitations x3 days not relieved by his albuterol rescue inhaler which he's been using up to 4x daily as well as his Spiriva. He has been hospitalized several times this past year, with most recent last month for similar complaint, states he doesn't always take his medications, notably his Lasix "fluid pill." Denies chest pain, palpitations, nausea/vomiting, diaphoresis. TTE last stay 8/19/22 showed EF 40-45% with mild pulm HTN. Patient unsure of which medications he takes, states his doctor has switched them recently and that he has an appointment for new PCP at [facility] next Thursday. Pt states he still occasionally does cocaine, last used 10 days ago, UDS pending.     EKG in ED found patient to be in A-fib with RVR, labs showed subtherapeutic digoxin level and so patient given home dose of oral digoxin 0.25 mg x1, home dose Cardizem 240 mg ER oral, and started on Diltiazem gtt currently at 15 mg/hr.  HR currently in 90s sustained, hemodynamically stable. , trop negative, CXR shows mild cardiopulmonary congestion which appears chronic in nature.      PMH: Atrial flutter on Xarelto, COPD, HTN, DM II, KRISHNA on CPAP  PSH: " mandibular fx repair  Family History: HTN, DM  Social History: Ex smoker (quit 20+ years ago), occasional alcohol use, +occasional cocaine use    Previous Cardiac Diagnostics:   TTE 8/19/22:   The left ventricle is normal in size with concentric remodeling and mildly decreased systolic function.  The estimated ejection fraction is 40-45%.  There is mild left ventricular global hypokinesis.  Grade I left ventricular diastolic dysfunction.  Moderate right ventricular enlargement with mildly reduced right ventricular systolic function.  There is mild pulmonary hypertension.  The estimated PA systolic pressure is 44 mmHg.  Elevated central venous pressure (15 mmHg).    Past Medical History:   Diagnosis Date    COPD (chronic obstructive pulmonary disease)     Diabetes mellitus     Hypertension     Paroxysmal atrial fibrillation        Past Surgical History:   Procedure Laterality Date    MANDIBLE SURGERY         Review of patient's allergies indicates:   Allergen Reactions    Iodine Shortness Of Breath and Swelling     IT FLARES UP ASTHMA SYMPTOMSFLAR      Shellfish containing products        No current facility-administered medications on file prior to encounter.     Current Outpatient Medications on File Prior to Encounter   Medication Sig    budesonide-formoterol 160-4.5 mcg (SYMBICORT) 160-4.5 mcg/actuation HFAA Inhale 2 puffs into the lungs every 12 (twelve) hours. Controller    digoxin (LANOXIN) 250 mcg tablet Take 1 tablet (0.25 mg total) by mouth once daily.    diltiaZEM (CARDIZEM CD) 240 MG 24 hr capsule Take 1 capsule (240 mg total) by mouth once daily.    furosemide (LASIX) 40 MG tablet Take 1 tablet (40 mg total) by mouth once daily.    gabapentin (NEURONTIN) 300 MG capsule Take 300 mg by mouth 3 (three) times daily.    levalbuterol (XOPENEX) 1.25 mg/3 mL nebulizer solution Take 3 mLs (1.25 mg total) by nebulization every 6 (six) hours as needed for Wheezing. Rescue    lisinopriL (PRINIVIL,ZESTRIL) 20 MG  tablet Take 1 tablet (20 mg total) by mouth once daily.    metFORMIN (GLUCOPHAGE-XR) 500 MG ER 24hr tablet Take 500 mg by mouth once daily.    pantoprazole (PROTONIX) 40 MG tablet Take 40 mg by mouth once daily.    rivaroxaban (XARELTO) 10 mg Tab Take 2 tablets (20 mg total) by mouth daily with dinner or evening meal.    tiotropium (SPIRIVA) 18 mcg inhalation capsule Inhale 1 capsule (18 mcg total) into the lungs once daily. Controller    atorvastatin (LIPITOR) 80 MG tablet Take 80 mg by mouth once daily.    butalbital-acetaminophen-caffeine -40 mg (FIORICET, ESGIC) -40 mg per tablet Take 1 tablet by mouth every 4 (four) hours as needed for Pain.    tadalafiL (CIALIS) 5 MG tablet Take 10 mg by mouth daily as needed for Erectile Dysfunction.    [DISCONTINUED] albuterol (ACCUNEB) 0.63 mg/3 mL Nebu Take 3 mLs (0.63 mg total) by nebulization every 6 (six) hours as needed (shortness of breath and wheezing). Rescue    [DISCONTINUED] amiodarone (PACERONE) 200 MG Tab Take 2 tablets BID x 1 day, then Take 1 tablet BID x 3 days, then Take 1 tablet daily    [DISCONTINUED] metoprolol succinate (TOPROL-XL) 100 MG 24 hr tablet Take 1 tablet (100 mg total) by mouth 2 (two) times daily.    [DISCONTINUED] spironolactone (ALDACTONE) 25 MG tablet Take 1 tablet (25 mg total) by mouth once daily.     Family History    None       Tobacco Use    Smoking status: Every Day     Types: Cigarettes    Smokeless tobacco: Not on file   Substance and Sexual Activity    Alcohol use: Yes     Comment: socially    Drug use: Yes     Types: Cocaine    Sexual activity: Not on file       Review of Systems   Constitutional:  Negative for chills, diaphoresis and fever.   Respiratory:  Positive for shortness of breath. Negative for apnea, cough, choking, chest tightness, wheezing and stridor.    Cardiovascular:  Positive for palpitations. Negative for chest pain and leg swelling.   Gastrointestinal:  Negative for abdominal distention, abdominal  pain, blood in stool, constipation, diarrhea, nausea and vomiting.     Objective:     Vital Signs (Most Recent):  Temp: 97.9 °F (36.6 °C) (09/22/22 1135)  Pulse: 96 (09/22/22 1530)  Resp: 18 (09/22/22 1530)  BP: (!) 134/98 (09/22/22 1135)  SpO2: 95 % (09/22/22 1530)   Vital Signs (24h Range):  Temp:  [97.5 °F (36.4 °C)-97.9 °F (36.6 °C)] 97.9 °F (36.6 °C)  Pulse:  [] 96  Resp:  [17-32] 18  SpO2:  [90 %-97 %] 95 %  BP: (113-168)/() 134/98     Weight: (!) 162.2 kg (357 lb 9.4 oz)  Body mass index is 48.49 kg/m².    SpO2: 95 %  O2 Device (Oxygen Therapy): nasal cannula    No intake or output data in the 24 hours ending 09/22/22 1540    Lines/Drains/Airways       Peripheral Intravenous Line  Duration                  Peripheral IV - Single Lumen 09/22/22 0427 20 G Right Wrist <1 day                    Significant Labs:  Recent Results (from the past 72 hour(s))   Comprehensive metabolic panel    Collection Time: 09/22/22  4:28 AM   Result Value Ref Range    Sodium Level 142 136 - 145 mmol/L    Potassium Level 4.5 3.5 - 5.1 mmol/L    Chloride 107 98 - 107 mmol/L    Carbon Dioxide 25 22 - 29 mmol/L    Glucose Level 113 (H) 74 - 100 mg/dL    Blood Urea Nitrogen 19.2 8.4 - 25.7 mg/dL    Creatinine 1.08 0.73 - 1.18 mg/dL    Calcium Level Total 9.4 8.4 - 10.2 mg/dL    Protein Total 6.6 6.4 - 8.3 gm/dL    Albumin Level 3.6 3.5 - 5.0 gm/dL    Globulin 3.0 2.4 - 3.5 gm/dL    Albumin/Globulin Ratio 1.2 1.1 - 2.0 ratio    Bilirubin Total 0.6 <=1.5 mg/dL    Alkaline Phosphatase 64 40 - 150 unit/L    Alanine Aminotransferase 17 0 - 55 unit/L    Aspartate Aminotransferase 17 5 - 34 unit/L    eGFR >60 mls/min/1.73/m2   Troponin I    Collection Time: 09/22/22  4:28 AM   Result Value Ref Range    Troponin-I <0.010 0.000 - 0.045 ng/mL   Brain natriuretic peptide    Collection Time: 09/22/22  4:28 AM   Result Value Ref Range    Natriuretic Peptide 510.3 (H) <=100.0 pg/mL   Protime-INR    Collection Time: 09/22/22  4:28 AM    Result Value Ref Range    PT 13.9 12.5 - 14.5 seconds    INR 1.08 0.00 - 1.30   CBC with Differential    Collection Time: 09/22/22  4:28 AM   Result Value Ref Range    WBC 5.5 4.5 - 11.5 x10(3)/mcL    RBC 4.45 (L) 4.70 - 6.10 x10(6)/mcL    Hgb 13.6 (L) 14.0 - 18.0 gm/dL    Hct 43.3 42.0 - 52.0 %    MCV 97.3 (H) 80.0 - 94.0 fL    MCH 30.6 27.0 - 31.0 pg    MCHC 31.4 (L) 33.0 - 36.0 mg/dL    RDW 15.3 11.5 - 17.0 %    Platelet 274 130 - 400 x10(3)/mcL    MPV 9.7 7.4 - 10.4 fL    Neut % 48.1 %    Lymph % 41.0 %    Mono % 6.1 %    Eos % 3.6 %    Basophil % 0.7 %    Lymph # 2.27 0.6 - 4.6 x10(3)/mcL    Neut # 2.7 2.1 - 9.2 x10(3)/mcL    Mono # 0.34 0.1 - 1.3 x10(3)/mcL    Eos # 0.20 0 - 0.9 x10(3)/mcL    Baso # 0.04 0 - 0.2 x10(3)/mcL    IG# 0.03 0 - 0.04 x10(3)/mcL    IG% 0.5 %    NRBC% 0.7 %   Digoxin Level    Collection Time: 09/22/22  4:28 AM   Result Value Ref Range    Digoxin Level <0.19 (L) 0.80 - 2.00 ng/mL   TSH    Collection Time: 09/22/22  4:28 AM   Result Value Ref Range    Thyroid Stimulating Hormone 0.8334 0.3500 - 4.9400 uIU/mL   COVID/FLU A&B PCR    Collection Time: 09/22/22  8:21 AM   Result Value Ref Range    Influenza A PCR Not Detected Not Detected    Influenza B PCR Not Detected Not Detected    SARS-CoV-2 PCR Not Detected Not Detected   Magnesium    Collection Time: 09/22/22 10:48 AM   Result Value Ref Range    Magnesium Level 1.80 1.60 - 2.60 mg/dL   POCT glucose    Collection Time: 09/22/22 11:08 AM   Result Value Ref Range    POCT Glucose 193 (H) 70 - 110 mg/dL   Drug Screen, Urine    Collection Time: 09/22/22 11:50 AM   Result Value Ref Range    Amphetamines, Urine Negative Negative    Barbituates, Urine Negative Negative    Benzodiazepine, Urine Negative Negative    Cannabinoids, Urine Negative Negative    Cocaine, Urine Positive (A) Negative    Fentanyl, Urine Negative Negative    MDMA, Urine Negative Negative    Opiates, Urine Negative Negative    Phencyclidine, Urine Negative Negative    pH,  Urine 7.5 3.0 - 11.0    Specific Gravity, Urine Auto 1.017 1.001 - 1.035       Significant Imaging:  Imaging Results              X-Ray Chest 1 View (Final result)  Result time 09/22/22 06:46:42      Final result by Silviano Manning MD (09/22/22 06:46:42)                   Impression:      No acute abnormalities are demonstrated.      Electronically signed by: Silviano Manning MD  Date:    09/22/2022  Time:    06:46               Narrative:    EXAMINATION:  XR CHEST 1 VIEW    CLINICAL HISTORY:  Shortness of breath    TECHNIQUE:  Single frontal view of the chest was performed.    COMPARISON:  08/18/2022    FINDINGS:  There is linear density on the right stable from the previous study.  Heart is borderline in size.  Costophrenic angles are clear.  No acute infiltrates are seen.                                      EKG:  No results found for this visit on 09/22/22.    Telemetry:  atrial fib rate 90s    Physical Exam  Constitutional:       General: He is not in acute distress.     Appearance: Normal appearance. He is obese. He is not ill-appearing, toxic-appearing or diaphoretic.   HENT:      Head: Normocephalic and atraumatic.      Ears:      Comments: Left eye blindness     Mouth/Throat:      Mouth: Mucous membranes are moist.      Pharynx: Oropharynx is clear.   Eyes:      Extraocular Movements: Extraocular movements intact.      Conjunctiva/sclera: Conjunctivae normal.      Pupils: Pupils are equal, round, and reactive to light.   Cardiovascular:      Rate and Rhythm: Normal rate. Rhythm irregular.      Pulses: Normal pulses.      Heart sounds: Normal heart sounds. No murmur heard.    No friction rub. No gallop.   Pulmonary:      Effort: Pulmonary effort is normal. No respiratory distress.      Breath sounds: Normal breath sounds. No wheezing, rhonchi or rales.   Abdominal:      General: Abdomen is flat. Bowel sounds are normal. There is no distension.      Palpations: Abdomen is soft.      Tenderness: There is no  abdominal tenderness.   Musculoskeletal:         General: Normal range of motion.      Cervical back: Normal range of motion and neck supple.      Right lower leg: No edema.      Left lower leg: No edema.   Skin:     General: Skin is warm and dry.      Capillary Refill: Capillary refill takes less than 2 seconds.   Neurological:      General: No focal deficit present.      Mental Status: He is alert and oriented to person, place, and time. Mental status is at baseline.       Home Medications:   No current facility-administered medications on file prior to encounter.     Current Outpatient Medications on File Prior to Encounter   Medication Sig Dispense Refill    budesonide-formoterol 160-4.5 mcg (SYMBICORT) 160-4.5 mcg/actuation HFAA Inhale 2 puffs into the lungs every 12 (twelve) hours. Controller 6 g 0    digoxin (LANOXIN) 250 mcg tablet Take 1 tablet (0.25 mg total) by mouth once daily. 30 tablet 0    diltiaZEM (CARDIZEM CD) 240 MG 24 hr capsule Take 1 capsule (240 mg total) by mouth once daily. 30 capsule 11    furosemide (LASIX) 40 MG tablet Take 1 tablet (40 mg total) by mouth once daily. 30 tablet 0    gabapentin (NEURONTIN) 300 MG capsule Take 300 mg by mouth 3 (three) times daily.      levalbuterol (XOPENEX) 1.25 mg/3 mL nebulizer solution Take 3 mLs (1.25 mg total) by nebulization every 6 (six) hours as needed for Wheezing. Rescue 100 mL 0    lisinopriL (PRINIVIL,ZESTRIL) 20 MG tablet Take 1 tablet (20 mg total) by mouth once daily. 90 tablet 0    metFORMIN (GLUCOPHAGE-XR) 500 MG ER 24hr tablet Take 500 mg by mouth once daily.      pantoprazole (PROTONIX) 40 MG tablet Take 40 mg by mouth once daily.      rivaroxaban (XARELTO) 10 mg Tab Take 2 tablets (20 mg total) by mouth daily with dinner or evening meal. 30 tablet 0    tiotropium (SPIRIVA) 18 mcg inhalation capsule Inhale 1 capsule (18 mcg total) into the lungs once daily. Controller 30 capsule 0    atorvastatin (LIPITOR) 80 MG tablet Take 80 mg by  mouth once daily.      butalbital-acetaminophen-caffeine -40 mg (FIORICET, ESGIC) -40 mg per tablet Take 1 tablet by mouth every 4 (four) hours as needed for Pain.      tadalafiL (CIALIS) 5 MG tablet Take 10 mg by mouth daily as needed for Erectile Dysfunction.      [DISCONTINUED] albuterol (ACCUNEB) 0.63 mg/3 mL Nebu Take 3 mLs (0.63 mg total) by nebulization every 6 (six) hours as needed (shortness of breath and wheezing). Rescue 75 mL 0    [DISCONTINUED] amiodarone (PACERONE) 200 MG Tab Take 2 tablets BID x 1 day, then Take 1 tablet BID x 3 days, then Take 1 tablet daily 40 tablet 0    [DISCONTINUED] metoprolol succinate (TOPROL-XL) 100 MG 24 hr tablet Take 1 tablet (100 mg total) by mouth 2 (two) times daily. 60 tablet 0    [DISCONTINUED] spironolactone (ALDACTONE) 25 MG tablet Take 1 tablet (25 mg total) by mouth once daily. 30 tablet 0       Current Inpatient Medications:    Current Facility-Administered Medications:     acetaminophen tablet 650 mg, 650 mg, Oral, Q8H PRN, YULIA Yao    acetaminophen tablet 650 mg, 650 mg, Oral, Q4H PRN, YULIA Yao    benzonatate capsule 100 mg, 100 mg, Oral, TID PRN, YULIA Yao    dextrose 50% injection 12.5 g, 12.5 g, Intravenous, PRN, YULIA Yao    dextrose 50% injection 25 g, 25 g, Intravenous, PRN, YULIA Yao    [START ON 9/23/2022] digoxin tablet 0.25 mg, 0.25 mg, Oral, Daily, Stella Salazar MD    [START ON 9/23/2022] diltiaZEM 24 hr capsule 240 mg, 240 mg, Oral, Daily, Stella Salazar MD    furosemide injection 40 mg, 40 mg, Intravenous, BID, Stella Salazar MD    glucagon (human recombinant) injection 1 mg, 1 mg, Intramuscular, PRN, YULIA Yao    glucose chewable tablet 16 g, 16 g, Oral, PRN, Yogi M Sarah, PA    glucose chewable tablet 24 g, 24 g, Oral, PRN, YULIA Yao    insulin aspart U-100 injection 1-10 Units, 1-10 Units, Subcutaneous, QID (AC + HS) PRN, YULIA Yao, 2  Units at 09/22/22 1256    levalbuterol nebulizer solution 0.63 mg, 0.63 mg, Nebulization, Q4H, David Pires MD, 0.63 mg at 09/22/22 1530    [START ON 9/23/2022] lisinopriL tablet 20 mg, 20 mg, Oral, Daily, Stella Salazar MD    methylPREDNISolone sodium succinate injection 60 mg, 60 mg, Intravenous, Q8H, YULIA Yao, 60 mg at 09/22/22 1130    ondansetron injection 4 mg, 4 mg, Intravenous, Q8H PRN, YULIA Yao    rivaroxaban tablet 20 mg, 20 mg, Oral, Daily with dinner, Stella Salazar MD         VTE Risk Mitigation (From admission, onward)           Ordered     rivaroxaban tablet 20 mg  With dinner         09/22/22 1147     IP VTE HIGH RISK PATIENT  Once         09/22/22 0742     Place sequential compression device  Until discontinued         09/22/22 0742                    Assessment:     Chronic Atrial Fib  HTN  Acute on Chronic HF exacerbation (EF 40-45%)  Hx Cocaine use  COPD on Home O2 2L NC  Acute on Chronic Resp Failure  DM    Plan:     -Admitted to Hospitalist service, continue care per primary team  -[facility] consulted for A-fib w/RVR  -Patient questionable compliance, home cardiac regimen includes: diltiazem  mg daily, digoxin 0.25 mg daily, Xarelto 20 mg daily; lisinopril 20 mg daily, Lasix 40 mg PO  -Dig level below therapeutic range at 0.19 (0.8-2 normal)  -Started on Cardizem gtt in ED, currently at 15 mg/hr, given 240 mg diltiazem oral and dig 0.25 mg in ED; -120s currently, hemodynamically stable /107.  -Continue home digoxin 0.25 mg PO daily, diltiazem 240 mg ER PO daily. Wean diltiazem gtt as tolerated  -Given home lasix 40 mg PO in ED, will start 40 mg IV BID for now given elevated BNP, SOB, and congestion on CXR. Reassess chioma, please draw BMP in AM. Continue home lisinopril 20 mg daily  -CHADSVASC2 score: 3  -Restart Xarelto 20 mg daily, stop Lovenox DVT prophylaxis  -Patient educated on need for medication compliance and thoroughly on need to stop  cocaine use as this could contribute to arrhythmia  Verbalized understanding on both.     Thank you for your consult.     Lily Watson MD  Cardiology  [facility] - [address] [place], [place] [address]etry  09/22/2022 10:01 AM

## 2022-09-22 NOTE — ED PROVIDER NOTES
Encounter Date: 9/22/2022    SCRIBE #1 NOTE: I, Adrian Willis, am scribing for, and in the presence of,  Darby Fonseca MD. I have scribed the following portions of the note - Other sections scribed: HPI, ROS, PE.     History     Chief Complaint   Patient presents with    Shortness of Breath     Pt arrives [facility] w/ complaints of Sob x3 days, worsening tonight. EMS reports 's en route; given 5 of metoprolol by EMS     52 year old male with past medical history of asthma, COPD, HTN, and DM presents to ED c/o shortness of breath onset 3 days ago. Pt reports that SOB has been gradually getting worse over the past 3 days. Pt states that he has been taking breathing treatments every 4 hours without alleviating of SOB. Pt reports dry cough. Pt reports that his heart began racing tonight.  Pt reports additional abdominal pain. Pt denies fever and chest pain. EMS reports giving 5 of metoprolol.    The history is provided by the patient and the EMS personnel. No  was used.   Shortness of Breath  This is a new problem. The problem occurs continuously.Episode onset: 3 days ago. The problem has been gradually worsening. Associated symptoms include cough and abdominal pain. Pertinent negatives include no fever, no sore throat, no chest pain, no vomiting, no rash and no leg swelling. It is unknown what precipitated the problem. He has tried inhaled steroids for the symptoms. The treatment provided no relief. He has had Prior hospitalizations. Associated medical issues include asthma and COPD.   Review of patient's allergies indicates:   Allergen Reactions    Iodine Shortness Of Breath and Swelling     IT FLARES UP ASTHMA SYMPTOMSFLAR      Shellfish containing products      Past Medical History:   Diagnosis Date    COPD (chronic obstructive pulmonary disease)     Diabetes mellitus     Hypertension     Paroxysmal atrial fibrillation      Past Surgical History:   Procedure Laterality Date    MANDIBLE  SURGERY       No family history on file.  Social History     Tobacco Use    Smoking status: Every Day     Types: Cigarettes   Substance Use Topics    Alcohol use: Yes     Comment: socially    Drug use: Yes     Types: Cocaine     Review of Systems   Constitutional:  Negative for appetite change, chills and fever.   HENT:  Negative for congestion and sore throat.    Eyes:  Negative for pain and visual disturbance.   Respiratory:  Positive for cough and shortness of breath.    Cardiovascular:  Positive for palpitations. Negative for chest pain and leg swelling.   Gastrointestinal:  Positive for abdominal pain. Negative for diarrhea, nausea and vomiting.   Genitourinary:  Negative for dysuria and hematuria.   Skin:  Negative for rash and wound.   Allergic/Immunologic: Negative for food allergies and immunocompromised state.   Neurological:  Negative for seizures, syncope and weakness.     Physical Exam     Initial Vitals [09/22/22 0353]   BP Pulse Resp Temp SpO2   (!) 137/104 (!) 130 17 97.5 °F (36.4 °C) 96 %      MAP       --         Physical Exam    Nursing note and vitals reviewed.  Constitutional: He appears well-developed and well-nourished. He is not diaphoretic. He does not appear ill. He appears distressed (mildly).   HENT:   Head: Normocephalic and atraumatic.   Right Ear: External ear normal.   Left Ear: External ear normal.   Nose: Nose normal.   Mouth/Throat: Oropharynx is clear and moist.   Eyes: EOM are normal.   Left eye blind- opaque    Neck: Neck supple. No tracheal deviation present.   Cardiovascular:  Normal heart sounds and intact distal pulses. An irregular rhythm present.   Tachycardia present.         No murmur heard.  Pulmonary/Chest: Tachypnea noted. He is in respiratory distress (mild). He has wheezes. He has no rhonchi. He has no rales.   Decreased air movement   Abdominal: Abdomen is soft. Bowel sounds are normal. He exhibits no distension. There is no abdominal tenderness.   No right CVA  tenderness.  No left CVA tenderness.   Musculoskeletal:         General: No edema. Normal range of motion.      Cervical back: Neck supple.     Neurological: He is alert and oriented to person, place, and time. He has normal strength. No cranial nerve deficit. GCS score is 15. GCS eye subscore is 4. GCS verbal subscore is 5. GCS motor subscore is 6.   Skin: Skin is warm and dry. Capillary refill takes less than 2 seconds.   Psychiatric: He has a normal mood and affect. His mood appears not anxious.       ED Course   Critical Care    Date/Time: 9/22/2022 7:25 AM  Performed by: Darby Fonseca MD  Authorized by: Darby Fonseca MD   Direct patient critical care time: 15 minutes  Additional history critical care time: 5 minutes  Ordering / reviewing critical care time: 5 minutes  Documentation critical care time: 5 minutes  Consulting other physicians critical care time: 3 minutes  Total critical care time (exclusive of procedural time) : 33 minutes  Critical care was necessary to treat or prevent imminent or life-threatening deterioration of the following conditions: cardiac failure, respiratory failure and circulatory failure.  Critical care was time spent personally by me on the following activities: development of treatment plan with patient or surrogate, evaluation of patient's response to treatment, examination of patient, obtaining history from patient or surrogate, ordering and performing treatments and interventions, ordering and review of laboratory studies, ordering and review of radiographic studies, pulse oximetry, re-evaluation of patient's condition and review of old charts.      Labs Reviewed   COMPREHENSIVE METABOLIC PANEL - Abnormal; Notable for the following components:       Result Value    Glucose Level 113 (*)     All other components within normal limits   B-TYPE NATRIURETIC PEPTIDE - Abnormal; Notable for the following components:    Natriuretic Peptide 510.3 (*)     All other components within  normal limits   CBC WITH DIFFERENTIAL - Abnormal; Notable for the following components:    RBC 4.45 (*)     Hgb 13.6 (*)     MCV 97.3 (*)     MCHC 31.4 (*)     All other components within normal limits   TROPONIN I - Normal   PROTIME-INR - Normal   CBC W/ AUTO DIFFERENTIAL    Narrative:     The following orders were created for panel order CBC auto differential.  Procedure                               Abnormality         Status                     ---------                               -----------         ------                     CBC with Differential[[id number]]        Abnormal            Final result                 Please view results for these tests on the individual orders.   EXTRA TUBES    Narrative:     The following orders were created for panel order EXTRA TUBES.  Procedure                               Abnormality         Status                     ---------                               -----------         ------                     Gold Top Hold[[id number]]                                    In process                   Please view results for these tests on the individual orders.   GOLD TOP HOLD     EKG Readings: (Independently Interpreted)   Rhythm: Atrial Fibrillation. Heart Rate: 146. Conduction: RBBB. Clinical Impression: Atrial Fibrillation with RVR   Performed at 0359     Imaging Results              X-Ray Chest 1 View (Final result)  Result time 09/22/22 06:46:42      Final result by Silviano Manning MD (09/22/22 06:46:42)                   Impression:      No acute abnormalities are demonstrated.      Electronically signed by: Silviano Manning MD  Date:    09/22/2022  Time:    06:46               Narrative:    EXAMINATION:  XR CHEST 1 VIEW    CLINICAL HISTORY:  Shortness of breath    TECHNIQUE:  Single frontal view of the chest was performed.    COMPARISON:  08/18/2022    FINDINGS:  There is linear density on the right stable from the previous study.  Heart is borderline in size.  Costophrenic  angles are clear.  No acute infiltrates are seen.                                    X-Rays:   Independently Interpreted Readings:   Chest X-Ray: No acute abnormalities.   Medications   traMADoL tablet 50 mg (has no administration in time range)   albuterol-ipratropium 2.5 mg-0.5 mg/3 mL nebulizer solution 3 mL (3 mLs Nebulization Given 9/22/22 0414)   methylPREDNISolone sodium succinate injection 125 mg (125 mg Intravenous Given 9/22/22 0415)   diltiaZEM injection 10 mg (10 mg Intravenous Given 9/22/22 0415)   diltiaZEM 125 mg in dextrose 5 % 125 mL IVPB (ready to mix system) (titrating) (15 mg/hr Intravenous Rate/Dose Change 9/22/22 0555)   digoxin tablet 0.25 mg (0.25 mg Oral Given 9/22/22 0615)   diltiaZEM 24 hr capsule 240 mg (240 mg Oral Given 9/22/22 0615)     Medical Decision Making:   Initial Assessment:   51 yo male with SOB, in A Fib with RVR, wheezing and SOB   Independently Interpreted Test(s):   I have ordered and independently interpreted X-rays - see prior notes.  I have ordered and independently interpreted EKG Reading(s) - see prior notes  Clinical Tests:   Lab Tests: Ordered and Reviewed  Radiological Study: Ordered and Reviewed  Medical Tests: Ordered and Reviewed  ED Management:  Given Solumedrol and Duoneb   Cardizem bolus and drip  Spoke with hospitalist for admission   Other:   I have discussed this case with another health care provider.        Scribe Attestation:   Scribe #1: I performed the above scribed service and the documentation accurately describes the services I performed. I attest to the accuracy of the note.    Attending Attestation:           Physician Attestation for Scribe:  Physician Attestation Statement for Scribe #1: I, reviewed documentation, as scribed by Adrian Willis in my presence, and it is both accurate and complete.           ED Course as of 09/22/22 0747   u Sep 22, 2022   0425 HR improved from 140s to 90s after IV Cardizem  [KM]   0514 HR back up to  130s-140s, will start cardizem gtt  [KM]      ED Course User Index  [KM] Darby Fonseca MD                 Clinical Impression:   Final diagnoses:  [R06.02] Shortness of breath               Darby Fonseca MD  09/22/22 0751

## 2022-09-22 NOTE — H&P
[facility] Hospital Medicine History & Physical Examination       Patient Name: Vladimir Bernal  MRN: [id number]  Patient Class: IP- Inpatient   Admission Date: 9/22/2022   Admitting Physician: Dr. Pires  Length of Stay: 0  Primary Care Provider: Sophie Navarrete MD  Attending Physician: Dr. Pires  Face-to-Face encounter date: 09/22/2022  Code Status:  Full  Chief Complaint: SOB      Patient information was obtained from patient, patient's family, past medical records and ER records.     HISTORY OF PRESENT ILLNESS:   Vladimir Bernal is a 52 y.o. male with a past medical history of atrial fibrillation, COPD on home oxygen 2 L/min, hypertension, hyperlipidemia, diabetes mellitus, cocaine abuse (las tused x1.5 weeks ago) and HFrEF (LVEF 40-45% on 08/19/2022) who presented to [facility] on 9/22/2022 with complaints of progressively worsening shortness of breath mostly with exertion that is associated with a dry cough over the past x3 days. He also states that he developed palpitations and generalized abdominal pain last night. He was using nebulizer treatments and his inhaler every 6 hours over the past x3 days with minimal improvement.  He denies sick contacts, recent travel, or eating any abnormal foods.  He denies chest pain, fever, N/V/D, lower extremity edema, or dysuria. He states that he has been compliant with all his prescribed medications as well as his CPAP for his KRISHNA.    His initial vital signs showed that he was afebrile, , RR 17, /104, and SpO2 96% on room air that decreased to 93% so he was placed on nasal cannula 2 L/min.  His labs showed a macrocytic anemia with a stable H&H and a .3.  CXR showed no acute findings.  He was noted to be in AFib RVR so he was placed on a Cardizem drip and given digoxin 0.25 mg x 1 dose.  He also received methylprednisolone 125 mg IV x1 dose and DuoNebs.   PAST MEDICAL HISTORY:   Atrial  fibrillation  Anxiety  Depression  COPD  Asthma  CHF  HTN  HLD  DM  KRISHNA  GERD  Left eye blindness  Obesity    PAST SURGICAL HISTORY:   Mandible surgery    ALLERGIES:   Iodine and Shellfish containing products    FAMILY HISTORY:   Reviewed and negative    SOCIAL HISTORY:   Tobacco-none  Alcohol-socially  Illicit Drugs-cocaine  HOME MEDICATIONS:     Prior to Admission medications    Medication Sig Start Date End Date Taking? Authorizing Provider   budesonide-formoterol 160-4.5 mcg (SYMBICORT) 160-4.5 mcg/actuation HFAA Inhale 2 puffs into the lungs every 12 (twelve) hours. Controller 7/27/22  Yes Caleb Stephen MD   digoxin (LANOXIN) 250 mcg tablet Take 1 tablet (0.25 mg total) by mouth once daily. 8/20/22  Yes Susie Maciel DO   diltiaZEM (CARDIZEM CD) 240 MG 24 hr capsule Take 1 capsule (240 mg total) by mouth once daily. 8/20/22 8/20/23 Yes CHRIS Zepeda   furosemide (LASIX) 40 MG tablet Take 1 tablet (40 mg total) by mouth once daily. 8/21/22  Yes Susie Maciel DO   gabapentin (NEURONTIN) 300 MG capsule Take 300 mg by mouth 3 (three) times daily.   Yes Historical Provider   levalbuterol (XOPENEX) 1.25 mg/3 mL nebulizer solution Take 3 mLs (1.25 mg total) by nebulization every 6 (six) hours as needed for Wheezing. Rescue 8/20/22  Yes Susie Maciel DO   lisinopriL (PRINIVIL,ZESTRIL) 20 MG tablet Take 1 tablet (20 mg total) by mouth once daily. 8/21/22  Yes Susie Maciel DO   metFORMIN (GLUCOPHAGE-XR) 500 MG ER 24hr tablet Take 500 mg by mouth once daily.   Yes Historical Provider   pantoprazole (PROTONIX) 40 MG tablet Take 40 mg by mouth once daily.   Yes Historical Provider   rivaroxaban (XARELTO) 10 mg Tab Take 2 tablets (20 mg total) by mouth daily with dinner or evening meal. 8/20/22  Yes Susie Maciel DO   tiotropium (SPIRIVA) 18 mcg inhalation capsule Inhale 1 capsule (18 mcg total) into the lungs once daily. Controller 7/27/22  Yes Caleb Stephen MD   atorvastatin  (LIPITOR) 80 MG tablet Take 80 mg by mouth once daily.    Historical Provider   butalbital-acetaminophen-caffeine -40 mg (FIORICET, ESGIC) -40 mg per tablet Take 1 tablet by mouth every 4 (four) hours as needed for Pain.    Historical Provider   tadalafiL (CIALIS) 5 MG tablet Take 10 mg by mouth daily as needed for Erectile Dysfunction.    Historical Provider   albuterol (ACCUNEB) 0.63 mg/3 mL Nebu Take 3 mLs (0.63 mg total) by nebulization every 6 (six) hours as needed (shortness of breath and wheezing). Rescue 7/27/22 8/20/22  Caleb Stephen MD   amiodarone (PACERONE) 200 MG Tab Take 2 tablets BID x 1 day, then Take 1 tablet BID x 3 days, then Take 1 tablet daily 5/25/22 6/10/22  Prateek Murillo MD   metoprolol succinate (TOPROL-XL) 100 MG 24 hr tablet Take 1 tablet (100 mg total) by mouth 2 (two) times daily. 7/27/22 8/19/22  Caleb Stephen MD   spironolactone (ALDACTONE) 25 MG tablet Take 1 tablet (25 mg total) by mouth once daily. 5/26/22 6/10/22  Prateek Murillo MD       REVIEW OF SYSTEMS:   Except as documented, all other systems reviewed and negative     PHYSICAL EXAM:     VITAL SIGNS: 24 HRS MIN & MAX LAST   Temp  Min: 97.5 °F (36.4 °C)  Max: 97.5 °F (36.4 °C) 97.5 °F (36.4 °C)   BP  Min: 113/95  Max: 142/107 (!) 140/118     Pulse  Min: 71  Max: 135  (!) 123     Resp  Min: 17  Max: 29 20   SpO2  Min: 93 %  Max: 97 % (!) 93 %     Vital signs reviewed.    General:  morbidly obese, appears uncomfortable  Eye: clear conjunctiva, eyelids normal, opaqueness in left eye  HENT: oropharynx and nasal mucosal surfaces moist,   Neck: full range of motion, large neck circumference  Respiratory:  Diffuse bilateral expiratory wheezing, no current respiratory distress, nasal cannula in place  Cardiovascular:  Irregularly irregular without gallops or rubs, no peripheral edema noted  Gastrointestinal: soft, non-tender, non-distended with normal bowel sounds, without masses to palpation, protuberant  abdomen  Musculoskeletal: full range of motion of all extremities  without limitation or discomfort  Integumentary: warm, dry  Neurologic: cranial nerves intact    LABS AND IMAGING:     Recent Labs   Lab 09/22/22 0428   WBC 5.5   RBC 4.45*   HGB 13.6*   HCT 43.3   MCV 97.3*   MCH 30.6   MCHC 31.4*   RDW 15.3      MPV 9.7       Recent Labs   Lab 09/22/22 0428      K 4.5   CO2 25   BUN 19.2   CREATININE 1.08   CALCIUM 9.4   ALBUMIN 3.6   ALKPHOS 64   ALT 17   AST 17   BILITOT 0.6       Microbiology Results (last 7 days)       ** No results found for the last 168 hours. **             X-Ray Chest 1 View  Narrative: EXAMINATION:  XR CHEST 1 VIEW    CLINICAL HISTORY:  Shortness of breath    TECHNIQUE:  Single frontal view of the chest was performed.    COMPARISON:  08/18/2022    FINDINGS:  There is linear density on the right stable from the previous study.  Heart is borderline in size.  Costophrenic angles are clear.  No acute infiltrates are seen.  Impression: No acute abnormalities are demonstrated.    Electronically signed by: Silviano Manning MD  Date:    09/22/2022  Time:    06:46        ASSESSMENT & PLAN:   Acute on chronic COPD exacerbation  Acute on chronic hypoxemic respiratory failure secondary to above  Atrial fibrillation with RVR  Questionable medication noncompliance  History of diabetes mellitus   History of hypertension   History of hyperlipidemia   History of HFrEF (LVEF 40-45% on 08/19/2022)  History of cocaine abuse  Medical noncompliance    Plan:  -continue with oxygen supplementation as needed  -continue with levoalbuterol Q 4 hours given his accelerated HR  -Solumedrol 60 mg Q8 hours  -monitor telemetry, continue with Cardizem drip  -digoxin level, UDS, TSH, COVID/Flu-pending  -[facility] consulted/ appreciate assistance   -he does not appear volume overloaded on examination although his BNP is elevated and CXR does not show signs of vascular congestion  -monitor CBG and insulin sliding  scale  -Repeat labs in am    VTE Prophylaxis: will be placed on Lovenox for DVT prophylaxis and will be advised to be as mobile as possible and sit in a chair as tolerated  __________________________________________________________________________  INPATIENT LIST OF MEDICATIONS   No current facility-administered medications for this encounter.    Current Outpatient Medications:     budesonide-formoterol 160-4.5 mcg (SYMBICORT) 160-4.5 mcg/actuation HFAA, Inhale 2 puffs into the lungs every 12 (twelve) hours. Controller, Disp: 6 g, Rfl: 0    digoxin (LANOXIN) 250 mcg tablet, Take 1 tablet (0.25 mg total) by mouth once daily., Disp: 30 tablet, Rfl: 0    diltiaZEM (CARDIZEM CD) 240 MG 24 hr capsule, Take 1 capsule (240 mg total) by mouth once daily., Disp: 30 capsule, Rfl: 11    furosemide (LASIX) 40 MG tablet, Take 1 tablet (40 mg total) by mouth once daily., Disp: 30 tablet, Rfl: 0    gabapentin (NEURONTIN) 300 MG capsule, Take 300 mg by mouth 3 (three) times daily., Disp: , Rfl:     levalbuterol (XOPENEX) 1.25 mg/3 mL nebulizer solution, Take 3 mLs (1.25 mg total) by nebulization every 6 (six) hours as needed for Wheezing. Rescue, Disp: 100 mL, Rfl: 0    lisinopriL (PRINIVIL,ZESTRIL) 20 MG tablet, Take 1 tablet (20 mg total) by mouth once daily., Disp: 90 tablet, Rfl: 0    metFORMIN (GLUCOPHAGE-XR) 500 MG ER 24hr tablet, Take 500 mg by mouth once daily., Disp: , Rfl:     pantoprazole (PROTONIX) 40 MG tablet, Take 40 mg by mouth once daily., Disp: , Rfl:     rivaroxaban (XARELTO) 10 mg Tab, Take 2 tablets (20 mg total) by mouth daily with dinner or evening meal., Disp: 30 tablet, Rfl: 0    tiotropium (SPIRIVA) 18 mcg inhalation capsule, Inhale 1 capsule (18 mcg total) into the lungs once daily. Controller, Disp: 30 capsule, Rfl: 0    atorvastatin (LIPITOR) 80 MG tablet, Take 80 mg by mouth once daily., Disp: , Rfl:     butalbital-acetaminophen-caffeine -40 mg (FIORICET, ESGIC) -40 mg per tablet, Take 1  tablet by mouth every 4 (four) hours as needed for Pain., Disp: , Rfl:     tadalafiL (CIALIS) 5 MG tablet, Take 10 mg by mouth daily as needed for Erectile Dysfunction., Disp: , Rfl:       Scheduled Meds:  Continuous Infusions:  PRN Meds:.      Discharge Planning and Disposition/Anticipated discharge: TBD    DANNA, Yogi Joya, NP/PA have reviewed and discussed the case with Dr. Pires   Please see the following addendum for further assessment and plan from there attending MD.  09/22/2022      Mr bradford is well known to me, non compliant and cocaine user. Removed all his tele leads. HR around a 100 at the present moment with moderate bilateral wheezes. Meds resumed and refers feeling better. Will dc cardizem drip and continue home medications. Agree with assessment and plans done by YULIA joya. Some corrections were done to hpi as well as PE at the time of my evaluation.Continue xopenex/steroids/home meds/supplemental O2      Cc  time 35 minutes  Cc dx - acute on chronic hypoxic resp. Failure/afib  w rvr

## 2022-09-23 NOTE — PROGRESS NOTES
"[facility] Tele[facility]  Cardiology  Progress Note    Patient Name: Vladimir Bernal  MRN: [id number]  Admission Date: 9/22/2022  Hospital Length of Stay: 1 days  Code Status: Full Code   Attending Physician: David Pires MD   Primary Care Physician: Sophie Navarrete MD  Expected Discharge Date:   Principal Problem:<principal problem not specified>    Subjective:     Brief HPI:   Mr. Bernal is a 52 y.o.  male with a PMH significant for paroxysmal a-fib/flutter on Xarelto, HTN, HFrEF (EF 40-45%), COPD, DM Type II, KRISHNA on CPAP, and cocaine use as well as questionable medication compliance who reported to [facility] ED this morning for SOB associated with palpitations x3 days not relieved by his albuterol rescue inhaler which he's been using up to 4x daily as well as his Spiriva. He has been hospitalized several times this past year, with most recent last month for similar complaint, states he doesn't always take his medications, notably his Lasix "fluid pill." Denies chest pain, palpitations, nausea/vomiting, diaphoresis. TTE last stay 8/19/22 showed EF 40-45% with mild pulm HTN. Patient unsure of which medications he takes, states his doctor has switched them recently and that he has an appointment for new PCP at [facility] next Thursday. Pt states he still occasionally does cocaine, last used 10 days ago, UDS pending.      EKG in ED found patient to be in A-fib with RVR, labs showed subtherapeutic digoxin level and so patient given home dose of oral digoxin 0.25 mg x1, home dose Cardizem 240 mg ER oral, and started on Diltiazem gtt currently at 15 mg/hr.  HR currently in 90s sustained, hemodynamically stable. , trop negative, CXR shows mild cardiopulmonary congestion which appears chronic in nature.    9.23.22: Atrial fibrillation with 's. Patient has c/o SOB and back pain.    Hospital Course:   PMH: Atrial flutter on Xarelto, COPD, HTN, DM II, KRISHNA on CPAP  PSH: " mandibular fx repair  Family History: HTN, DM  Social History: Ex smoker (quit 20+ years ago), occasional alcohol use, +occasional cocaine use     Previous Cardiac Diagnostics:   TTE 8/19/22:   The left ventricle is normal in size with concentric remodeling and mildly decreased systolic function.  The estimated ejection fraction is 40-45%.  There is mild left ventricular global hypokinesis.  Grade I left ventricular diastolic dysfunction.  Moderate right ventricular enlargement with mildly reduced right ventricular systolic function.  There is mild pulmonary hypertension.  The estimated PA systolic pressure is 44 mmHg.  Elevated central venous pressure (15 mmHg).       Review of Systems   Respiratory:  Positive for shortness of breath.    Musculoskeletal:  Positive for back pain.   All other systems reviewed and are negative.    Objective:     Vital Signs (Most Recent):  Temp: 97.7 °F (36.5 °C) (09/23/22 0741)  Pulse: 102 (09/23/22 0753)  Resp: 18 (09/23/22 0753)  BP: 101/71 (09/23/22 0741)  SpO2: (!) 91 % (09/23/22 0753)   Vital Signs (24h Range):  Temp:  [95.6 °F (35.3 °C)-98.1 °F (36.7 °C)] 97.7 °F (36.5 °C)  Pulse:  [] 102  Resp:  [15-24] 18  SpO2:  [89 %-98 %] 91 %  BP: (101-150)/(71-98) 101/71     Weight: (!) 159.8 kg (352 lb 4.7 oz)  Body mass index is 47.77 kg/m².    SpO2: (!) 91 %  O2 Device (Oxygen Therapy): nasal cannula      Intake/Output Summary (Last 24 hours) at 9/23/2022 1059  Last data filed at 9/23/2022 1000  Gross per 24 hour   Intake 360 ml   Output 4250 ml   Net -3890 ml       Lines/Drains/Airways       Peripheral Intravenous Line  Duration                  Peripheral IV - Single Lumen 09/22/22 0427 20 G Right Wrist 1 day                    Significant Labs:   Recent Results (from the past 72 hour(s))   Comprehensive metabolic panel    Collection Time: 09/22/22  4:28 AM   Result Value Ref Range    Sodium Level 142 136 - 145 mmol/L    Potassium Level 4.5 3.5 - 5.1 mmol/L    Chloride 107 98  - 107 mmol/L    Carbon Dioxide 25 22 - 29 mmol/L    Glucose Level 113 (H) 74 - 100 mg/dL    Blood Urea Nitrogen 19.2 8.4 - 25.7 mg/dL    Creatinine 1.08 0.73 - 1.18 mg/dL    Calcium Level Total 9.4 8.4 - 10.2 mg/dL    Protein Total 6.6 6.4 - 8.3 gm/dL    Albumin Level 3.6 3.5 - 5.0 gm/dL    Globulin 3.0 2.4 - 3.5 gm/dL    Albumin/Globulin Ratio 1.2 1.1 - 2.0 ratio    Bilirubin Total 0.6 <=1.5 mg/dL    Alkaline Phosphatase 64 40 - 150 unit/L    Alanine Aminotransferase 17 0 - 55 unit/L    Aspartate Aminotransferase 17 5 - 34 unit/L    eGFR >60 mls/min/1.73/m2   Troponin I    Collection Time: 09/22/22  4:28 AM   Result Value Ref Range    Troponin-I <0.010 0.000 - 0.045 ng/mL   Brain natriuretic peptide    Collection Time: 09/22/22  4:28 AM   Result Value Ref Range    Natriuretic Peptide 510.3 (H) <=100.0 pg/mL   Protime-INR    Collection Time: 09/22/22  4:28 AM   Result Value Ref Range    PT 13.9 12.5 - 14.5 seconds    INR 1.08 0.00 - 1.30   CBC with Differential    Collection Time: 09/22/22  4:28 AM   Result Value Ref Range    WBC 5.5 4.5 - 11.5 x10(3)/mcL    RBC 4.45 (L) 4.70 - 6.10 x10(6)/mcL    Hgb 13.6 (L) 14.0 - 18.0 gm/dL    Hct 43.3 42.0 - 52.0 %    MCV 97.3 (H) 80.0 - 94.0 fL    MCH 30.6 27.0 - 31.0 pg    MCHC 31.4 (L) 33.0 - 36.0 mg/dL    RDW 15.3 11.5 - 17.0 %    Platelet 274 130 - 400 x10(3)/mcL    MPV 9.7 7.4 - 10.4 fL    Neut % 48.1 %    Lymph % 41.0 %    Mono % 6.1 %    Eos % 3.6 %    Basophil % 0.7 %    Lymph # 2.27 0.6 - 4.6 x10(3)/mcL    Neut # 2.7 2.1 - 9.2 x10(3)/mcL    Mono # 0.34 0.1 - 1.3 x10(3)/mcL    Eos # 0.20 0 - 0.9 x10(3)/mcL    Baso # 0.04 0 - 0.2 x10(3)/mcL    IG# 0.03 0 - 0.04 x10(3)/mcL    IG% 0.5 %    NRBC% 0.7 %   Digoxin Level    Collection Time: 09/22/22  4:28 AM   Result Value Ref Range    Digoxin Level <0.19 (L) 0.80 - 2.00 ng/mL   TSH    Collection Time: 09/22/22  4:28 AM   Result Value Ref Range    Thyroid Stimulating Hormone 0.8334 0.3500 - 4.9400 uIU/mL   COVID/FLU A&B PCR     Collection Time: 09/22/22  8:21 AM   Result Value Ref Range    Influenza A PCR Not Detected Not Detected    Influenza B PCR Not Detected Not Detected    SARS-CoV-2 PCR Not Detected Not Detected   Magnesium    Collection Time: 09/22/22 10:48 AM   Result Value Ref Range    Magnesium Level 1.80 1.60 - 2.60 mg/dL   POCT Glucose, Hand-Held Device    Collection Time: 09/22/22 11:00 AM   Result Value Ref Range    POC Glucose 193 (A) 70 - 110 MG/DL   POCT glucose    Collection Time: 09/22/22 11:08 AM   Result Value Ref Range    POCT Glucose 193 (H) 70 - 110 mg/dL   Drug Screen, Urine    Collection Time: 09/22/22 11:50 AM   Result Value Ref Range    Amphetamines, Urine Negative Negative    Barbituates, Urine Negative Negative    Benzodiazepine, Urine Negative Negative    Cannabinoids, Urine Negative Negative    Cocaine, Urine Positive (A) Negative    Fentanyl, Urine Negative Negative    MDMA, Urine Negative Negative    Opiates, Urine Negative Negative    Phencyclidine, Urine Negative Negative    pH, Urine 7.5 3.0 - 11.0    Specific Gravity, Urine Auto 1.017 1.001 - 1.035   POCT Glucose, Hand-Held Device    Collection Time: 09/22/22  5:17 PM   Result Value Ref Range    POC Glucose 167 (A) 70 - 110 MG/DL   POCT glucose    Collection Time: 09/22/22  6:16 PM   Result Value Ref Range    POCT Glucose 167 (H) 70 - 110 mg/dL   POCT glucose    Collection Time: 09/22/22  8:50 PM   Result Value Ref Range    POCT Glucose 192 (H) 70 - 110 mg/dL   CBC with Differential    Collection Time: 09/23/22  6:45 AM   Result Value Ref Range    WBC 12.2 (H) 4.5 - 11.5 x10(3)/mcL    RBC 4.57 (L) 4.70 - 6.10 x10(6)/mcL    Hgb 14.0 14.0 - 18.0 gm/dL    Hct 44.0 42.0 - 52.0 %    MCV 96.3 (H) 80.0 - 94.0 fL    MCH 30.6 27.0 - 31.0 pg    MCHC 31.8 (L) 33.0 - 36.0 mg/dL    RDW 15.3 11.5 - 17.0 %    Platelet 315 130 - 400 x10(3)/mcL    MPV 9.8 7.4 - 10.4 fL    Neut % 91.0 %    Lymph % 7.0 %    Mono % 1.4 %    Eos % 0.0 %    Basophil % 0.1 %    Lymph # 0.86  0.6 - 4.6 x10(3)/mcL    Neut # 11.1 (H) 2.1 - 9.2 x10(3)/mcL    Mono # 0.17 0.1 - 1.3 x10(3)/mcL    Eos # 0.00 0 - 0.9 x10(3)/mcL    Baso # 0.01 0 - 0.2 x10(3)/mcL    IG# 0.06 (H) 0 - 0.04 x10(3)/mcL    IG% 0.5 %    NRBC% 0.5 %   Comprehensive Metabolic Panel    Collection Time: 09/23/22  6:46 AM   Result Value Ref Range    Sodium Level 138 136 - 145 mmol/L    Potassium Level 5.1 3.5 - 5.1 mmol/L    Chloride 102 98 - 107 mmol/L    Carbon Dioxide 27 22 - 29 mmol/L    Glucose Level 208 (H) 74 - 100 mg/dL    Blood Urea Nitrogen 19.9 8.4 - 25.7 mg/dL    Creatinine 1.02 0.73 - 1.18 mg/dL    Calcium Level Total 10.0 8.4 - 10.2 mg/dL    Protein Total 7.3 6.4 - 8.3 gm/dL    Albumin Level 3.8 3.5 - 5.0 gm/dL    Globulin 3.5 2.4 - 3.5 gm/dL    Albumin/Globulin Ratio 1.1 1.1 - 2.0 ratio    Bilirubin Total 0.5 <=1.5 mg/dL    Alkaline Phosphatase 68 40 - 150 unit/L    Alanine Aminotransferase 13 0 - 55 unit/L    Aspartate Aminotransferase 12 5 - 34 unit/L    eGFR >60 mls/min/1.73/m2       Telemetry:  Atrial Fibrillation RVR    Physical Exam  Vitals reviewed.   Constitutional:       Appearance: Normal appearance.   Cardiovascular:      Rate and Rhythm: Tachycardia present. Rhythm irregular.      Pulses: Normal pulses.      Heart sounds: Normal heart sounds.   Pulmonary:      Effort: Pulmonary effort is normal.      Comments: BBS diminished = bilateral.  Abdominal:      General: Abdomen is flat. Bowel sounds are normal.      Palpations: Abdomen is soft.   Musculoskeletal:         General: Normal range of motion.   Skin:     General: Skin is warm and dry.      Capillary Refill: Capillary refill takes less than 2 seconds.   Neurological:      Mental Status: He is alert and oriented to person, place, and time.       Current Inpatient Medications:    Current Facility-Administered Medications:     acetaminophen tablet 650 mg, 650 mg, Oral, Q8H PRN, YULIA Yao    acetaminophen tablet 650 mg, 650 mg, Oral, Q4H PRN, Yogi SCHMIDT  YULIA Smith    benzonatate capsule 100 mg, 100 mg, Oral, TID PRN, YULIA Yao    dextrose 50% injection 12.5 g, 12.5 g, Intravenous, PRN, YULIA Yao    dextrose 50% injection 25 g, 25 g, Intravenous, PRN, YULIA Yao    digoxin tablet 0.25 mg, 0.25 mg, Oral, Daily, Indigo B Lebas, FNP, 0.25 mg at 09/23/22 0849    diltiaZEM 125 mg in dextrose 5 % 125 mL IVPB (ready to mix system) (titrating), 0-15 mg/hr, Intravenous, Continuous, Indigo B Lebas, FNP    [START ON 9/24/2022] diltiaZEM 24 hr capsule 360 mg, 360 mg, Oral, Daily, Indigo B Lebas, FNP    furosemide injection 80 mg, 80 mg, Intravenous, BID, Indigo B Lebas, FNP    glucagon (human recombinant) injection 1 mg, 1 mg, Intramuscular, PRN, YULIA Yao    glucose chewable tablet 16 g, 16 g, Oral, PRN, YULIA Yao    glucose chewable tablet 24 g, 24 g, Oral, PRN, YULIA Yao    insulin aspart U-100 injection 1-10 Units, 1-10 Units, Subcutaneous, QID (AC + HS) PRN, YULIA Yao, 2 Units at 09/22/22 2114    levalbuterol nebulizer solution 0.63 mg, 0.63 mg, Nebulization, Q4H, David Pires MD, 0.63 mg at 09/23/22 0753    lisinopriL tablet 20 mg, 20 mg, Oral, Daily, Stella Salazar MD, 20 mg at 09/23/22 0849    methylPREDNISolone sodium succinate injection 60 mg, 60 mg, Intravenous, Q8H, YULIA Yao, 60 mg at 09/23/22 0551    ondansetron injection 4 mg, 4 mg, Intravenous, Q8H PRN, YULIA Yao    oxyCODONE-acetaminophen  mg per tablet 1 tablet, 1 tablet, Oral, Q4H PRN, Indigo B Lebas, FNP    rivaroxaban tablet 20 mg, 20 mg, Oral, Daily with dinner, Stella Salazar MD, 20 mg at 09/22/22 1741    VTE Risk Mitigation (From admission, onward)           Ordered     rivaroxaban tablet 20 mg  With dinner         09/22/22 1147     IP VTE HIGH RISK PATIENT  Once         09/22/22 0742     Place sequential compression device  Until discontinued         09/22/22 0742                    Assessment:    Chronic Atrial Fib RVR   -CHADS-Vasc 3  HTN  Acute on Chronic HF exacerbation (EF 40-45%)  Hx Cocaine use  COPD on Home O2 2L NC  Acute on Chronic Resp Failure  DM      Plan:     -Admitted to Hospitalist service, continue care per primary team  -CIS consulted for A-fib w/RVR  -Patient questionable compliance, home cardiac regimen includes: diltiazem  mg daily, digoxin 0.25 mg daily, Xarelto 20 mg daily; lisinopril 20 mg daily, Lasix 40 mg PO  -Dig level below therapeutic range at 0.19 (0.8-2 normal)  -Resume Cardizem gtt at 15 mg/hr,   Increase diltiazem oral to 360 mg po daily and continue dig 0.25 mg; Wean diltiazem gtt as tolerated  -Increase lasix to 80 mg IV BID  Continue home lisinopril 20 mg daily  Start Coreg 3.125 mg po bid  -CHADSVASC2 score: 3  -Continue Xarelto 20 mg daily  -Patient educated on need for medication compliance and thoroughly on need to stop cocaine use as this could contribute to arrhythmia  Verbalized understanding on both.   CMP/Mag in am.       CHRIS Landa  Cardiology  [facility]  09/23/2022

## 2022-09-23 NOTE — PROGRESS NOTES
"Inpatient Nutrition Evaluation    Admit Date: 9/22/2022   Total duration of encounter: 1 day    Nutrition Recommendation/Prescription     - Continue current diet as tolerated.   - Monitor wt, labs, and intake.     Nutrition Assessment     Chart Review    Reason Seen: continuous nutrition monitoring    Diagnosis:  Chronic Atrial Fib RVR  HTN  Acute on Chronic HF exacerbation (EF 40-45%)  Hx Cocaine use  COPD on Home O2 2L NC  Acute on Chronic Resp Failure  DM    Relevant Medical History: Atrial flutter on Xarelto, COPD, HTN, DM II, KRISHNA on CPAP    Nutrition-Related Medications: furosemide, SSI, zofran PRN    Nutrition-Related Labs:  9/23/22: Glu 208, GFR>60    Diet Order: Diet heart healthy Diabetic  Oral Supplement Order: none at this time  Appetite/Oral Intake: good/% of meals  Factors Affecting Nutritional Intake: none identified at this time  Food/Cheondoism/Cultural Preferences: none reported       Wound(s):   none noted    Comments    9/23/22: Pt reports good appetite, tolerating diet; denies wt loss.     Anthropometrics    Height: 6' 0.01" (182.9 cm)    Last Weight: (!) 159.8 kg (352 lb 4.7 oz) (09/23/22 1258) Weight Method: Standard Scale  BMI (Calculated): 47.8  BMI Classification: obese grade III (BMI >/=40)        Ideal Body Weight (IBW), Male: 178.06 lb     % Ideal Body Weight, Male (lb): 197.85 %                          Usual Weight Provided By: patient    Wt Readings from Last 5 Encounters:   09/23/22 (!) 159.8 kg (352 lb 4.7 oz)   08/19/22 (!) 156.5 kg (345 lb 0.3 oz)   07/26/22 (!) 159.3 kg (351 lb 3.2 oz)   06/08/22 (!) 171.8 kg (378 lb 12 oz)   05/20/22 (!) 167 kg (368 lb 2.7 oz)     Weight Change(s) Since Admission:  Admit Weight: (!) 162.2 kg (357 lb 9.4 oz) (09/22/22 0900)      Patient Education    Not applicable.    Monitoring & Evaluation     Dietitian will monitor food and beverage intake and weight change.  Nutrition Risk/Follow-Up: low (follow-up in 5-7 days)  Patients assigned 'low " nutrition risk' status do not qualify for a full nutritional assessment but will be monitored and re-evaluated in a 5-7 day time period.

## 2022-09-24 NOTE — PROGRESS NOTES
[facility] Hospital Medicine Progress Note        Chief Complaint: Inpatient Follow-up for SOB    HPI: Vladimir Bernal is a 52 y.o. male with a past medical history of atrial fibrillation, COPD on home oxygen 2 L/min, hypertension, hyperlipidemia, diabetes mellitus, cocaine abuse (las tused x1.5 weeks ago) and HFrEF (LVEF 40-45% on 08/19/2022) who presented to [facility] on 9/22/2022 with complaints of progressively worsening shortness of breath mostly with exertion that is associated with a dry cough over the past x3 days. He also states that he developed palpitations and generalized abdominal pain last night. He was using nebulizer treatments and his inhaler every 6 hours over the past x3 days with minimal improvement.  He denies sick contacts, recent travel, or eating any abnormal foods.  He denies chest pain, fever, N/V/D, lower extremity edema, or dysuria. He states that he has been compliant with all his prescribed medications as well as his CPAP for his KRISHNA.         His initial vital signs showed that he was afebrile, , RR 17, /104, and SpO2 96% on room air that decreased to 93% so he was placed on nasal cannula 2 L/min.  His labs showed a macrocytic anemia with a stable H&H and a .3.  CXR showed no acute findings.  He was noted to be in AFib RVR so he was placed on a Cardizem drip and given digoxin 0.25 mg x 1 dose.  He also received methylprednisolone 125 mg IV x1 dose and DuoNebs.     Interval Hx:   Pt was again in afib with rvr and had to be placed on cardizem drip, cardizem po was increased. Refers breathing better      Objective/physical exam:  General:  morbidly obese, appears uncomfortable  Eye: clear conjunctiva, eyelids normal, opaqueness in left eye  HENT: oropharynx and nasal mucosal surfaces moist,     Neck: full range of motion, large neck circumference  Respiratory:  mild inspiratory wheezing, no current respiratory distress, nasal cannula in  place  Cardiovascular:  Irregularly irregular without gallops or rubs, no peripheral edema noted  Gastrointestinal: soft, non-tender, non-distended with normal bowel sounds, without masses to palpation, protuberant abdomen  Musculoskeletal: full range of motion of all extremities  without limitation or discomfort  Integumentary: warm, dry  Neurologic: cranial nerves intact    VITAL SIGNS: 24 HRS MIN & MAX LAST   Temp  Min: 95.6 °F (35.3 °C)  Max: 98.1 °F (36.7 °C) 97.6 °F (36.4 °C)   BP  Min: 101/71  Max: 150/96 113/71   Pulse  Min: 57  Max: 110  62   Resp  Min: 15  Max: 24 19   SpO2  Min: 89 %  Max: 98 % (!) 94 %         Recent Labs   Lab 09/22/22  0428 09/23/22  0645   WBC 5.5 12.2*   RBC 4.45* 4.57*   HGB 13.6* 14.0   HCT 43.3 44.0   MCV 97.3* 96.3*   MCH 30.6 30.6   MCHC 31.4* 31.8*   RDW 15.3 15.3    315   MPV 9.7 9.8       Recent Labs   Lab 09/22/22  0428 09/22/22  1048 09/23/22  0646     --  138   K 4.5  --  5.1   CO2 25  --  27   BUN 19.2  --  19.9   CREATININE 1.08  --  1.02   CALCIUM 9.4  --  10.0   MG  --  1.80  --    ALBUMIN 3.6  --  3.8   ALKPHOS 64  --  68   ALT 17  --  13   AST 17  --  12   BILITOT 0.6  --  0.5          Microbiology Results (last 7 days)       ** No results found for the last 168 hours. **             See below for Radiology    Scheduled Med:   carvediloL  3.125 mg Oral BID    digoxin  0.25 mg Oral Daily    [START ON 9/24/2022] diltiaZEM  360 mg Oral Daily    furosemide (LASIX) injection  80 mg Intravenous BID    levalbuterol  0.63 mg Nebulization Q4H    lisinopriL  20 mg Oral Daily    methylPREDNISolone sodium succinate injection  60 mg Intravenous Q8H    rivaroxaban  20 mg Oral Daily with dinner        Continuous Infusions:   dilTIAZem 15 mg/hr (09/23/22 1315)        PRN Meds:  acetaminophen, acetaminophen, benzonatate, dextrose 50%, dextrose 50%, glucagon (human recombinant), glucose, glucose, insulin aspart U-100, ondansetron, oxyCODONE-acetaminophen       Assessment  and plans:    Acute on chronic COPD exacerbation    Acute on chronic hypoxemic respiratory failure secondary to above    Atrial fibrillation with RVR    medication noncompliance    History of diabetes mellitus     History of hypertension     History of hyperlipidemia     Acute on chronic HFrEF (LVEF 40-45% on 08/19/2022)    History of cocaine abuse    Medical noncompliance        Plan:  -continue lasix 80 mgs iv bid  -increase diltiazem oral to 360 mg po daily and continue dig 0.25 mg, Xarelto 20 mg daily; lisinopril 20 mg daily,coreg 3.125 mgs po bid added  -continue with oxygen supplementation as needed  -continue with levoalbuterol Q 4 hours given his accelerated HR  -decrease Solumedrol to  60 mg Q 12    -wean down Cardizem drip, titrate up cardizem po    -monitor CBG and insulin sliding scale    - will refer to, psych as outpt as well as to [facility] for primary md and cis    Cc time 35 minutes  Cc dx- afib rvr on cardizem drip/ Lasic iv for decompensated systolic heart failure      VTE prophylaxis: xarelto    Patient condition:  Stable    Anticipated discharge and Disposition:   home      All diagnosis and differential diagnosis have been reviewed; assessment and plan has been documented; I have personally reviewed the labs and test results that are presently available; I have reviewed the patients medication list; I have reviewed the consulting providers response and recommendations. I have reviewed or attempted to review medical records based upon their availability    All of the patient's questions have been  addressed and answered. Patient's is agreeable to the above stated plan. I will continue to monitor closely and make adjustments to medical management as needed.  _____________________________________________________________________    Nutrition Status:    Radiology:  X-Ray Chest 1 View  Narrative: EXAMINATION:  XR CHEST 1 VIEW    CLINICAL HISTORY:  Shortness of breath    TECHNIQUE:  Single frontal view of  the chest was performed.    COMPARISON:  08/18/2022    FINDINGS:  There is linear density on the right stable from the previous study.  Heart is borderline in size.  Costophrenic angles are clear.  No acute infiltrates are seen.  Impression: No acute abnormalities are demonstrated.    Electronically signed by: Silviano Manning MD  Date:    09/22/2022  Time:    06:46      David Pires MD   09/23/2022

## 2022-09-24 NOTE — PROGRESS NOTES
[facility] Medicine Progress Note        Chief Complaint: Inpatient Follow-up for SOB    HPI: Vladimir Bernal is a 52 y.o. male with a past medical history of atrial fibrillation, COPD on home oxygen 2 L/min, hypertension, hyperlipidemia, diabetes mellitus, cocaine abuse (las tused x1.5 weeks ago) and HFrEF (LVEF 40-45% on 08/19/2022) who presented to [facility] on 9/22/2022 with complaints of progressively worsening shortness of breath mostly with exertion that is associated with a dry cough over the past x3 days. He also states that he developed palpitations and generalized abdominal pain last night. He was using nebulizer treatments and his inhaler every 6 hours over the past x3 days with minimal improvement.  He denies sick contacts, recent travel, or eating any abnormal foods.  He denies chest pain, fever, N/V/D, lower extremity edema, or dysuria. He states that he has been compliant with all his prescribed medications as well as his CPAP for his KRISHNA.         His initial vital signs showed that he was afebrile, , RR 17, /104, and SpO2 96% on room air that decreased to 93% so he was placed on nasal cannula 2 L/min.  His labs showed a macrocytic anemia with a stable H&H and a .3.  CXR showed no acute findings.  He was noted to be in AFib RVR so he was placed on a Cardizem drip and given digoxin 0.25 mg x 1 dose.  He also received methylprednisolone 125 mg IV x1 dose and DuoNebs.     Interval Hx:     Patient seen and examined this morning.  No acute events overnight.  Remains in A. Fib, heart rate better in low 100s.  Denies any palpitations, reports that shortness of breath is better.  Remains on supplemental oxygen (on home oxygen).  Denies any chest pain.  On Cardizem drip    Objective/physical exam:  General:  morbidly obese,no acute distress  Neck: full range of motion, large neck circumference, unable to assess JVD due to his body habitus  Respiratory:   mild inspiratory wheezing, no current respiratory distress, nasal cannula in place  Cardiovascular:  Irregularly irregular , no peripheral edema noted  Gastrointestinal: soft, non-tender, non-distended with normal bowel sounds, Musculoskeletal: full range of motion of all extremities  without limitation or discomfort  Integumentary: warm, dry  Neurologic: cranial nerves intact    VITAL SIGNS: 24 HRS MIN & MAX LAST   Temp  Min: 97.6 °F (36.4 °C)  Max: 98.1 °F (36.7 °C) 97.9 °F (36.6 °C)   BP  Min: 101/71  Max: 127/87 121/87   Pulse  Min: 57  Max: 103  103   Resp  Min: 18  Max: 19 18   SpO2  Min: 91 %  Max: 98 % 95 %         Recent Labs   Lab 09/22/22  0428 09/23/22  0645 09/24/22  0358   WBC 5.5 12.2* 17.3*   RBC 4.45* 4.57* 4.61*   HGB 13.6* 14.0 14.2   HCT 43.3 44.0 44.8   MCV 97.3* 96.3* 97.2*   MCH 30.6 30.6 30.8   MCHC 31.4* 31.8* 31.7*   RDW 15.3 15.3 15.4    315 277   MPV 9.7 9.8 10.1         Recent Labs   Lab 09/22/22 0428 09/22/22  1048 09/23/22  0646 09/24/22  0358     --  138 137   K 4.5  --  5.1 4.9   CO2 25  --  27 26   BUN 19.2  --  19.9 28.6*   CREATININE 1.08  --  1.02 1.18   CALCIUM 9.4  --  10.0 9.2   MG  --  1.80  --  2.00   ALBUMIN 3.6  --  3.8 3.6   ALKPHOS 64  --  68 64   ALT 17  --  13 11   AST 17  --  12 10   BILITOT 0.6  --  0.5 0.4            Microbiology Results (last 7 days)       ** No results found for the last 168 hours. **             See below for Radiology    Scheduled Med:   carvediloL  3.125 mg Oral BID    digoxin  0.25 mg Oral Daily    diltiaZEM  360 mg Oral Daily    furosemide (LASIX) injection  80 mg Intravenous BID    levalbuterol  0.63 mg Nebulization Q4H    lisinopriL  20 mg Oral Daily    methylPREDNISolone sodium succinate injection  60 mg Intravenous Q12H    rivaroxaban  20 mg Oral Daily with dinner        Continuous Infusions:   dilTIAZem 15 mg/hr (09/23/22 1315)        PRN Meds:  acetaminophen, acetaminophen, benzonatate, dextrose 50%, dextrose 50%, glucagon  (human recombinant), glucose, glucose, insulin aspart U-100, ondansetron, oxyCODONE-acetaminophen       Assessment and plans:  A fib RVR requiring Cardizem drip  Acute exacerbation of chronic HFrEF ( 40-45% on 8/19/2022)  Mild COPD exacerbation-home oxygen dependent  Acute on chronic hypoxemic respiratory failure-multifactorial secondary to above  Medication noncompliance  Type 2 diabetes  Hypertension  Hyperlipidemia  History of cocaine use    Cardiology following  Remains on Cardizem drip, oral Cardizem increased to 360 mg daily, continue digoxin 0.25 mg daily, Xarelto, Coreg and remaining medications  Counseled on medication adherence  Wean off of Cardizem drip as tolerated  Continue with supplemental oxygen  Taper Solu-Medrol to 40 mg daily for 3 more days and discontinue  Insulin sliding scale for CBGs  Leukocytosis secondary to steroids  Patient will need a outpatient psych follow-up and PCP follow-up        Cc time 36 minutes  Cc dx- afib w rvr on cardizem drip/     VTE prophylaxis: xarelto          All diagnosis and differential diagnosis have been reviewed; assessment and plan has been documented; I have personally reviewed the labs and test results that are presently available; I have reviewed the patients medication list; I have reviewed the consulting providers response and recommendations. I have reviewed or attempted to review medical records based upon their availability    All of the patient's questions have been  addressed and answered. Patient's is agreeable to the above stated plan. I will continue to monitor closely and make adjustments to medical management as needed.  _____________________________________________________________________    Nutrition Status:    Radiology:  X-Ray Chest 1 View  Narrative: EXAMINATION:  XR CHEST 1 VIEW    CLINICAL HISTORY:  Shortness of breath    TECHNIQUE:  Single frontal view of the chest was performed.    COMPARISON:  08/18/2022    FINDINGS:  There is linear  density on the right stable from the previous study.  Heart is borderline in size.  Costophrenic angles are clear.  No acute infiltrates are seen.  Impression: No acute abnormalities are demonstrated.    Electronically signed by: Silviano Manning MD  Date:    09/22/2022  Time:    06:46      Tami Crews MD   09/24/2022

## 2022-09-24 NOTE — PROGRESS NOTES
"[facility] Medical Telemetry  Cardiology  Progress Note    Patient Name: Vladimir Bernal  MRN: [id number]  Admission Date: 9/22/2022  Hospital Length of Stay: 2 days  Code Status: Full Code   Attending Physician: David Pires MD   Primary Care Physician: Sophie Navarrete MD  Expected Discharge Date:   Principal Problem:<principal problem not specified>    Subjective:     Brief HPI:   Mr. Bernal is a 52 y.o.  male with a PMH significant for paroxysmal a-fib/flutter on Xarelto, HTN, HFrEF (EF 40-45%), COPD, DM Type II, KRISHNA on CPAP, and cocaine use as well as questionable medication compliance who reported to [facility] ED this morning for SOB associated with palpitations x3 days not relieved by his albuterol rescue inhaler which he's been using up to 4x daily as well as his Spiriva. He has been hospitalized several times this past year, with most recent last month for similar complaint, states he doesn't always take his medications, notably his Lasix "fluid pill." Denies chest pain, palpitations, nausea/vomiting, diaphoresis. TTE last stay 8/19/22 showed EF 40-45% with mild pulm HTN. Patient unsure of which medications he takes, states his doctor has switched them recently and that he has an appointment for new PCP at [facility] next Thursday. Pt states he still occasionally does cocaine, last used 10 days ago, UDS pending.      EKG in ED found patient to be in A-fib with RVR, labs showed subtherapeutic digoxin level and so patient given home dose of oral digoxin 0.25 mg x1, home dose Cardizem 240 mg ER oral, and started on Diltiazem gtt currently at 15 mg/hr.  HR currently in 90s sustained, hemodynamically stable. , trop negative, CXR shows mild cardiopulmonary congestion which appears chronic in nature.    9.23.22: Atrial fibrillation with 's. Patient has c/o SOB and back pain.  9.24.22: Atrial fibrillation with HR better controlled on cardizem gtt. Patient stated SOB has " improved.     Hospital Course:   PMH: Atrial flutter on Xarelto, COPD, HTN, DM II, KRISHNA on CPAP  PSH: mandibular fx repair  Family History: HTN, DM  Social History: Ex smoker (quit 20+ years ago), occasional alcohol use, +occasional cocaine use     Previous Cardiac Diagnostics:   TTE 8/19/22:   The left ventricle is normal in size with concentric remodeling and mildly decreased systolic function.  The estimated ejection fraction is 40-45%.  There is mild left ventricular global hypokinesis.  Grade I left ventricular diastolic dysfunction.  Moderate right ventricular enlargement with mildly reduced right ventricular systolic function.  There is mild pulmonary hypertension.  The estimated PA systolic pressure is 44 mmHg.  Elevated central venous pressure (15 mmHg).       Review of Systems   Respiratory:  Positive for shortness of breath.    Musculoskeletal:  Positive for back pain.   All other systems reviewed and are negative.    Objective:     Vital Signs (Most Recent):  Temp: 98.1 °F (36.7 °C) (09/24/22 0754)  Pulse: 100 (09/24/22 0805)  Resp: 18 (09/24/22 0851)  BP: 114/75 (09/24/22 0851)  SpO2: 97 % (09/24/22 0805)   Vital Signs (24h Range):  Temp:  [97.6 °F (36.4 °C)-98.1 °F (36.7 °C)] 98.1 °F (36.7 °C)  Pulse:  [] 100  Resp:  [18-20] 18  SpO2:  [92 %-98 %] 97 %  BP: (109-127)/(71-87) 114/75     Weight: (!) 158.3 kg (348 lb 15.8 oz)  Body mass index is 47.32 kg/m².    SpO2: 97 %  O2 Device (Oxygen Therapy): nasal cannula      Intake/Output Summary (Last 24 hours) at 9/24/2022 0856  Last data filed at 9/23/2022 1800  Gross per 24 hour   Intake 1100 ml   Output 1850 ml   Net -750 ml         Lines/Drains/Airways       Peripheral Intravenous Line  Duration                  Peripheral IV - Single Lumen 09/22/22 0427 20 G Right Wrist 2 days                    Significant Labs:   Recent Results (from the past 72 hour(s))   Comprehensive metabolic panel    Collection Time: 09/22/22  4:28 AM   Result Value Ref Range     Sodium Level 142 136 - 145 mmol/L    Potassium Level 4.5 3.5 - 5.1 mmol/L    Chloride 107 98 - 107 mmol/L    Carbon Dioxide 25 22 - 29 mmol/L    Glucose Level 113 (H) 74 - 100 mg/dL    Blood Urea Nitrogen 19.2 8.4 - 25.7 mg/dL    Creatinine 1.08 0.73 - 1.18 mg/dL    Calcium Level Total 9.4 8.4 - 10.2 mg/dL    Protein Total 6.6 6.4 - 8.3 gm/dL    Albumin Level 3.6 3.5 - 5.0 gm/dL    Globulin 3.0 2.4 - 3.5 gm/dL    Albumin/Globulin Ratio 1.2 1.1 - 2.0 ratio    Bilirubin Total 0.6 <=1.5 mg/dL    Alkaline Phosphatase 64 40 - 150 unit/L    Alanine Aminotransferase 17 0 - 55 unit/L    Aspartate Aminotransferase 17 5 - 34 unit/L    eGFR >60 mls/min/1.73/m2   Troponin I    Collection Time: 09/22/22  4:28 AM   Result Value Ref Range    Troponin-I <0.010 0.000 - 0.045 ng/mL   Brain natriuretic peptide    Collection Time: 09/22/22  4:28 AM   Result Value Ref Range    Natriuretic Peptide 510.3 (H) <=100.0 pg/mL   Protime-INR    Collection Time: 09/22/22  4:28 AM   Result Value Ref Range    PT 13.9 12.5 - 14.5 seconds    INR 1.08 0.00 - 1.30   CBC with Differential    Collection Time: 09/22/22  4:28 AM   Result Value Ref Range    WBC 5.5 4.5 - 11.5 x10(3)/mcL    RBC 4.45 (L) 4.70 - 6.10 x10(6)/mcL    Hgb 13.6 (L) 14.0 - 18.0 gm/dL    Hct 43.3 42.0 - 52.0 %    MCV 97.3 (H) 80.0 - 94.0 fL    MCH 30.6 27.0 - 31.0 pg    MCHC 31.4 (L) 33.0 - 36.0 mg/dL    RDW 15.3 11.5 - 17.0 %    Platelet 274 130 - 400 x10(3)/mcL    MPV 9.7 7.4 - 10.4 fL    Neut % 48.1 %    Lymph % 41.0 %    Mono % 6.1 %    Eos % 3.6 %    Basophil % 0.7 %    Lymph # 2.27 0.6 - 4.6 x10(3)/mcL    Neut # 2.7 2.1 - 9.2 x10(3)/mcL    Mono # 0.34 0.1 - 1.3 x10(3)/mcL    Eos # 0.20 0 - 0.9 x10(3)/mcL    Baso # 0.04 0 - 0.2 x10(3)/mcL    IG# 0.03 0 - 0.04 x10(3)/mcL    IG% 0.5 %    NRBC% 0.7 %   Digoxin Level    Collection Time: 09/22/22  4:28 AM   Result Value Ref Range    Digoxin Level <0.19 (L) 0.80 - 2.00 ng/mL   TSH    Collection Time: 09/22/22  4:28 AM   Result  Value Ref Range    Thyroid Stimulating Hormone 0.8334 0.3500 - 4.9400 uIU/mL   COVID/FLU A&B PCR    Collection Time: 09/22/22  8:21 AM   Result Value Ref Range    Influenza A PCR Not Detected Not Detected    Influenza B PCR Not Detected Not Detected    SARS-CoV-2 PCR Not Detected Not Detected   Magnesium    Collection Time: 09/22/22 10:48 AM   Result Value Ref Range    Magnesium Level 1.80 1.60 - 2.60 mg/dL   POCT Glucose, Hand-Held Device    Collection Time: 09/22/22 11:00 AM   Result Value Ref Range    POC Glucose 193 (A) 70 - 110 MG/DL   POCT glucose    Collection Time: 09/22/22 11:08 AM   Result Value Ref Range    POCT Glucose 193 (H) 70 - 110 mg/dL   Drug Screen, Urine    Collection Time: 09/22/22 11:50 AM   Result Value Ref Range    Amphetamines, Urine Negative Negative    Barbituates, Urine Negative Negative    Benzodiazepine, Urine Negative Negative    Cannabinoids, Urine Negative Negative    Cocaine, Urine Positive (A) Negative    Fentanyl, Urine Negative Negative    MDMA, Urine Negative Negative    Opiates, Urine Negative Negative    Phencyclidine, Urine Negative Negative    pH, Urine 7.5 3.0 - 11.0    Specific Gravity, Urine Auto 1.017 1.001 - 1.035   POCT Glucose, Hand-Held Device    Collection Time: 09/22/22  5:17 PM   Result Value Ref Range    POC Glucose 167 (A) 70 - 110 MG/DL   POCT glucose    Collection Time: 09/22/22  6:16 PM   Result Value Ref Range    POCT Glucose 167 (H) 70 - 110 mg/dL   POCT glucose    Collection Time: 09/22/22  8:50 PM   Result Value Ref Range    POCT Glucose 192 (H) 70 - 110 mg/dL   CBC with Differential    Collection Time: 09/23/22  6:45 AM   Result Value Ref Range    WBC 12.2 (H) 4.5 - 11.5 x10(3)/mcL    RBC 4.57 (L) 4.70 - 6.10 x10(6)/mcL    Hgb 14.0 14.0 - 18.0 gm/dL    Hct 44.0 42.0 - 52.0 %    MCV 96.3 (H) 80.0 - 94.0 fL    MCH 30.6 27.0 - 31.0 pg    MCHC 31.8 (L) 33.0 - 36.0 mg/dL    RDW 15.3 11.5 - 17.0 %    Platelet 315 130 - 400 x10(3)/mcL    MPV 9.8 7.4 - 10.4 fL     Neut % 91.0 %    Lymph % 7.0 %    Mono % 1.4 %    Eos % 0.0 %    Basophil % 0.1 %    Lymph # 0.86 0.6 - 4.6 x10(3)/mcL    Neut # 11.1 (H) 2.1 - 9.2 x10(3)/mcL    Mono # 0.17 0.1 - 1.3 x10(3)/mcL    Eos # 0.00 0 - 0.9 x10(3)/mcL    Baso # 0.01 0 - 0.2 x10(3)/mcL    IG# 0.06 (H) 0 - 0.04 x10(3)/mcL    IG% 0.5 %    NRBC% 0.5 %   Comprehensive Metabolic Panel    Collection Time: 09/23/22  6:46 AM   Result Value Ref Range    Sodium Level 138 136 - 145 mmol/L    Potassium Level 5.1 3.5 - 5.1 mmol/L    Chloride 102 98 - 107 mmol/L    Carbon Dioxide 27 22 - 29 mmol/L    Glucose Level 208 (H) 74 - 100 mg/dL    Blood Urea Nitrogen 19.9 8.4 - 25.7 mg/dL    Creatinine 1.02 0.73 - 1.18 mg/dL    Calcium Level Total 10.0 8.4 - 10.2 mg/dL    Protein Total 7.3 6.4 - 8.3 gm/dL    Albumin Level 3.8 3.5 - 5.0 gm/dL    Globulin 3.5 2.4 - 3.5 gm/dL    Albumin/Globulin Ratio 1.1 1.1 - 2.0 ratio    Bilirubin Total 0.5 <=1.5 mg/dL    Alkaline Phosphatase 68 40 - 150 unit/L    Alanine Aminotransferase 13 0 - 55 unit/L    Aspartate Aminotransferase 12 5 - 34 unit/L    eGFR >60 mls/min/1.73/m2   POCT glucose    Collection Time: 09/23/22 10:35 AM   Result Value Ref Range    POCT Glucose 148 (H) 70 - 110 mg/dL   POCT glucose    Collection Time: 09/23/22  5:32 PM   Result Value Ref Range    POCT Glucose 134 (H) 70 - 110 mg/dL   Magnesium    Collection Time: 09/24/22  3:58 AM   Result Value Ref Range    Magnesium Level 2.00 1.60 - 2.60 mg/dL   Comprehensive Metabolic Panel    Collection Time: 09/24/22  3:58 AM   Result Value Ref Range    Sodium Level 137 136 - 145 mmol/L    Potassium Level 4.9 3.5 - 5.1 mmol/L    Chloride 99 98 - 107 mmol/L    Carbon Dioxide 26 22 - 29 mmol/L    Glucose Level 118 (H) 74 - 100 mg/dL    Blood Urea Nitrogen 28.6 (H) 8.4 - 25.7 mg/dL    Creatinine 1.18 0.73 - 1.18 mg/dL    Calcium Level Total 9.2 8.4 - 10.2 mg/dL    Protein Total 6.8 6.4 - 8.3 gm/dL    Albumin Level 3.6 3.5 - 5.0 gm/dL    Globulin 3.2 2.4 - 3.5  gm/dL    Albumin/Globulin Ratio 1.1 1.1 - 2.0 ratio    Bilirubin Total 0.4 <=1.5 mg/dL    Alkaline Phosphatase 64 40 - 150 unit/L    Alanine Aminotransferase 11 0 - 55 unit/L    Aspartate Aminotransferase 10 5 - 34 unit/L    eGFR >60 mls/min/1.73/m2   CBC with Differential    Collection Time: 09/24/22  3:58 AM   Result Value Ref Range    WBC 17.3 (H) 4.5 - 11.5 x10(3)/mcL    RBC 4.61 (L) 4.70 - 6.10 x10(6)/mcL    Hgb 14.2 14.0 - 18.0 gm/dL    Hct 44.8 42.0 - 52.0 %    MCV 97.2 (H) 80.0 - 94.0 fL    MCH 30.8 27.0 - 31.0 pg    MCHC 31.7 (L) 33.0 - 36.0 mg/dL    RDW 15.4 11.5 - 17.0 %    Platelet 277 130 - 400 x10(3)/mcL    MPV 10.1 7.4 - 10.4 fL    Neut % 92.8 %    Lymph % 4.3 %    Mono % 2.3 %    Eos % 0.0 %    Basophil % 0.1 %    Lymph # 0.75 0.6 - 4.6 x10(3)/mcL    Neut # 16.1 (H) 2.1 - 9.2 x10(3)/mcL    Mono # 0.39 0.1 - 1.3 x10(3)/mcL    Eos # 0.00 0 - 0.9 x10(3)/mcL    Baso # 0.02 0 - 0.2 x10(3)/mcL    IG# 0.08 (H) 0 - 0.04 x10(3)/mcL    IG% 0.5 %    NRBC% 0.2 %       Telemetry:  Atrial Fibrillation RVR    Physical Exam  Vitals reviewed.   Constitutional:       Appearance: Normal appearance.   Cardiovascular:      Rate and Rhythm: Tachycardia present. Rhythm irregular.      Pulses: Normal pulses.      Heart sounds: Normal heart sounds.   Pulmonary:      Effort: Pulmonary effort is normal.      Comments: BBS diminished = bilateral.  Abdominal:      General: Abdomen is flat. Bowel sounds are normal.      Palpations: Abdomen is soft.   Musculoskeletal:         General: Normal range of motion.   Skin:     General: Skin is warm and dry.      Capillary Refill: Capillary refill takes less than 2 seconds.   Neurological:      Mental Status: He is alert and oriented to person, place, and time.       Current Inpatient Medications:    Current Facility-Administered Medications:     acetaminophen tablet 650 mg, 650 mg, Oral, Q8H PRN, YULIA Yao    acetaminophen tablet 650 mg, 650 mg, Oral, Q4H PRN, Yogi Smith,  PA    benzonatate capsule 100 mg, 100 mg, Oral, TID PRN, YULIA Yao, 100 mg at 09/23/22 1315    carvediloL tablet 3.125 mg, 3.125 mg, Oral, BID, Indigo B Lebas, FNP, 3.125 mg at 09/24/22 0851    dextrose 50% injection 12.5 g, 12.5 g, Intravenous, PRN, YULIA Yao    dextrose 50% injection 25 g, 25 g, Intravenous, PRN, YULIA Yao    digoxin tablet 0.25 mg, 0.25 mg, Oral, Daily, Indigo B Lebas, FNP, 0.25 mg at 09/24/22 0851    diltiaZEM 125 mg in dextrose 5 % 125 mL IVPB (ready to mix system) (titrating), 0-15 mg/hr, Intravenous, Continuous, Indigo B Lebas, FNP, Last Rate: 15 mL/hr at 09/23/22 1315, 15 mg/hr at 09/23/22 1315    diltiaZEM 24 hr capsule 360 mg, 360 mg, Oral, Daily, Indigo B Lebas, FNP, 360 mg at 09/24/22 0851    furosemide injection 80 mg, 80 mg, Intravenous, BID, Indigo B Lebas, FNP, 80 mg at 09/23/22 2045    glucagon (human recombinant) injection 1 mg, 1 mg, Intramuscular, PRN, YULIA Yao    glucose chewable tablet 16 g, 16 g, Oral, PRN, YULIA Yao    glucose chewable tablet 24 g, 24 g, Oral, PRN, YULIA Yao    insulin aspart U-100 injection 1-10 Units, 1-10 Units, Subcutaneous, QID (AC + HS) PRN, YULIA Yao, 2 Units at 09/22/22 2114    levalbuterol nebulizer solution 0.63 mg, 0.63 mg, Nebulization, Q4H, David Pires MD, 0.63 mg at 09/24/22 0805    lisinopriL tablet 20 mg, 20 mg, Oral, Daily, Stella Salazar MD, 20 mg at 09/24/22 0851    methylPREDNISolone sodium succinate injection 60 mg, 60 mg, Intravenous, Q12H, David Pires MD    ondansetron injection 4 mg, 4 mg, Intravenous, Q8H PRN, YULIA Yao    oxyCODONE-acetaminophen  mg per tablet 1 tablet, 1 tablet, Oral, Q4H PRN, CHRIS Murphy, 1 tablet at 09/24/22 0851    rivaroxaban tablet 20 mg, 20 mg, Oral, Daily with dinner, Stella Salazar MD, 20 mg at 09/23/22 8771    VTE Risk Mitigation (From admission, onward)           Ordered     rivaroxaban tablet  20 mg  With dinner         09/22/22 1147     IP VTE HIGH RISK PATIENT  Once         09/22/22 0742     Place sequential compression device  Until discontinued         09/22/22 0742                    Assessment:   Chronic Atrial Fib RVR   -CHADS-Vasc 3  HTN  Acute on Chronic HF exacerbation (EF 40-45%)  Hx Cocaine use  COPD on Home O2 2L NC  Acute on Chronic Resp Failure  DM      Plan:     -Admitted to Hospitalist service, continue care per primary team  -CIS consulted for A-fib w/RVR  -Patient questionable compliance, home cardiac regimen includes: diltiazem  mg daily, digoxin 0.25 mg daily, Xarelto 20 mg daily; lisinopril 20 mg daily, Lasix 40 mg PO  -Dig level below therapeutic range at 0.19 (0.8-2 normal)  -Wean Cardizem gtt   Continue diltiazem oral to 360 mg po daily and continue dig 0.25 mg; Wean diltiazem gtt as tolerated  -Continue lasix to 80 mg IV BID  Continue home lisinopril 20 mg daily  Continue Coreg 3.125 mg po bid  -CHADSVASC2 score: 3  -Continue Xarelto 20 mg daily  -Patient educated on need for medication compliance and thoroughly on need to stop cocaine use as this could contribute to arrhythmia  Verbalized understanding on both.   CMP/Mag in am.       I,Taiwo Peterson MD,performed the substantive portion of this visit. I had a face-to-face time with the patient on 9/24/22. I reviewed and agree with the nurse practitioner's history, physical exam and plan of care.       Physical exam:    CV:Irreg, irreg; no m/r/g  Resp: CTA jazzmine, no w/r/r  Extremities:No C/C     Medical decision making:     Continue to titrate rate control and resume full AC tx for AF.  Emphasized compliance w/ meds and cessation of illicit drugs.    CHRIS Landa  Cardiology  [facility] Tele[facility]  09/24/2022

## 2022-09-25 NOTE — PROGRESS NOTES
"[facility] Medical Telemetry  Cardiology  Progress Note    Patient Name: Vladimir Bernal  MRN: [id number]  Admission Date: 9/22/2022  Hospital Length of Stay: 3 days  Code Status: Full Code   Attending Physician: David Pires MD   Primary Care Physician: Sophie Navarrete MD  Expected Discharge Date: 9/25/2022  Principal Problem:Atrial fibrillation with RVR    Subjective:     Brief HPI:   Mr. Bernal is a 52 y.o.  male with a PMH significant for paroxysmal a-fib/flutter on Xarelto, HTN, HFrEF (EF 40-45%), COPD, DM Type II, KRISHNA on CPAP, and cocaine use as well as questionable medication compliance who reported to [facility] ED this morning for SOB associated with palpitations x3 days not relieved by his albuterol rescue inhaler which he's been using up to 4x daily as well as his Spiriva. He has been hospitalized several times this past year, with most recent last month for similar complaint, states he doesn't always take his medications, notably his Lasix "fluid pill." Denies chest pain, palpitations, nausea/vomiting, diaphoresis. TTE last stay 8/19/22 showed EF 40-45% with mild pulm HTN. Patient unsure of which medications he takes, states his doctor has switched them recently and that he has an appointment for new PCP at [facility] next Thursday. Pt states he still occasionally does cocaine, last used 10 days ago, UDS pending.      EKG in ED found patient to be in A-fib with RVR, labs showed subtherapeutic digoxin level and so patient given home dose of oral digoxin 0.25 mg x1, home dose Cardizem 240 mg ER oral, and started on Diltiazem gtt currently at 15 mg/hr.  HR currently in 90s sustained, hemodynamically stable. , trop negative, CXR shows mild cardiopulmonary congestion which appears chronic in nature.    9.23.22: Atrial fibrillation with 's. Patient has c/o SOB and back pain.  9.24.22: Atrial fibrillation with HR better controlled on cardizem gtt. Patient stated SOB " has improved.   9.25.22: A fib with CVR. Patient denies CP/SOB/palps.    Hospital Course:   PMH: Atrial flutter on Xarelto, COPD, HTN, DM II, KRISHNA on CPAP  PSH: mandibular fx repair  Family History: HTN, DM  Social History: Ex smoker (quit 20+ years ago), occasional alcohol use, +occasional cocaine use     Previous Cardiac Diagnostics:   TTE 8/19/22:   The left ventricle is normal in size with concentric remodeling and mildly decreased systolic function.  The estimated ejection fraction is 40-45%.  There is mild left ventricular global hypokinesis.  Grade I left ventricular diastolic dysfunction.  Moderate right ventricular enlargement with mildly reduced right ventricular systolic function.  There is mild pulmonary hypertension.  The estimated PA systolic pressure is 44 mmHg.  Elevated central venous pressure (15 mmHg).       Review of Systems   Musculoskeletal:  Positive for back pain.   All other systems reviewed and are negative.    Objective:     Vital Signs (Most Recent):  Temp: 97.4 °F (36.3 °C) (09/25/22 0821)  Pulse: 95 (09/25/22 0821)  Resp: 18 (09/25/22 0922)  BP: 96/65 (09/25/22 0921)  SpO2: (!) 90 % (09/25/22 0821)   Vital Signs (24h Range):  Temp:  [97.2 °F (36.2 °C)-97.7 °F (36.5 °C)] 97.4 °F (36.3 °C)  Pulse:  [] 95  Resp:  [18-22] 18  SpO2:  [90 %-100 %] 90 %  BP: ()/(54-75) 96/65     Weight: (!) 158.3 kg (348 lb 15.8 oz)  Body mass index is 47.32 kg/m².    SpO2: (!) 90 %  O2 Device (Oxygen Therapy): nasal cannula      Intake/Output Summary (Last 24 hours) at 9/25/2022 0957  Last data filed at 9/25/2022 0800  Gross per 24 hour   Intake 1200 ml   Output 5750 ml   Net -4550 ml         Lines/Drains/Airways       Peripheral Intravenous Line  Duration                  Peripheral IV - Single Lumen 09/22/22 0427 20 G Right Wrist 3 days                    Significant Labs:   Recent Results (from the past 72 hour(s))   Magnesium    Collection Time: 09/22/22 10:48 AM   Result Value Ref Range     Magnesium Level 1.80 1.60 - 2.60 mg/dL   POCT Glucose, Hand-Held Device    Collection Time: 09/22/22 11:00 AM   Result Value Ref Range    POC Glucose 193 (A) 70 - 110 MG/DL   POCT glucose    Collection Time: 09/22/22 11:08 AM   Result Value Ref Range    POCT Glucose 193 (H) 70 - 110 mg/dL   Drug Screen, Urine    Collection Time: 09/22/22 11:50 AM   Result Value Ref Range    Amphetamines, Urine Negative Negative    Barbituates, Urine Negative Negative    Benzodiazepine, Urine Negative Negative    Cannabinoids, Urine Negative Negative    Cocaine, Urine Positive (A) Negative    Fentanyl, Urine Negative Negative    MDMA, Urine Negative Negative    Opiates, Urine Negative Negative    Phencyclidine, Urine Negative Negative    pH, Urine 7.5 3.0 - 11.0    Specific Gravity, Urine Auto 1.017 1.001 - 1.035   POCT Glucose, Hand-Held Device    Collection Time: 09/22/22  5:17 PM   Result Value Ref Range    POC Glucose 167 (A) 70 - 110 MG/DL   POCT glucose    Collection Time: 09/22/22  6:16 PM   Result Value Ref Range    POCT Glucose 167 (H) 70 - 110 mg/dL   POCT glucose    Collection Time: 09/22/22  8:50 PM   Result Value Ref Range    POCT Glucose 192 (H) 70 - 110 mg/dL   CBC with Differential    Collection Time: 09/23/22  6:45 AM   Result Value Ref Range    WBC 12.2 (H) 4.5 - 11.5 x10(3)/mcL    RBC 4.57 (L) 4.70 - 6.10 x10(6)/mcL    Hgb 14.0 14.0 - 18.0 gm/dL    Hct 44.0 42.0 - 52.0 %    MCV 96.3 (H) 80.0 - 94.0 fL    MCH 30.6 27.0 - 31.0 pg    MCHC 31.8 (L) 33.0 - 36.0 mg/dL    RDW 15.3 11.5 - 17.0 %    Platelet 315 130 - 400 x10(3)/mcL    MPV 9.8 7.4 - 10.4 fL    Neut % 91.0 %    Lymph % 7.0 %    Mono % 1.4 %    Eos % 0.0 %    Basophil % 0.1 %    Lymph # 0.86 0.6 - 4.6 x10(3)/mcL    Neut # 11.1 (H) 2.1 - 9.2 x10(3)/mcL    Mono # 0.17 0.1 - 1.3 x10(3)/mcL    Eos # 0.00 0 - 0.9 x10(3)/mcL    Baso # 0.01 0 - 0.2 x10(3)/mcL    IG# 0.06 (H) 0 - 0.04 x10(3)/mcL    IG% 0.5 %    NRBC% 0.5 %   Comprehensive Metabolic Panel     Collection Time: 09/23/22  6:46 AM   Result Value Ref Range    Sodium Level 138 136 - 145 mmol/L    Potassium Level 5.1 3.5 - 5.1 mmol/L    Chloride 102 98 - 107 mmol/L    Carbon Dioxide 27 22 - 29 mmol/L    Glucose Level 208 (H) 74 - 100 mg/dL    Blood Urea Nitrogen 19.9 8.4 - 25.7 mg/dL    Creatinine 1.02 0.73 - 1.18 mg/dL    Calcium Level Total 10.0 8.4 - 10.2 mg/dL    Protein Total 7.3 6.4 - 8.3 gm/dL    Albumin Level 3.8 3.5 - 5.0 gm/dL    Globulin 3.5 2.4 - 3.5 gm/dL    Albumin/Globulin Ratio 1.1 1.1 - 2.0 ratio    Bilirubin Total 0.5 <=1.5 mg/dL    Alkaline Phosphatase 68 40 - 150 unit/L    Alanine Aminotransferase 13 0 - 55 unit/L    Aspartate Aminotransferase 12 5 - 34 unit/L    eGFR >60 mls/min/1.73/m2   POCT glucose    Collection Time: 09/23/22 10:35 AM   Result Value Ref Range    POCT Glucose 148 (H) 70 - 110 mg/dL   POCT glucose    Collection Time: 09/23/22  5:32 PM   Result Value Ref Range    POCT Glucose 134 (H) 70 - 110 mg/dL   Magnesium    Collection Time: 09/24/22  3:58 AM   Result Value Ref Range    Magnesium Level 2.00 1.60 - 2.60 mg/dL   Comprehensive Metabolic Panel    Collection Time: 09/24/22  3:58 AM   Result Value Ref Range    Sodium Level 137 136 - 145 mmol/L    Potassium Level 4.9 3.5 - 5.1 mmol/L    Chloride 99 98 - 107 mmol/L    Carbon Dioxide 26 22 - 29 mmol/L    Glucose Level 118 (H) 74 - 100 mg/dL    Blood Urea Nitrogen 28.6 (H) 8.4 - 25.7 mg/dL    Creatinine 1.18 0.73 - 1.18 mg/dL    Calcium Level Total 9.2 8.4 - 10.2 mg/dL    Protein Total 6.8 6.4 - 8.3 gm/dL    Albumin Level 3.6 3.5 - 5.0 gm/dL    Globulin 3.2 2.4 - 3.5 gm/dL    Albumin/Globulin Ratio 1.1 1.1 - 2.0 ratio    Bilirubin Total 0.4 <=1.5 mg/dL    Alkaline Phosphatase 64 40 - 150 unit/L    Alanine Aminotransferase 11 0 - 55 unit/L    Aspartate Aminotransferase 10 5 - 34 unit/L    eGFR >60 mls/min/1.73/m2   CBC with Differential    Collection Time: 09/24/22  3:58 AM   Result Value Ref Range    WBC 17.3 (H) 4.5 - 11.5  x10(3)/mcL    RBC 4.61 (L) 4.70 - 6.10 x10(6)/mcL    Hgb 14.2 14.0 - 18.0 gm/dL    Hct 44.8 42.0 - 52.0 %    MCV 97.2 (H) 80.0 - 94.0 fL    MCH 30.8 27.0 - 31.0 pg    MCHC 31.7 (L) 33.0 - 36.0 mg/dL    RDW 15.4 11.5 - 17.0 %    Platelet 277 130 - 400 x10(3)/mcL    MPV 10.1 7.4 - 10.4 fL    Neut % 92.8 %    Lymph % 4.3 %    Mono % 2.3 %    Eos % 0.0 %    Basophil % 0.1 %    Lymph # 0.75 0.6 - 4.6 x10(3)/mcL    Neut # 16.1 (H) 2.1 - 9.2 x10(3)/mcL    Mono # 0.39 0.1 - 1.3 x10(3)/mcL    Eos # 0.00 0 - 0.9 x10(3)/mcL    Baso # 0.02 0 - 0.2 x10(3)/mcL    IG# 0.08 (H) 0 - 0.04 x10(3)/mcL    IG% 0.5 %    NRBC% 0.2 %   POCT glucose    Collection Time: 09/24/22 11:58 AM   Result Value Ref Range    POCT Glucose 108 70 - 110 mg/dL   POCT Glucose, Hand-Held Device    Collection Time: 09/24/22 12:29 PM   Result Value Ref Range    POC Glucose 108 70 - 110 MG/DL   POCT glucose    Collection Time: 09/24/22  5:14 PM   Result Value Ref Range    POCT Glucose 122 (H) 70 - 110 mg/dL   POCT Glucose, Hand-Held Device    Collection Time: 09/24/22  5:22 PM   Result Value Ref Range    POC Glucose 122 (A) 70 - 110 MG/DL   POCT glucose    Collection Time: 09/24/22  8:13 PM   Result Value Ref Range    POCT Glucose 167 (H) 70 - 110 mg/dL   POCT glucose    Collection Time: 09/25/22  5:09 AM   Result Value Ref Range    POCT Glucose 110 70 - 110 mg/dL   Basic Metabolic Panel    Collection Time: 09/25/22  6:33 AM   Result Value Ref Range    Sodium Level 137 136 - 145 mmol/L    Potassium Level 4.4 3.5 - 5.1 mmol/L    Chloride 98 98 - 107 mmol/L    Carbon Dioxide 29 22 - 29 mmol/L    Glucose Level 107 (H) 74 - 100 mg/dL    Blood Urea Nitrogen 27.0 (H) 8.4 - 25.7 mg/dL    Creatinine 0.98 0.73 - 1.18 mg/dL    BUN/Creatinine Ratio 28     Calcium Level Total 9.2 8.4 - 10.2 mg/dL    Anion Gap 10.0 mEq/L    eGFR >60 mls/min/1.73/m2   Magnesium    Collection Time: 09/25/22  6:33 AM   Result Value Ref Range    Magnesium Level 2.00 1.60 - 2.60 mg/dL   CBC with  Differential    Collection Time: 09/25/22  6:33 AM   Result Value Ref Range    WBC 12.8 (H) 4.5 - 11.5 x10(3)/mcL    RBC 4.69 (L) 4.70 - 6.10 x10(6)/mcL    Hgb 14.7 14.0 - 18.0 gm/dL    Hct 45.5 42.0 - 52.0 %    MCV 97.0 (H) 80.0 - 94.0 fL    MCH 31.3 (H) 27.0 - 31.0 pg    MCHC 32.3 (L) 33.0 - 36.0 mg/dL    RDW 15.4 11.5 - 17.0 %    Platelet 324 130 - 400 x10(3)/mcL    MPV 9.9 7.4 - 10.4 fL    Neut % 85.7 %    Lymph % 7.7 %    Mono % 5.9 %    Eos % 0.0 %    Basophil % 0.1 %    Lymph # 0.99 0.6 - 4.6 x10(3)/mcL    Neut # 11.0 (H) 2.1 - 9.2 x10(3)/mcL    Mono # 0.76 0.1 - 1.3 x10(3)/mcL    Eos # 0.00 0 - 0.9 x10(3)/mcL    Baso # 0.01 0 - 0.2 x10(3)/mcL    IG# 0.08 (H) 0 - 0.04 x10(3)/mcL    IG% 0.6 %    NRBC% 0.2 %       Telemetry:  Atrial Fibrillation CVR    Physical Exam  Vitals reviewed.   Constitutional:       Appearance: Normal appearance.   Cardiovascular:      Rate and Rhythm: Normal rate. Rhythm irregular.      Pulses: Normal pulses.      Heart sounds: Normal heart sounds.   Pulmonary:      Effort: Pulmonary effort is normal.      Breath sounds: Normal breath sounds.   Abdominal:      General: Abdomen is flat. Bowel sounds are normal.      Palpations: Abdomen is soft.   Musculoskeletal:         General: Normal range of motion.   Skin:     General: Skin is warm and dry.      Capillary Refill: Capillary refill takes less than 2 seconds.   Neurological:      Mental Status: He is alert and oriented to person, place, and time.       Current Inpatient Medications:    Current Facility-Administered Medications:     acetaminophen tablet 650 mg, 650 mg, Oral, Q8H PRN, YULIA Yao    acetaminophen tablet 650 mg, 650 mg, Oral, Q4H PRN, YULIA Yao    benzonatate capsule 100 mg, 100 mg, Oral, TID PRN, YULIA Yao, 100 mg at 09/24/22 1312    carvediloL tablet 3.125 mg, 3.125 mg, Oral, BID, Indigo B CHRIS Caballero, 3.125 mg at 09/25/22 0921    dextrose 50% injection 12.5 g, 12.5 g, Intravenous, PRN, Yogi  YULIA Bertrand    dextrose 50% injection 25 g, 25 g, Intravenous, PRN, YULIA Yao    digoxin tablet 0.25 mg, 0.25 mg, Oral, Daily, Indigo B Lebas, FNP, 0.25 mg at 09/25/22 0922    diltiaZEM 125 mg in dextrose 5 % 125 mL IVPB (ready to mix system) (titrating), 0-15 mg/hr, Intravenous, Continuous, Indigo B Lebas, FNP, Last Rate: 15 mL/hr at 09/23/22 1315, 15 mg/hr at 09/23/22 1315    diltiaZEM 24 hr capsule 360 mg, 360 mg, Oral, Daily, Indigo B Lebas, FNP, 360 mg at 09/25/22 0921    furosemide injection 80 mg, 80 mg, Intravenous, BID, Indigo B Lebas, FNP, 80 mg at 09/25/22 0923    glucagon (human recombinant) injection 1 mg, 1 mg, Intramuscular, PRN, YULIA Yao    glucose chewable tablet 16 g, 16 g, Oral, PRN, YULIA Yao    glucose chewable tablet 24 g, 24 g, Oral, PRN, YULIA Yao    insulin aspart U-100 injection 1-10 Units, 1-10 Units, Subcutaneous, QID (AC + HS) PRN, YULIA Yao, 1 Units at 09/24/22 2016    levalbuterol nebulizer solution 0.63 mg, 0.63 mg, Nebulization, Q4H, David Pires MD, 0.63 mg at 09/25/22 0722    lisinopriL tablet 20 mg, 20 mg, Oral, Daily, Stella Salazar MD, 20 mg at 09/25/22 0921    methylPREDNISolone sodium succinate injection 40 mg, 40 mg, Intravenous, Daily, Tami Crews MD, 40 mg at 09/25/22 0923    ondansetron injection 4 mg, 4 mg, Intravenous, Q8H PRN, YULIA Yao    oxyCODONE-acetaminophen  mg per tablet 1 tablet, 1 tablet, Oral, Q4H PRN, Indigo B Lebas, FNP, 1 tablet at 09/25/22 0922    rivaroxaban tablet 20 mg, 20 mg, Oral, Daily with dinner, Stella Salazar MD, 20 mg at 09/24/22 1632    VTE Risk Mitigation (From admission, onward)           Ordered     rivaroxaban tablet 20 mg  With dinner         09/22/22 1147     IP VTE HIGH RISK PATIENT  Once         09/22/22 0742     Place sequential compression device  Until discontinued         09/22/22 0742                    Assessment:   Chronic Atrial Fib  CVR   -CHADS-Vasc 3  HTN  Acute on Chronic HF exacerbation (EF 40-45%)  Hx Cocaine use  COPD on Home O2 2L NC  Acute on Chronic Resp Failure  DM      Plan:     -Admitted to Hospitalist service, continue care per primary team  -CIS consulted for A-fib w/RVR-now CVR  -Patient questionable compliance, home cardiac regimen includes: diltiazem  mg daily, digoxin 0.25 mg daily, Xarelto 20 mg daily; lisinopril 20 mg daily, Lasix 40 mg PO  -Dig level below therapeutic range at 0.19 (0.8-2 normal  Cardizem gtt off  Continue diltiazem oral to 360 mg po daily and continue dig 0.25 mg; Wean diltiazem gtt as tolerated  -Transition lasix to 40 mg po BID  Continue home lisinopril 20 mg daily  Continue Coreg 3.125 mg po bid  -CHADSVASC2 score: 3  -Continue Xarelto 20 mg daily  -Patient educated on need for medication compliance and thoroughly on need to stop cocaine use as this could contribute to arrhythmia  Verbalized understanding on both.   Follow up with Dr. Bellamy in 5-7 days  Will be available as needed.           Indigo Rockwell, CHRIS  Cardiology  [facility] - [address] [place], [place] [address]etry  09/25/2022

## 2022-09-25 NOTE — DISCHARGE SUMMARY
[facility] Hospital Medicine Discharge Summary    Admit Date: 9/22/2022  Discharge Date and Time: 9/25/20228:54 AM  Admitting Physician:  Team  Discharging Physician: Tami Crews MD.  Primary Care Physician: Sophie Navarrete MD  Consults: Cardiology    Discharge Diagnoses:  A fib RVR requiring Cardizem drip  Acute exacerbation of chronic HFrEF ( 40-45% on 8/19/2022)  Mild COPD exacerbation-home oxygen dependent  Acute on chronic hypoxemic respiratory failure-multifactorial secondary to above  Medication noncompliance  Type 2 diabetes  Hypertension  Hyperlipidemia  History of cocaine use    Hospital Course:   Vladimir Bernal is a 52 y.o. male with a past medical history of atrial fibrillation, COPD on home oxygen 2 L/min, hypertension, hyperlipidemia, diabetes mellitus, cocaine abuse (las tused x1.5 weeks ago) and HFrEF (LVEF 40-45% on 08/19/2022) who presented to [facility] on 9/22/2022 with complaints of progressively worsening shortness of breath mostly with exertion that is associated with a dry cough over the past x3 days. He also states that he developed palpitations and generalized abdominal pain last night. He was using nebulizer treatments and his inhaler every 6 hours over the past x3 days with minimal improvement.  He denies sick contacts, recent travel, or eating any abnormal foods.  He denies chest pain, fever, N/V/D, lower extremity edema, or dysuria. He states that he has been compliant with all his prescribed medications as well as his CPAP for his KRISHNA.  His initial vital signs showed that he was afebrile, , RR 17, /104, and SpO2 96% on room air that decreased to 93% so he was placed on nasal cannula 2 L/min.  His labs showed a macrocytic anemia with a stable H&H and a .3.  CXR showed no acute findings.  He was noted to be in AFib RVR so he was placed on a Cardizem drip and given digoxin 0.25 mg x 1 dose.  He also received methylprednisolone 125 mg  IV x1 dose and DuoNebs.   Cardiology has been consulted   Lasix was increased to 80 mg iv bid   Po cardizem was increased to 360 mg qd   9.24.22: Atrial fibrillation with HR better controlled on cardizem gtt. Patient stated SOB has improved. Started weaning him off of cardizem drip  Pt was seen and examined on the day of discharge, off of cardizem drip, reports feeling much better except for cough , chronic dry , but overall clinical status has improved and he remains on supplemental; o2 same as home  Counselled on medication adherance  Refilled meds   Lasix changed to 40 mg bid and cardizem increased to 360 mg qd  Cardiology cleared him  Discharged pt home in a stable condition, close f/u w pcp and cardiology in 1-2 weeks   Vitals:  VITAL SIGNS: 24 HRS MIN & MAX LAST   Temp  Min: 97.2 °F (36.2 °C)  Max: 97.7 °F (36.5 °C) 97.4 °F (36.3 °C)   BP  Min: 84/54  Max: 114/74 96/75   Pulse  Min: 71  Max: 106  95   Resp  Min: 18  Max: 22 18   SpO2  Min: 90 %  Max: 100 % (!) 90 %         Physical Exam:  Vitals reviewed.   Constitutional:       Appearance: Normal appearance.   Cardiovascular:      Rate and Rhythm: Tachycardia present. Rhythm irregular.      Pulses: Normal pulses.      Heart sounds: Normal heart sounds.   Pulmonary:      Effort: Pulmonary effort is normal.      Comments: BBS diminished = bilateral.  Abdominal:      General: Abdomen is flat. Bowel sounds are normal.      Palpations: Abdomen is soft.   Musculoskeletal:         General: Normal range of motion.   Skin:     General: Skin is warm and dry.      Capillary Refill: Capillary refill takes less than 2 seconds.   Neurological:      Mental Status: He is alert and oriented to person, place, and time.     Procedures Performed: No admission procedures for hospital encounter.     Significant Diagnostic Studies: See Full reports for all details    Recent Labs   Lab 09/22/22  0428 09/23/22  0645 09/24/22  0358   WBC 5.5 12.2* 17.3*   RBC 4.45* 4.57* 4.61*   HGB  13.6* 14.0 14.2   HCT 43.3 44.0 44.8   MCV 97.3* 96.3* 97.2*   MCH 30.6 30.6 30.8   MCHC 31.4* 31.8* 31.7*   RDW 15.3 15.3 15.4    315 277   MPV 9.7 9.8 10.1       Recent Labs   Lab 09/22/22  0428 09/22/22  1048 09/23/22  0646 09/24/22  0358 09/25/22  0633     --  138 137 137   K 4.5  --  5.1 4.9 4.4   CO2 25  --  27 26 29   BUN 19.2  --  19.9 28.6* 27.0*   CREATININE 1.08  --  1.02 1.18 0.98   CALCIUM 9.4  --  10.0 9.2 9.2   MG  --  1.80  --  2.00 2.00   ALBUMIN 3.6  --  3.8 3.6  --    ALKPHOS 64  --  68 64  --    ALT 17  --  13 11  --    AST 17  --  12 10  --    BILITOT 0.6  --  0.5 0.4  --         Microbiology Results (last 7 days)       ** No results found for the last 168 hours. **             X-Ray Chest 1 View  Narrative: EXAMINATION:  XR CHEST 1 VIEW    CLINICAL HISTORY:  Shortness of breath    TECHNIQUE:  Single frontal view of the chest was performed.    COMPARISON:  08/18/2022    FINDINGS:  There is linear density on the right stable from the previous study.  Heart is borderline in size.  Costophrenic angles are clear.  No acute infiltrates are seen.  Impression: No acute abnormalities are demonstrated.    Electronically signed by: Silviano Manning MD  Date:    09/22/2022  Time:    06:46         Medication List        START taking these medications      carvediloL 3.125 MG tablet  Commonly known as: COREG  Take 1 tablet (3.125 mg total) by mouth 2 (two) times daily.     levalbuterol 0.63 mg/3 mL nebulizer solution  Commonly known as: XOPENEX  Take 3 mLs (0.63 mg total) by nebulization every 4 (four) hours. Rescue  Replaces: levalbuterol 1.25 mg/3 mL nebulizer solution     methylPREDNISolone 4 mg tablet  Commonly known as: MEDROL DOSEPACK  use as directed     promethazine-dextromethorphan 6.25-15 mg/5 mL Syrp  Commonly known as: PROMETHAZINE-DM  Take 5 mLs by mouth every 8 (eight) hours as needed.            CHANGE how you take these medications      diltiaZEM 360 MG 24 hr capsule  Commonly known  as: CARDIZEM CD  Take 1 capsule (360 mg total) by mouth once daily.  What changed:   medication strength  how much to take     furosemide 40 MG tablet  Commonly known as: LASIX  Take 1 tablet (40 mg total) by mouth 2 (two) times daily.  What changed: when to take this            CONTINUE taking these medications      atorvastatin 80 MG tablet  Commonly known as: LIPITOR  Take 1 tablet (80 mg total) by mouth once daily.     budesonide-formoterol 160-4.5 mcg 160-4.5 mcg/actuation Hfaa  Commonly known as: SYMBICORT  Inhale 2 puffs into the lungs every 12 (twelve) hours. Controller     butalbital-acetaminophen-caffeine -40 mg -40 mg per tablet  Commonly known as: FIORICET, ESGIC     digoxin 250 mcg tablet  Commonly known as: LANOXIN  Take 1 tablet (0.25 mg total) by mouth once daily.     gabapentin 300 MG capsule  Commonly known as: NEURONTIN     lisinopriL 20 MG tablet  Commonly known as: PRINIVIL,ZESTRIL  Take 1 tablet (20 mg total) by mouth once daily.     metFORMIN 500 MG ER 24hr tablet  Commonly known as: GLUCOPHAGE-XR     pantoprazole 40 MG tablet  Commonly known as: PROTONIX     rivaroxaban 10 mg Tab  Commonly known as: XARELTO  Take 2 tablets (20 mg total) by mouth daily with dinner or evening meal.     tadalafiL 5 MG tablet  Commonly known as: CIALIS     tiotropium 18 mcg inhalation capsule  Commonly known as: SPIRIVA  Inhale 1 capsule (18 mcg total) into the lungs once daily. Controller            STOP taking these medications      albuterol 0.63 mg/3 mL Nebu  Commonly known as: ACCUNEB     amiodarone 200 MG Tab  Commonly known as: PACERONE     levalbuterol 1.25 mg/3 mL nebulizer solution  Commonly known as: XOPENEX  Replaced by: levalbuterol 0.63 mg/3 mL nebulizer solution     metoprolol succinate 100 MG 24 hr tablet  Commonly known as: TOPROL-XL     spironolactone 25 MG tablet  Commonly known as: ALDACTONE               Where to Get Your Medications        These medications were sent to [id number] - [place] - [address] AT [facility] [facility] [address] [facility] () &  [address] [place] [address]      Phone: [phone]   atorvastatin 80 MG tablet  budesonide-formoterol 160-4.5 mcg 160-4.5 mcg/actuation Hfaa  carvediloL 3.125 MG tablet  digoxin 250 mcg tablet  diltiaZEM 360 MG 24 hr capsule  furosemide 40 MG tablet  levalbuterol 0.63 mg/3 mL nebulizer solution  lisinopriL 20 MG tablet  methylPREDNISolone 4 mg tablet  promethazine-dextromethorphan 6.25-15 mg/5 mL Syrp  rivaroxaban 10 mg Tab          Explained in detail to the patient about the discharge plan, medications, and follow-up visits. Pt understands and agrees with the treatment plan  Discharge Disposition: Home or Self Care   Discharged Condition: stable  Diet- cardiac diet  Dietary Orders (From admission, onward)       Start     Ordered    09/22/22 0748  Diet heart healthy Diabetic  Diet effective now        Question:  Diet Modifier:  Answer:  Diabetic    09/22/22 0747                   Medications Per ID med rec  Activities as tolerated    For further questions contact hospitalist office    Discharge time 33 minutes    For worsening symptoms, chest pain, shortness of breath, increased abdominal pain, high grade fever, stroke or stroke like symptoms, immediately go to the nearest Emergency Room or call 911 as soon as possible.      Tami Farrar M.D, on 9/25/2022. at 8:54 AM.

## 2022-09-26 NOTE — PROGRESS NOTES
C3 nurse attempted to contact Vladimir Larsonews for a TCC post hospital discharge follow up call. No answer, left VM, CB# provided.  The patient has a scheduled HOSFU appointment with [facility] on 9/29 @ 1862.

## 2022-09-29 NOTE — PHYSICIAN QUERY
PT Name: Vladimir Bernal  MR #: [id number]     DOCUMENTATION CLARIFICATION     CDS/: Mariely Love RN, CCDS             Contact information: [email or web address]  This form is a permanent document in the medical record.     Query Date: September 29, 2022    By submitting this query, we are merely seeking further clarification of documentation.  Please utilize your independent clinical judgment when addressing the question(s) below.  The Medical Record contains the following   Indicators   Supporting Clinical Findings Location in Medical Record   X SOB, HILL, Wheezing, Productive Cough, Use of Accessory Muscles, etc. SOB x 3 days  Tachypnea 9/22 ed note   X RR         ABGs         O2 sat  SpO2 96% on room air that decreased to 93% so he was placed on nasal cannula 2 L/min.  9/22 h/p    Hypoxia/Hypercapnia      BiPAP/Intubation/Mechanical Ventilation     X Supplemental O2 Placed on 2 L NC 9/22 h/p   X Home O2, Oxygen Dependence  COPD on home oxygen 2 L/min, 9/22 h/p   X Respiratory distress or failure He is respiratory distress, (Mild)    acute on chronic hypoxic resp failure    Acute on Chronic Resp Failure 9/22 ed note    9/22 h/p      9/22 cards note    Radiology findings     X Acute/Chronic Illness A fib RVR requiring Cardizem drip  Acute exacerbation of chronic HFrEF   Mild COPD exacerbation-home oxygen dependent 9/24 prog note   X Treatment He also received methylprednisolone 125 mg IV x1 dose and DuoNebs.     Continue lasix to 80 mg IV BID 9/22 h/p        9/24 prog note    Other         The noted clinical guidelines are only system guidelines and do not replace the providers clinical judgment.    Provider, please specify the diagnosis or diagnoses associated with above clinical findings.   [  x  ] Acute and (on) Chronic Respiratory Failure with Hypoxia - pO2 >10 mmHg below baseline or SpO2 < 91% on usual home O2 or O2 ? 2L/min over baseline home O2 and respiratory symptoms documented     [  x  ] Chronic  Respiratory Failure with Hypoxia - Continuous home oxygen use     [    ] Other Respiratory Diagnosis (please specify): _________________   [   ] Clinically Undetermined       Please document in your progress notes daily for the duration of treatment until resolved and include in your discharge summary.     Form No. [id number]

## 2022-09-29 NOTE — PHYSICIAN QUERY
PT Name: Vladimir Bernal  MR #: [id number]    DOCUMENTATION CLARIFICATION     CDS/: Mariely Love  RN, CCDS             Contact information: [email or web address]     This form is a permanent document in the medical record.     Query Date: September 29, 2022    By submitting this query, we are merely seeking further clarification of documentation. Please utilize your independent clinical judgment when addressing the question(s) below.    The Medical Record contains the following:   Indicators Supporting Clinical Findings Location in Medical Record   X Atrial Fibrillation EMS reports a fib en route, was given 5mg of metoprolol  A Fib RVR    past medical history of atrial fibrillation,  AFib rvr     PMH significant for paroxysmal a-fib/flutter on Xarelto    Chronic Atrial Fib  [facility] consulted for A-fib w/RVR     Remains in A. Fib, heart rate better in low 100s.     [facility] consulted for A-fib w/RVR-now CVR 9/22 ed nurse note    9/22 ed note    9/22 h/p      9/22 cards note      9/22-9/24 cards note      9/24 prog note      9/25 cards note     X EKG Results Atrial fibrillation with rapid ventricular response  9/22 ekg   X Medication HR improved from 140s to 90s after IV Cardizem  HR back up to 130s-140s, will start cardizem gtt    labs showed subtherapeutic digoxin level and so patient given home dose of oral digoxin 0.25 mg x1, home dose Cardizem 240 mg ER oral    cardizem increased to 360 mg qd 9/22 ed note          9/22 cards note          9/25 d/c summary    Treatment     X Other SOB with palpitations x3 days  9/22 cards note     The clinical guidelines noted are only a system guideline. It does not replace the provider's clinical judgment.    Provider, please specify the type of Atrial Fibrillation (AF):    [  X] Paroxysmal - defined as AF that terminates spontaneously or with intervention within < 7 days of onset, episodes may recur with variable frequency   [  ] Long-standing persistent - AF that has lasted for >  12 months    [  ] Chronic, not otherwise specified      [  ] Other cardiac diagnosis (please specify): ____________   [  ] Clinically Undetermined       Please document in your progress notes daily for the duration of treatment until resolved, and include in your discharge summary.    Reference:  SAMANTHA Rider MD. (2020, September 14). Overview of atrial fibrillation (TAISHA Cat MD & JEN Mendez MD, Eds.). Retrieved October 22, 2020, from https://[email or web address]/contents/hliyiyjs-lb-essgbt-fibrillation?search=atrial%20fibrillation&source=search_result&selectedTitle=1~150&usage_type=default&display_rank=1    Form No. [id number]

## 2022-10-20 NOTE — ED PROVIDER NOTES
Encounter Date: 10/20/2022    SCRIBE #1 NOTE: I, Svitlana Rivera, am scribing for, and in the presence of,  Danish Louis III, MD. I have scribed the following portions of the note - the EKG reading. Other sections scribed: HPI, ROS, PE.     History     Chief Complaint   Patient presents with    Shortness of Breath     C/O SOB. STATES HX OF COPD, CHF, AND ASTHMA. NO RELIEF WITH RESCUE INHALER.        This is a 52 y.o. male, with a PMHx of DM, HTN, COPD, CHF, Afib, and asthma, who presents with complaint of shortness of breath with onset a few days ago. Patient reports that this episode's onset was sudden. Patient adds that the SOB is worse with exertion, but he states it is normal when lying down. Patient notes that he has been experiencing a nonproductive cough and wheezing. Patient states that his BP has been elevated, and he denies taking his medications. He states that he has not taken his medication because he recently moved. Patient adds that his legs have been swelling and he has been experiencing a headache. Patient notes that he has been admitted to the hospital previously for similar symptoms. Patient admits to drinking occasionally, but denies smoking. He adds that he drank alcohol today, and he states that it was about a pint.    PCP is Sophie Navarrete MD.      The history is provided by the patient.   Shortness of Breath  This is a recurrent problem. The current episode started more than 2 days ago. Associated symptoms include headaches, cough (nonproductive), wheezing and leg swelling. Pertinent negatives include no fever, no sore throat, no chest pain, no vomiting, no abdominal pain and no rash. Associated medical issues include asthma and COPD.   Review of patient's allergies indicates:   Allergen Reactions    Iodine Shortness Of Breath and Swelling     IT FLARES UP ASTHMA SYMPTOMSFLAR      Shellfish containing products      Past Medical History:   Diagnosis Date    COPD (chronic obstructive  pulmonary disease)     Diabetes mellitus     Hypertension     Paroxysmal atrial fibrillation      Past Surgical History:   Procedure Laterality Date    MANDIBLE SURGERY       No family history on file.  Social History     Tobacco Use    Smoking status: Every Day     Types: Cigarettes   Substance Use Topics    Alcohol use: Yes     Comment: socially    Drug use: Yes     Types: Cocaine     Review of Systems   Constitutional:  Negative for chills, fatigue and fever.   HENT:  Negative for congestion and sore throat.    Eyes:  Negative for visual disturbance.   Respiratory:  Positive for cough (nonproductive), shortness of breath and wheezing.    Cardiovascular:  Positive for leg swelling. Negative for chest pain.   Gastrointestinal:  Negative for abdominal pain, diarrhea, nausea and vomiting.   Genitourinary:  Negative for dysuria.   Musculoskeletal:  Negative for myalgias.   Skin:  Negative for rash.   Neurological:  Positive for headaches. Negative for weakness and numbness.   All other systems reviewed and are negative.    Physical Exam     Initial Vitals [10/20/22 0159]   BP Pulse Resp Temp SpO2   (!) 155/70 (!) 125 18 96.8 °F (36 °C) 96 %      MAP       --         Physical Exam    Nursing note and vitals reviewed.  Constitutional: He appears well-developed. He is Obese .   Alcohol on breath. Mild distress.   HENT:   Head: Normocephalic and atraumatic.   Eyes: EOM are normal. Pupils are equal, round, and reactive to light.   Neck:   Normal range of motion.  Cardiovascular:  Normal rate, regular rhythm, normal heart sounds and intact distal pulses.           Pulmonary/Chest:   Mild respiratory distress and mild tachypnea. Wheezing all over and crackles at bases.   Abdominal: Abdomen is soft. Bowel sounds are normal.   Musculoskeletal:         General: Normal range of motion.      Cervical back: Normal range of motion.     Neurological: He is alert and oriented to person, place, and time. He has normal strength. GCS  score is 15. GCS eye subscore is 4. GCS verbal subscore is 5. GCS motor subscore is 6.   Skin: Skin is warm and dry. Capillary refill takes less than 2 seconds.   Psychiatric: He has a normal mood and affect. Judgment normal.       ED Course   Procedures  Labs Reviewed   COMPREHENSIVE METABOLIC PANEL - Abnormal; Notable for the following components:       Result Value    Potassium Level 3.3 (*)     Creatinine 1.34 (*)     Protein Total 6.2 (*)     Albumin Level 3.3 (*)     All other components within normal limits   B-TYPE NATRIURETIC PEPTIDE - Abnormal; Notable for the following components:    Natriuretic Peptide 324.8 (*)     All other components within normal limits   CBC WITH DIFFERENTIAL - Abnormal; Notable for the following components:    RBC 4.15 (*)     Hgb 13.0 (*)     Hct 40.0 (*)     MCV 96.4 (*)     MCH 31.3 (*)     MCHC 32.5 (*)     All other components within normal limits   ALCOHOL,MEDICAL (ETHANOL) - Abnormal; Notable for the following components:    Ethanol Level 160.0 (*)     All other components within normal limits   COVID/FLU A&B PCR - Normal    Narrative:     The Xpert Xpress SARS-CoV-2/FLU/RSV plus is a rapid, multiplexed real-time PCR test intended for the simultaneous qualitative detection and differentiation of SARS-CoV-2, Influenza A, Influenza B, and respiratory syncytial virus (RSV) viral RNA in either nasopharyngeal swab or nasal swab specimens.         TROPONIN I - Normal   CBC W/ AUTO DIFFERENTIAL    Narrative:     The following orders were created for panel order CBC auto differential.  Procedure                               Abnormality         Status                     ---------                               -----------         ------                     CBC with Differential[[id number]]        Abnormal            Final result                 Please view results for these tests on the individual orders.     EKG Readings: (Independently Interpreted)   Heart Rate: 106. Conduction:  RBBB.   2:08. Atrial Fibrillation with rapid ventricular response. Flattened T wave globally.     Imaging Results              X-Ray Chest 1 View (In process)                      Medications   diltiaZEM 24 hr capsule 360 mg (360 mg Oral Given 10/20/22 0228)   albuterol-ipratropium 2.5 mg-0.5 mg/3 mL nebulizer solution 3 mL (3 mLs Nebulization Given 10/20/22 0224)   methylPREDNISolone sodium succinate injection 125 mg (125 mg Intravenous Given 10/20/22 0229)   lisinopriL tablet 20 mg (20 mg Oral Given 10/20/22 0229)   furosemide injection 40 mg (40 mg Intravenous Given 10/20/22 0229)   albuterol nebulizer solution 2.5 mg (2.5 mg Nebulization Given 10/20/22 0345)     Medical Decision Making:   History:   Old Medical Records: I decided to obtain old medical records.  Independently Interpreted Test(s):   I have ordered and independently interpreted X-rays - see prior notes.  I have ordered and independently interpreted EKG Reading(s) - see prior notes  Clinical Tests:   Lab Tests: Ordered and Reviewed  Radiological Study: Ordered and Reviewed  Medical Tests: Ordered and Reviewed  ED Management:  Patient given Solu-Medrol 2 albuterol treatments and Atrovent treatment patient with a borderline tachycardia with AFib with RVR noncompliant with his medications was given his home dose of Cardizem CD he was hypertensive was given 40 of Lasix id which is his home oral dose.  Patient will be admitted to hospital medicine service discussed case with Dr. Jesus Castillo Attestation:   Scribe #1: I performed the above scribed service and the documentation accurately describes the services I performed. I attest to the accuracy of the note.    Attending Attestation:           Physician Attestation for Scribe:  Physician Attestation Statement for Scribe #1: I, Danish Louis III, MD, reviewed documentation, as scribed by Svitlana Rivera in my presence, and it is both accurate and complete.                        Clinical  Impression:   Final diagnoses:  [R06.02] SOB (shortness of breath)  [J44.1] COPD exacerbation (Primary)  [I50.9] Congestive heart failure, unspecified HF chronicity, unspecified heart failure type      ED Disposition Condition    Admit Stable                Danish Louis III, MD  10/20/22 0582

## 2022-10-20 NOTE — H&P
[facility] Medicine History & Physical Examination       Patient Name: Vladimir Bernal  MRN: [id number]  Patient Class: IP- Inpatient   Admission Date: 10/20/2022   Admitting Physician: Uday Rich MD   Length of Stay: 0  Attending Physician: Uday Rich MD   Primary Care Provider: Sophie Navarrete MD  Face-to-Face encounter date: 10/20/2022  Code Status:Full Code   Chief Complaint: Shortness of Breath (C/O SOB. STATES HX OF COPD, CHF, AND ASTHMA. NO RELIEF WITH RESCUE INHALER. )        Patient information was obtained from patient, patient's family, past medical records and ER records.     HISTORY OF PRESENT ILLNESS:   Vladimir Bernal is a 52 y.o. male with a past medical history of atrial fibrillation, COPD on home oxygen 2 L/min, hypertension, hyperlipidemia, diabetes mellitus, cocaine abuse (last used x 1 week ago) and HFrEF (LVEF 40-45% on 08/19/2022)presented to [facility] on 10/20/2022 for shortness of breath.  Patient reports  shortness of breath worse on exertion.  Patient also reports nonproductive cough, wheezing, elevated blood pressure, leg swelling, and headache.    On exam patient complains lower midline chest pain that does not radiate.  Patient rates chest pain 7/10.  Patient denies shortness of breath at rest, nausea, vomiting, abdominal pain, headache, and dizziness at this time.  Patient reports he has not taken his medications for the past week.  Patient reports his last alcohol use was yesterday-patient reports drinking about 1 pt.  Patient reports last cocaine use was 1 week ago.    Initial vital signs in ED were /70, pulse 125, respirations 18, temperature 36° C, and SpO2 96%.  Labs in ED revealed potassium 3.3, creatinine 1.34, .8, troponin 0.015, digoxin less than 0.19, and alcohol 160.  EKG showed atrial fibrillation with rapid ventricular response, right bundle-branch block, and heart rate 106.  Chest x-ray shows suspicion for pulmonary vascular  congestion. Patient was given  Solu-Medrol, 2 DuoNeb treatments, Cardizem 360 mg tablet, and IV 40 mg Lasix. Patient was admitted to hospital medicine service for further medical management.   PAST MEDICAL HISTORY:   Atrial fibrillation  Anxiety  Depression  COPD  Asthma  CHF  HTN  HLD  DM  KRISHNA  GERD  Left eye blindness  Obesity    PAST SURGICAL HISTORY:     Past Surgical History:   Procedure Laterality Date    MANDIBLE SURGERY         ALLERGIES:   Iodine and Shellfish containing products    FAMILY HISTORY:   Reviewed and negative    SOCIAL HISTORY:   Tobacco-none  Alcohol-socially  Illicit Drugs-cocaine     HOME MEDICATIONS:     Prior to Admission medications    Medication Sig Start Date End Date Taking? Authorizing Provider   atorvastatin (LIPITOR) 80 MG tablet Take 1 tablet (80 mg total) by mouth once daily. 9/25/22 10/25/22 Yes Tami Crews MD   budesonide-formoterol 160-4.5 mcg (SYMBICORT) 160-4.5 mcg/actuation HFAA Inhale 2 puffs into the lungs every 12 (twelve) hours. Controller 9/25/22  Yes Tami Crews MD   carvediloL (COREG) 3.125 MG tablet Take 1 tablet (3.125 mg total) by mouth 2 (two) times daily. 9/25/22 10/25/22 Yes Tami Crews MD   digoxin (LANOXIN) 250 mcg tablet Take 1 tablet (0.25 mg total) by mouth once daily. 9/25/22  Yes Tami Crews MD   furosemide (LASIX) 40 MG tablet Take 1 tablet (40 mg total) by mouth 2 (two) times daily. 9/25/22 10/25/22 Yes Tami Crews MD   lisinopriL (PRINIVIL,ZESTRIL) 20 MG tablet Take 1 tablet (20 mg total) by mouth once daily. 9/25/22  Yes Tami Crews MD   pantoprazole (PROTONIX) 40 MG tablet Take 40 mg by mouth once daily.   Yes Historical Provider   rivaroxaban (XARELTO) 10 mg Tab Take 2 tablets (20 mg total) by mouth daily with dinner or evening meal. 9/25/22  Yes Tami Crews MD   butalbital-acetaminophen-caffeine -40 mg (FIORICET, ESGIC) -40 mg per tablet Take 1 tablet by  mouth every 4 (four) hours as needed for Pain.    Historical Provider   diltiaZEM (CARDIZEM CD) 360 MG 24 hr capsule Take 1 capsule (360 mg total) by mouth once daily. 9/25/22 9/25/23  Tami Crews MD   gabapentin (NEURONTIN) 300 MG capsule Take 300 mg by mouth 3 (three) times daily.    Historical Provider   levalbuterol (XOPENEX) 0.63 mg/3 mL nebulizer solution Take 3 mLs (0.63 mg total) by nebulization every 4 (four) hours. Rescue 9/25/22 10/25/22  Tami Crews MD   metFORMIN (GLUCOPHAGE-XR) 500 MG ER 24hr tablet Take 500 mg by mouth once daily.    Historical Provider   tadalafiL (CIALIS) 5 MG tablet Take 10 mg by mouth daily as needed for Erectile Dysfunction.    Historical Provider   tiotropium (SPIRIVA) 18 mcg inhalation capsule Inhale 1 capsule (18 mcg total) into the lungs once daily. Controller 7/27/22   Caleb Stephen MD   albuterol (ACCUNEB) 0.63 mg/3 mL Nebu Take 3 mLs (0.63 mg total) by nebulization every 6 (six) hours as needed (shortness of breath and wheezing). Rescue 7/27/22 8/20/22  Caleb Stephen MD   amiodarone (PACERONE) 200 MG Tab Take 2 tablets BID x 1 day, then Take 1 tablet BID x 3 days, then Take 1 tablet daily 5/25/22 6/10/22  Prateek Murillo MD   metoprolol succinate (TOPROL-XL) 100 MG 24 hr tablet Take 1 tablet (100 mg total) by mouth 2 (two) times daily. 7/27/22 8/19/22  Caleb Stephen MD   spironolactone (ALDACTONE) 25 MG tablet Take 1 tablet (25 mg total) by mouth once daily. 5/26/22 6/10/22  Prateek Murillo MD       REVIEW OF SYSTEMS:   Except as documented, all other systems reviewed and negative     PHYSICAL EXAM:     VITAL SIGNS: 24 HRS MIN & MAX LAST   Temp  Min: 96.8 °F (36 °C)  Max: 97.9 °F (36.6 °C) 97.9 °F (36.6 °C)   BP  Min: 155/70  Max: 186/147 (!) 157/117     Pulse  Min: 90  Max: 125  101   Resp  Min: 15  Max: 27 (!) 24     SpO2  Min: 89 %  Max: 100 % (!) 92 %         General appearance:  Obese male in no apparent distress.  HEENNT: Atraumatic  head. Moist mucous membranes of oral cavity.   Lungs:  Crackles at bilateral bases  Heart: Regular rate and rhythm.    Abdomen: Soft, non-distended, non-tender. Bowel sounds are normal.   Extremities:  Bilateral lower extremity swelling.  No deformities.   Skin: No Rash. Warm and dry.   Neuro: Awake, alert, and oriented. Motor and sensory exams grossly intact.   Psych/mental status: Appropriate mood and affect. Responds appropriately to questions.     LABS AND IMAGING:     Recent Labs   Lab 10/20/22  0224   WBC 7.0   RBC 4.15*   HGB 13.0*   HCT 40.0*   MCV 96.4*   MCH 31.3*   MCHC 32.5*   RDW 15.1      MPV 9.3       Recent Labs   Lab 10/20/22  0224      K 3.3*   CO2 28   BUN 14.3   CREATININE 1.34*   CALCIUM 8.7   ALBUMIN 3.3*   ALKPHOS 66   ALT 8   AST 14   BILITOT 0.3       Microbiology Results (last 7 days)       ** No results found for the last 168 hours. **             X-Ray Chest 1 View  Narrative: EXAMINATION:  XR CHEST 1 VIEW    CLINICAL HISTORY:  Shortness of breath    TECHNIQUE:  Single frontal view of the chest was performed.    COMPARISON:  08/18/2022    FINDINGS:  There is linear density on the right stable from the previous study.  Heart is borderline in size.  Costophrenic angles are clear.  No acute infiltrates are seen.  Impression: No acute abnormalities are demonstrated.    Electronically signed by: Silviano Manning MD  Date:    09/22/2022  Time:    06:46        ASSESSMENT & PLAN:   Assessment:  CHF -LVEF 40-45% on 08/19/2022  Obstructive sleep apnea  Chest Pain   Hypokalemia  Atrial fibrillation with RVR  Diabetes mellitus type 2  Essential hypertension   History of hyperlipidemia, cocaine abuse, medication noncompliance    Plan:  Continue with oxygen supplementation as needed  Duo nebs q 6 PRN  Cardiac monitoring   EKG  Troponin q6 x 3   Cardiology consulted, appreciate recommendations   Kcl 20 mg tab  Insulin sliding scale with accu-checks  Continue appropriate home medications    Repeat labs in am    VTE Prophylaxis: will be placed on Lovenox/ SCD for DVT prophylaxis and will be advised to be as mobile as possible and sit in a chair as tolerated      __________________________________________________________________________  INPATIENT LIST OF MEDICATIONS     Scheduled Meds:   methylPREDNISolone sodium succinate  80 mg Intravenous Q8H     Continuous Infusions:  PRN Meds:.albuterol-ipratropium, cloNIDine, dextrose 10%, dextrose 10%, glucagon (human recombinant), glucose, glucose, hydrALAZINE, insulin aspart U-100      IJacobo PA-C, have reviewed and discussed the case with Dr. Uday Rich MD   Please see the following addendum for further assessment and plan from there attending MD.    10/20/2022    ________________________________________________________________________________    MD Addendum:  I, Dr. Uday Rich MD  assumed care of this patient today.  For the patient encounter, I performed the substantive portion of the visit, I reviewed the PA documentation, treatment plan, and medical decision making.  I had face to face time with this patient     Seen and examined the pt. Agree with above history physical exam and management.   Presenting with dyspnea and mild chest pain. Admitting that he was not taking his medications for a week and he was doing cocaine. Denies having fever, productive cough.   Chest x-ray shows congestive findings and cardiomegaly, BNP is 400.  Echocardiogram 08/19/2022 revealed EF 40 45% mild left ventricular global hypokinesis grade 1 diastolic dysfunction PA pressure 44 CVP 15  Had multiple admissions in the past for CHF.    Bilateral crackles both lung fields.  Bilateral leg edema.    Will admit patient to the hospitalist service.    -repeat troponins is most likely demand ischemia however will consult Cardiology given the chest pain.    -EKG shows AFib RVR Cardizem was given in the ER will restart Coreg and digoxin 250.    -Continue Lasix as 40 mg IV  b.i.d..    -will give CPAP at night giving the obstructive sleep apnea.    -Educated patient regarding his compliance with his home medications and importance of stopping his sees illicit drug use altogether.    - will continue monitoring restarted home medications.  States he does not have productive cough and does not have wheezing.  Continue his home inhalers.     Critical care diagnoses acute decompensated systolic heart failure  Critical care time was given 45 minutes    All diagnosis and differential diagnosis have been reviewed; assessment and plan has been documented; I have personally reviewed the labs and test results that are presently available; I have reviewed the patients medication list; I have reviewed the consulting providers response and recommendations. I have reviewed or attempted to review medical records based upon their availability.    All of the patient and family questions have been addressed and answered. Patient's is agreeable to the above stated plan. I will continue to monitor closely and make adjustments to medical management as needed.      Uday Rich MD   10/20/2022

## 2022-10-20 NOTE — NURSING
Pt requesting pain medication for back pain of 7 out of 10.  Contacted dr abdullahi and he asked me to put order in for percocet 5/325mg 1 tablet by mouth every 12 hours prn pain.

## 2022-10-20 NOTE — PROGRESS NOTES
Inpatient Nutrition Evaluation    Admit Date: 10/20/2022   Total duration of encounter: 1 day    Nutrition Recommendation/Prescription     Continue oral diet as tolerated.    Nutrition Assessment     Chart Review    Reason Seen: continuous nutrition monitoring    Diagnosis:  CHF  Obstructive sleep apnea  Chest Pain   Hypokalemia  Atrial fibrillation with RVR  Diabetes mellitus type 2  Essential hypertension     Relevant Medical History: hyperlipidemia, cocaine abuse, medication noncompliance    Nutrition-Related Medications: furosemide, pantoprazole    Nutrition-Related Labs:  10/20/22 K 3.3, Alb 3.3    Diet Order: Diet Low Sodium, 2gm  Oral Supplement Order: none  Appetite/Oral Intake: good/% of meals  Factors Affecting Nutritional Intake: none identified  Food/Christian/Cultural Preferences: none reported  Food Allergies: shellfish       Wound(s):  n/a    Comments    10/20/22 Patient reports a good appetite.    Anthropometrics    Height: 6' (182.9 cm) Height Method: Stated  Last Weight: (!) 158.8 kg (350 lb) (10/20/22 0159) Weight Method: Bed Scale  BMI (Calculated): 48.8  BMI Classification: obese grade III (BMI >/=40)        Ideal Body Weight (IBW), Male: 178 lb     % Ideal Body Weight, Male (lb): 203.49 %                          Usual Weight Provided By: patient denies unintentional weight loss    Wt Readings from Last 5 Encounters:   10/20/22 (!) 158.8 kg (350 lb)   09/24/22 (!) 158.3 kg (348 lb 15.8 oz)   08/19/22 (!) 156.5 kg (345 lb 0.3 oz)   07/26/22 (!) 159.3 kg (351 lb 3.2 oz)   06/08/22 (!) 171.8 kg (378 lb 12 oz)     Weight Change(s) Since Admission:  Admit Weight: (!) 158.8 kg (350 lb) (10/20/22 0159)  No new weights (10/20/2022)    Patient Education    Not applicable.    Monitoring & Evaluation     Dietitian will monitor food and beverage intake.  Nutrition Risk/Follow-Up: low (follow-up in 5-7 days)  Patients assigned 'low nutrition risk' status do not qualify for a full nutritional  assessment but will be monitored and re-evaluated in a 5-7 day time period. Please consult if re-evaluation needed sooner.

## 2022-10-20 NOTE — CONSULTS
Inpatient consult to Cardiology  Consult performed by: CHRIS Thompson  Consult ordered by: Jacobo Jimenes PA-C  Reason for consult: Heart Failure/Chest Pain      [facility] - 4th Floor Medical Telemetry  Cardiology  Consult Note    Patient Name: Vladimir Bernal  MRN: [id number]  Admission Date: 10/20/2022  Hospital Length of Stay: 0 days  Code Status: Prior   Attending Provider: Uday Rich MD   Consulting Provider: CHRIS Thompson  Primary Care Physician: Sophie Navarrete MD  Principal Problem:<principal problem not specified>    Patient information was obtained from patient, past medical records, and ER records.     Subjective:   Consultation Reason: Heart Failure/Chest Pain    HPI:   Mr. Bernal is a 52 year old male, known to [facility] through previous hospitalization, who presented to the hospital with shortness of breath, worse with exertion. Patient noted to report chest pain as well. Troponin values are normal. BNP ~ 300, but chest radiograph revealed pulmonary vascular congestion. EF 40-45% with known diastolic dysfunction. EKG revealed AF RVR. He's been started on IV Diuretics. [facility] is consulted for cardiac management.    PMH: Chronic AF/AFL (Xarelto), Hypertension, Hyperlipidemia, GERD LVSD (EF 40-45%), COPD/Home Oxygen, DM II, Obesity, KRISHNA/CPAP, Left Eye Blindness  PSH: Mandibular Fracture Repair  Family History: Negative  Social History: Tobacco- Active Smoker, Alcohol- Occasional Use, Substance Abuse- Cocaine    Previous Cardiac Diagnostics:   Echocardiogram (8.19.22):   The left ventricle is normal in size with concentric remodeling and mildly decreased systolic function.  The estimated ejection fraction is 40-45%.  There is mild left ventricular global hypokinesis.  Grade I left ventricular diastolic dysfunction.  Moderate right ventricular enlargement with mildly reduced right ventricular systolic function.  There is mild pulmonary hypertension.  The estimated PA systolic pressure is 44  mmHg.  Elevated central venous pressure (15 mmHg).    Review of patient's allergies indicates:   Allergen Reactions    Iodine Shortness Of Breath and Swelling     IT FLARES UP ASTHMA SYMPTOMSFLAR      Shellfish containing products        No current facility-administered medications on file prior to encounter.     Current Outpatient Medications on File Prior to Encounter   Medication Sig    atorvastatin (LIPITOR) 80 MG tablet Take 1 tablet (80 mg total) by mouth once daily.    budesonide-formoterol 160-4.5 mcg (SYMBICORT) 160-4.5 mcg/actuation HFAA Inhale 2 puffs into the lungs every 12 (twelve) hours. Controller    carvediloL (COREG) 3.125 MG tablet Take 1 tablet (3.125 mg total) by mouth 2 (two) times daily.    digoxin (LANOXIN) 250 mcg tablet Take 1 tablet (0.25 mg total) by mouth once daily.    furosemide (LASIX) 40 MG tablet Take 1 tablet (40 mg total) by mouth 2 (two) times daily.     mg tablet Take 800 mg by mouth every 8 (eight) hours.    lisinopriL (PRINIVIL,ZESTRIL) 20 MG tablet Take 1 tablet (20 mg total) by mouth once daily.    pantoprazole (PROTONIX) 40 MG tablet Take 40 mg by mouth once daily.    rivaroxaban (XARELTO) 10 mg Tab Take 2 tablets (20 mg total) by mouth daily with dinner or evening meal.    butalbital-acetaminophen-caffeine -40 mg (FIORICET, ESGIC) -40 mg per tablet Take 1 tablet by mouth every 4 (four) hours as needed for Pain.    diltiaZEM (CARDIZEM CD) 360 MG 24 hr capsule Take 1 capsule (360 mg total) by mouth once daily.    gabapentin (NEURONTIN) 300 MG capsule Take 300 mg by mouth 3 (three) times daily.    levalbuterol (XOPENEX) 0.63 mg/3 mL nebulizer solution Take 3 mLs (0.63 mg total) by nebulization every 4 (four) hours. Rescue    metFORMIN (GLUCOPHAGE-XR) 500 MG ER 24hr tablet Take 500 mg by mouth once daily.    tadalafiL (CIALIS) 5 MG tablet Take 10 mg by mouth daily as needed for Erectile Dysfunction.    tiotropium (SPIRIVA) 18 mcg inhalation capsule Inhale 1  capsule (18 mcg total) into the lungs once daily. Controller    [DISCONTINUED] albuterol (ACCUNEB) 0.63 mg/3 mL Nebu Take 3 mLs (0.63 mg total) by nebulization every 6 (six) hours as needed (shortness of breath and wheezing). Rescue    [DISCONTINUED] amiodarone (PACERONE) 200 MG Tab Take 2 tablets BID x 1 day, then Take 1 tablet BID x 3 days, then Take 1 tablet daily    [DISCONTINUED] metoprolol succinate (TOPROL-XL) 100 MG 24 hr tablet Take 1 tablet (100 mg total) by mouth 2 (two) times daily.    [DISCONTINUED] spironolactone (ALDACTONE) 25 MG tablet Take 1 tablet (25 mg total) by mouth once daily.     Review of Systems   Respiratory:  Negative for chest tightness and shortness of breath.    Cardiovascular:  Negative for chest pain.   All other systems reviewed and are negative.    Objective:     Vital Signs (Most Recent):  Temp: 97.9 °F (36.6 °C) (10/20/22 1002)  Pulse: 107 (10/20/22 1002)  Resp: (!) 24 (10/20/22 1002)  BP: (!) 145/77 (10/20/22 1002)  SpO2: (!) 93 % (10/20/22 1002)   Vital Signs (24h Range):  Temp:  [96.8 °F (36 °C)-97.9 °F (36.6 °C)] 97.9 °F (36.6 °C)  Pulse:  [] 107  Resp:  [15-27] 24  SpO2:  [89 %-100 %] 93 %  BP: (145-186)/() 145/77     Weight: (!) 158.8 kg (350 lb)  Body mass index is 47.47 kg/m².    SpO2: (!) 93 %  O2 Device (Oxygen Therapy): nasal cannula      Intake/Output Summary (Last 24 hours) at 10/20/2022 1139  Last data filed at 10/20/2022 0940  Gross per 24 hour   Intake --   Output 4380 ml   Net -4380 ml       Lines/Drains/Airways       Peripheral Intravenous Line  Duration                  Peripheral IV - Single Lumen 10/20/22 0216 20 G Right Antecubital <1 day                  Significant Labs:  Recent Results (from the past 72 hour(s))   Comprehensive metabolic panel    Collection Time: 10/20/22  2:24 AM   Result Value Ref Range    Sodium Level 142 136 - 145 mmol/L    Potassium Level 3.3 (L) 3.5 - 5.1 mmol/L    Chloride 103 98 - 107 mmol/L    Carbon Dioxide 28 22 -  29 mmol/L    Glucose Level 90 74 - 100 mg/dL    Blood Urea Nitrogen 14.3 8.4 - 25.7 mg/dL    Creatinine 1.34 (H) 0.73 - 1.18 mg/dL    Calcium Level Total 8.7 8.4 - 10.2 mg/dL    Protein Total 6.2 (L) 6.4 - 8.3 gm/dL    Albumin Level 3.3 (L) 3.5 - 5.0 gm/dL    Globulin 2.9 2.4 - 3.5 gm/dL    Albumin/Globulin Ratio 1.1 1.1 - 2.0 ratio    Bilirubin Total 0.3 <=1.5 mg/dL    Alkaline Phosphatase 66 40 - 150 unit/L    Alanine Aminotransferase 8 0 - 55 unit/L    Aspartate Aminotransferase 14 5 - 34 unit/L    eGFR >60 mls/min/1.73/m2   Troponin I    Collection Time: 10/20/22  2:24 AM   Result Value Ref Range    Troponin-I 0.015 0.000 - 0.045 ng/mL   Brain natriuretic peptide    Collection Time: 10/20/22  2:24 AM   Result Value Ref Range    Natriuretic Peptide 324.8 (H) <=100.0 pg/mL   CBC with Differential    Collection Time: 10/20/22  2:24 AM   Result Value Ref Range    WBC 7.0 4.5 - 11.5 x10(3)/mcL    RBC 4.15 (L) 4.70 - 6.10 x10(6)/mcL    Hgb 13.0 (L) 14.0 - 18.0 gm/dL    Hct 40.0 (L) 42.0 - 52.0 %    MCV 96.4 (H) 80.0 - 94.0 fL    MCH 31.3 (H) 27.0 - 31.0 pg    MCHC 32.5 (L) 33.0 - 36.0 mg/dL    RDW 15.1 11.5 - 17.0 %    Platelet 285 130 - 400 x10(3)/mcL    MPV 9.3 7.4 - 10.4 fL    Neut % 55.5 %    Lymph % 33.1 %    Mono % 8.7 %    Eos % 1.7 %    Basophil % 0.4 %    Lymph # 2.32 0.6 - 4.6 x10(3)/mcL    Neut # 3.9 2.1 - 9.2 x10(3)/mcL    Mono # 0.61 0.1 - 1.3 x10(3)/mcL    Eos # 0.12 0 - 0.9 x10(3)/mcL    Baso # 0.03 0 - 0.2 x10(3)/mcL    IG# 0.04 0 - 0.04 x10(3)/mcL    IG% 0.6 %    NRBC% 0.0 %   Ethanol    Collection Time: 10/20/22  2:24 AM   Result Value Ref Range    Ethanol Level 160.0 (H) <=10.0 mg/dL   Digoxin Level    Collection Time: 10/20/22  2:24 AM   Result Value Ref Range    Digoxin Level <0.19 (L) 0.80 - 2.00 ng/mL   COVID/FLU A&B PCR    Collection Time: 10/20/22  2:35 AM   Result Value Ref Range    Influenza A PCR Not Detected Not Detected    Influenza B PCR Not Detected Not Detected    SARS-CoV-2 PCR Not  Detected Not Detected   POCT glucose    Collection Time: 10/20/22  6:42 AM   Result Value Ref Range    POCT Glucose 101 70 - 110 mg/dL   Troponin I    Collection Time: 10/20/22 10:19 AM   Result Value Ref Range    Troponin-I 0.010 0.000 - 0.045 ng/mL   POCT glucose    Collection Time: 10/20/22 11:09 AM   Result Value Ref Range    POCT Glucose 252 (H) 70 - 110 mg/dL       Significant Imaging:  Imaging Results              X-Ray Chest 1 View (Final result)  Result time 10/20/22 08:33:02      Final result by Bakari Perrin MD (10/20/22 08:33:02)                   Impression:      Suspect some pulmonary vascular congestion.      Electronically signed by: Bakari Perrin  Date:    10/20/2022  Time:    08:33               Narrative:    EXAMINATION:  XR CHEST 1 VIEW    CLINICAL HISTORY:  Shortness of breath    COMPARISON:  22 September 2022    FINDINGS:  Portable frontal view of the chest was obtained. Stable cardiomegaly.  There is prominence of the interstitium.  No dense consolidation or pneumothorax.                                    Telemetry:  AF RVR    Physical Exam  Vitals reviewed.   Constitutional:       Appearance: He is obese.   HENT:      Head: Normocephalic.      Mouth/Throat:      Mouth: Mucous membranes are moist.      Pharynx: Oropharynx is clear.   Cardiovascular:      Rate and Rhythm: Tachycardia present. Rhythm irregular.   Pulmonary:      Effort: Pulmonary effort is normal. No respiratory distress.      Comments: NC 2 L/Min  Abdominal:      General: There is no distension.      Palpations: Abdomen is soft.      Tenderness: There is no abdominal tenderness.   Musculoskeletal:      Cervical back: Neck supple.      Right lower leg: Edema present.      Left lower leg: Edema present.      Comments: Legs are Warm.   Skin:     General: Skin is warm and dry.   Neurological:      General: No focal deficit present.      Mental Status: He is alert. Mental status is at baseline.      Comments: Resting but  arousable.       Home Medications:   No current facility-administered medications on file prior to encounter.     Current Outpatient Medications on File Prior to Encounter   Medication Sig Dispense Refill    atorvastatin (LIPITOR) 80 MG tablet Take 1 tablet (80 mg total) by mouth once daily. 30 tablet 0    budesonide-formoterol 160-4.5 mcg (SYMBICORT) 160-4.5 mcg/actuation HFAA Inhale 2 puffs into the lungs every 12 (twelve) hours. Controller 6 g 0    carvediloL (COREG) 3.125 MG tablet Take 1 tablet (3.125 mg total) by mouth 2 (two) times daily. 60 tablet 0    digoxin (LANOXIN) 250 mcg tablet Take 1 tablet (0.25 mg total) by mouth once daily. 30 tablet 0    furosemide (LASIX) 40 MG tablet Take 1 tablet (40 mg total) by mouth 2 (two) times daily. 60 tablet 0     mg tablet Take 800 mg by mouth every 8 (eight) hours.      lisinopriL (PRINIVIL,ZESTRIL) 20 MG tablet Take 1 tablet (20 mg total) by mouth once daily. 90 tablet 0    pantoprazole (PROTONIX) 40 MG tablet Take 40 mg by mouth once daily.      rivaroxaban (XARELTO) 10 mg Tab Take 2 tablets (20 mg total) by mouth daily with dinner or evening meal. 30 tablet 0    butalbital-acetaminophen-caffeine -40 mg (FIORICET, ESGIC) -40 mg per tablet Take 1 tablet by mouth every 4 (four) hours as needed for Pain.      diltiaZEM (CARDIZEM CD) 360 MG 24 hr capsule Take 1 capsule (360 mg total) by mouth once daily. 30 capsule 0    gabapentin (NEURONTIN) 300 MG capsule Take 300 mg by mouth 3 (three) times daily.      levalbuterol (XOPENEX) 0.63 mg/3 mL nebulizer solution Take 3 mLs (0.63 mg total) by nebulization every 4 (four) hours. Rescue 540 mL 0    metFORMIN (GLUCOPHAGE-XR) 500 MG ER 24hr tablet Take 500 mg by mouth once daily.      tadalafiL (CIALIS) 5 MG tablet Take 10 mg by mouth daily as needed for Erectile Dysfunction.      tiotropium (SPIRIVA) 18 mcg inhalation capsule Inhale 1 capsule (18 mcg total) into the lungs once daily. Controller 30 capsule 0     [DISCONTINUED] albuterol (ACCUNEB) 0.63 mg/3 mL Nebu Take 3 mLs (0.63 mg total) by nebulization every 6 (six) hours as needed (shortness of breath and wheezing). Rescue 75 mL 0    [DISCONTINUED] amiodarone (PACERONE) 200 MG Tab Take 2 tablets BID x 1 day, then Take 1 tablet BID x 3 days, then Take 1 tablet daily 40 tablet 0    [DISCONTINUED] metoprolol succinate (TOPROL-XL) 100 MG 24 hr tablet Take 1 tablet (100 mg total) by mouth 2 (two) times daily. 60 tablet 0    [DISCONTINUED] spironolactone (ALDACTONE) 25 MG tablet Take 1 tablet (25 mg total) by mouth once daily. 30 tablet 0     Current Inpatient Medications:    Current Facility-Administered Medications:     albuterol-ipratropium 2.5 mg-0.5 mg/3 mL nebulizer solution 3 mL, 3 mL, Nebulization, Q6H PRN, Sergo Lee MD    atorvastatin tablet 80 mg, 80 mg, Oral, Daily, Uday Rich MD    butalbital-acetaminophen-caffeine -40 mg per tablet 1 tablet, 1 tablet, Oral, Q4H PRN, Uday Rich MD    carvediloL tablet 3.125 mg, 3.125 mg, Oral, BID, Uday Rich MD    cloNIDine tablet 0.2 mg, 0.2 mg, Oral, Q6H PRN, Sergo Lee MD    dextrose 10% bolus 125 mL, 12.5 g, Intravenous, PRN, Sergo Lee MD    dextrose 10% bolus 250 mL, 25 g, Intravenous, PRN, Sergo Lee MD    digoxin tablet 0.25 mg, 0.25 mg, Oral, Daily, Uday Rich MD    [START ON 10/21/2022] diltiaZEM 24 hr capsule 360 mg, 360 mg, Oral, Daily, Uday iRch MD    fluticasone furoate-vilanteroL 100-25 mcg/dose diskus inhaler 1 puff, 1 puff, Inhalation, Daily, Uday Rich MD    furosemide injection 40 mg, 40 mg, Intravenous, Q12H, Uday Rich MD    gabapentin capsule 300 mg, 300 mg, Oral, TID, Uday Rich MD    glucagon (human recombinant) injection 1 mg, 1 mg, Intramuscular, PRN, Sergo Lee MD    glucose chewable tablet 16 g, 16 g, Oral, PRN, Sergo Lee MD    glucose chewable tablet 24 g, 24 g, Oral, PRN, Sergo Lee MD    hydrALAZINE  injection 10 mg, 10 mg, Intravenous, Q2H PRN, Sergo Lee MD, 10 mg at 10/20/22 0705    insulin aspart U-100 injection 1-10 Units, 1-10 Units, Subcutaneous, QID (AC + HS) PRN, Sergo Lee MD    levalbuterol nebulizer solution 0.63 mg, 0.63 mg, Nebulization, Q4H, Uday Rich MD    lisinopriL tablet 20 mg, 20 mg, Oral, Daily, Uday Rich MD    pantoprazole EC tablet 40 mg, 40 mg, Oral, Daily, Uday Rich MD    rivaroxaban tablet 20 mg, 20 mg, Oral, Daily with dinner, dUay Rich MD    [START ON 10/21/2022] tiotropium bromide 2.5 mcg/actuation inhaler 2 puff, 2 puff, Inhalation, Daily, Uday Rich MD    VTE Risk Mitigation (From admission, onward)           Ordered     rivaroxaban tablet 20 mg  With dinner         10/20/22 1100                  EKG:      Assessment:   Acute/Chronic Combined Systolic & Diastolic HF    - EF 40-45% with Grade I Diastolic Dysfunction (August 2022)  Chest Pain (Currently Comfortable)    - Troponin Values Normal  Hypertension (Above Goal)  Hyperlipidemia  PAF- Now AF RVR    - On Xarelto  DM II    - Hyperglycemia  Elevated BMI/Obesity  Obstructive Sleep Apnea/CPAP  Hypokalemia  History of Polysubstance Abuse- Tobacco Use/Alcohol Consumption/Cocaine Abuse  GERD  COPD    - Chronic Home Oxygen  Left Eye Blindness    Plan:   Continue Current Cardiac Medications  Give Digoxin Load 0.5 Mg IVP x 1 Now, then 0.25 Mg IVP q6h x 2 doses (1 Mg Load)  Continue Lasix 40 Mg IVP q12h; Ensure Accurate I/0 & Daily Weights  Continue Xarelto 20 Mg PO Daily Re: AF/Stroke Risk Reduction  Will consider ischemic evaluation outpatient if demonstrates compliance through office follow up.  Labs in AM (BMP, Mag Level)    Thank you for your consult.     CHRIS Thompson  Cardiology  [facility] - 4th Floor Medical Telemetry  10/20/2022 11:39 AM

## 2022-10-21 NOTE — PLAN OF CARE
Problem: Adult Inpatient Plan of Care  Goal: Plan of Care Review  10/21/2022 0758 by Jeremy Andrade RN  Outcome: Ongoing, Progressing  10/21/2022 0758 by Jeremy Andrade RN  Outcome: Ongoing, Progressing  Goal: Patient-Specific Goal (Individualized)  10/21/2022 0758 by Jeremy Andrade RN  Outcome: Ongoing, Progressing  10/21/2022 0758 by Jeremy Andrade RN  Outcome: Ongoing, Progressing  Goal: Absence of Hospital-Acquired Illness or Injury  10/21/2022 0758 by Jeremy Andrade RN  Outcome: Ongoing, Progressing  10/21/2022 0758 by Jeremy Andrade RN  Outcome: Ongoing, Progressing  Goal: Optimal Comfort and Wellbeing  10/21/2022 0758 by Jeremy Andrade RN  Outcome: Ongoing, Progressing  10/21/2022 0758 by Jeremy Andrade RN  Outcome: Ongoing, Progressing  Goal: Readiness for Transition of Care  10/21/2022 0758 by Jeremy Andrade RN  Outcome: Ongoing, Progressing  10/21/2022 0758 by Jeremy Andrade RN  Outcome: Ongoing, Progressing

## 2022-10-21 NOTE — PROGRESS NOTES
[facility]  Hospital Medicine Progress Note        Chief Complaint: Inpatient Follow-up for     Subjective:  Vladimir Bernal is a 52 y.o. male with a past medical history of atrial fibrillation, COPD on home oxygen 2 L/min, hypertension, hyperlipidemia, diabetes mellitus, cocaine abuse (last used x 1 week ago) and HFrEF (LVEF 40-45% on 08/19/2022)presented to [facility] on 10/20/2022 for shortness of breath.  Patient reports  shortness of breath worse on exertion.  Patient also reports nonproductive cough, wheezing, elevated blood pressure, leg swelling, and headache.    On exam patient complains lower midline chest pain that does not radiate.  Patient rates chest pain 7/10.  Patient denies shortness of breath at rest, nausea, vomiting, abdominal pain, headache, and dizziness at this time.  Patient reports he has not taken his medications for the past week.  Patient reports his last alcohol use was yesterday-patient reports drinking about 1 pt.  Patient reports last cocaine use was 1 week ago.    Initial vital signs in ED were /70, pulse 125, respirations 18, temperature 36° C, and SpO2 96%.  Labs in ED revealed potassium 3.3, creatinine 1.34, .8, troponin 0.015, digoxin less than 0.19, and alcohol 160.  EKG showed atrial fibrillation with rapid ventricular response, right bundle-branch block, and heart rate 106.  Chest x-ray shows suspicion for pulmonary vascular congestion. Patient was given  Solu-Medrol, 2 DuoNeb treatments, Cardizem 360 mg tablet, and IV 40 mg Lasix. Patient was admitted to hospital medicine service for further medical management.     Objective/physical exam:  General appearance:  Obese male in no apparent distress.  HEENNT: Atraumatic head. Moist mucous membranes of oral cavity.   Lungs:  Crackles at bilateral bases  Heart: Regular rate and rhythm.    Abdomen: Soft, non-distended, non-tender. Bowel sounds are normal.   Extremities:  Bilateral lower  extremity swelling.  No deformities.   Skin: No Rash. Warm and dry.   Neuro: Awake, alert, and oriented. Motor and sensory exams grossly intact.   Psych/mental status: Appropriate mood and affect. Responds appropriately to questions.     VITAL SIGNS: 24 HRS MIN & MAX LAST   Temp  Min: 97.2 °F (36.2 °C)  Max: 98.4 °F (36.9 °C) 98.1 °F (36.7 °C)   BP  Min: 132/91  Max: 165/98 (!) 132/91     Pulse  Min: 78  Max: 99  78   Resp  Min: 16  Max: 20 18   SpO2  Min: 96 %  Max: 100 % 97 %       Labs, Microbiology and Imaging were Reviewed.      Microbiology Results (last 7 days)       ** No results found for the last 168 hours. **               Medications   atorvastatin  80 mg Oral Daily    carvediloL  6.25 mg Oral BID    [START ON 10/22/2022] diltiaZEM  480 mg Oral Daily    fluticasone furoate-vilanteroL  1 puff Inhalation Daily    furosemide (LASIX) injection  40 mg Intravenous Q12H    gabapentin  300 mg Oral TID    levalbuterol  0.63 mg Nebulization Q4H    lisinopriL  20 mg Oral Daily    magnesium sulfate in water  2 g Intravenous Q3H    pantoprazole  40 mg Oral Daily    rivaroxaban  20 mg Oral Daily with dinner    tiotropium bromide  2 puff Inhalation Daily        albuterol-ipratropium, butalbital-acetaminophen-caffeine -40 mg, cloNIDine, dextrose 10%, dextrose 10%, glucagon (human recombinant), glucose, glucose, hydrALAZINE, insulin aspart U-100, oxyCODONE-acetaminophen     Radiology:  X-Ray Chest 1 View  Narrative: EXAMINATION:  XR CHEST 1 VIEW    CLINICAL HISTORY:  Shortness of breath    COMPARISON:  22 September 2022    FINDINGS:  Portable frontal view of the chest was obtained. Stable cardiomegaly.  There is prominence of the interstitium.  No dense consolidation or pneumothorax.  Impression: Suspect some pulmonary vascular congestion.    Electronically signed by: Bakari Perrin  Date:    10/20/2022  Time:    08:33          Assessment/Plan:  CHF -LVEF 40-45% on 08/19/2022  Obstructive sleep apnea  Chest Pain    Hypokalemia  Atrial fibrillation with RVR  Diabetes mellitus type 2  Essential hypertension   History of hyperlipidemia, cocaine abuse, medication noncompliance    Cards onboard, loaded pt with dig.   - continue coreg and cardizem.   -Continue Lasix as 40 mg IV b.i.d..  improving crackles. But stil requiring O2.   -will give CPAP at night giving the obstructive sleep apnea.    -Educated patient regarding his compliance with his home medications and importance of stopping his sees illicit drug use altogether.    - will continue monitoring restarted home medications.  States he does not have productive cough and does not have wheezing.  Continue his home inhalers.      Critical care diagnoses acute decompensated systolic heart failure  Critical care time was given 45 minutes    All diagnosis and differential diagnosis have been reviewed; assessment and plan has been documented; I have personally reviewed the labs and test results that are presently available; I have reviewed the patients medication list; I have reviewed the consulting providers response and recommendations. I have reviewed or attempted to review medical records based upon their availability.       Uday Rich MD   10/21/2022

## 2022-10-21 NOTE — PROGRESS NOTES
"[facility] - 4th Floor Medical Telemetry  Cardiology  Progress Note    Patient Name: Vladimir Bernal  MRN: [id number]  Admission Date: 10/20/2022  Hospital Length of Stay: 1 days  Code Status: Prior   Attending Physician: Uday Rich MD   Primary Care Physician: Sophie Navarrete MD  Expected Discharge Date:   Principal Problem:<principal problem not specified>    Subjective:     Brief HPI: Mr. Bernal is a 52 year old male, known to [facility] through previous hospitalization, who presented to the hospital with shortness of breath, worse with exertion. Patient noted to report chest pain as well. Troponin values are normal. BNP ~ 300, but chest radiograph revealed pulmonary vascular congestion. EF 40-45% with known diastolic dysfunction. EKG revealed AF RVR. He's been started on IV Diuretics. [facility] is consulted for cardiac management.    Hospital Course:   10.21.22: NAD. "I am feeling well." Denies CP and Palps. + SOB at Rest Remains, but Improved since Admission. Fluid Balance Net Negative 1660mL.     PMH: Chronic AF/AFL (Xarelto), Hypertension, Hyperlipidemia, GERD LVSD (EF 40-45%), COPD/Home Oxygen, DM II, Obesity, KRISHNA/CPAP, Left Eye Blindness  PSH: Mandibular Fracture Repair  Family History: Negative  Social History: Tobacco- Active Smoker, Alcohol- Occasional Use, Substance Abuse- Cocaine     Previous Cardiac Diagnostics:   Echocardiogram (8.19.22):   The left ventricle is normal in size with concentric remodeling and mildly decreased systolic function.  The estimated ejection fraction is 40-45%.  There is mild left ventricular global hypokinesis.  Grade I left ventricular diastolic dysfunction.  Moderate right ventricular enlargement with mildly reduced right ventricular systolic function.  There is mild pulmonary hypertension.  The estimated PA systolic pressure is 44 mmHg.  Elevated central venous pressure (15 mmHg).    Review of Systems   Constitutional: Positive for malaise/fatigue. Negative for fever " and night sweats.   Cardiovascular:  Positive for leg swelling. Negative for chest pain and claudication.   Respiratory:  Positive for shortness of breath and snoring.    All other systems reviewed and are negative.    Objective:     Vital Signs (Most Recent):  Temp: 97.8 °F (36.6 °C) (10/21/22 1116)  Pulse: 91 (10/21/22 1116)  Resp: 18 (10/21/22 1116)  BP: 133/84 (10/21/22 1116)  SpO2: 97 % (10/21/22 1116) Vital Signs (24h Range):  Temp:  [97.2 °F (36.2 °C)-98.4 °F (36.9 °C)] 97.8 °F (36.6 °C)  Pulse:  [] 91  Resp:  [16-20] 18  SpO2:  [94 %-100 %] 97 %  BP: (133-165)/() 133/84     Weight: (!) 159.2 kg (350 lb 15.6 oz)  Body mass index is 47.6 kg/m².    SpO2: 97 %  O2 Device (Oxygen Therapy): nasal cannula      Intake/Output Summary (Last 24 hours) at 10/21/2022 1216  Last data filed at 10/21/2022 0600  Gross per 24 hour   Intake 570 ml   Output 1730 ml   Net -1160 ml     Lines/Drains/Airways       Peripheral Intravenous Line  Duration                  Peripheral IV - Single Lumen 10/20/22 0216 20 G Right Antecubital 1 day                  Significant Labs:   Recent Results (from the past 72 hour(s))   Comprehensive metabolic panel    Collection Time: 10/20/22  2:24 AM   Result Value Ref Range    Sodium Level 142 136 - 145 mmol/L    Potassium Level 3.3 (L) 3.5 - 5.1 mmol/L    Chloride 103 98 - 107 mmol/L    Carbon Dioxide 28 22 - 29 mmol/L    Glucose Level 90 74 - 100 mg/dL    Blood Urea Nitrogen 14.3 8.4 - 25.7 mg/dL    Creatinine 1.34 (H) 0.73 - 1.18 mg/dL    Calcium Level Total 8.7 8.4 - 10.2 mg/dL    Protein Total 6.2 (L) 6.4 - 8.3 gm/dL    Albumin Level 3.3 (L) 3.5 - 5.0 gm/dL    Globulin 2.9 2.4 - 3.5 gm/dL    Albumin/Globulin Ratio 1.1 1.1 - 2.0 ratio    Bilirubin Total 0.3 <=1.5 mg/dL    Alkaline Phosphatase 66 40 - 150 unit/L    Alanine Aminotransferase 8 0 - 55 unit/L    Aspartate Aminotransferase 14 5 - 34 unit/L    eGFR >60 mls/min/1.73/m2   Troponin I    Collection Time: 10/20/22  2:24 AM    Result Value Ref Range    Troponin-I 0.015 0.000 - 0.045 ng/mL   Brain natriuretic peptide    Collection Time: 10/20/22  2:24 AM   Result Value Ref Range    Natriuretic Peptide 324.8 (H) <=100.0 pg/mL   CBC with Differential    Collection Time: 10/20/22  2:24 AM   Result Value Ref Range    WBC 7.0 4.5 - 11.5 x10(3)/mcL    RBC 4.15 (L) 4.70 - 6.10 x10(6)/mcL    Hgb 13.0 (L) 14.0 - 18.0 gm/dL    Hct 40.0 (L) 42.0 - 52.0 %    MCV 96.4 (H) 80.0 - 94.0 fL    MCH 31.3 (H) 27.0 - 31.0 pg    MCHC 32.5 (L) 33.0 - 36.0 mg/dL    RDW 15.1 11.5 - 17.0 %    Platelet 285 130 - 400 x10(3)/mcL    MPV 9.3 7.4 - 10.4 fL    Neut % 55.5 %    Lymph % 33.1 %    Mono % 8.7 %    Eos % 1.7 %    Basophil % 0.4 %    Lymph # 2.32 0.6 - 4.6 x10(3)/mcL    Neut # 3.9 2.1 - 9.2 x10(3)/mcL    Mono # 0.61 0.1 - 1.3 x10(3)/mcL    Eos # 0.12 0 - 0.9 x10(3)/mcL    Baso # 0.03 0 - 0.2 x10(3)/mcL    IG# 0.04 0 - 0.04 x10(3)/mcL    IG% 0.6 %    NRBC% 0.0 %   Ethanol    Collection Time: 10/20/22  2:24 AM   Result Value Ref Range    Ethanol Level 160.0 (H) <=10.0 mg/dL   Digoxin Level    Collection Time: 10/20/22  2:24 AM   Result Value Ref Range    Digoxin Level <0.19 (L) 0.80 - 2.00 ng/mL   COVID/FLU A&B PCR    Collection Time: 10/20/22  2:35 AM   Result Value Ref Range    Influenza A PCR Not Detected Not Detected    Influenza B PCR Not Detected Not Detected    SARS-CoV-2 PCR Not Detected Not Detected   POCT glucose    Collection Time: 10/20/22  6:42 AM   Result Value Ref Range    POCT Glucose 101 70 - 110 mg/dL   Troponin I    Collection Time: 10/20/22 10:19 AM   Result Value Ref Range    Troponin-I 0.010 0.000 - 0.045 ng/mL   Magnesium    Collection Time: 10/20/22 10:19 AM   Result Value Ref Range    Magnesium Level 1.10 (L) 1.60 - 2.60 mg/dL   POCT glucose    Collection Time: 10/20/22 11:09 AM   Result Value Ref Range    POCT Glucose 252 (H) 70 - 110 mg/dL   POCT glucose    Collection Time: 10/20/22  4:06 PM   Result Value Ref Range    POCT Glucose  151 (H) 70 - 110 mg/dL   Troponin I    Collection Time: 10/20/22  6:06 PM   Result Value Ref Range    Troponin-I 0.013 0.000 - 0.045 ng/mL   Troponin I    Collection Time: 10/20/22  9:40 PM   Result Value Ref Range    Troponin-I <0.010 0.000 - 0.045 ng/mL   POCT glucose    Collection Time: 10/20/22 11:09 PM   Result Value Ref Range    POCT Glucose 157 (H) 70 - 110 mg/dL   POCT glucose    Collection Time: 10/21/22  4:49 AM   Result Value Ref Range    POCT Glucose 130 (H) 70 - 110 mg/dL   Basic Metabolic Panel    Collection Time: 10/21/22  5:40 AM   Result Value Ref Range    Sodium Level 141 136 - 145 mmol/L    Potassium Level 4.3 3.5 - 5.1 mmol/L    Chloride 96 (L) 98 - 107 mmol/L    Carbon Dioxide 29 22 - 29 mmol/L    Glucose Level 143 (H) 74 - 100 mg/dL    Blood Urea Nitrogen 14.7 8.4 - 25.7 mg/dL    Creatinine 1.08 0.73 - 1.18 mg/dL    BUN/Creatinine Ratio 14     Calcium Level Total 9.3 8.4 - 10.2 mg/dL    Anion Gap 16.0 mEq/L    eGFR >60 mls/min/1.73/m2   Magnesium    Collection Time: 10/21/22  5:40 AM   Result Value Ref Range    Magnesium Level 1.10 (L) 1.60 - 2.60 mg/dL   POCT glucose    Collection Time: 10/21/22 11:21 AM   Result Value Ref Range    POCT Glucose 100 70 - 110 mg/dL     Telemetry: PAF 120s     Physical Exam  Constitutional:       Appearance: Normal appearance. He is obese.   HENT:      Head: Normocephalic.      Mouth/Throat:      Mouth: Mucous membranes are moist.   Eyes:      Extraocular Movements: Extraocular movements intact.   Cardiovascular:      Rate and Rhythm: Tachycardia present. Rhythm irregular.      Pulses: Normal pulses.      Heart sounds: Normal heart sounds.   Pulmonary:      Effort: Pulmonary effort is normal.      Breath sounds: Rhonchi and rales present.      Comments: BiPAP QHS; On NC O2 Currently  Abdominal:      Palpations: Abdomen is soft.   Skin:     General: Skin is warm and dry.      Comments: BLE Warm to Touch   Neurological:      General: No focal deficit present.       Mental Status: He is alert and oriented to person, place, and time.   Psychiatric:         Mood and Affect: Mood normal.         Behavior: Behavior normal.     Current Inpatient Medications:    Current Facility-Administered Medications:     albuterol-ipratropium 2.5 mg-0.5 mg/3 mL nebulizer solution 3 mL, 3 mL, Nebulization, Q6H PRN, Sergo Lee MD    atorvastatin tablet 80 mg, 80 mg, Oral, Daily, Uday Rich MD, 80 mg at 10/21/22 0852    butalbital-acetaminophen-caffeine -40 mg per tablet 1 tablet, 1 tablet, Oral, Q4H PRN, Uday Rich MD    carvediloL tablet 6.25 mg, 6.25 mg, Oral, BID, JOEY Cazares    cloNIDine tablet 0.2 mg, 0.2 mg, Oral, Q6H PRN, Sergo Lee MD    dextrose 10% bolus 125 mL, 12.5 g, Intravenous, PRN, Sergo Lee MD    dextrose 10% bolus 250 mL, 25 g, Intravenous, PRN, Sergo Lee MD    [START ON 10/22/2022] diltiaZEM 24 hr capsule 480 mg, 480 mg, Oral, Daily, JOEY Cazares    fluticasone furoate-vilanteroL 100-25 mcg/dose diskus inhaler 1 puff, 1 puff, Inhalation, Daily, Uday Rich MD, 1 puff at 10/21/22 0854    furosemide injection 40 mg, 40 mg, Intravenous, Q12H, Uday Rich MD, 40 mg at 10/21/22 1128    gabapentin capsule 300 mg, 300 mg, Oral, TID, Uday Rich MD, 300 mg at 10/21/22 0852    glucagon (human recombinant) injection 1 mg, 1 mg, Intramuscular, PRN, Sergo Lee MD    glucose chewable tablet 16 g, 16 g, Oral, PRN, Sergo Lee MD    glucose chewable tablet 24 g, 24 g, Oral, PRN, Sergo Lee MD    hydrALAZINE injection 10 mg, 10 mg, Intravenous, Q2H PRN, Sergo Lee MD, 10 mg at 10/20/22 0705    insulin aspart U-100 injection 1-10 Units, 1-10 Units, Subcutaneous, QID (AC + HS) PRN, Sergo Lee MD, 4 Units at 10/20/22 1234    levalbuterol nebulizer solution 0.63 mg, 0.63 mg, Nebulization, Q4H, Uday Rich MD, 0.63 mg at 10/21/22 1115    lisinopriL tablet 20 mg, 20 mg, Oral, Daily,  Uday Rich MD, 20 mg at 10/21/22 0852    magnesium sulfate 2g in water 50mL IVPB (premix), 2 g, Intravenous, Q4H, Uday Rich MD, Last Rate: 25 mL/hr at 10/21/22 1128, 2 g at 10/21/22 1128    oxyCODONE-acetaminophen 5-325 mg per tablet 1 tablet, 1 tablet, Oral, Q12H PRN, Uday Rich MD, 1 tablet at 10/21/22 0231    pantoprazole EC tablet 40 mg, 40 mg, Oral, Daily, Uday Rich MD, 40 mg at 10/21/22 0852    rivaroxaban tablet 20 mg, 20 mg, Oral, Daily with dinner, Uday Rich MD, 20 mg at 10/20/22 1603    tiotropium bromide 2.5 mcg/actuation inhaler 2 puff, 2 puff, Inhalation, Daily, Uday Rich MD, 2 puff at 10/21/22 0852    VTE Risk Mitigation (From admission, onward)           Ordered     rivaroxaban tablet 20 mg  With dinner         10/20/22 1100                  Assessment:   Acute/Chronic Combined Systolic & Diastolic HF    - EF 40-45% with Grade I Diastolic Dysfunction (August 2022)  Chest Pain - Resolved    - Troponin Values Normal  Hypertension - Improving  Hyperlipidemia  PAF- Now AF RVR    - On Xarelto  DM II    - Hyperglycemia  Elevated BMI/Obesity  Obstructive Sleep Apnea/CPAP  Hypokalemia - Resolved  Hypomagnesemia   History of Polysubstance Abuse- Tobacco Use/Alcohol Consumption/Cocaine Abuse  GERD  COPD    - Chronic Home Oxygen  Left Eye Blindness    Plan:   Keep K > 4.0 and Mg > 2.0   Replete Lytes   Continue IV Lasix Q12HR for Diuresis  Accurate I&Os and Daily Weights  Increase Coreg to 6.25mg PO BID  Increase Cardizem CD to 480mg PO Qday  Continue Xarelto for AF/Stroke Risk Reduction   Will consider Ischemic Evaluation as OP if PT demonstrates compliance through office follow up.  Will Continue to Follow   Labs in AM: CBC, CMP and Mg    JOEY Cazares  Cardiology  [facility] - 4th Floor Medical Telemetry  10/21/2022

## 2022-10-22 NOTE — PROGRESS NOTES
[facility]  Hospital Medicine Progress Note        Chief Complaint: Inpatient Follow-up for     Subjective:  Vladimir Bernal is a 52 y.o. male with a past medical history of atrial fibrillation, COPD on home oxygen 2 L/min, hypertension, hyperlipidemia, diabetes mellitus, cocaine abuse (last used x 1 week ago) and HFrEF (LVEF 40-45% on 08/19/2022)presented to [facility] on 10/20/2022 for shortness of breath.  Patient reports  shortness of breath worse on exertion.  Patient also reports nonproductive cough, wheezing, elevated blood pressure, leg swelling, and headache.    On exam patient complains lower midline chest pain that does not radiate.  Patient rates chest pain 7/10.  Patient denies shortness of breath at rest, nausea, vomiting, abdominal pain, headache, and dizziness at this time.  Patient reports he has not taken his medications for the past week.  Patient reports his last alcohol use was yesterday-patient reports drinking about 1 pt.  Patient reports last cocaine use was 1 week ago.    Initial vital signs in ED were /70, pulse 125, respirations 18, temperature 36° C, and SpO2 96%.  Labs in ED revealed potassium 3.3, creatinine 1.34, .8, troponin 0.015, digoxin less than 0.19, and alcohol 160.  EKG showed atrial fibrillation with rapid ventricular response, right bundle-branch block, and heart rate 106.  Chest x-ray shows suspicion for pulmonary vascular congestion. Patient was given  Solu-Medrol, 2 DuoNeb treatments, Cardizem 360 mg tablet, and IV 40 mg Lasix. Patient was admitted to hospital medicine service for further medical management.     Seen and examined the patient, he still requiring oxygen has bilateral crackles in lungs.    Asking for Percocet to be increased to q.6.        Objective/physical exam:  General appearance:  Obese male in no apparent distress.  HEENNT: Atraumatic head. Moist mucous membranes of oral cavity.   Lungs:  Crackles at bilateral  bases  Heart: Regular rate and rhythm.    Abdomen: Soft, non-distended, non-tender. Bowel sounds are normal.   Extremities:  Bilateral lower extremity swelling.  No deformities.   Skin: No Rash. Warm and dry.   Neuro: Awake, alert, and oriented. Motor and sensory exams grossly intact.   Psych/mental status: Appropriate mood and affect. Responds appropriately to questions.     VITAL SIGNS: 24 HRS MIN & MAX LAST   Temp  Min: 97.4 °F (36.3 °C)  Max: 98.1 °F (36.7 °C) 97.6 °F (36.4 °C)   BP  Min: 82/53  Max: 154/90 (!) 82/53     Pulse  Min: 68  Max: 93  93   Resp  Min: 16  Max: 22 18   SpO2  Min: 86 %  Max: 100 % 95 %       Labs, Microbiology and Imaging were Reviewed.      Microbiology Results (last 7 days)       ** No results found for the last 168 hours. **               Medications   atorvastatin  80 mg Oral Daily    carvediloL  6.25 mg Oral BID    diltiaZEM  480 mg Oral Daily    fluticasone furoate-vilanteroL  1 puff Inhalation Daily    furosemide (LASIX) injection  40 mg Intravenous Q12H    gabapentin  300 mg Oral TID    levalbuterol  0.63 mg Nebulization Q4H    lisinopriL  20 mg Oral Daily    pantoprazole  40 mg Oral Daily    rivaroxaban  20 mg Oral Daily with dinner    tiotropium bromide  2 puff Inhalation Daily        albuterol-ipratropium, butalbital-acetaminophen-caffeine -40 mg, cloNIDine, dextrose 10%, dextrose 10%, glucagon (human recombinant), glucose, glucose, hydrALAZINE, insulin aspart U-100, oxyCODONE-acetaminophen     Radiology:  X-Ray Chest 1 View  Narrative: EXAMINATION:  XR CHEST 1 VIEW    CLINICAL HISTORY:  Shortness of breath    COMPARISON:  22 September 2022    FINDINGS:  Portable frontal view of the chest was obtained. Stable cardiomegaly.  There is prominence of the interstitium.  No dense consolidation or pneumothorax.  Impression: Suspect some pulmonary vascular congestion.    Electronically signed by: Bakari Perrin  Date:    10/20/2022  Time:    08:33      Assessment/Plan:  CHF -LVEF  40-45% on 08/19/2022  Obstructive sleep apnea  Chest Pain   Hypokalemia  Atrial fibrillation with RVR  Diabetes mellitus type 2  Essential hypertension   History of hyperlipidemia, cocaine abuse, medication noncompliance    - the is the patient today also and depend on his progress considering discharge tomorrow  Cards onboard, loaded pt with dig.   - continue coreg and cardizem.   -Continue Lasix as 40 mg IV b.i.d..  improving crackles. But stil requiring O2.   -will give CPAP at night giving the obstructive sleep apnea.    -Educated patient regarding his compliance with his home medications and importance of stopping his sees illicit drug use altogether.    - will continue monitoring restarted home medications.  States he does not have productive cough and does not have wheezing.  Continue his home inhalers.      Critical care diagnoses acute decompensated systolic heart failure  Critical care time was given 45 minutes    All diagnosis and differential diagnosis have been reviewed; assessment and plan has been documented; I have personally reviewed the labs and test results that are presently available; I have reviewed the patients medication list; I have reviewed the consulting providers response and recommendations. I have reviewed or attempted to review medical records based upon their availability.       Uday Rich MD   10/22/2022

## 2022-10-22 NOTE — PROGRESS NOTES
"[facility] - 4th Floor Medical Telemetry  Cardiology  Progress Note    Patient Name: Vladimir Bernal  MRN: [id number]  Admission Date: 10/20/2022  Hospital Length of Stay: 2 days  Code Status: Prior   Attending Physician: Uday Rich MD   Primary Care Physician: Sophie Navarrete MD  Expected Discharge Date: 10/22/2022  Principal Problem:<principal problem not specified>    Subjective:     Brief HPI: Mr. Bernal is a 52 year old male, known to [facility] through previous hospitalization, who presented to the hospital with shortness of breath, worse with exertion. Patient noted to report chest pain as well. Troponin values are normal. BNP ~ 300, but chest radiograph revealed pulmonary vascular congestion. EF 40-45% with known diastolic dysfunction. EKG revealed AF RVR. He's been started on IV Diuretics. [facility] is consulted for cardiac management.    Hospital Course:   10.21.22: NAD. "I am feeling well." Denies CP and Palps. + SOB at Rest Remains, but Improved since Admission. Fluid Balance Net Negative 1660mL.   10.22.22: NAD. "I am feeling much better." Sitting on bedside. Denies CP and Palps. + Chronic SOB, but much Improved. Fluid Balance Net Negative 1478mL.    PMH: Chronic AF/AFL (Xarelto), Hypertension, Hyperlipidemia, GERD LVSD (EF 40-45%), COPD/Home Oxygen, DM II, Obesity, KRISHNA/CPAP, Left Eye Blindness  PSH: Mandibular Fracture Repair  Family History: Negative  Social History: Tobacco- Active Smoker, Alcohol- Occasional Use, Substance Abuse- Cocaine     Previous Cardiac Diagnostics:   Echocardiogram (8.19.22):   The left ventricle is normal in size with concentric remodeling and mildly decreased systolic function.  The estimated ejection fraction is 40-45%.  There is mild left ventricular global hypokinesis.  Grade I left ventricular diastolic dysfunction.  Moderate right ventricular enlargement with mildly reduced right ventricular systolic function.  There is mild pulmonary hypertension.  The estimated PA " systolic pressure is 44 mmHg.  Elevated central venous pressure (15 mmHg).    Review of Systems   Constitutional: Positive for malaise/fatigue. Negative for fever and night sweats.   Cardiovascular:  Negative for chest pain, claudication and leg swelling.   Respiratory:  Positive for shortness of breath.    All other systems reviewed and are negative.    Objective:     Vital Signs (Most Recent):  Temp: 97.7 °F (36.5 °C) (10/22/22 0827)  Pulse: 85 (10/22/22 0838)  Resp: 18 (10/22/22 0838)  BP: (!) 130/53 (10/22/22 0827)  SpO2: 96 % (10/22/22 0827) Vital Signs (24h Range):  Temp:  [97.4 °F (36.3 °C)-98.1 °F (36.7 °C)] 97.7 °F (36.5 °C)  Pulse:  [68-91] 85  Resp:  [18-22] 18  SpO2:  [90 %-97 %] 96 %  BP: (130-154)/(53-91) 130/53     Weight: (!) 159.2 kg (350 lb 15.6 oz)  Body mass index is 47.6 kg/m².    SpO2: 96 %  O2 Device (Oxygen Therapy): nasal cannula (vision cpap on sb)      Intake/Output Summary (Last 24 hours) at 10/22/2022 1001  Last data filed at 10/22/2022 0639  Gross per 24 hour   Intake 2242 ml   Output 3700 ml   Net -1458 ml       Lines/Drains/Airways       Peripheral Intravenous Line  Duration                  Peripheral IV - Single Lumen 10/20/22 0216 20 G Right Antecubital 2 days                  Significant Labs:   Recent Results (from the past 72 hour(s))   Comprehensive metabolic panel    Collection Time: 10/20/22  2:24 AM   Result Value Ref Range    Sodium Level 142 136 - 145 mmol/L    Potassium Level 3.3 (L) 3.5 - 5.1 mmol/L    Chloride 103 98 - 107 mmol/L    Carbon Dioxide 28 22 - 29 mmol/L    Glucose Level 90 74 - 100 mg/dL    Blood Urea Nitrogen 14.3 8.4 - 25.7 mg/dL    Creatinine 1.34 (H) 0.73 - 1.18 mg/dL    Calcium Level Total 8.7 8.4 - 10.2 mg/dL    Protein Total 6.2 (L) 6.4 - 8.3 gm/dL    Albumin Level 3.3 (L) 3.5 - 5.0 gm/dL    Globulin 2.9 2.4 - 3.5 gm/dL    Albumin/Globulin Ratio 1.1 1.1 - 2.0 ratio    Bilirubin Total 0.3 <=1.5 mg/dL    Alkaline Phosphatase 66 40 - 150 unit/L     Alanine Aminotransferase 8 0 - 55 unit/L    Aspartate Aminotransferase 14 5 - 34 unit/L    eGFR >60 mls/min/1.73/m2   Troponin I    Collection Time: 10/20/22  2:24 AM   Result Value Ref Range    Troponin-I 0.015 0.000 - 0.045 ng/mL   Brain natriuretic peptide    Collection Time: 10/20/22  2:24 AM   Result Value Ref Range    Natriuretic Peptide 324.8 (H) <=100.0 pg/mL   CBC with Differential    Collection Time: 10/20/22  2:24 AM   Result Value Ref Range    WBC 7.0 4.5 - 11.5 x10(3)/mcL    RBC 4.15 (L) 4.70 - 6.10 x10(6)/mcL    Hgb 13.0 (L) 14.0 - 18.0 gm/dL    Hct 40.0 (L) 42.0 - 52.0 %    MCV 96.4 (H) 80.0 - 94.0 fL    MCH 31.3 (H) 27.0 - 31.0 pg    MCHC 32.5 (L) 33.0 - 36.0 mg/dL    RDW 15.1 11.5 - 17.0 %    Platelet 285 130 - 400 x10(3)/mcL    MPV 9.3 7.4 - 10.4 fL    Neut % 55.5 %    Lymph % 33.1 %    Mono % 8.7 %    Eos % 1.7 %    Basophil % 0.4 %    Lymph # 2.32 0.6 - 4.6 x10(3)/mcL    Neut # 3.9 2.1 - 9.2 x10(3)/mcL    Mono # 0.61 0.1 - 1.3 x10(3)/mcL    Eos # 0.12 0 - 0.9 x10(3)/mcL    Baso # 0.03 0 - 0.2 x10(3)/mcL    IG# 0.04 0 - 0.04 x10(3)/mcL    IG% 0.6 %    NRBC% 0.0 %   Ethanol    Collection Time: 10/20/22  2:24 AM   Result Value Ref Range    Ethanol Level 160.0 (H) <=10.0 mg/dL   Digoxin Level    Collection Time: 10/20/22  2:24 AM   Result Value Ref Range    Digoxin Level <0.19 (L) 0.80 - 2.00 ng/mL   COVID/FLU A&B PCR    Collection Time: 10/20/22  2:35 AM   Result Value Ref Range    Influenza A PCR Not Detected Not Detected    Influenza B PCR Not Detected Not Detected    SARS-CoV-2 PCR Not Detected Not Detected   POCT glucose    Collection Time: 10/20/22  6:42 AM   Result Value Ref Range    POCT Glucose 101 70 - 110 mg/dL   Troponin I    Collection Time: 10/20/22 10:19 AM   Result Value Ref Range    Troponin-I 0.010 0.000 - 0.045 ng/mL   Magnesium    Collection Time: 10/20/22 10:19 AM   Result Value Ref Range    Magnesium Level 1.10 (L) 1.60 - 2.60 mg/dL   POCT glucose    Collection Time: 10/20/22  11:09 AM   Result Value Ref Range    POCT Glucose 252 (H) 70 - 110 mg/dL   POCT glucose    Collection Time: 10/20/22  4:06 PM   Result Value Ref Range    POCT Glucose 151 (H) 70 - 110 mg/dL   Troponin I    Collection Time: 10/20/22  6:06 PM   Result Value Ref Range    Troponin-I 0.013 0.000 - 0.045 ng/mL   Troponin I    Collection Time: 10/20/22  9:40 PM   Result Value Ref Range    Troponin-I <0.010 0.000 - 0.045 ng/mL   POCT glucose    Collection Time: 10/20/22 11:09 PM   Result Value Ref Range    POCT Glucose 157 (H) 70 - 110 mg/dL   POCT glucose    Collection Time: 10/21/22  4:49 AM   Result Value Ref Range    POCT Glucose 130 (H) 70 - 110 mg/dL   Basic Metabolic Panel    Collection Time: 10/21/22  5:40 AM   Result Value Ref Range    Sodium Level 141 136 - 145 mmol/L    Potassium Level 4.3 3.5 - 5.1 mmol/L    Chloride 96 (L) 98 - 107 mmol/L    Carbon Dioxide 29 22 - 29 mmol/L    Glucose Level 143 (H) 74 - 100 mg/dL    Blood Urea Nitrogen 14.7 8.4 - 25.7 mg/dL    Creatinine 1.08 0.73 - 1.18 mg/dL    BUN/Creatinine Ratio 14     Calcium Level Total 9.3 8.4 - 10.2 mg/dL    Anion Gap 16.0 mEq/L    eGFR >60 mls/min/1.73/m2   Magnesium    Collection Time: 10/21/22  5:40 AM   Result Value Ref Range    Magnesium Level 1.10 (L) 1.60 - 2.60 mg/dL   POCT glucose    Collection Time: 10/21/22 11:21 AM   Result Value Ref Range    POCT Glucose 100 70 - 110 mg/dL   POCT glucose    Collection Time: 10/21/22  4:32 PM   Result Value Ref Range    POCT Glucose 129 (H) 70 - 110 mg/dL   POCT glucose    Collection Time: 10/21/22  9:20 PM   Result Value Ref Range    POCT Glucose 109 70 - 110 mg/dL   POCT glucose    Collection Time: 10/22/22  5:44 AM   Result Value Ref Range    POCT Glucose 94 70 - 110 mg/dL   Magnesium    Collection Time: 10/22/22  6:02 AM   Result Value Ref Range    Magnesium Level 2.20 1.60 - 2.60 mg/dL   Comprehensive Metabolic Panel    Collection Time: 10/22/22  6:02 AM   Result Value Ref Range    Sodium Level 141 136  - 145 mmol/L    Potassium Level 4.0 3.5 - 5.1 mmol/L    Chloride 100 98 - 107 mmol/L    Carbon Dioxide 33 (H) 22 - 29 mmol/L    Glucose Level 94 74 - 100 mg/dL    Blood Urea Nitrogen 12.8 8.4 - 25.7 mg/dL    Creatinine 1.01 0.73 - 1.18 mg/dL    Calcium Level Total 8.5 8.4 - 10.2 mg/dL    Protein Total 5.8 (L) 6.4 - 8.3 gm/dL    Albumin Level 3.2 (L) 3.5 - 5.0 gm/dL    Globulin 2.6 2.4 - 3.5 gm/dL    Albumin/Globulin Ratio 1.2 1.1 - 2.0 ratio    Bilirubin Total 0.3 <=1.5 mg/dL    Alkaline Phosphatase 60 40 - 150 unit/L    Alanine Aminotransferase 7 0 - 55 unit/L    Aspartate Aminotransferase 9 5 - 34 unit/L    eGFR >60 mls/min/1.73/m2   CBC with Differential    Collection Time: 10/22/22  6:02 AM   Result Value Ref Range    WBC 9.2 4.5 - 11.5 x10(3)/mcL    RBC 4.60 (L) 4.70 - 6.10 x10(6)/mcL    Hgb 14.3 14.0 - 18.0 gm/dL    Hct 45.7 42.0 - 52.0 %    MCV 99.3 (H) 80.0 - 94.0 fL    MCH 31.1 (H) 27.0 - 31.0 pg    MCHC 31.3 (L) 33.0 - 36.0 mg/dL    RDW 14.9 11.5 - 17.0 %    Platelet 351 130 - 400 x10(3)/mcL    MPV 9.6 7.4 - 10.4 fL    Neut % 69.7 %    Lymph % 20.0 %    Mono % 9.2 %    Eos % 0.1 %    Basophil % 0.2 %    Lymph # 1.83 0.6 - 4.6 x10(3)/mcL    Neut # 6.4 2.1 - 9.2 x10(3)/mcL    Mono # 0.84 0.1 - 1.3 x10(3)/mcL    Eos # 0.01 0 - 0.9 x10(3)/mcL    Baso # 0.02 0 - 0.2 x10(3)/mcL    IG# 0.07 (H) 0 - 0.04 x10(3)/mcL    IG% 0.8 %    NRBC% 0.0 %     Telemetry: PAF 80s-90s    Physical Exam  Constitutional:       Appearance: Normal appearance. He is obese.   HENT:      Head: Normocephalic.      Mouth/Throat:      Mouth: Mucous membranes are moist.   Eyes:      Extraocular Movements: Extraocular movements intact.   Cardiovascular:      Rate and Rhythm: Normal rate. Rhythm irregular.      Pulses: Normal pulses.      Heart sounds: Normal heart sounds.   Pulmonary:      Effort: Pulmonary effort is normal.      Breath sounds: Rhonchi and rales present.      Comments: BiPAP QHS; On NC O2 Currently  Abdominal:       Palpations: Abdomen is soft.   Musculoskeletal:         General: No swelling.   Skin:     General: Skin is warm and dry.      Comments: BLE Warm to Touch   Neurological:      General: No focal deficit present.      Mental Status: He is alert and oriented to person, place, and time.   Psychiatric:         Mood and Affect: Mood normal.         Behavior: Behavior normal.     Current Inpatient Medications:    Current Facility-Administered Medications:     albuterol-ipratropium 2.5 mg-0.5 mg/3 mL nebulizer solution 3 mL, 3 mL, Nebulization, Q6H PRN, Sergo Lee MD    atorvastatin tablet 80 mg, 80 mg, Oral, Daily, Uday Rich MD, 80 mg at 10/22/22 0839    butalbital-acetaminophen-caffeine -40 mg per tablet 1 tablet, 1 tablet, Oral, Q4H PRN, Uday Rich MD    carvediloL tablet 6.25 mg, 6.25 mg, Oral, BID, JOEY Cazares, 6.25 mg at 10/22/22 0838    cloNIDine tablet 0.2 mg, 0.2 mg, Oral, Q6H PRN, Sergo Lee MD    dextrose 10% bolus 125 mL, 12.5 g, Intravenous, PRN, Sergo Lee MD    dextrose 10% bolus 250 mL, 25 g, Intravenous, PRN, Sergo Lee MD    diltiaZEM 24 hr capsule 480 mg, 480 mg, Oral, Daily, JOEY Cazares, 480 mg at 10/22/22 0839    fluticasone furoate-vilanteroL 100-25 mcg/dose diskus inhaler 1 puff, 1 puff, Inhalation, Daily, Uday Rich MD, 1 puff at 10/22/22 0837    furosemide injection 40 mg, 40 mg, Intravenous, Q12H, Uday Rich MD, 40 mg at 10/22/22 0041    gabapentin capsule 300 mg, 300 mg, Oral, TID, Uday Rich MD, 300 mg at 10/22/22 0839    glucagon (human recombinant) injection 1 mg, 1 mg, Intramuscular, PRN, Sergo Lee MD    glucose chewable tablet 16 g, 16 g, Oral, PRN, Sergo Lee MD    glucose chewable tablet 24 g, 24 g, Oral, PRN, Sergo Lee MD    hydrALAZINE injection 10 mg, 10 mg, Intravenous, Q2H PRN, Sergo Lee MD, 10 mg at 10/20/22 0705    insulin aspart U-100 injection 1-10 Units, 1-10 Units,  Subcutaneous, QID (AC + HS) PRN, Sergo Lee MD, 4 Units at 10/20/22 1234    levalbuterol nebulizer solution 0.63 mg, 0.63 mg, Nebulization, Q4H, Uday Rich MD, 0.63 mg at 10/22/22 0806    lisinopriL tablet 20 mg, 20 mg, Oral, Daily, Uday Rich MD, 20 mg at 10/22/22 0839    oxyCODONE-acetaminophen 5-325 mg per tablet 1 tablet, 1 tablet, Oral, Q12H PRN, Uday Rich MD, 1 tablet at 10/22/22 0322    pantoprazole EC tablet 40 mg, 40 mg, Oral, Daily, Udya Rich MD, 40 mg at 10/22/22 0839    rivaroxaban tablet 20 mg, 20 mg, Oral, Daily with dinner, Uday Rich MD, 20 mg at 10/21/22 1629    tiotropium bromide 2.5 mcg/actuation inhaler 2 puff, 2 puff, Inhalation, Daily, Uday Rich MD, 2 puff at 10/22/22 0838    VTE Risk Mitigation (From admission, onward)           Ordered     rivaroxaban tablet 20 mg  With dinner         10/20/22 1100                  Assessment:   Acute/Chronic Combined Systolic & Diastolic HF - Improving    - EF 40-45% with Grade I Diastolic Dysfunction (August 2022)  Chest Pain - Resolved    - Troponin Values Normal  Hypertension - Improving  Hyperlipidemia  PAF- Now AF RVR    - On Xarelto  DM II    - Hyperglycemia  Elevated BMI/Obesity  Obstructive Sleep Apnea/CPAP  Hypokalemia - Resolved  Hypomagnesemia   History of Polysubstance Abuse- Tobacco Use/Alcohol Consumption/Cocaine Abuse  GERD  COPD    - Chronic Home Oxygen  KRISHNA/CPAP  Left Eye Blindness    Plan:   Keep K > 4.0 and Mg > 2.0   Replete Lytes   Will Defer further Diuresis to Primary Team  Accurate I&Os and Daily Weights  Continue BB and Cardizem CD Doses - HR Controlled  Continue Xarelto for AF/Stroke Risk Reduction   Will consider Ischemic Evaluation as OP if PT demonstrates compliance through office follow up.  F/U with [facility] in 1 week upon D/C (Dr. Hill)  Encourage Compliance with Medical Therapy and Follow Up Appointments  We will be available as needed    Torres Lee, JOEY  Cardiology  [facility] - 4th Floor  Medical Telemetry  10/22/2022

## 2022-10-23 NOTE — DISCHARGE SUMMARY
[facility] Hospital Medicine Discharge Summary    Admit Date: 10/20/2022  Discharge Date and Time: 10/23/2022  Discharging Physician: Uday Rich MD.  Consults: Cardiology    Discharge Diagnoses:  CHF -LVEF 40-45% on 08/19/2022  Obstructive sleep apnea  Chest Pain   Hypokalemia  Atrial fibrillation with RVR  Diabetes mellitus type 2  Essential hypertension   History of hyperlipidemia, cocaine abuse, medication noncompliance    Hospital Course:   Vladimir Bernal is a 52 y.o. male with a past medical history of atrial fibrillation, COPD on home oxygen 2 L/min, hypertension, hyperlipidemia, diabetes mellitus, cocaine abuse (last used x 1 week ago) and HFrEF (LVEF 40-45% on 08/19/2022)presented to [facility] on 10/20/2022 for shortness of breath.  Patient reports  shortness of breath worse on exertion.  Patient also reports nonproductive cough, wheezing, elevated blood pressure, leg swelling, and headache.    On exam patient complains lower midline chest pain that does not radiate.  Patient rates chest pain 7/10.  Patient denies shortness of breath at rest, nausea, vomiting, abdominal pain, headache, and dizziness at this time.  Patient reports he has not taken his medications for the past week.  Patient reports his last alcohol use was yesterday-patient reports drinking about 1 pt.  Patient reports last cocaine use was 1 week ago.    Initial vital signs in ED were /70, pulse 125, respirations 18, temperature 36° C, and SpO2 96%.  Labs in ED revealed potassium 3.3, creatinine 1.34, .8, troponin 0.015, digoxin less than 0.19, and alcohol 160.  EKG showed atrial fibrillation with rapid ventricular response, right bundle-branch block, and heart rate 106.  Chest x-ray shows suspicion for pulmonary vascular congestion. Patient was given  Solu-Medrol, 2 DuoNeb treatments, Cardizem 360 mg tablet, and IV 40 mg Lasix. Patient was admitted to hospital medicine service for further  medical management.   Patient was treated for have for of respiration in the hospital.  AFib RVR resolved after restarting his medications Cardizem was increased to 480 mg extended release and Coreg was increased to 6.25 mg twice a day was diuresed with Lasix b.i.d. 40 mg.  He stated that he is on 2 L of home oxygen at home.  He was discharged today home with medications refilled.  He will follow-up with Cardiology and primary care physician.      Objective/physical exam:  General appearance:  Obese male in no apparent distress.  HEENNT: Atraumatic head. Moist mucous membranes of oral cavity.   Lungs:  Crackles at bilateral bases  Heart: Regular rate and rhythm.    Abdomen: Soft, non-distended, non-tender. Bowel sounds are normal.   Extremities:  Bilateral lower extremity swelling.  No deformities.   Skin: No Rash. Warm and dry.   Neuro: Awake, alert, and oriented. Motor and sensory exams grossly intact.   Psych/mental status: Appropriate mood and affect. Responds appropriately to questions.     Vitals:  VITAL SIGNS: 24 HRS MIN & MAX LAST   Temp  Min: 97.5 °F (36.4 °C)  Max: 98.1 °F (36.7 °C) 97.5 °F (36.4 °C)   BP  Min: 82/53  Max: 136/66 114/78   Pulse  Min: 50  Max: 103  94   Resp  Min: 15  Max: 27 18   SpO2  Min: 91 %  Max: 99 % (!) 94 %           Procedures Performed: No admission procedures for hospital encounter.     Significant Diagnostic Studies: See Full reports for all details    Recent Labs   Lab 10/20/22  0224 10/22/22  0602   WBC 7.0 9.2   RBC 4.15* 4.60*   HGB 13.0* 14.3   HCT 40.0* 45.7   MCV 96.4* 99.3*   MCH 31.3* 31.1*   MCHC 32.5* 31.3*   RDW 15.1 14.9    351   MPV 9.3 9.6       Recent Labs   Lab 10/20/22  0224 10/20/22  1019 10/21/22  0540 10/22/22  0602 10/23/22  0903     --  141 141 141   K 3.3*  --  4.3 4.0 3.9   CO2 28  --  29 33* 32*   BUN 14.3  --  14.7 12.8 17.5   CREATININE 1.34*  --  1.08 1.01 1.29*   CALCIUM 8.7  --  9.3 8.5 9.1   MG  --    < > 1.10* 2.20 1.80   ALBUMIN  3.3*  --   --  3.2*  --    ALKPHOS 66  --   --  60  --    ALT 8  --   --  7  --    AST 14  --   --  9  --    BILITOT 0.3  --   --  0.3  --     < > = values in this interval not displayed.        Microbiology Results (last 7 days)       ** No results found for the last 168 hours. **             X-Ray Chest 1 View  Narrative: EXAMINATION:  XR CHEST 1 VIEW    CLINICAL HISTORY:  Shortness of breath    COMPARISON:  22 September 2022    FINDINGS:  Portable frontal view of the chest was obtained. Stable cardiomegaly.  There is prominence of the interstitium.  No dense consolidation or pneumothorax.  Impression: Suspect some pulmonary vascular congestion.    Electronically signed by: Bakari Perrin  Date:    10/20/2022  Time:    08:33         Medication List        START taking these medications      oxyCODONE-acetaminophen 5-325 mg per tablet  Commonly known as: PERCOCET  Take 1 tablet by mouth every 6 (six) hours as needed for Pain.            CHANGE how you take these medications      carvediloL 6.25 MG tablet  Commonly known as: COREG  Take 1 tablet (6.25 mg total) by mouth 2 (two) times daily.  What changed:   medication strength  how much to take     diltiaZEM 240 MG 24 hr capsule  Commonly known as: CARDIZEM CD  Take 2 capsules (480 mg total) by mouth once daily.  Start taking on: October 24, 2022  What changed:   medication strength  how much to take            CONTINUE taking these medications      atorvastatin 80 MG tablet  Commonly known as: LIPITOR  Take 1 tablet (80 mg total) by mouth once daily.     budesonide-formoterol 160-4.5 mcg 160-4.5 mcg/actuation Hfaa  Commonly known as: SYMBICORT  Inhale 2 puffs into the lungs every 12 (twelve) hours. Controller     butalbital-acetaminophen-caffeine -40 mg -40 mg per tablet  Commonly known as: FIORICET, ESGIC     furosemide 40 MG tablet  Commonly known as: LASIX  Take 1 tablet (40 mg total) by mouth 2 (two) times daily.     gabapentin 300 MG  capsule  Commonly known as: NEURONTIN  Take 1 capsule (300 mg total) by mouth 3 (three) times daily.     levalbuterol 0.63 mg/3 mL nebulizer solution  Commonly known as: XOPENEX  Take 3 mLs (0.63 mg total) by nebulization every 4 (four) hours. Rescue     lisinopriL 20 MG tablet  Commonly known as: PRINIVIL,ZESTRIL  Take 1 tablet (20 mg total) by mouth once daily.     metFORMIN 500 MG ER 24hr tablet  Commonly known as: GLUCOPHAGE-XR     pantoprazole 40 MG tablet  Commonly known as: PROTONIX  Take 1 tablet (40 mg total) by mouth once daily.     rivaroxaban 10 mg Tab  Commonly known as: XARELTO  Take 2 tablets (20 mg total) by mouth daily with dinner or evening meal.     tadalafiL 5 MG tablet  Commonly known as: CIALIS     tiotropium 18 mcg inhalation capsule  Commonly known as: SPIRIVA  Inhale 1 capsule (18 mcg total) into the lungs once daily. Controller            STOP taking these medications      digoxin 250 mcg tablet  Commonly known as: LANOXIN      MG tablet  Generic drug: ibuprofen               Where to Get Your Medications        These medications were sent to [address] [address]      Phone: [phone]   atorvastatin 80 MG tablet  carvediloL 6.25 MG tablet  diltiaZEM 240 MG 24 hr capsule  furosemide 40 MG tablet  gabapentin 300 MG capsule  lisinopriL 20 MG tablet  pantoprazole 40 MG tablet  rivaroxaban 10 mg Tab       You can get these medications from any pharmacy    Bring a paper prescription for each of these medications  oxyCODONE-acetaminophen 5-325 mg per tablet          Explained in detail to the patient about the discharge plan, medications, and follow-up visits. Pt understands and agrees with the treatment plan  Discharge Disposition: Home or Self Care   Discharged Condition: stable  Diet-   Dietary Orders (From admission, onward)       Start     Ordered    10/20/22 0553  Diet Low Sodium, 2gm  Diet effective now          10/20/22 0552                   Medications Per DC med rec  Activities as tolerated   Follow-up Information       Jose Alejandro Hill Jr, MD Follow up in 1 week(s).    Specialty: Cardiology  Why: Hospital Follow Up  Contact information:  [address] [place] [id number]  [phone]               Sophie Navarrete MD Follow up in 1 week(s).    Contact information:  [address] [facility]  [place] [id number] [phone]                           For further questions contact hospitalist office    Discharge time 33 minutes    For worsening symptoms, chest pain, shortness of breath, increased abdominal pain, high grade fever, stroke or stroke like symptoms, immediately go to the nearest Emergency Room or call 911 as soon as possible.      Uday Daley M.D, on 10/23/2022. at 12:25 PM.

## 2022-10-24 NOTE — PROGRESS NOTES
C3 nurse attempted to contact Vladimir Bernal   for a TCC post hospital discharge follow up call. No answer at phone number listed.

## 2022-10-25 NOTE — PHYSICIAN QUERY
PT Name: Vladimir Bernal  MR #: [id number]     DOCUMENTATION CLARIFICATION     CDS/: Adam Nicole RN, CCDS         Contact information:   [email or web address]     This form is a permanent document in the medical record.     Query Date: October 25, 2022    By submitting this query, we are merely seeking further clarification of documentation.  Please utilize your independent clinical judgment when addressing the question(s) below.  The Medical Record contains the following   Indicators   Supporting Clinical Findings Location in Medical Record   x SOB, HILL, Wheezing, Productive Cough, Use of Accessory Muscles, etc. Shortness of Breath    C/O SOB. States HX OF COPD, CHF, AND Asthma  Mild respiratory distress and mild tachypnea. Wheezing all over and crackles at bases.   He is Obese .   Clinical Impression: COPD exacerbation ; CHF   10/20 ED provider note    x RR         ABGs         O2 sat 10/19@2301    RR: 24;   sats 92% on 3 LNC;  10/20 @0701   RR: 18   sats 94%  3 LNC  10/20@ 1501  RR: 18;  sats 97 on 3L/ CPAP   Flow sheet        Hypoxia/Hypercapnia      BiPAP/Intubation/Mechanical Ventilation      Supplemental O2     x Home O2, Oxygen Dependence ,PMH: ... COPD on home oxygen 2 L/min,   10/20  H&P; Hosp med ()    Respiratory distress or failure     x Radiology findings Stable cardiomegaly.  There is prominence of the interstitium.  No dense consolidation or pneumothorax.   10/20 CXR   x Acute/Chronic Illness SOB; Obstructive sleep apnea-will give CPAP at night giving the obstructive sleep apnea.   Critical care diagnoses acute decompensated systolic heart failure   10/21 HM, PN   x Treatment albuterol nebulizer; 10/20  albuterol-ipratropium 2.5 mg-0.5 mg/3 mL nebulizer ; 10/20  methylPREDNISolone sodium succinate, IV;  10/20  tiotropium bromide 2.5 mcg/actuation inhaler ; 10/20-22     MAR    Other         The noted clinical guidelines are only system guidelines and do not replace the providers  clinical judgment.    Provider, please specify the respiratory condition associated with above clinical findings.     [    ] Chronic Respiratory Failure with Hypercapnia - Normal pH with high pCO2 (chronic hypercapnia) (common example: COPD)  -- COPD on home oxygen 2 L/min     [    ] Chronic Respiratory Failure with Hypoxia and Hypercapnia - Continuous home oxygen and normal pH with high pCO2 (chronic hypercapnia) (common example: COPD);    -- COPD on home oxygen 2 L/min     [ x   ] Other Respiratory Diagnosis (please specify): _________________     [  ] Clinically Undetermined         Please document in your progress notes daily for the duration of treatment until resolved and include in your discharge summary.     Reference:    LAURA Chavez MD. (2020, March 13). Acute respiratory distress syndrome: Clinical features, diagnosis, and complications in adults ([id number] TAISHA Schaefer MD & [id number] JEN Foote MD, Eds.). Retrieved November 13, 2020, from [email or web address]-respiratory-distress-syndrome-[email or web address]-features-[email or web address]-and-complications-[email or web address]-adults?search=ards&source=search_result&selectedTitle=1~150&usage_type=default&display_rank=1  Form No. 00802

## 2022-11-01 NOTE — ED PROVIDER NOTES
Encounter Date: 11/1/2022    SCRIBE #1 NOTE: I, Henna Nichols, am scribing for, and in the presence of,  Jennifer Andres MD. I have scribed the following portions of the note - Other sections scribed: HPI, ROS, PE, EKG.     History     Chief Complaint   Patient presents with    Shortness of Breath     C/o SOB and chest pain that began last night around 1100. Hx of CHF. Also c/o headache. Received 1 spray Nitro and 324 mg ASA in route with ems.      52 year old male with a hx of DM, COPD, HTN, and Afib presents to the ED via EMS for shortness of breath and sharp, left sided chest pain that began last night around 2300. The patient reports that he began wheezing and coughing 2 days ago and is using an inhaler without relief. He denies fever or lower extremity swelling. He currently has a headache and did not take any of his daily medications today. He is not a smoker, drinks occasionally, and used cocaine 4-5 days ago.     The history is provided by the patient. No  was used.   Shortness of Breath  This is a new problem. The problem occurs intermittently.Episode onset: 2300 yesterday. The problem has not changed since onset.Associated symptoms include headaches, cough, wheezing and chest pain. Pertinent negatives include no fever, no sore throat, no vomiting, no abdominal pain, no rash and no leg swelling. Treatments tried: inhaler. The treatment provided no relief. He has had Prior hospitalizations. He has had Prior ED visits. Associated medical issues include COPD.   Review of patient's allergies indicates:   Allergen Reactions    Iodine Shortness Of Breath and Swelling     IT FLARES UP ASTHMA SYMPTOMSFLAR      Shellfish containing products      Past Medical History:   Diagnosis Date    COPD (chronic obstructive pulmonary disease)     Diabetes mellitus     Hypertension     Paroxysmal atrial fibrillation      Past Surgical History:   Procedure Laterality Date    MANDIBLE SURGERY       No family  history on file.  Social History     Tobacco Use    Smoking status: Every Day     Types: Cigarettes   Substance Use Topics    Alcohol use: Yes     Comment: socially    Drug use: Yes     Types: Cocaine     Review of Systems   Constitutional:  Negative for diaphoresis, fatigue and fever.   HENT:  Negative for congestion and sore throat.    Eyes:  Negative for photophobia and discharge.   Respiratory:  Positive for cough, shortness of breath and wheezing.    Cardiovascular:  Positive for chest pain. Negative for palpitations and leg swelling.   Gastrointestinal:  Negative for abdominal pain, blood in stool, constipation, diarrhea, nausea and vomiting.   Genitourinary:  Negative for dysuria, frequency, hematuria and urgency.   Musculoskeletal:  Negative for back pain and myalgias.   Skin:  Negative for rash.   Neurological:  Positive for headaches. Negative for dizziness.   Psychiatric/Behavioral: Negative.     All other systems reviewed and are negative.    Physical Exam     Initial Vitals [11/01/22 1150]   BP Pulse Resp Temp SpO2   (!) 168/109 60 (!) 25 -- (!) 93 %      MAP       --         Physical Exam    Nursing note and vitals reviewed.  Constitutional: He appears well-developed and well-nourished. He is not diaphoretic.   HENT:   Head: Normocephalic and atraumatic.   Nose: Nose normal.   Mouth/Throat: Oropharynx is clear and moist.   Eyes: Conjunctivae and EOM are normal.   Left eye cataracts    Neck: Trachea normal. Neck supple.   Normal range of motion.  Cardiovascular:  Normal rate, regular rhythm, normal heart sounds and intact distal pulses.     Exam reveals no gallop and no friction rub.       No murmur heard.  Pulmonary/Chest: No respiratory distress. He has wheezes in the right upper field, the right middle field, the right lower field, the left upper field, the left middle field and the left lower field. He has no rhonchi. He has no rales. He exhibits no tenderness.   Abdominal: Abdomen is soft. Bowel  sounds are normal. He exhibits no distension and no mass. There is no abdominal tenderness. There is no rebound and no guarding.   Musculoskeletal:         General: No tenderness or edema. Normal range of motion.      Cervical back: Normal range of motion and neck supple.      Lumbar back: Normal. No tenderness. Normal range of motion.     Neurological: He is alert and oriented to person, place, and time. He has normal strength. No cranial nerve deficit or sensory deficit.   Skin: Skin is warm and dry. Capillary refill takes less than 2 seconds. No rash and no abscess noted. No erythema. No pallor.   Psychiatric: His behavior is normal. Judgment and thought content normal. His mood appears anxious.       ED Course   Critical Care    Date/Time: 11/1/2022 7:40 PM  Performed by: Jennifer Andres MD  Authorized by: Jennifer Andres MD   Direct patient critical care time: 20 minutes  Additional history critical care time: 5 minutes  Ordering / reviewing critical care time: 12 minutes  Documentation critical care time: 10 minutes  Consulting other physicians critical care time: 5 minutes  Total critical care time (exclusive of procedural time) : 52 minutes  Critical care was necessary to treat or prevent imminent or life-threatening deterioration of the following conditions: cardiac failure, circulatory failure and respiratory failure.  Critical care was time spent personally by me on the following activities: development of treatment plan with patient or surrogate, discussions with consultants, evaluation of patient's response to treatment, examination of patient, obtaining history from patient or surrogate, ordering and performing treatments and interventions, ordering and review of laboratory studies, ordering and review of radiographic studies, pulse oximetry, re-evaluation of patient's condition and review of old charts.      Labs Reviewed   B-TYPE NATRIURETIC PEPTIDE - Abnormal; Notable for the following  components:       Result Value    Natriuretic Peptide 192.3 (*)     All other components within normal limits   COMPREHENSIVE METABOLIC PANEL - Abnormal; Notable for the following components:    Albumin/Globulin Ratio 1.0 (*)     All other components within normal limits   APTT - Abnormal; Notable for the following components:    PTT 20.6 (*)     All other components within normal limits   CBC WITH DIFFERENTIAL - Abnormal; Notable for the following components:    RBC 4.51 (*)     Hgb 13.9 (*)     MCV 99.1 (*)     MCHC 31.1 (*)     All other components within normal limits   DRUG SCREEN, URINE (BEAKER) - Abnormal; Notable for the following components:    Cocaine, Urine Positive (*)     All other components within normal limits    Narrative:     Cut off concentrations:    Amphetamines - 1000 ng/ml  Barbiturates - 200 ng/ml  Benzodiazepine - 200 ng/ml  Cannabinoids (THC) - 50 ng/ml  Cocaine - 300 ng/ml  Fentanyl - 1.0 ng/ml  MDMA - 500 ng/ml  Opiates - 300 ng/ml   Phencyclidine (PCP) - 25 ng/ml    Specimen submitted for drug analysis and tested for pH and specific gravity in order to evaluate sample integrity. Suspect tampering if specific gravity is <1.003 and/or pH is not within the range of 4.5 - 8.0  False negatives may result form substances such as bleach added to urine.  False positives may result for the presence of a substance with similar chemical structure to the drug or its metabolite.    This test provides only a PRELIMINARY analytical test result. A more specific alternate chemical method must be used in order to obtain a confirmed analytical result. Gas chromatography/mass spectrometry (GC/MS) is the preferred confirmatory method. Other chemical confirmation methods are available. Clinical consideration and professional judgement should be applied to any drug of abuse test result, particularly when preliminary positive results are used.    Positive results will be confirmed only at the physicians  request. Unconfirmed screening results are to be used only for medical purposes (treatment).        MAGNESIUM - Normal   PROTIME-INR - Normal   TROPONIN I - Normal   CBC W/ AUTO DIFFERENTIAL    Narrative:     The following orders were created for panel order CBC auto differential.  Procedure                               Abnormality         Status                     ---------                               -----------         ------                     CBC with Differential[[id number]]        Abnormal            Final result                 Please view results for these tests on the individual orders.     EKG Readings: (Independently Interpreted)   Initial Reading: No STEMI. Rhythm: Atrial Fibrillation. Heart Rate: 124. Ectopy: No Ectopy. Conduction: RBBB. ST Segments: Normal ST Segments. T Waves Flipped: I, AVL, V5 and V6. Axis: Normal. Clinical Impression: Atrial Fibrillation with RVR   Performed at 11:54 am, inferior Q waves    ECG Results              EKG 12-lead (Final result)  Result time 11/01/22 12:58:26      Final result by Interface, Lab In [facility] (11/01/22 12:58:26)                   Narrative:    Test Reason : R06.02,    Vent. Rate : 124 BPM     Atrial Rate : 000 BPM     P-R Int : 000 ms          QRS Dur : 124 ms      QT Int : 332 ms       P-R-T Axes : 000 053 -41 degrees     QTc Int : 476 ms    Atrial fibrillation with rapid ventricular response  Right bundle branch block  T wave abnormality, consider inferolateral ischemia  Abnormal ECG  Confirmed by Alvin Jean MD ([id number]) on 11/1/2022 12:58:17 PM    Referred By:             Confirmed By:Alvin Jean MD                                  Imaging Results              X-Ray Chest AP Portable (Final result)  Result time 11/01/22 13:54:45      Final result by Shady Yee MD (11/01/22 13:54:45)                   Impression:      Mild cardiomegaly.    Changes of pulmonary vascular congestion and cardiac decompensation      Electronically signed  by: Shady Yee  Date:    11/01/2022  Time:    13:54               Narrative:    EXAMINATION:  XR CHEST AP PORTABLE    CLINICAL HISTORY:  shortness of breath;    TECHNIQUE:  Single frontal view of the chest was performed.    COMPARISON:  October 20, 2022    FINDINGS:  examination reveals mediastinal silhouette to be within normal limits cardiac silhouette is mildly enlarged there is increase in interstitial and pulmonary vascular markings indicating some degree of pulmonary vascular congestion and cardiac decompensation.    No focal consolidative changes are otherwise identified                                    X-Rays:   Independently Interpreted Readings:   Chest X-Ray: Cardiomegaly present.  Increased vascular markings consistent with CHF are present.   Medications   ondansetron injection 4 mg (has no administration in time range)   acetaminophen tablet 650 mg (has no administration in time range)   acetaminophen tablet 650 mg (has no administration in time range)   glucose chewable tablet 16 g (has no administration in time range)   glucose chewable tablet 24 g (has no administration in time range)   dextrose 50% injection 12.5 g (has no administration in time range)   dextrose 50% injection 25 g (has no administration in time range)   glucagon (human recombinant) injection 1 mg (has no administration in time range)   atorvastatin tablet 80 mg (has no administration in time range)   fluticasone furoate-vilanteroL 100-25 mcg/dose diskus inhaler 1 puff (has no administration in time range)   butalbital-acetaminophen-caffeine -40 mg per tablet 1 tablet (has no administration in time range)   diltiaZEM 24 hr capsule 480 mg (has no administration in time range)   furosemide tablet 40 mg (has no administration in time range)   levalbuterol nebulizer solution 0.63 mg (has no administration in time range)   lisinopriL tablet 20 mg (has no administration in time range)   pantoprazole EC tablet 40 mg (has no  administration in time range)   rivaroxaban tablet 20 mg (has no administration in time range)   tiotropium bromide 2.5 mcg/actuation inhaler 2 puff (has no administration in time range)   hydrALAZINE injection 5 mg (has no administration in time range)   nitroGLYCERIN 2% TD oint ointment 1 inch (1 inch Topical (Top) Given 11/1/22 1305)   albuterol-ipratropium 2.5 mg-0.5 mg/3 mL nebulizer solution 3 mL (3 mLs Nebulization Given 11/1/22 1324)   acetaminophen tablet 1,000 mg (1,000 mg Oral Given 11/1/22 1305)   lisinopriL tablet 20 mg (20 mg Oral Given 11/1/22 1415)   carvediloL tablet 6.25 mg (6.25 mg Oral Given 11/1/22 1415)   diltiaZEM 24 hr capsule 240 mg (240 mg Oral Given 11/1/22 1415)   prochlorperazine injection Soln 10 mg (10 mg Intravenous Given 11/1/22 1535)   furosemide injection 40 mg (40 mg Intravenous Given 11/1/22 1535)     Medical Decision Making:   History:   Old Medical Records: I decided to obtain old medical records.  Old Records Summarized: records from previous admission(s).       <> Summary of Records: Frequent visits  Initial Assessment:   Noncompliant, hypertension, cocaine use  Differential Diagnosis:   Acs, chf, copd, covid, flu, anemia  Independently Interpreted Test(s):   I have ordered and independently interpreted X-rays - see prior notes.  I have ordered and independently interpreted EKG Reading(s) - see prior notes  Clinical Tests:   Lab Tests: Reviewed and Ordered  Radiological Study: Ordered and Reviewed  Medical Tests: Ordered and Reviewed  ED Management:  Supplemental o2, bp control, diuresis and duoneb, admit  Other:   I have discussed this case with another health care provider.        Scribe Attestation:   Scribe #1: I performed the above scribed service and the documentation accurately describes the services I performed. I attest to the accuracy of the note.  Comments: Attending:   Physician Attestation Statement for Scribe #1: I, Jennifer Andres MD, personally performed  the services described in this documentation. All medical record entries made by the scribe were at my direction and in my presence.  I have reviewed the chart and agree that the record reflects my personal performance and is accurate and complete.        Attending Attestation:           Physician Attestation for Scribe:  Physician Attestation Statement for Scribe #1: I, reviewed documentation, as scribed by Henna Nichols in my presence, and it is both accurate and complete.           ED Course as of 11/01/22 1942 Tue Nov 01, 2022   1509 Bp 149/90 [BS]   1601 Feels slightly better, admit to hospitalist [BS]      ED Course User Index  [BS] Jennifer Andres MD                 Clinical Impression:   Final diagnoses:  [I50.9] Acute on chronic congestive heart failure, unspecified heart failure type  [J96.01] Acute respiratory failure with hypoxia  [I16.1] Hypertensive emergency      ED Disposition Condition    Observation Stable                Jennifer Andres MD  11/01/22 1942

## 2022-11-02 PROBLEM — J96.22 ACUTE ON CHRONIC RESPIRATORY FAILURE WITH HYPOXIA AND HYPERCAPNIA: Status: RESOLVED | Noted: 2022-01-01 | Resolved: 2022-01-01

## 2022-11-02 PROBLEM — R07.9 CHEST PAIN: Status: RESOLVED | Noted: 2022-01-01 | Resolved: 2022-01-01

## 2022-11-02 PROBLEM — J96.21 ACUTE ON CHRONIC RESPIRATORY FAILURE WITH HYPOXIA AND HYPERCAPNIA: Status: RESOLVED | Noted: 2022-01-01 | Resolved: 2022-01-01

## 2022-11-02 PROBLEM — J96.21 ACUTE ON CHRONIC RESPIRATORY FAILURE WITH HYPOXIA AND HYPERCAPNIA: Status: ACTIVE | Noted: 2022-01-01

## 2022-11-02 PROBLEM — J96.22 ACUTE ON CHRONIC RESPIRATORY FAILURE WITH HYPOXIA AND HYPERCAPNIA: Status: ACTIVE | Noted: 2022-01-01

## 2022-11-02 NOTE — NURSING
Pt aao. Vss. Iv and tele removed. Pt instructed on dc instructions, medications, and follow up appointments, verbalized understanding. Pt dc via [employer].

## 2022-11-02 NOTE — PLAN OF CARE
Pt inquired for caregiver info. LT-PCA form given to pt to initiate phone call.   Pt denies any other dc needs except transportation home. Pt's nurse states she is calling an [employer].

## 2022-11-02 NOTE — DISCHARGE SUMMARY
[facility] Hospital Medicine Discharge Summary    Admit Date: 11/1/2022  Discharge Date and Time: 11/2/20227:51 AM  Admitting Physician:  Team  Discharging Physician: Sanchez Varma MD.  Primary Care Physician: Sophie Navarrete MD  Consults: None    Discharge Diagnoses:  Chest pain- due to cocaine use- trop negative   COPD with chronic respiratory failure-on home CHF -LVEF 40-45% on at 2 liters/minute  CHF -LVEF 40-45% on 08/19/2022- not in acute exacerbation  Obstructive sleep apnea, not on CPAP  Atrial fibrillation, rate controlled - on xarelto   Diabetes mellitus type 2  Essential hypertension   History of hyperlipidemia, cocaine abuse, medication noncompliance  Morbid obesity with BMI 47.0    Hospital Course:   History is very limited given patient is very somnolent, visibly showing sign of obstructive sleep apnea with apneic spells and snoring, they really can keep her eyes open.  History was mostly obtained from ER notes, ER physician, previous notes  This is a 52-year-old male with known past medical history of atrial fibrillation, COPD on home oxygen at 2 liters/minute, hypertension hyperlipidemia diabetes mellitus type 2, cocaine abuse with last use today but patient, heart failure with reduced EF with LVEF 40-45% on 819th 2022, morbid obesity with BMI 47.0 admitted at 11 1 with complaint of shortness of breath and chest pain.  The ED report, it began 2 days ago along with wheezing and coughing.  Patient reported that he used home inhaler but without any relief.  He told us his last use of cocaine was today then he changed and said no it was yesterday.  Admitted under observation on 11/01  Continue with oxygen supplementation as needed, currently at home level of supplemental oxygen  Levalbuterol  q 6 PRN reason or shortness on breath,  avoid albuterol given AFib and may accelerate to RVR  No indication for antibiotics or prednisone at this point given patient's oxygen  requirement has not increased  Continue cardiac monitoring   UDS - positive for cocaine   Insulin sliding scale with accu-checks  This morning pt reported SOB, but still at his home level of oxygen, requested to refill his inhalers as he reports he is out   Continued appropriate home medications for chronic medical condition, pt was advised that he should not be using cocaine while on B blocker- he said ok, did not commit to quitting cocaine   Labs are stable  Pt is cleared for discharge     Pt was seen and examined on the day of discharge  Vitals:  VITAL SIGNS: 24 HRS MIN & MAX LAST   Temp  Min: 97.6 °F (36.4 °C)  Max: 98.1 °F (36.7 °C) 98 °F (36.7 °C)   BP  Min: 131/80  Max: 168/109 (!) 140/96     Pulse  Min: 60  Max: 115  90   Resp  Min: 15  Max: 27 20   SpO2  Min: 93 %  Max: 100 % 95 %       Physical Exam:  General: In no acute distress, noting apneic spells and has obstructive sleep apnea, easily awake   Chest:  Inspiratory wheezes occasionally, supplemental oxygen via nasal cannula 2 liters/minute  Heart: S1, S2, no appreciable murmur, no lower extremity edema  Abdomen: Soft, obese, nontender, BS +  MSK: Warm, no rashes noted  Neurologic:  able to britany up and answer my questions     Procedures Performed: No admission procedures for hospital encounter.     Significant Diagnostic Studies: See Full reports for all details    Recent Labs   Lab 11/01/22  1418 11/02/22  0402   WBC 8.1 6.5   RBC 4.51* 4.36*   HGB 13.9* 13.6*   HCT 44.7 43.3   MCV 99.1* 99.3*   MCH 30.8 31.2*   MCHC 31.1* 31.4*   RDW 14.3 14.4    286   MPV 10.2 10.0       Recent Labs   Lab 11/01/22  1418 11/02/22  0402    142   K 4.8 4.6   CO2 28 29   BUN 14.5 14.6   CREATININE 1.06 1.10   CALCIUM 9.3 9.4   MG 1.80  --    ALBUMIN 3.5 3.5   ALKPHOS 70 67   ALT 6 7   AST 13 14   BILITOT 0.4 0.5        Microbiology Results (last 7 days)       ** No results found for the last 168 hours. **             X-Ray Chest AP Portable  Narrative:  EXAMINATION:  XR CHEST AP PORTABLE    CLINICAL HISTORY:  shortness of breath;    TECHNIQUE:  Single frontal view of the chest was performed.    COMPARISON:  October 20, 2022    FINDINGS:  examination reveals mediastinal silhouette to be within normal limits cardiac silhouette is mildly enlarged there is increase in interstitial and pulmonary vascular markings indicating some degree of pulmonary vascular congestion and cardiac decompensation.    No focal consolidative changes are otherwise identified  Impression: Mild cardiomegaly.    Changes of pulmonary vascular congestion and cardiac decompensation    Electronically signed by: Shady Yee  Date:    11/01/2022  Time:    13:54         Medication List        CONTINUE taking these medications      atorvastatin 80 MG tablet  Commonly known as: LIPITOR  Take 1 tablet (80 mg total) by mouth once daily.     budesonide-formoterol 160-4.5 mcg 160-4.5 mcg/actuation Hfaa  Commonly known as: SYMBICORT  Inhale 2 puffs into the lungs every 12 (twelve) hours. Controller     butalbital-acetaminophen-caffeine -40 mg -40 mg per tablet  Commonly known as: FIORICET, ESGIC  Take 1 tablet by mouth every 4 (four) hours as needed for Pain.     carvediloL 6.25 MG tablet  Commonly known as: COREG  Take 1 tablet (6.25 mg total) by mouth 2 (two) times daily.     diltiaZEM 240 MG 24 hr capsule  Commonly known as: CARDIZEM CD  Take 2 capsules (480 mg total) by mouth once daily.     furosemide 40 MG tablet  Commonly known as: LASIX  Take 1 tablet (40 mg total) by mouth 2 (two) times daily.     gabapentin 300 MG capsule  Commonly known as: NEURONTIN  Take 1 capsule (300 mg total) by mouth 3 (three) times daily.     levalbuterol 0.63 mg/3 mL nebulizer solution  Commonly known as: XOPENEX  Take 3 mLs (0.63 mg total) by nebulization every 4 (four) hours. Rescue     lisinopriL 20 MG tablet  Commonly known as: PRINIVIL,ZESTRIL  Take 1 tablet (20 mg total) by mouth once daily.      metFORMIN 500 MG ER 24hr tablet  Commonly known as: GLUCOPHAGE-XR  Take 1 tablet (500 mg total) by mouth once daily.     pantoprazole 40 MG tablet  Commonly known as: PROTONIX  Take 1 tablet (40 mg total) by mouth once daily.     rivaroxaban 10 mg Tab  Commonly known as: XARELTO  Take 2 tablets (20 mg total) by mouth daily with dinner or evening meal.     tiotropium 18 mcg inhalation capsule  Commonly known as: SPIRIVA  Inhale 1 capsule (18 mcg total) into the lungs once daily. Controller               Where to Get Your Medications        These medications were sent to [facility] - [place], [place] - [address]  [address], [facility] [address]      Phone: [phone]   budesonide-formoterol 160-4.5 mcg 160-4.5 mcg/actuation Hfaa  tiotropium 18 mcg inhalation capsule          Explained in detail to the patient about the discharge plan, medications, and follow-up visits. Pt understands and agrees with the treatment plan  Discharge Disposition: Home or Self Care   Discharged Condition: stable  Diet-   Dietary Orders (From admission, onward)       Start     Ordered    11/01/22 1853  Diet diabetic  Diet effective now        Comments: cardiac    11/01/22 1852                   Medications Per DC med rec  Activities as tolerated   Follow-up Information       Sophie Navarrete MD. Schedule an appointment as soon as possible for a visit in 1 week(s).    Why: Post discharge, pls refer for out patient sleep study for obvious KRISHNA  Contact information:  [address] [place] [id number] [phone]                           For further questions contact hospitalist office    Discharge time 30 minutes    For worsening symptoms, chest pain, shortness of breath, increased abdominal pain, high grade fever, stroke or stroke like symptoms, immediately go to the nearest Emergency Room or call 911 as soon as possible.      Sanchez Varma MD  Department of [facility]  Medicine   [facility]   11/02/2022 7:51 AM

## 2022-12-30 PROBLEM — G93.1: Status: ACTIVE | Noted: 2022-01-01

## 2022-12-30 PROBLEM — I46.9 CARDIAC ARREST: Status: ACTIVE | Noted: 2022-01-01

## 2022-12-30 PROBLEM — N17.9 AKI (ACUTE KIDNEY INJURY): Status: ACTIVE | Noted: 2022-01-01

## 2022-12-30 NOTE — H&P
[facility] - ICU  Pulmonary Critical Care Note    Patient Name: Vladimir Bernal  MRN: [id number]  Admission Date: 12/29/2022  Hospital Length of Stay: 0 days  Code Status: Full Code  Attending Provider: HUE Rivera MD  Primary Care Provider: No primary care provider on file.     Subjective:     HPI:     Vladimir Bernal is a 52 y.o. male with PMH of pA-fib on xarelto, COPD on home O2, HFrEF (EF 45% October 2022), DM, HTN, HLD who presents after out of hospital cardiac arrest. History obtained by ED provider and partially from [facility]. Patient was reportedly returning home yesterday evening when he suddenly felt short of breath, used a duoneb treatment with no relief and therefore asked [facility] to call ambulance. EMS arrived after which patient had a witnessed arrest. Initial rhythm asystole. Received multiple rounds of epinephrine and eventually ROSC achieved with rhythm a-fib with RVR. LMA placed. He arrived in ED here, and was quickly intubated. Had expiratory wheezing and was given 9 mL duonebs, 2 mg magnesium sulfate, 125 mg solumedrol with improvement in wheezing. He was started on epinephrine due to hypotension. He remained in A flutter at that time and was cardioverted without success. He subsequently began to have bradycardia and 1 mg atropine was given, but he then went into v-fib. ACLS protocol restarted with CPR, and he was defibrillated, rhythm then V-tach. Given 300 mg amiodarone, more magnesium, 1 amp bicarb, ROSC obtained with sinus rhythm. Started on keppra 1g IV load as well as versed IV. Internal medicine consulted for ICU admission.    Hospital Course/Significant events:      24 Hour Interval History:      History reviewed. No pertinent past medical history.    History reviewed. No pertinent surgical history.    Social History     Socioeconomic History    Marital status: Significant Other           Objective:   No current outpatient medications    Current Inpatient Medications    albuterol-ipratropium  3 mL Nebulization Q4H    levetiracetam IV  500 mg Intravenous Q12H    magnesium sulfate IVPB  2 g Intravenous ED 1 Time    methylPREDNISolone sodium succinate injection  60 mg Intravenous Q8H    pantoprozole (PROTONIX) IV  40 mg Intravenous Q12H    pantoprozole (PROTONIX) IV  40 mg Intravenous Once           Intake/Output Summary (Last 24 hours) at 12/30/2022 0414  Last data filed at 12/30/2022 0129  Gross per 24 hour   Intake 1000 ml   Output 800 ml   Net 200 ml       Review of Systems   Unable to perform ROS: Intubated      Vital Signs (Most Recent):  Temp: (!) 100.8 °F (38.2 °C) (12/30/22 0330)  Pulse: 94 (Simultaneous filing. User may not have seen previous data.) (12/30/22 0330)  Resp: (!) 22 (Simultaneous filing. User may not have seen previous data.) (12/30/22 0330)  BP: (!) 130/42 (12/30/22 0330)  SpO2: (!) 92 % (Simultaneous filing. User may not have seen previous data.) (12/30/22 0330)    There is no height or weight on file to calculate BMI.  Weight: (!) 165 kg (363 lb 12.1 oz) Vital Signs (24h Range):  Temp:  [98.3 °F (36.8 °C)-100.8 °F (38.2 °C)] 100.8 °F (38.2 °C)  Pulse:  [] 94  Resp:  [15-31] 22  SpO2:  [88 %-100 %] 92 %  BP: ()/() 130/42  Arterial Line BP: (0-300)/(0-100) 77/64     Physical Exam  Constitutional:       Comments: Intubated, sedated   HENT:      Head: Normocephalic and atraumatic.      Nose: Nose normal.      Mouth/Throat:      Comments: ET, OG tubes in place  Eyes:      Comments: Left eye clouded over, right pupil 3 mm nonreactive   Cardiovascular:      Rate and Rhythm: Regular rhythm. Tachycardia present.   Pulmonary:      Breath sounds: No wheezing or rhonchi.   Abdominal:      General: There is no distension.      Palpations: Abdomen is soft.   Musculoskeletal:      Right lower leg: No edema.      Left lower leg: No edema.   Skin:     General: Skin is warm and dry.   Neurological:      Comments: Currently sedated, did initially have weak  cough and gag reflex. No corneals. Not responding to painful stimuli         Mechanical ventilation support:  Vent Mode: A/C (12/30/22 0330)  Ventilator Initiated: Yes (12/29/22 2211)  Set Rate: 18 BPM (12/30/22 0330)  Vt Set: 530 mL (12/30/22 0330)  PEEP/CPAP: 8 cmH20 (12/30/22 0330)  Oxygen Concentration (%): 100 (12/30/22 0100)  Peak Airway Pressure: 22.4 cmH20 (12/30/22 0330)  Total Ve: 13.45 L/m (12/30/22 0330)  F/VT Ratio<105 (RSBI): (!) 35.77 (12/30/22 0330)    Lines/Drains/Airways       Central Venous Catheter Line  Duration             Percutaneous Central Line Insertion/Assessment - Triple Lumen  12/30/22 0109 right internal jugular <1 day              Drain  Duration                  NG/OG Tube 12/29/22 2245 Jenkins sump 16 Fr. Right mouth <1 day         Urethral Catheter 12/29/22 2258 Non-latex 16 Fr. <1 day              Airway  Duration                  Airway - Non-Surgical 12/29/22 2211 Endotracheal Tube <1 day              Arterial Line  Duration             Arterial Line 12/30/22 0017 Right Radial <1 day              Peripheral Intravenous Line  Duration                  Peripheral IV - Single Lumen 12/29/22 2100 16 G Right Antecubital <1 day         Peripheral IV - Single Lumen 12/29/22 2100 18 G Left Antecubital <1 day                    Significant Labs:    Lab Results   Component Value Date    WBC 16.2 (H) 12/30/2022    HGB 17.0 12/30/2022    HCT 53.9 (H) 12/30/2022    MCV 98.4 (H) 12/30/2022     12/30/2022         BMP  Lab Results   Component Value Date     12/30/2022    K 5.0 12/30/2022    CO2 19 (L) 12/30/2022    BUN 20.3 12/30/2022    CREATININE 2.72 (H) 12/30/2022    CALCIUM 8.5 12/30/2022       ABG  Recent Labs   Lab 12/29/22 2239   PH 7.11*   PO2 296*   PCO2 54*   HCO3 17.2*           Significant Imaging:  I have reviewed all pertinent imaging within the past 24 hours.        Assessment/Plan:     Assessment  Cardiopulmonary arrest, s/p ROSC  Acute respiratory failure requiring  mechanical ventilation  Hypotension requiring vasopressor support  NSTEMI  Suspected COPD exacerbation  Oliguria, suspect ATN from renal hypoperfusion  ? GIB  Anion gap metabolic acidosis, lactic etiology  Leukocytosis  Hx COPD  Hx CHF (EF 45% 10/2022)  Hx A-fib, currently in sinus rhythm      Plan  -Admit to ICU  -Continue mechanical ventilation, currently 22/530/8/80, wean per ABGs  -Currently on 2 vasopressors, levophed and epinephrine. Wean for map >65  -Initiate targeted temperature management protocol (at 36C)  -Start heparin drip. ASA load. Troponin uptrending. Repeat EKG reveals sinus tachycardia with ST depression in inferior leads, TWI in V1-V3 (present on priors), questionable ST elevation in aVR, V1. Continue trending troponin, EKG. TTE in AM. Did speak with [facility] on call overnight, who state that unless it appears patient is having active STEMI no need to push for active cath. They did review most recent EKG, believe ST changes are rate related, no active STEMI. Will speak with cardiology here at [facility] as well  -Too unstable to go for CT head and VQ scan/CTPE, though low concern for PE given patient has been on xarelto for his A-fib  -Has hx of COPD, reportedly had significant wheezing upon arrival. Was given 9L duonebs, 125 mg solumedrol 2 mg magnesium sulfate. Will continue scheduled duonebs q4h, solumedrol 60 mg TID  -Worsening renal function. Patient is oliguric, with no urine on bladder scan. Was initially given 1L NS bolus in ED. Will bolus 1L NS more. Likely ATN given hypotension, hypoperfusion. Nephrology consulted  -Given significant acidosis, has been given 2 amp bicarb, will also start continuous bicarb drip 3amp in d5w  -Has seizure like activity, loaded with keppra 1g in ED, continue 500 mg BID. EEG ordered. Versed PRN  -Patient does appear to have blood tinged stools. Loaded with protonix 80 mg, continue 40 mg IV BID. Hemoglobin WNL and stable   -Will continue to have goals of care discussion  with family. Currently is Full Code. Mother (Barbara Bernal) [phone], Sister (Candie Warner) [phone]    DVT Prophylaxis: Heparin drip  GI Prophylaxis: Protonix 40 mg BID     Bakari Tidwell MD  Pulmonary Critical Care Medicine  [facility] - ICU

## 2022-12-30 NOTE — PROGRESS NOTES
Inpatient Nutrition Assessment    Admit Date: 12/29/2022   Total duration of encounter: 1 day     Nutrition Recommendation/Prescription     Pt NPO; S/P cardiac arrest; on vent; pressor support. When able to initiate enteral feeds--suggest Diabetic Source AC @ 20ml/hr; increase as tolerated to goal rate 70ml/hr (23 hr cycle) to provide 1932 calories, 97 gm protein, 1313 ml free water. Flush tube with 115 ml water every 4hrs.   When extubated : ADAT to Diabetic/cardiac   MVI/fe  Biweekly wt  Will monitor nutrition status     Communication of Recommendations: reviewed with nurse    Nutrition Assessment     Malnutrition Assessment/Nutrition-Focused Physical Exam    Malnutrition in the context of acute illness or injury  Degree of Malnutrition: unable to complete  Energy Intake: unable to obtain  Interpretation of Weight Loss: unable to obtain  Body Fat:does not meet criteria  Area of Body Fat Loss: does not meet criteria  Muscle Mass Loss: does not meet criteria  Area of Muscle Mass Loss: does not meet criteria  Fluid Accumulation: does not meet criteria  Edema: no edema present   Reduced  Strength: unable to obtain  A minimum of two characteristics is recommended for diagnosis of either severe or non-severe malnutrition.    Chart Review    Reason Seen: continuous nutrition monitoring    Malnutrition Screening Tool Results                Diagnosis:  S/P Cardiac arrest/vent; VY/ATN, CHF, DM, HTN     Relevant Medical History: COPD     Nutrition-Related Medications: precedex, epinephrine, levophed, vasopressin, Na bicarbonate, atorvbastatin, MgSO4, pantoprazole   Calorie Containing IV Medications: no significant kcals from medications at this time    Nutrition-Related Labs:  (12-30) H/H 17.0/53.9 gluc 377(H) Bun 22.3 Cr 3.5(H) GFR 20(L) K 5.2(H) Alb 3.7     Diet/PN Order: Diet NPO  Oral Supplement Order: none  Tube Feeding Order: none  Appetite/Oral Intake: NPO/NPO  Factors Affecting Nutritional Intake: NPO and on  mechanical ventilation  Food/Synagogue/Cultural Preferences: unable to obtain  Food Allergies: unable to obtain    Skin Integrity: intact  Wound(s):   none noted     Comments    (12/30) Pt NPO/vent; on 3 pressors; S/p cardiac arrest; no diet/wt hx; BMI 49--obese; abnormal labs noted: Bun/Cr (H); GFR(L)--ATN; nephrology following. TF recs provided; see recs.     Anthropometrics    Height: 6' (182.9 cm)    Last Weight: (!) 164.7 kg (363 lb) (12/30/22 0728) Weight Method: Bed Scale  BMI (Calculated): 49.2  BMI Classification: obese grade III (BMI >/=40)        Ideal Body Weight (IBW), Male: 178 lb     % Ideal Body Weight, Male (lb): 203.93 %                 Usual Body Weight (UBW), kg:  (pt on vent; unable to obtain wt hx)        Usual Weight Provided By: unable to obtain usual weight    Wt Readings from Last 5 Encounters:   12/30/22 (!) 164.7 kg (363 lb)     Weight Change(s) Since Admission:  Admit Weight: (!) 165 kg (363 lb 12.1 oz) (12/29/22 2202)  No wt hx     Estimated Needs    Weight Used For Calorie Calculations: 80.9 kg (178 lb 5.6 oz) (IBW used for estimated needs)  Energy Calorie Requirements (kcal): 2022 kcal/d; 25 kcal/kg IBW /vent  Energy Need Method: Kcal/kg  Weight Used For Protein Calculations: 80.9 kg (178 lb 5.6 oz) (IBW used for est needs)  Protein Requirements:  gm proetin/d; 1.2-1.4 gm/kg IBW --monitor protein load 2 ATN  Fluid Requirements (mL): 2022 ml/d; 1ml/joycelyn  Temp: 96.3 °F (35.7 °C)       Enteral Nutrition    Patient not receiving enteral nutrition at this time.    Parenteral Nutrition    Patient not receiving parenteral nutrition support at this time.    Evaluation of Received Nutrient Intake    Calories: not meeting estimated needs  Protein: not meeting estimated needs    Patient Education    Not applicable.    Nutrition Diagnosis     PES: Inadequate oral intake related to cardiac arrest  as evidenced by NPO . (new)    Interventions/Goals     Intervention(s): modified composition of  meals/snacks, modified composition of enteral nutrition, modified rate of enteral nutrition, multivitamin/mineral supplement therapy, and collaboration with other providers  Goal: Meet greater than 75% of nutritional needs by follow-up. (new)    Monitoring & Evaluation     Dietitian will monitor food and beverage intake, enteral nutrition intake, and weight.  Nutrition Risk/Follow-Up: moderate (follow-up in 3-5 days)   Please consult if re-assessment needed sooner.

## 2022-12-30 NOTE — NURSING
Cooling started at 0300 at target temp for 36C.    Spoke to Dr. Tidwell about starting heparin drip, patient stool observe red tinge output. MD wants to hold heparin until morning labs. Also was inform about new [place] and no output, flush with 20 mL saline and return was pink tinge.

## 2022-12-30 NOTE — CONSULTS
[facility]  Nephrology Consultation  Patient Name: Vladimir Bernal  Age: 52 y.o.  : 1970  MRN: [id number]  Admission Date: 2022    Date of Consultation: 22    Consultation Requested By: Dr. Tidwell    Reason for Consultation: VY and Oliguria     Chief Complaint: Post cardiac arrest (EMS reports pt gf called 911 due to pt having worsening SOB after nebs not working all day. Fire dept found pt with agonal respirations, then pt went into cardiac arrest. EMS gave 6 of epi and began CPR, pt regained pulse and Afib RVR rhythm. Pt arrives unresponsive with LMA in place, afib rhythm. 18 g IV to left AC, 16 g IV to R AC. EMS reports  and gave 1 L NS. )      History of Present Illness:  Mr. Vladimir Bernal is a 52 y.o. Black or  male with past medical history of COPD on home O2, afib, HTN, HLD, DM, HErEF who presented to [facility] ED in cardiac arrest.  Patient is now status post cardiac arrest x2 on vent. Per EMS notes patient had increased work of breathing and sob on arrival and then became unresponsive.  On arrival to ED patient went into caridac arrest once again and was placed on the vent.  Patient is hypotensive on 3 pressors at this time.  Nephrology consulted for VY and oliguria.  Patient was bladder scanned with no urine in bladder and catheter was changed.  Continues to be oliguric at this time.     Patient has no allergies on file.     Review of systems: unable to obtain from patient    Past Medical History:  has no past medical history on file.    Procedure History:  has no past surgical history on file.    Family History: family history is not on file.    Social History:      Physical Exam:   BP (!) 73/47   Pulse (!) 120   Temp (!) 101.5 °F (38.6 °C) (Core Bladder)   Resp 20   Wt (!) 165 kg (363 lb 12.1 oz)   SpO2 100%  There is no height or weight on file to calculate BMI.    Physical Exam  Vitals and nursing note reviewed.    Constitutional:       Appearance: He is ill-appearing.      Interventions: He is intubated.   HENT:      Head: Normocephalic and atraumatic.   Cardiovascular:      Rate and Rhythm: Normal rate and regular rhythm.      Heart sounds: No murmur heard.    No friction rub. No gallop.   Pulmonary:      Effort: He is intubated.      Breath sounds: Normal breath sounds.   Abdominal:      General: Bowel sounds are normal.      Palpations: Abdomen is soft.   Skin:     General: Skin is warm and dry.        Inpatient Medications:     Current Facility-Administered Medications:     albuterol-ipratropium 2.5 mg-0.5 mg/3 mL nebulizer solution 3 mL, 3 mL, Nebulization, Q4H, Bakari Tidwell MD, 3 mL at 12/30/22 0330    [START ON 12/31/2022] aspirin chewable tablet 81 mg, 81 mg, Per OG tube, Daily, Bakari Tidwell MD    atorvastatin tablet 80 mg, 80 mg, Per OG tube, Daily, Bakari Tidwell MD    dexmedetomidine (PRECEDEX) 400mcg/100mL 0.9% NaCL infusion, 0-1.4 mcg/kg/hr, Intravenous, Continuous, Castillo Mckay MD, Last Rate: 57.8 mL/hr at 12/30/22 0944, 1.4 mcg/kg/hr at 12/30/22 0944    dextrose 10% bolus 125 mL 125 mL, 12.5 g, Intravenous, PRN, Bakari Tidwell MD    dextrose 10% bolus 250 mL 250 mL, 25 g, Intravenous, PRN, Bakari Tidwell MD    EPINEPHrine (ADRENALIN) 30 mg in dextrose 5 % 250 mL infusion, 0-2 mcg/kg/min (Dosing Weight), Intravenous, Continuous, Bakari Tidwell MD, Last Rate: 148.5 mL/hr at 12/30/22 0902, 1.8 mcg/kg/min at 12/30/22 0902    glucagon (human recombinant) injection 1 mg, 1 mg, Intramuscular, PRN, Bakari Tidwell MD    heparin 25,000 units in dextrose 5% (100 units/ml) IV bolus from bag - ADDITIONAL PRN BOLUS - 30 units/kg, 30 Units/kg, Intravenous, PRN, Bakari Tidwell MD    heparin 25,000 units in dextrose 5% (100 units/ml) IV bolus from bag - ADDITIONAL PRN BOLUS - 60 units/kg, 60 Units/kg, Intravenous, PRN, Bakari Tidwell MD    heparin 25,000 units in dextrose 5% (100 units/ml) IV bolus from bag INITIAL BOLUS, 80  Units/kg, Intravenous, Once, Bakari Tidwell MD    heparin 25,000 units in dextrose 5% 250 mL (100 units/mL) infusion HIGH INTENSITY nomogram - LAF, 0-40 Units/kg/hr, Intravenous, Continuous, Bakari Tidwell MD, Last Rate: 29.7 mL/hr at 12/30/22 0414, 18 Units/kg/hr at 12/30/22 0414    hydrocortisone sodium succinate injection 50 mg, 50 mg, Intravenous, Q8H, Bakari Tidwell MD    insulin aspart U-100 injection 1-10 Units, 1-10 Units, Subcutaneous, Q6H PRN, Bakari Tidwell MD, 10 Units at 12/30/22 0941    levETIRAcetam in NaCl (iso-os) IVPB 500 mg, 500 mg, Intravenous, Q12H, Bakari Tidwell MD, Last Rate: 200 mL/hr at 12/30/22 0914, 500 mg at 12/30/22 0914    magnesium sulfate 2g in water 50mL IVPB (premix), 2 g, Intravenous, ED 1 Time, Castillo Mckay MD    midazolam (VERSED) 1 mg/mL injection 4 mg, 4 mg, Intravenous, Q2H PRN, Bakari Tidwell MD, 4 mg at 12/30/22 0620    mupirocin 2 % ointment, , Nasal, BID, Bakari Tidwell MD, Given at 12/30/22 0900    NORepinephrine 32 mg in dextrose 5 % 250 mL infusion, 0-3 mcg/kg/min (Dosing Weight), Intravenous, Continuous, Bakari Tidwell MD, Last Rate: 201.1 mL/hr at 12/30/22 0945, 2.6 mcg/kg/min at 12/30/22 0945    pantoprazole injection 40 mg, 40 mg, Intravenous, Q12H, Bakari Tidwell MD, 40 mg at 12/30/22 0441    sodium bicarbonate 150 mEq in dextrose 5 % 1,000 mL infusion, , Intravenous, Continuous, Bakari Tidwell MD, Last Rate: 150 mL/hr at 12/30/22 0823, New Bag at 12/30/22 0823    sodium chloride 0.9% flush 10 mL, 10 mL, Intravenous, PRN, Bakari Tidwell MD    vasopressin (PITRESSIN) 1 Units/mL in dextrose 5 % 100 mL infusion, 0.04 Units/min, Intravenous, Continuous, Bakari Tidwell MD, Last Rate: 2.4 mL/hr at 12/30/22 0720, 0.04 Units/min at 12/30/22 0720     Imaging:  X-Ray Chest 1 View   ED Interpretation   Central line in good position      Final Result      No pneumothorax following right IJ catheter placement         Electronically signed by: Waldemar Ortiz MD   Date:    12/30/2022    Time:    07:55      X-Ray Chest 1 View   ED Interpretation   ET tube above alana in good position.      Final Result      1. Satisfactory endotracheal tube positioning.   2. Ground-glass interstitial opacities bilaterally may represent edema         Electronically signed by: Waldemar Ortiz MD   Date:    12/30/2022   Time:    07:53      US Retroperitoneal Limited    (Results Pending)       Laboratory Data:   Hematology  Lab Results   Component Value Date    WBC 16.2 (H) 12/30/2022    HGB 17.0 12/30/2022    HCT 53.9 (H) 12/30/2022     12/30/2022     Chemistry  Lab Results   Component Value Date     12/30/2022    K 5.2 (H) 12/30/2022    CHLORIDE 102 12/30/2022    CO2 13 (L) 12/30/2022    BUN 22.3 12/30/2022    CREATININE 3.57 (H) 12/30/2022    EGFRNORACEVR 20 12/30/2022    GLUCOSE 377 (H) 12/30/2022    CALCIUM 8.2 (L) 12/30/2022    ALKPHOS 112 12/30/2022    LABPROT 7.7 12/30/2022    ALBUMIN 3.7 12/30/2022    AST 97 (H) 12/30/2022    ALT 10 12/30/2022    MG 2.10 12/30/2022    PHOS 9.2 (HH) 12/30/2022        Lab Results   Component Value Date    GLUCOSE 377 (H) 12/30/2022     No results found for: PTH, [id number]    No results found for: IRON, TIBC, TRANS, LABIRON, FERRITIN, FOLATE, [id number], HAPTO, LDH    No results found for: TACROLIMUS, MSPIKEPCT, ANAIFAIGG, CANCA, PANCA, HIV, HEPCAB, HEPBSURFAG, HEPBSAB, HBCORIGMNUM, HEPBCAB    Impression:   VY 2/2 ATN 2/2 cardiac arrest  S/p cardiac arrest on the vent  Clinically volume depleted  Hx COPD on home O2  Hx afib   HFrEF  DM  HTN  HLD    Plan:   -Will workup for VY with renal US and urine studies (once patient makes urine)  -Continue cardiac management and hydration  -If suspicion for sepsis, agree with cultures and antibiotics  -Will continue to monitor renal status, at this time the urgent need is to stabilize him from a cardiac stand point.   -At this time patient does not need renal replacement therapy and is not stable for it even if needed at  this time  -I spoke with the family (sister Candie Warner [phone]) about patients renal status   -Will continue to follow over the weekend    Thank you very much for your consultation.     Yesica Robertson M.D.  [facility]

## 2022-12-30 NOTE — ED PROVIDER NOTES
Encounter Date: 12/29/2022       History     Chief Complaint   Patient presents with    Post cardiac arrest     EMS reports pt gf called 911 due to pt having worsening SOB after nebs not working all day. Fire dept found pt with agonal respirations, then pt went into cardiac arrest. EMS gave 6 of epi and began CPR, pt regained pulse and Afib RVR rhythm. Pt arrives unresponsive with LMA in place, afib rhythm. 18 g IV to left AC, 16 g IV to R AC. EMS reports  and gave 1 L NS.      52-year-old gentleman presents emergency room brought in by ambulance status post cardiac arrest.  Patient reported by EMS to have increased work of breathing and shortness of breath.  This occurred after eating dinner.  Patient tried using nebulizer treatments at home (history of COPD), but reports that breathing denied improve.  Ultimately he called the ambulance.  Fire trucks arrived prior to EMS, and patient reportedly went unresponsive around 9:05 p.m..  CPR was begun by the fire crew, and EMS arrived within 3 minutes.  Patient was noted to initially be in asystole.  Multiple rounds of epinephrine and continued CPR.  ROSC achieved, and patient was in atrial fibrillation with RVR.  LMA was placed on scene and patient was transported to this facility.    The history is provided by the EMS personnel.   Review of patient's allergies indicates:  Not on File  History reviewed. No pertinent past medical history.  History reviewed. No pertinent surgical history.  History reviewed. No pertinent family history.     Review of Systems   Unable to perform ROS: Acuity of condition     Physical Exam     Initial Vitals [12/29/22 2202]   BP Pulse Resp Temp SpO2   (!) 80/66 (!) 132 (!) 21 -- 99 %      MAP       --         Physical Exam    Nursing note and vitals reviewed.  Constitutional: He appears well-developed and well-nourished.   HENT:   Head: Normocephalic and atraumatic.   LMA currently in place, no oral lesions, large tongue   Eyes:    Appears to have had a left eye previous injury, right pupil normal in size, minimally reactive   Neck: Neck supple.   Normal range of motion.  Cardiovascular:  Normal heart sounds and intact distal pulses.     Exam reveals no gallop and no friction rub.       No murmur heard.  Tachycardic, irregular irregular   Pulmonary/Chest: He has wheezes. He has no rhonchi. He has no rales.   Abdominal: Abdomen is soft. Bowel sounds are normal. He exhibits no distension.   Musculoskeletal:      Cervical back: Normal range of motion and neck supple.     Neurological: GCS eye subscore is 1. GCS verbal subscore is 1. GCS motor subscore is 1.       ED Course   Arterial Line    Date/Time: 12/29/2022 11:45 PM  Location procedure was performed: [facility] EMERGENCY DEPARTMENT  Performed by: Castillo Mckay MD  Authorized by: Castillo Mckay MD   Consent Done: Emergent Situation  Preparation: Patient was prepped and draped in the usual sterile fashion.  Indications: multiple ABGs, respiratory failure and hemodynamic monitoring  Location: right radial  Silviano's test normal: yes  Needle gauge: 20  Seldinger technique: Seldinger technique used  Number of attempts: 1  Technical procedures used: ultrasound guided  Complications: No  Specimens: No  Implants: No  Post-procedure: line sutured and dressing applied  Post-procedure CMS: normal  Patient tolerance: Patient tolerated the procedure well with no immediate complications      Intubation    Date/Time: 12/29/2022 10:20 PM  Location procedure was performed: [facility] EMERGENCY DEPARTMENT  Performed by: Castillo Mckay MD  Authorized by: Castillo Mckay MD   Indications: respiratory distress, respiratory failure, airway protection and hypoxemia  Intubation method: video-assisted  Patient status: sedated  Sedatives: ketmine  Paralytic: none  Laryngoscope size: Glide 4  Tube size: 8.0 mm  Tube type: cuffed  Number of attempts: 1  Cricoid pressure: no  Cords visualized:  yes  Post-procedure assessment: ETCO2 monitor, chest rise and CO2 detector  Breath sounds: wheezing  Cuff inflated: yes  ETT to teeth: 26 cm  Tube secured with: ETT jordan  Chest x-ray interpreted by me.  Chest x-ray findings: endotracheal tube in appropriate position  Patient tolerance: Patient tolerated the procedure well with no immediate complications  Complications: No  Specimens: No  Implants: No      Central Line    Date/Time: 12/30/2022 1:00 AM  Performed by: Castillo Mckay MD  Authorized by: Castillo Mckya MD     Location procedure was performed:  [facility] EMERGENCY DEPARTMENT  Consent Done ?:  Emergent Situation  Time out complete?: Verified correct patient, procedure, equipment, staff, and site/side    Indications:  Hemodynamic monitoring, med administration and vascular access  Preparation:  Skin prepped with ChloraPrep  Skin prep agent dried: Skin prep agent completely dried prior to procedure    Sterile barriers: All five maximal sterile barriers used - gloves, gown, cap, mask and large sterile sheet    Hand hygiene: Hand hygiene performed immediately prior to central venous catheter insertion    Location:  Right internal jugular  Catheter type:  Triple lumen  Catheter size:  7 Fr  Ultrasound guidance: Yes    Vessel Caliber:  Medium  Comprressibility:  Normal  Needle advanced into vessel with real time ultrasound guidance.    Guidewire confirmed in vessel.    Steril sheath on probe.    Sterile gel used.  Manometry: No    Number of attempts:  1  Securement:  Line sutured, sterile dressing applied and blood return through all ports  Complications: No    Estimated blood loss (mL):  2  Specimens: No    Implants: No    XRay:  Placement verified by x-ray and successful placement  Adverse Events:  NoneTermination Site: superior vena cava  Critical Care    Date/Time: 12/30/2022 1:41 AM  Performed by: Castillo Mckay MD  Authorized by: Castillo Mckay MD   Direct patient critical care  time: 30 minutes  Additional history critical care time: 10 minutes  Ordering / reviewing critical care time: 15 minutes  Documentation critical care time: 30 minutes  Consulting other physicians critical care time: 10 minutes  Total critical care time (exclusive of procedural time) : 95 minutes  Critical care time was exclusive of separately billable procedures and treating other patients and teaching time.  Critical care was necessary to treat or prevent imminent or life-threatening deterioration of the following conditions: cardiac failure, circulatory failure and respiratory failure.  Critical care was time spent personally by me on the following activities: interpretation of cardiac output measurements, evaluation of patient's response to treatment, examination of patient, obtaining history from patient or surrogate, ordering and performing treatments and interventions, ordering and review of laboratory studies, ordering and review of radiographic studies, pulse oximetry, re-evaluation of patient's condition and ventilator management.      Labs Reviewed   COMPREHENSIVE METABOLIC PANEL - Abnormal; Notable for the following components:       Result Value    Carbon Dioxide 19 (*)     Glucose Level 193 (*)     Creatinine 1.87 (*)     Protein Total 6.3 (*)     Albumin Level 3.0 (*)     Albumin/Globulin Ratio 0.9 (*)     All other components within normal limits   LACTIC ACID, PLASMA - Abnormal; Notable for the following components:    Lactic Acid Level 8.7 (*)     All other components within normal limits   TROPONIN I - Abnormal; Notable for the following components:    Troponin-I 0.072 (*)     All other components within normal limits   PROTIME-INR - Abnormal; Notable for the following components:    INR 1.47 (*)     All other components within normal limits   B-TYPE NATRIURETIC PEPTIDE - Abnormal; Notable for the following components:    Natriuretic Peptide 168.2 (*)     All other components within normal limits    CBC WITH DIFFERENTIAL - Abnormal; Notable for the following components:    .3 (*)     MCHC 30.9 (*)     IG# 0.56 (*)     All other components within normal limits   LACTIC ACID, PLASMA - Abnormal; Notable for the following components:    Lactic Acid Level 5.4 (*)     All other components within normal limits   CK-MB - Normal   CK - Normal   COVID/FLU A&B PCR - Normal    Narrative:     The Xpert Xpress SARS-CoV-2/FLU/RSV plus is a rapid, multiplexed real-time PCR test intended for the simultaneous qualitative detection and differentiation of SARS-CoV-2, Influenza A, Influenza B, and respiratory syncytial virus (RSV) viral RNA in either nasopharyngeal swab or nasal swab specimens.         MAGNESIUM - Normal   APTT - Normal   TSH - Normal   BLOOD CULTURE [facility]   CBC W/ AUTO DIFFERENTIAL    Narrative:     The following orders were created for panel order CBC Auto Differential.  Procedure                               Abnormality         Status                     ---------                               -----------         ------                     CBC with Differential[[id number]]        Abnormal            Final result                 Please view results for these tests on the individual orders.   LACTIC ACID, PLASMA   APTT   TROPONIN I     EKG Readings: (Independently Interpreted)   12/29/2022 10:01 PM  Rate: 112 bpm  Rhythm: Atrial Fibrillation  Axis: Normal  Intervals: Right bundle branch block  ST Changes: Nonspecific  Impression:  Atrial fibrillation with RVR and nonspecific ST changes    12/29/2022 10:38 PM  Rate: 69 bpm  Rhythm: Atrial Flutter 4:1  Axis: Normal  Intervals: Right bundle branch block  ST Changes: Nonspecific  Impression:  Atrial flutter 4:1 with nonspecific ST changes    12/29/2022 10:43 PM  Rate: 77 bpm  Rhythm: Atrial Flutter variable AV block  Axis: Normal  Intervals: Right bundle branch block  ST Changes: Nonspecific  Impression:  Atrial flutter with variable AV block with nonspecific  ST changes    12/29/2022 11:47 PM  Rate: 77 bpm  Rhythm: sinus rhythm  Axis: Normal  Intervals: Right bundle branch block  ST Changes: Nonspecific  Impression:  Normal sinus rhythm with nonspecific ST changes     Imaging Results              X-Ray Chest 1 View (Preliminary result)  Result time 12/30/22 01:33:57      ED Interpretation by Castillo Mckay MD (12/30/22 01:33:57, [facility], Emergency Medicine)    Central line in good position                                     X-Ray Chest 1 View (Preliminary result)  Result time 12/30/22 01:34:12      ED Interpretation by Castillo Mckay MD (12/30/22 01:34:12, [facility])    ET tube above laana in good position.                                     Medications   EPINEPHrine (PF) (ADRENALIN) 5 mg in dextrose 5 % 250 mL infusion (0.15 mcg/kg/min × 165 kg (Dosing Weight) Intravenous Rate/Dose Change 12/30/22 0057)   magnesium sulfate 2g in water 50mL IVPB (premix) (2 g Intravenous Not Given 12/29/22 2245)   dexmedetomidine (PRECEDEX) 400mcg/100mL 0.9% NaCL infusion (1.4 mcg/kg/hr × 165 kg Intravenous New Bag 12/30/22 0045)   sodium chloride 0.9% flush 10 mL (has no administration in time range)   heparin 25,000 units in dextrose 5% (100 units/ml) IV bolus from bag INITIAL BOLUS (has no administration in time range)   heparin 25,000 units in dextrose 5% 250 mL (100 units/mL) infusion HIGH INTENSITY nomogram - LAF (has no administration in time range)   heparin 25,000 units in dextrose 5% (100 units/ml) IV bolus from bag - ADDITIONAL PRN BOLUS - 60 units/kg (has no administration in time range)   heparin 25,000 units in dextrose 5% (100 units/ml) IV bolus from bag - ADDITIONAL PRN BOLUS - 30 units/kg (has no administration in time range)   albuterol-ipratropium 2.5 mg-0.5 mg/3 mL nebulizer solution 3 mL (has no administration in time range)   levETIRAcetam in NaCl (iso-os) IVPB 500 mg (has no  administration in time range)   methylPREDNISolone sodium succinate injection 125 mg (125 mg Intravenous Given 12/29/22 2215)   EPINEPHrine (ADRENALIN) 0.1 mg/mL injection (0.2 mg Intravenous Given 12/29/22 2213)   albuterol-ipratropium (DUO-NEB) 2.5 mg-0.5 mg/3 mL nebulizer solution (  Return to Cabinet 12/29/22 2230)   sodium chloride 0.9% bolus 1,000 mL 1,000 mL (0 mLs Intravenous Stopped 12/29/22 2309)   sodium chloride 0.9% bolus 1,000 mL 1,000 mL (1,000 mLs Intravenous Not Given 12/29/22 2309)   albuterol-ipratropium 2.5 mg-0.5 mg/3 mL nebulizer solution 9 mL (9 mLs Nebulization Given 12/29/22 2211)   EPINEPHrine (PF) injection 0.4 mg (0.4 mg Intravenous Given 12/29/22 2217)   levETIRAcetam injection 1,000 mg (1,000 mg Intravenous Given 12/30/22 0119)   midazolam (VERSED) 1 mg/mL injection 4 mg (0 mg Intravenous Not Given 12/30/22 0045)   midazolam (VERSED) 1 mg/mL injection 2 mg (2 mg Intravenous Given 12/30/22 0041)     Medical Decision Making:   Initial Assessment:   Patient currently with LMA in place, vital signs relatively stable.  Patient has AFib with RVR.  Currently awaiting for family to arrive to give further history of the patient.  No name was provided, and patient initially presents as a unknown.  Will proceed with endotracheal intubation for airway protection and continue to monitor.  Patient has expiratory wheezing, and suspect likely COPD exacerbation resulting in hypoxia and ultimate cardiac arrest.  Will use ketamine for intubation.           ED Course as of 12/30/22 0143   u Dec 29, 2022   2317 Patient began having bradycardia, and atropine 1 mg given.  This helped initially, but then patient went into ventricular fibrillation.  CPR begun, and patient defibrillated at 200 joules.  On repeat evaluation during CPR check, patient was in pulseless V-tach, and amiodarone 300 mg administered along with continued CPR.  Patient given 2 g of magnesium as well as 1 amp of bicarb, and ROSC obtained.   Patient having intermittent twitching episode/spasms which may be related to new onset seizures.  Currently sedated.  Will give Keppra 1000 mg IV.  Initially Versed ordered due to these intermittent twitching episodes, but has not yet been administered.  Will continue to monitor closely. [MW]   Fri Dec 30, 2022   0128 Patient has remained stable since last cardiac event.  Patient currently now has an a line maintaining good blood pressures.  Able to titrate down the epinephrine drip.  Patient was given 2 g of magnesium IV during code, and therefore will discontinue the 2 g which were ordered and a piggyback.  Patient now has a central line placed as well.  Patient has been seen by internal medicine, and will be admitted to ICU.  We will begin therapeutic cooling. [MW]   0129 Of note, 2 episodes of cardioversion were tried on the patient, initially at 50 joules synchronized cardioversion when patient presented hypotensive with AFib RVR.  Second time patient was in atrial flutter, and given 85 joules synchronized cardioversion but patient remained in atrial fibrillation/a flutter.  Both these events occurred prior to patient having the ventricular fibrillation of that around 2317.  [MW]      ED Course User Index  [MW] Castillo Mckay MD                 Clinical Impression:   Final diagnoses:  [R06.00] Dyspnea  [I48.91] Atrial fibrillation status post cardioversion  [I46.9] Cardiac arrest (Primary)  [J44.1] COPD exacerbation  [I48.91] Atrial fibrillation with RVR        ED Disposition Condition    Admit                 Castillo Mckay MD  12/30/22 0143

## 2022-12-30 NOTE — PROCEDURES
ROUTINE EEG    Indication: Cardiac Arrest    Clinical Information: 52 y.o. male with PMH of pA-fib on xarelto, COPD on home O2, HFrEF (EF 45% October 2022), DM, HTN, HLD who presents after out of hospital cardiac arrest on TTM.     Anticonvulsants:  Levetiracetam 500 mg every 12 hours, midazolam 4 mg every 2 hours as needed    Recording Condition: This is a Routine electroencephalogram performed in ICU settings using [employer] software. Electroencephalogram leads were placed using a 10-20 placement system. The electroencephalogram is monitored in real time by a technologist and is of good technical quality for review.     Technique: Electroencephalogram leads were placed using a 10-20 placement system and electrode impedance was maintained below 10KOhms. Sb and seizure detection computer program was used for digital EEG analysis throughout the monitoring period, to screen the EEG in real time and goran the data file with pointers to electrographic seizure and interictal discharges. Hyperventilation was not performed and Photic stimulation was not performed.     Description:   Background Symmetry- Symmetric  Frequency -  Alpha  Voltage - Suppressed (<10 microVolt)   Continuity - Burst Suppression (50-99% of the record suppressed):  Bilateral synchronized high-amplitude alpha bursts with imbedded poorly organized generalized spike and fast wave, AND multifocal asynchronous spike and fast wave activity.  Burst activity varies in duration and morphology throughout recording.  Anterior to Posterior Gradient - Absent  Posterior Dominant Rhythm - non discernible  Reactivity - Unreactive  State Changes - Absent  Breach Artifact - Absent    Conclusion: This is an abnormal Routine comatosed EEG with burst suppression with varied burst morphology with epileptogenic burst activity.     Impression: This is an abnormal Routine comatosed EEG with highly malignant EEG pattern in the setting of cardiac arrest.    Highly  malignant EEG patterns greater than 24 hours after cardiac arrest are considered as one of the six prognostic markers for poor outcome prediction in the last 2021 ERC/ESICM recommendations.

## 2022-12-30 NOTE — PLAN OF CARE
12/30/22 1108   Discharge Assessment   Assessment Type Discharge Planning Assessment   Confirmed/corrected address, phone number and insurance Yes   Confirmed Demographics Correct on Facesheet   Source of Information family   Reason For Admission Cardiac Arrest, Dyspnea, COPD exacerbation, Atrial fib with RVR   People in Home significant other   Facility Arrived From: Home   Do you expect to return to your current living situation? Yes   Do you have help at home or someone to help you manage your care at home? Yes   Who are your caregiver(s) and their phone number(s)? Lina Villar (SO) [phone]; Next of kin: Candie Warner (Sister) [phone]   Prior to hospitilization cognitive status: Alert/Oriented   Current cognitive status: Coma/Sedated/Intubated   Equipment Currently Used at Home oxygen;nebulizer   Patient currently being followed by outpatient case management? No   Do you currently have service(s) that help you manage your care at home? No   Do you take prescription medications? Yes   Do you have prescription coverage? Yes   Coverage [facility] M/D   Do you have any problems affording any of your prescribed medications? No   Is the patient taking medications as prescribed? yes   Who is going to help you get home at discharge? Lina Villar, WINTER   How do you get to doctors appointments? health plan transportation   Are you on dialysis? No   Discharge Plan A Home with family   DME Needed Upon Discharge    (Pending PT Eval)   Discharge Plan discussed with: Sibling   Name(s) and Number(s) Candie Warner (Sister)   [phone]   Discharge Barriers Identified None   Physical Activity   On average, how many days per week do you engage in moderate to strenuous exercise (like a brisk walk)? 0 days   On average, how many minutes do you engage in exercise at this level? 0 min   Financial Resource Strain   How hard is it for you to pay for the very basics like food, housing, medical care, and heating? Not  very   Housing Stability   In the last 12 months, was there a time when you were not able to pay the mortgage or rent on time? N   In the last 12 months, how many places have you lived? 1   In the last 12 months, was there a time when you did not have a steady place to sleep or slept in a shelter (including now)? N   Transportation Needs   In the past 12 months, has lack of transportation kept you from medical appointments or from getting medications? no   In the past 12 months, has lack of transportation kept you from meetings, work, or from getting things needed for daily living? No   Food Insecurity   Within the past 12 months, you worried that your food would run out before you got the money to buy more. Never true   Within the past 12 months, the food you bought just didn't last and you didn't have money to get more. Never true   Social Connections   In a typical week, how many times do you talk on the phone with family, friends, or neighbors? More than 3   How often do you get together with friends or relatives? More than 3   How often do you attend Alevism or Orthodoxy services? Never  ([place])   Do you belong to any clubs or organizations such as Alevism groups, unions, fraternal or athletic groups, or school groups? No   How often do you attend meetings of the clubs or organizations you belong to? Never   Are you , , , , never , or living with a partner?    Alcohol Use   Q1: How often do you have a drink containing alcohol? 4 or more ti   Q2: How many drinks containing alcohol do you have on a typical day when you are drinking? 5 or 6   Q3: How often do you have six or more drinks on one occasion? Monthly   OTHER   Name(s) of People in Home Candie Warner (Sister)   [phone]

## 2022-12-31 NOTE — NURSING
JÚNIOR notify of GCS 3, referral [phone]    22:58- Spoke to Cayden from pharmacy and Lina from house house supervisor called, Epinephine is low in the entire hospital. Pharmacy will call MD about this and see where to go from here. Will continue to try to decrease epi gtt, patient's current vital is 69/62 Map 63 Epi is 5mg/250mL going at 0.9 mcg which is 445 mL/hr.  23:04- Dr. Castro called about patient Epi gtt, understands that supply is low on medication and ask for epi gtt to be titrated down and increase Al gtt. Will continue to monitor patient.     23:28-0 Spoke to Dr. Castro about continuing the Epi gtt after the next two bags are out. MD did confirm to stop Epi gtt completely after these next two bags. Dr. Castro is aware of the epi shortage. Also spoke to Cayden from pharmacy about the medication supply and was told there is still some left but very low supply.     00:45- Last bag of Epi was spike and hung, will continue to monitor VS. Art line currently 66/57 MAP 60    01:29- Epi gtt stop, see MAR    01:43- Patient arterial pressure started to diminished, MD was called to bedside. Time of death 0145 on 22    03:50- Called [facility] about services per family request. Spoke to aminata Rojas of the  home, information given and will come pick patient up soon.   [address]   [phone]

## 2022-12-31 NOTE — DISCHARGE SUMMARY
DEATH NOTE    Received a page from the nurse that Mr. Vladimir Bernal's telemetry monitor was showing asystole. On arrival at bedside, he was unresponsive to verbal, tactile or painful stimuli. He was not moving any of his extremities spontaneously. He had non-palpable bilateral  Carotid, Radial, Brachial, Femoral, DP, and PT pulses. On auscultation of His precordium and anterior thorax, He did not have any audible heart sounds or breath sounds. He had absent pupillary light reflexes as well as absent corneal reflexes bilaterally. He was pronounced  at 0145 on 2022  . Cause of death was cardiopulmonary arrest from acute hypoxic respiratory failure    Family was at bedside and informed of the patient's passing.     Time of Death: 145  Date of Death: 2022    Attending physician notified.    Hunter Castro, DO

## 2022-12-31 NOTE — CONSULTS
Cardiology   Vladimir Bernal is a 52 y.o. male with:   Chief Complaint   Patient presents with    Post cardiac arrest     EMS reports pt gf called 911 due to pt having worsening SOB after nebs not working all day. Fire dept found pt with agonal respirations, then pt went into cardiac arrest. EMS gave 6 of epi and began CPR, pt regained pulse and Afib RVR rhythm. Pt arrives unresponsive with LMA in place, afib rhythm. 18 g IV to left AC, 16 g IV to R AC. EMS reports  and gave 1 L NS.          HPI  Cardiology was consulted for abnormal troponin  The patient is intubated and unable to give history.  History was obtained from the medical records.  Briefly, the patient is a  52 y.o. male with PMH of pA-fib on xarelto, COPD on home O2, HFrEF (EF 45% October 2022), DM, HTN, HLD who presented after out of hospital cardiac arrest. Patient suddenly felt short of breath, used a duoneb treatment with no relief and therefore asked fiance to call ambulance. EMS arrived after which patient had a witnessed arrest. Initial rhythm asystole. Received multiple rounds of epinephrine and eventually ROSC achieved with rhythm a-fib with RVR.  He was intubated in the ER. Had expiratory wheezing and was given 9 mL duonebs, 2 mg magnesium sulfate, 125 mg solumedrol with improvement in wheezing. He was started on epinephrine due to hypotension. He remained in A flutter at that time and was cardioverted without success. He subsequently began to have bradycardia and 1 mg atropine was given, but he then went into v-fib. ACLS protocol restarted with CPR, and he was defibrillated, rhythm then V-tach. Given 300 mg amiodarone, more magnesium, 1 amp bicarb, ROSC obtained with sinus rhythm. Started on keppra 1g IV load as well as versed IV.     ROS:  Please see HPI    Patient Active Problem List   Diagnosis    Cardiac arrest    VY (acute kidney injury)    Cerebral anoxia       History reviewed. No pertinent surgical history.    Social  History     Socioeconomic History    Marital status: Significant Other     Social Determinants of Health     Financial Resource Strain: Low Risk     Difficulty of Paying Living Expenses: Not very hard   Food Insecurity: No Food Insecurity    Worried About Running Out of Food in the Last Year: Never true    Ran Out of Food in the Last Year: Never true   Transportation Needs: No Transportation Needs    Lack of Transportation (Medical): No    Lack of Transportation (Non-Medical): No   Physical Activity: Inactive    Days of Exercise per Week: 0 days    Minutes of Exercise per Session: 0 min   Social Connections: Socially Isolated    Frequency of Communication with Friends and Family: More than three times a week    Frequency of Social Gatherings with Friends and Family: More than three times a week    Attends Catholic Services: Never    Active Member of Clubs or Organizations: No    Attends Club or Organization Meetings: Never    Marital Status:    Housing Stability: Low Risk     Unable to Pay for Housing in the Last Year: No    Number of Places Lived in the Last Year: 1    Unstable Housing in the Last Year: No       History reviewed. No pertinent family history.    Review of patient's allergies indicates:  Not on File      Current Facility-Administered Medications:     albuterol-ipratropium 2.5 mg-0.5 mg/3 mL nebulizer solution 3 mL, 3 mL, Nebulization, Q4H, Bakari Tidwell MD, 3 mL at 12/30/22 0330    [START ON 12/31/2022] aspirin chewable tablet 81 mg, 81 mg, Per OG tube, Daily, Bakari Tidwell MD    atorvastatin tablet 80 mg, 80 mg, Per OG tube, Daily, Bakari Tidwell MD    calcium gluconate 1 g in NS IVPB (premixed), 1 g, Intravenous, Q1H, Hunter Castro DO, Last Rate: 50 mL/hr at 12/30/22 1713, 1 g at 12/30/22 1713    dexmedetomidine (PRECEDEX) 400mcg/100mL 0.9% NaCL infusion, 0-1.4 mcg/kg/hr, Intravenous, Continuous, Castillo Mckay MD, Stopped at 12/30/22 1200    dextrose 10% bolus 125 mL 125 mL, 12.5 g,  Intravenous, PRN, Bakari Tidwell MD    dextrose 10% bolus 250 mL 250 mL, 25 g, Intravenous, PRN, Bakari Tidwell MD, Stopped at 12/30/22 1732    dextrose 10% bolus 250 mL 250 mL, 250 mL, Intravenous, Once, Hunter Castro DO, Last Rate: 250 mL/hr at 12/30/22 1815, 250 mL at 12/30/22 1815    EPINEPHrine (PF) (ADRENALIN) 5 mg in dextrose 5 % 250 mL infusion, 0-2 mcg/kg/min (Dosing Weight), Intravenous, Continuous, Hunter Castro DO, Last Rate: 287.1 mL/hr at 12/30/22 1805, 0.58 mcg/kg/min at 12/30/22 1805    fentaNYL injection 50 mcg, 50 mcg, Intravenous, Q1H PRN, Bakari Tidwell MD    glucagon (human recombinant) injection 1 mg, 1 mg, Intramuscular, PRN, Bakari Tidwell MD    heparin 25,000 units in dextrose 5% (100 units/ml) IV bolus from bag - ADDITIONAL PRN BOLUS - 30 units/kg, 30 Units/kg, Intravenous, PRN, Bakari Tidwell MD    heparin 25,000 units in dextrose 5% (100 units/ml) IV bolus from bag - ADDITIONAL PRN BOLUS - 60 units/kg, 60 Units/kg, Intravenous, PRN, Bakari Tidwell MD    heparin 25,000 units in dextrose 5% (100 units/ml) IV bolus from bag INITIAL BOLUS, 80 Units/kg, Intravenous, Once, Bakari Tidwell MD    heparin 25,000 units in dextrose 5% 250 mL (100 units/mL) infusion HIGH INTENSITY nomogram - LAF, 0-40 Units/kg/hr, Intravenous, Continuous, Bakari Tidwell MD, Last Rate: 23.1 mL/hr at 12/30/22 1315, 14 Units/kg/hr at 12/30/22 1315    hydrocortisone sodium succinate injection 50 mg, 50 mg, Intravenous, Q8H, Bakari Tidwell MD, 50 mg at 12/30/22 1618    insulin aspart U-100 injection 1-10 Units, 1-10 Units, Subcutaneous, Q6H PRN, Bakari Tidwell MD, 10 Units at 12/30/22 1711    levETIRAcetam in NaCl (iso-os) IVPB 500 mg, 500 mg, Intravenous, Q12H, Bakari Tidwell MD, Stopped at 12/30/22 0944    midazolam (VERSED) 1 mg/mL injection 4 mg, 4 mg, Intravenous, Q2H PRN, Bakari Tidwell MD, 4 mg at 12/30/22 0620    mupirocin 2 % ointment, , Nasal, BID, Bakari Tidwell MD, Given at 12/30/22 0900    NORepinephrine 32 mg in dextrose 5 % 250 mL  infusion, 0-3 mcg/kg/min (Dosing Weight), Intravenous, Continuous, Bakari Tidwell MD, Last Rate: 216.6 mL/hr at 12/30/22 1724, 2.8 mcg/kg/min at 12/30/22 1724    pantoprazole injection 40 mg, 40 mg, Intravenous, Q12H, Bakari Tidwell MD, 40 mg at 12/30/22 0441    PHENYLephrine 20 mg in sodium chloride 0.9% 250 mL infusion, 0-5 mcg/kg/min (Dosing Weight), Intravenous, Continuous, Hunter Castro DO, Last Rate: 618.8 mL/hr at 12/30/22 1723, 5 mcg/kg/min at 12/30/22 1723    sodium bicarbonate 150 mEq in dextrose 5 % 1,000 mL infusion, , Intravenous, Continuous, Bakari Tidwell MD, Last Rate: 150 mL/hr at 12/30/22 1429, New Bag at 12/30/22 1429    sodium chloride 0.9% flush 10 mL, 10 mL, Intravenous, PRN, Bakari Tidwell MD    sodium zirconium cyclosilicate packet 10 g, 10 g, Oral, TID, Hunter Castro DO, 10 g at 12/30/22 1714    vasopressin (PITRESSIN) 1 Units/mL in dextrose 5 % 100 mL infusion, 0.04 Units/min, Intravenous, Continuous, Bakari Tidwell MD, Last Rate: 2.4 mL/hr at 12/30/22 0720, 0.04 Units/min at 12/30/22 0720    PE  Blood pressure (!) 73/47, pulse (!) 113, temperature 97.4 °F (36.3 °C), resp. rate (!) 24, height 6' (1.829 m), weight (!) 164.7 kg (363 lb), SpO2 99 %.  CONSTITUTIONAL:  Intubated.  On cooling blanket.  NECK:  Thick.  CARDIOVASCULAR:  Tachycardic.    PULMONARY:  Decreased air movement.    ABDOMINAL:  + distention.    MUSCULOSKELETAL:  + trace edema bilat.    LABS  BMP:   Recent Labs   Lab 12/29/22  2238 12/30/22  0311 12/30/22  0821 12/30/22  1612    137 138 132*   K 5.0 5.0 5.2* 6.3*   CO2 19* 19* 13* 14*   BUN 14.9 20.3 22.3 21.8   CREATININE 1.87* 2.72* 3.57* 4.35*   CALCIUM 9.3 8.5 8.2* 7.8*   MG 1.60  --  2.10 2.20   , CBC   Recent Labs   Lab 12/29/22  2238 12/30/22  0311   WBC 8.4 16.2*   HGB 15.4 17.0   HCT 49.9 53.9*    244   , and Troponin   Recent Labs   Lab 12/30/22  0311 12/30/22  1008 12/30/22  1612   TROPONINI 1.561* 4.663* 5.003*       X-Ray: CXR: X-Ray Chest 1 View (CXR):    Results for orders placed or performed during the hospital encounter of 12/29/22   X-Ray Chest 1 View    Narrative    EXAMINATION:  XR CHEST 1 VIEW    CLINICAL HISTORY:  Post central line;    COMPARISON:  12/29/2022    FINDINGS:  Single view of the chest shows normal right IJ catheter placement with its tip over the SVC.  Enteric tube and endotracheal tube are stable.  No pneumothorax.      Impression    No pneumothorax following right IJ catheter placement      Electronically signed by: Waldemar Ortiz MD  Date:    12/30/2022  Time:    07:55    and X-Ray Chest PA and Lateral (CXR): No results found for this visit on 12/29/22.    ECHO  No results found for this or any previous visit.    STRESS TEST  No results found for this or any previous visit.    LHC  No results found for this or any previous visit.      ASSESSMENT  NSTEMI, Afib, HTN, DM, and HLP  EEG showed malignant pattern   Intubated  Hypotension  Secondary cardiac ischemia     PLAN  Medications:  Continue pressors for hypotension  Work up:   LHC is not currently advised.  Recommend conservative management.   EKG reviewed.  Wide complexes are from RBBB.  Patient currently does not have Wide Complex Tachycardia; thus, electrical cardioversion is not indicated

## 2023-01-04 LAB — BACTERIA BLD CULT: NORMAL

## 2023-01-25 LAB
BACTERIA SPT CULT: ABNORMAL
GRAM STN SPEC: ABNORMAL
GRAM STN SPEC: ABNORMAL
MAYO GENERIC ORDERABLE RESULT: NORMAL

## 2023-08-16 LAB
INR PPP: 1.5
INR PPP: 1.5
PROTHROMBIN TIME: 17.5 SECONDS (ref 11.4–14)
PROTHROMBIN TIME: 17.7 SECONDS (ref 11.4–14)